# Patient Record
Sex: MALE | Race: WHITE | NOT HISPANIC OR LATINO
[De-identification: names, ages, dates, MRNs, and addresses within clinical notes are randomized per-mention and may not be internally consistent; named-entity substitution may affect disease eponyms.]

---

## 2017-01-03 ENCOUNTER — APPOINTMENT (OUTPATIENT)
Dept: CARDIOLOGY | Facility: CLINIC | Age: 82
End: 2017-01-03

## 2017-01-03 VITALS — DIASTOLIC BLOOD PRESSURE: 77 MMHG | SYSTOLIC BLOOD PRESSURE: 126 MMHG | HEART RATE: 69 BPM | OXYGEN SATURATION: 98 %

## 2017-01-03 LAB
INR PPP: 3.3 RATIO
POCT-PROTHROMBIN TIME: 39.3 SECS
QUALITY CONTROL: YES

## 2017-01-09 ENCOUNTER — APPOINTMENT (OUTPATIENT)
Dept: CARDIOLOGY | Facility: CLINIC | Age: 82
End: 2017-01-09

## 2017-01-16 ENCOUNTER — APPOINTMENT (OUTPATIENT)
Dept: CARDIOLOGY | Facility: CLINIC | Age: 82
End: 2017-01-16

## 2017-01-16 VITALS — HEART RATE: 75 BPM | OXYGEN SATURATION: 98 %

## 2017-01-16 LAB
INR PPP: 2 RATIO
POCT-PROTHROMBIN TIME: 23.6 SECS
QUALITY CONTROL: YES

## 2017-02-01 ENCOUNTER — APPOINTMENT (OUTPATIENT)
Dept: CARDIOLOGY | Facility: CLINIC | Age: 82
End: 2017-02-01

## 2017-02-01 VITALS — HEART RATE: 60 BPM | SYSTOLIC BLOOD PRESSURE: 144 MMHG | OXYGEN SATURATION: 96 % | DIASTOLIC BLOOD PRESSURE: 85 MMHG

## 2017-02-01 LAB
INR PPP: 2 RATIO
POCT-PROTHROMBIN TIME: 23.5 SECS
QUALITY CONTROL: YES

## 2017-02-15 ENCOUNTER — APPOINTMENT (OUTPATIENT)
Dept: CARDIOLOGY | Facility: CLINIC | Age: 82
End: 2017-02-15

## 2017-02-22 ENCOUNTER — APPOINTMENT (OUTPATIENT)
Dept: CARDIOLOGY | Facility: CLINIC | Age: 82
End: 2017-02-22

## 2017-02-22 VITALS — RESPIRATION RATE: 14 BRPM | HEART RATE: 65 BPM

## 2017-02-22 LAB
INR PPP: 3 RATIO
POCT-PROTHROMBIN TIME: 35.4 SECS
QUALITY CONTROL: YES

## 2017-03-08 ENCOUNTER — APPOINTMENT (OUTPATIENT)
Dept: SURGERY | Facility: CLINIC | Age: 82
End: 2017-03-08

## 2017-03-08 ENCOUNTER — EMERGENCY (EMERGENCY)
Facility: HOSPITAL | Age: 82
LOS: 1 days | Discharge: HOME | End: 2017-03-08
Admitting: EMERGENCY MEDICINE
Payer: MEDICARE

## 2017-03-08 DIAGNOSIS — Z79.01 LONG TERM (CURRENT) USE OF ANTICOAGULANTS: ICD-10-CM

## 2017-03-08 DIAGNOSIS — L02.01 CUTANEOUS ABSCESS OF FACE: ICD-10-CM

## 2017-03-08 PROCEDURE — 99284 EMERGENCY DEPT VISIT MOD MDM: CPT | Mod: 25

## 2017-03-08 PROCEDURE — 80048 BASIC METABOLIC PNL TOTAL CA: CPT

## 2017-03-08 PROCEDURE — 10021 FNA BX W/O IMG GDN 1ST LES: CPT

## 2017-03-08 PROCEDURE — 85610 PROTHROMBIN TIME: CPT

## 2017-03-08 PROCEDURE — 85027 COMPLETE CBC AUTOMATED: CPT

## 2017-03-08 PROCEDURE — 85730 THROMBOPLASTIN TIME PARTIAL: CPT

## 2017-03-08 PROCEDURE — 96374 THER/PROPH/DIAG INJ IV PUSH: CPT | Mod: XU

## 2017-03-13 ENCOUNTER — APPOINTMENT (OUTPATIENT)
Dept: CARDIOLOGY | Facility: CLINIC | Age: 82
End: 2017-03-13

## 2017-03-13 VITALS — OXYGEN SATURATION: 98 % | HEART RATE: 56 BPM

## 2017-03-13 LAB
INR PPP: 1.9 RATIO
POCT-PROTHROMBIN TIME: 22.2 SECS
QUALITY CONTROL: YES

## 2017-03-20 ENCOUNTER — APPOINTMENT (OUTPATIENT)
Dept: CARDIOLOGY | Facility: CLINIC | Age: 82
End: 2017-03-20

## 2017-03-20 VITALS — OXYGEN SATURATION: 97 % | RESPIRATION RATE: 14 BRPM

## 2017-03-20 LAB
INR PPP: 1.9 RATIO
POCT-PROTHROMBIN TIME: 22.3 SECS
QUALITY CONTROL: YES

## 2017-03-22 ENCOUNTER — LABORATORY RESULT (OUTPATIENT)
Age: 82
End: 2017-03-22

## 2017-03-27 ENCOUNTER — APPOINTMENT (OUTPATIENT)
Dept: CARDIOLOGY | Facility: CLINIC | Age: 82
End: 2017-03-27

## 2017-03-27 VITALS — OXYGEN SATURATION: 96 % | HEART RATE: 57 BPM

## 2017-03-28 LAB
INR PPP: 1.6 RATIO
POCT-PROTHROMBIN TIME: 18.7 SECS
QUALITY CONTROL: YES

## 2017-04-03 ENCOUNTER — APPOINTMENT (OUTPATIENT)
Dept: CARDIOLOGY | Facility: CLINIC | Age: 82
End: 2017-04-03

## 2017-04-03 VITALS — OXYGEN SATURATION: 95 % | HEART RATE: 72 BPM

## 2017-04-03 LAB
INR PPP: 1.6 RATIO
POCT-PROTHROMBIN TIME: 18.9 SECS
QUALITY CONTROL: YES

## 2017-04-10 ENCOUNTER — APPOINTMENT (OUTPATIENT)
Dept: CARDIOLOGY | Facility: CLINIC | Age: 82
End: 2017-04-10

## 2017-04-10 LAB
INR PPP: 1.6 RATIO
POCT-PROTHROMBIN TIME: 19.6 SECS
QUALITY CONTROL: YES

## 2017-04-19 ENCOUNTER — APPOINTMENT (OUTPATIENT)
Dept: CARDIOLOGY | Facility: CLINIC | Age: 82
End: 2017-04-19

## 2017-04-19 VITALS — HEART RATE: 62 BPM | OXYGEN SATURATION: 96 %

## 2017-04-19 LAB
INR PPP: 3.1 RATIO
POCT-PROTHROMBIN TIME: 37.7 SECS
QUALITY CONTROL: YES

## 2017-04-24 ENCOUNTER — NON-APPOINTMENT (OUTPATIENT)
Age: 82
End: 2017-04-24

## 2017-04-24 ENCOUNTER — APPOINTMENT (OUTPATIENT)
Dept: CARDIOLOGY | Facility: CLINIC | Age: 82
End: 2017-04-24

## 2017-04-24 VITALS
BODY MASS INDEX: 23.54 KG/M2 | DIASTOLIC BLOOD PRESSURE: 73 MMHG | HEART RATE: 66 BPM | HEIGHT: 67 IN | OXYGEN SATURATION: 97 % | WEIGHT: 150 LBS | RESPIRATION RATE: 16 BRPM | SYSTOLIC BLOOD PRESSURE: 163 MMHG

## 2017-04-24 VITALS — DIASTOLIC BLOOD PRESSURE: 70 MMHG | SYSTOLIC BLOOD PRESSURE: 142 MMHG

## 2017-05-14 ENCOUNTER — RX RENEWAL (OUTPATIENT)
Age: 82
End: 2017-05-14

## 2017-05-15 ENCOUNTER — APPOINTMENT (OUTPATIENT)
Dept: CARDIOLOGY | Facility: CLINIC | Age: 82
End: 2017-05-15

## 2017-05-15 ENCOUNTER — RX RENEWAL (OUTPATIENT)
Age: 82
End: 2017-05-15

## 2017-05-15 VITALS — HEART RATE: 74 BPM | OXYGEN SATURATION: 96 %

## 2017-05-15 LAB
INR PPP: 3.6 RATIO
POCT-PROTHROMBIN TIME: 42.9 SECS
QUALITY CONTROL: YES

## 2017-05-22 ENCOUNTER — APPOINTMENT (OUTPATIENT)
Dept: CARDIOLOGY | Facility: CLINIC | Age: 82
End: 2017-05-22

## 2017-05-22 VITALS — HEART RATE: 65 BPM | OXYGEN SATURATION: 97 %

## 2017-05-22 LAB
INR PPP: 2.5 RATIO
POCT-PROTHROMBIN TIME: 30.4 SECS
QUALITY CONTROL: YES

## 2017-06-12 ENCOUNTER — APPOINTMENT (OUTPATIENT)
Dept: CARDIOLOGY | Facility: CLINIC | Age: 82
End: 2017-06-12

## 2017-06-12 VITALS — RESPIRATION RATE: 14 BRPM | OXYGEN SATURATION: 97 %

## 2017-06-12 LAB
INR PPP: 3.1 RATIO
POCT-PROTHROMBIN TIME: 37 SECS
QUALITY CONTROL: YES

## 2017-07-03 ENCOUNTER — RX RENEWAL (OUTPATIENT)
Age: 82
End: 2017-07-03

## 2017-07-03 ENCOUNTER — APPOINTMENT (OUTPATIENT)
Dept: CARDIOLOGY | Facility: CLINIC | Age: 82
End: 2017-07-03

## 2017-07-05 LAB
INR PPP: 3.5 RATIO
POCT-PROTHROMBIN TIME: 41.7 SECS
QUALITY CONTROL: YES

## 2017-07-09 ENCOUNTER — RX RENEWAL (OUTPATIENT)
Age: 82
End: 2017-07-09

## 2017-07-24 ENCOUNTER — APPOINTMENT (OUTPATIENT)
Dept: CARDIOLOGY | Facility: CLINIC | Age: 82
End: 2017-07-24

## 2017-07-25 LAB
INR PPP: 2.9 RATIO
POCT-PROTHROMBIN TIME: 35 SECS
QUALITY CONTROL: YES

## 2017-08-14 ENCOUNTER — APPOINTMENT (OUTPATIENT)
Dept: CARDIOLOGY | Facility: CLINIC | Age: 82
End: 2017-08-14
Payer: MEDICARE

## 2017-08-14 ENCOUNTER — APPOINTMENT (OUTPATIENT)
Dept: CARDIOLOGY | Facility: CLINIC | Age: 82
End: 2017-08-14

## 2017-08-14 VITALS — RESPIRATION RATE: 14 BRPM | OXYGEN SATURATION: 97 %

## 2017-08-14 LAB
INR PPP: 3 RATIO
POCT-PROTHROMBIN TIME: 36.4 SECS
QUALITY CONTROL: YES

## 2017-08-14 PROCEDURE — 85610 PROTHROMBIN TIME: CPT | Mod: QW

## 2017-08-14 PROCEDURE — 99211 OFF/OP EST MAY X REQ PHY/QHP: CPT

## 2017-09-05 ENCOUNTER — RX RENEWAL (OUTPATIENT)
Age: 82
End: 2017-09-05

## 2017-09-10 ENCOUNTER — RESULT CHARGE (OUTPATIENT)
Age: 82
End: 2017-09-10

## 2017-09-11 ENCOUNTER — APPOINTMENT (OUTPATIENT)
Dept: CARDIOLOGY | Facility: CLINIC | Age: 82
End: 2017-09-11
Payer: MEDICARE

## 2017-09-11 ENCOUNTER — NON-APPOINTMENT (OUTPATIENT)
Age: 82
End: 2017-09-11

## 2017-09-11 VITALS
BODY MASS INDEX: 23.54 KG/M2 | HEART RATE: 58 BPM | SYSTOLIC BLOOD PRESSURE: 161 MMHG | HEIGHT: 67 IN | WEIGHT: 150 LBS | RESPIRATION RATE: 16 BRPM | OXYGEN SATURATION: 96 % | DIASTOLIC BLOOD PRESSURE: 72 MMHG

## 2017-09-11 VITALS — DIASTOLIC BLOOD PRESSURE: 72 MMHG | SYSTOLIC BLOOD PRESSURE: 124 MMHG

## 2017-09-11 LAB
INR PPP: 3.5 RATIO
POCT-PROTHROMBIN TIME: 41.6 SECS
QUALITY CONTROL: YES

## 2017-09-11 PROCEDURE — 99214 OFFICE O/P EST MOD 30 MIN: CPT

## 2017-09-11 PROCEDURE — 85610 PROTHROMBIN TIME: CPT | Mod: QW

## 2017-09-11 PROCEDURE — 93000 ELECTROCARDIOGRAM COMPLETE: CPT

## 2017-10-02 ENCOUNTER — APPOINTMENT (OUTPATIENT)
Dept: CARDIOLOGY | Facility: CLINIC | Age: 82
End: 2017-10-02

## 2017-10-02 ENCOUNTER — RX RENEWAL (OUTPATIENT)
Age: 82
End: 2017-10-02

## 2017-10-04 ENCOUNTER — APPOINTMENT (OUTPATIENT)
Age: 82
End: 2017-10-04
Payer: MEDICARE

## 2017-10-04 LAB
INR PPP: 3.4 RATIO
POCT-PROTHROMBIN TIME: 40.7 SECS
QUALITY CONTROL: YES

## 2017-10-04 PROCEDURE — 99211 OFF/OP EST MAY X REQ PHY/QHP: CPT

## 2017-10-04 PROCEDURE — 85610 PROTHROMBIN TIME: CPT | Mod: QW

## 2017-10-08 ENCOUNTER — RX RENEWAL (OUTPATIENT)
Age: 82
End: 2017-10-08

## 2017-10-23 ENCOUNTER — APPOINTMENT (OUTPATIENT)
Dept: CARDIOLOGY | Facility: CLINIC | Age: 82
End: 2017-10-23
Payer: MEDICARE

## 2017-10-23 LAB
INR PPP: 3.2 RATIO
POCT-PROTHROMBIN TIME: 38.5 SECS
QUALITY CONTROL: YES

## 2017-10-23 PROCEDURE — 85610 PROTHROMBIN TIME: CPT | Mod: QW

## 2017-10-23 PROCEDURE — 99211 OFF/OP EST MAY X REQ PHY/QHP: CPT

## 2017-11-01 ENCOUNTER — RX RENEWAL (OUTPATIENT)
Age: 82
End: 2017-11-01

## 2017-11-04 ENCOUNTER — RX RENEWAL (OUTPATIENT)
Age: 82
End: 2017-11-04

## 2017-11-13 ENCOUNTER — APPOINTMENT (OUTPATIENT)
Dept: CARDIOLOGY | Facility: CLINIC | Age: 82
End: 2017-11-13
Payer: MEDICARE

## 2017-11-13 VITALS — OXYGEN SATURATION: 96 % | HEART RATE: 64 BPM

## 2017-11-13 LAB
INR PPP: 3.1 RATIO
POCT-PROTHROMBIN TIME: 36.7 SECS
QUALITY CONTROL: YES

## 2017-11-13 PROCEDURE — 85610 PROTHROMBIN TIME: CPT | Mod: QW

## 2017-11-13 PROCEDURE — 99211 OFF/OP EST MAY X REQ PHY/QHP: CPT

## 2017-11-30 ENCOUNTER — RX RENEWAL (OUTPATIENT)
Age: 82
End: 2017-11-30

## 2017-12-11 ENCOUNTER — APPOINTMENT (OUTPATIENT)
Dept: CARDIOLOGY | Facility: CLINIC | Age: 82
End: 2017-12-11
Payer: MEDICARE

## 2017-12-11 VITALS — HEART RATE: 57 BPM | OXYGEN SATURATION: 97 %

## 2017-12-11 LAB
INR PPP: 4.2 RATIO
POCT-PROTHROMBIN TIME: 50.1 SECS
QUALITY CONTROL: YES

## 2017-12-11 PROCEDURE — 99211 OFF/OP EST MAY X REQ PHY/QHP: CPT

## 2017-12-11 PROCEDURE — 85610 PROTHROMBIN TIME: CPT | Mod: QW

## 2017-12-26 ENCOUNTER — APPOINTMENT (OUTPATIENT)
Dept: CARDIOLOGY | Facility: CLINIC | Age: 82
End: 2017-12-26
Payer: MEDICARE

## 2017-12-26 VITALS — OXYGEN SATURATION: 95 % | HEART RATE: 74 BPM

## 2017-12-26 LAB
INR PPP: 2.4 RATIO
POCT-PROTHROMBIN TIME: 29.2 SECS
QUALITY CONTROL: YES

## 2017-12-26 PROCEDURE — 99211 OFF/OP EST MAY X REQ PHY/QHP: CPT

## 2017-12-26 PROCEDURE — 85610 PROTHROMBIN TIME: CPT | Mod: QW

## 2018-01-02 ENCOUNTER — RX RENEWAL (OUTPATIENT)
Age: 83
End: 2018-01-02

## 2018-01-08 ENCOUNTER — APPOINTMENT (OUTPATIENT)
Dept: CARDIOLOGY | Facility: CLINIC | Age: 83
End: 2018-01-08

## 2018-01-14 ENCOUNTER — RX RENEWAL (OUTPATIENT)
Age: 83
End: 2018-01-14

## 2018-01-15 ENCOUNTER — APPOINTMENT (OUTPATIENT)
Dept: CARDIOLOGY | Facility: CLINIC | Age: 83
End: 2018-01-15
Payer: MEDICARE

## 2018-01-15 ENCOUNTER — NON-APPOINTMENT (OUTPATIENT)
Age: 83
End: 2018-01-15

## 2018-01-15 VITALS
HEART RATE: 61 BPM | HEIGHT: 67 IN | BODY MASS INDEX: 23.07 KG/M2 | SYSTOLIC BLOOD PRESSURE: 147 MMHG | OXYGEN SATURATION: 98 % | DIASTOLIC BLOOD PRESSURE: 76 MMHG | RESPIRATION RATE: 16 BRPM | WEIGHT: 147 LBS

## 2018-01-15 LAB
INR PPP: 2.3 RATIO
POCT-PROTHROMBIN TIME: 28.1 SECS
QUALITY CONTROL: YES

## 2018-01-15 PROCEDURE — 99214 OFFICE O/P EST MOD 30 MIN: CPT

## 2018-01-15 PROCEDURE — 85610 PROTHROMBIN TIME: CPT | Mod: QW

## 2018-01-15 PROCEDURE — 93000 ELECTROCARDIOGRAM COMPLETE: CPT

## 2018-01-15 PROCEDURE — 93306 TTE W/DOPPLER COMPLETE: CPT

## 2018-01-22 ENCOUNTER — APPOINTMENT (OUTPATIENT)
Dept: CARDIOLOGY | Facility: CLINIC | Age: 83
End: 2018-01-22
Payer: MEDICARE

## 2018-01-22 VITALS — RESPIRATION RATE: 14 BRPM | HEART RATE: 66 BPM

## 2018-01-22 LAB
INR PPP: 3 RATIO
POCT-PROTHROMBIN TIME: 35.9 SECS
QUALITY CONTROL: YES

## 2018-01-22 PROCEDURE — 99211 OFF/OP EST MAY X REQ PHY/QHP: CPT

## 2018-01-22 PROCEDURE — 85610 PROTHROMBIN TIME: CPT | Mod: QW

## 2018-01-27 ENCOUNTER — RX RENEWAL (OUTPATIENT)
Age: 83
End: 2018-01-27

## 2018-02-20 ENCOUNTER — APPOINTMENT (OUTPATIENT)
Dept: CARDIOLOGY | Facility: CLINIC | Age: 83
End: 2018-02-20
Payer: MEDICARE

## 2018-02-20 PROCEDURE — 99211 OFF/OP EST MAY X REQ PHY/QHP: CPT

## 2018-02-20 PROCEDURE — 85610 PROTHROMBIN TIME: CPT | Mod: QW

## 2018-02-22 LAB
INR PPP: 2.5 RATIO
POCT-PROTHROMBIN TIME: 29.7 SECS
QUALITY CONTROL: YES

## 2018-02-25 ENCOUNTER — RX RENEWAL (OUTPATIENT)
Age: 83
End: 2018-02-25

## 2018-03-12 ENCOUNTER — APPOINTMENT (OUTPATIENT)
Dept: CARDIOLOGY | Facility: CLINIC | Age: 83
End: 2018-03-12

## 2018-03-19 ENCOUNTER — APPOINTMENT (OUTPATIENT)
Dept: CARDIOLOGY | Facility: CLINIC | Age: 83
End: 2018-03-19
Payer: MEDICARE

## 2018-03-19 VITALS — OXYGEN SATURATION: 95 % | HEART RATE: 66 BPM

## 2018-03-19 LAB
INR PPP: 2.4 RATIO
POCT-PROTHROMBIN TIME: 29.4 SECS
QUALITY CONTROL: NO

## 2018-03-19 PROCEDURE — 85610 PROTHROMBIN TIME: CPT | Mod: QW

## 2018-03-19 PROCEDURE — 99211 OFF/OP EST MAY X REQ PHY/QHP: CPT

## 2018-03-25 ENCOUNTER — RX RENEWAL (OUTPATIENT)
Age: 83
End: 2018-03-25

## 2018-04-08 ENCOUNTER — RX RENEWAL (OUTPATIENT)
Age: 83
End: 2018-04-08

## 2018-04-09 ENCOUNTER — APPOINTMENT (OUTPATIENT)
Dept: CARDIOLOGY | Facility: CLINIC | Age: 83
End: 2018-04-09
Payer: MEDICARE

## 2018-04-09 VITALS — HEART RATE: 60 BPM | OXYGEN SATURATION: 94 %

## 2018-04-09 LAB
INR PPP: 1.8 RATIO
POCT-PROTHROMBIN TIME: 21.3 SECS
QUALITY CONTROL: YES

## 2018-04-09 PROCEDURE — 85610 PROTHROMBIN TIME: CPT | Mod: QW

## 2018-04-09 PROCEDURE — 99211 OFF/OP EST MAY X REQ PHY/QHP: CPT

## 2018-04-16 ENCOUNTER — APPOINTMENT (OUTPATIENT)
Dept: CARDIOLOGY | Facility: CLINIC | Age: 83
End: 2018-04-16
Payer: MEDICARE

## 2018-04-16 LAB
INR PPP: 4.8 RATIO
POCT-PROTHROMBIN TIME: 57.8 SECS
QUALITY CONTROL: YES

## 2018-04-16 PROCEDURE — 99211 OFF/OP EST MAY X REQ PHY/QHP: CPT

## 2018-04-16 PROCEDURE — 85610 PROTHROMBIN TIME: CPT | Mod: QW

## 2018-04-23 ENCOUNTER — APPOINTMENT (OUTPATIENT)
Dept: CARDIOLOGY | Facility: CLINIC | Age: 83
End: 2018-04-23
Payer: MEDICARE

## 2018-04-23 VITALS — HEART RATE: 76 BPM | OXYGEN SATURATION: 96 %

## 2018-04-23 LAB
INR PPP: 6.5 RATIO
POCT-PROTHROMBIN TIME: 77.5 SECS
QUALITY CONTROL: YES

## 2018-04-23 PROCEDURE — 85610 PROTHROMBIN TIME: CPT | Mod: QW

## 2018-04-23 PROCEDURE — 99211 OFF/OP EST MAY X REQ PHY/QHP: CPT

## 2018-04-26 ENCOUNTER — APPOINTMENT (OUTPATIENT)
Dept: CARDIOLOGY | Facility: CLINIC | Age: 83
End: 2018-04-26
Payer: MEDICARE

## 2018-04-26 LAB
INR PPP: 1.6 RATIO
POCT-PROTHROMBIN TIME: 19.1 SECS
QUALITY CONTROL: YES

## 2018-04-26 PROCEDURE — 85610 PROTHROMBIN TIME: CPT | Mod: QW

## 2018-04-26 PROCEDURE — 99211 OFF/OP EST MAY X REQ PHY/QHP: CPT

## 2018-04-30 ENCOUNTER — LABORATORY RESULT (OUTPATIENT)
Age: 83
End: 2018-04-30

## 2018-05-14 ENCOUNTER — APPOINTMENT (OUTPATIENT)
Dept: CARDIOLOGY | Facility: CLINIC | Age: 83
End: 2018-05-14
Payer: MEDICARE

## 2018-05-14 ENCOUNTER — NON-APPOINTMENT (OUTPATIENT)
Age: 83
End: 2018-05-14

## 2018-05-14 VITALS
SYSTOLIC BLOOD PRESSURE: 106 MMHG | BODY MASS INDEX: 22.44 KG/M2 | HEIGHT: 67 IN | HEART RATE: 61 BPM | DIASTOLIC BLOOD PRESSURE: 64 MMHG | OXYGEN SATURATION: 96 % | WEIGHT: 143 LBS | RESPIRATION RATE: 16 BRPM

## 2018-05-14 LAB
INR PPP: 2 RATIO
POCT-PROTHROMBIN TIME: 24 SECS
QUALITY CONTROL: YES

## 2018-05-14 PROCEDURE — 85610 PROTHROMBIN TIME: CPT | Mod: QW

## 2018-05-14 PROCEDURE — 93000 ELECTROCARDIOGRAM COMPLETE: CPT

## 2018-05-14 PROCEDURE — 99214 OFFICE O/P EST MOD 30 MIN: CPT

## 2018-05-20 ENCOUNTER — RX RENEWAL (OUTPATIENT)
Age: 83
End: 2018-05-20

## 2018-05-21 ENCOUNTER — MEDICATION RENEWAL (OUTPATIENT)
Age: 83
End: 2018-05-21

## 2018-05-22 ENCOUNTER — APPOINTMENT (OUTPATIENT)
Dept: CARDIOLOGY | Facility: CLINIC | Age: 83
End: 2018-05-22
Payer: MEDICARE

## 2018-05-22 LAB
INR PPP: 5.3 RATIO
POCT-PROTHROMBIN TIME: 63.8 SECS
QUALITY CONTROL: YES

## 2018-05-22 PROCEDURE — 85610 PROTHROMBIN TIME: CPT | Mod: QW

## 2018-05-22 PROCEDURE — 99211 OFF/OP EST MAY X REQ PHY/QHP: CPT

## 2018-05-25 ENCOUNTER — APPOINTMENT (OUTPATIENT)
Dept: CARDIOLOGY | Facility: CLINIC | Age: 83
End: 2018-05-25
Payer: MEDICARE

## 2018-05-25 LAB
INR PPP: 2.6 RATIO
POCT-PROTHROMBIN TIME: 30.7 SECS
QUALITY CONTROL: YES

## 2018-05-25 PROCEDURE — 99211 OFF/OP EST MAY X REQ PHY/QHP: CPT

## 2018-05-25 PROCEDURE — 85610 PROTHROMBIN TIME: CPT | Mod: QW

## 2018-05-29 ENCOUNTER — RX RENEWAL (OUTPATIENT)
Age: 83
End: 2018-05-29

## 2018-06-11 ENCOUNTER — APPOINTMENT (OUTPATIENT)
Dept: CARDIOLOGY | Facility: CLINIC | Age: 83
End: 2018-06-11
Payer: MEDICARE

## 2018-06-11 PROCEDURE — 85610 PROTHROMBIN TIME: CPT | Mod: QW

## 2018-06-11 PROCEDURE — 99211 OFF/OP EST MAY X REQ PHY/QHP: CPT

## 2018-06-14 ENCOUNTER — APPOINTMENT (OUTPATIENT)
Dept: CARDIOLOGY | Facility: CLINIC | Age: 83
End: 2018-06-14
Payer: MEDICARE

## 2018-06-14 LAB
INR PPP: 2.1 RATIO
POCT-PROTHROMBIN TIME: 24.6 SECS
QUALITY CONTROL: NO

## 2018-06-14 PROCEDURE — 85610 PROTHROMBIN TIME: CPT | Mod: QW

## 2018-06-14 PROCEDURE — 99211 OFF/OP EST MAY X REQ PHY/QHP: CPT

## 2018-06-19 LAB
INR PPP: 2.6 RATIO
POCT-PROTHROMBIN TIME: 67.2 SECS
QUALITY CONTROL: YES

## 2018-06-25 ENCOUNTER — APPOINTMENT (OUTPATIENT)
Dept: CARDIOLOGY | Facility: CLINIC | Age: 83
End: 2018-06-25
Payer: MEDICARE

## 2018-06-25 VITALS — HEART RATE: 74 BPM | RESPIRATION RATE: 16 BRPM

## 2018-06-25 LAB
INR PPP: 4.7 RATIO
POCT-PROTHROMBIN TIME: 56.2 SECS
QUALITY CONTROL: YES

## 2018-06-25 PROCEDURE — 93793 ANTICOAG MGMT PT WARFARIN: CPT

## 2018-06-25 PROCEDURE — 85610 PROTHROMBIN TIME: CPT | Mod: QW

## 2018-07-01 ENCOUNTER — RX RENEWAL (OUTPATIENT)
Age: 83
End: 2018-07-01

## 2018-07-02 ENCOUNTER — APPOINTMENT (OUTPATIENT)
Dept: CARDIOLOGY | Facility: CLINIC | Age: 83
End: 2018-07-02
Payer: MEDICARE

## 2018-07-02 VITALS — HEART RATE: 63 BPM | OXYGEN SATURATION: 96 %

## 2018-07-02 LAB
INR PPP: 2.2 RATIO
POCT-PROTHROMBIN TIME: 26.2 SECS
QUALITY CONTROL: YES

## 2018-07-02 PROCEDURE — 93793 ANTICOAG MGMT PT WARFARIN: CPT

## 2018-07-02 PROCEDURE — 85610 PROTHROMBIN TIME: CPT | Mod: QW

## 2018-07-16 ENCOUNTER — APPOINTMENT (OUTPATIENT)
Dept: CARDIOLOGY | Facility: CLINIC | Age: 83
End: 2018-07-16
Payer: MEDICARE

## 2018-07-16 VITALS — OXYGEN SATURATION: 96 % | RESPIRATION RATE: 16 BRPM

## 2018-07-16 LAB
INR PPP: 4 RATIO
POCT-PROTHROMBIN TIME: 48.5 SECS
QUALITY CONTROL: YES

## 2018-07-16 PROCEDURE — 93793 ANTICOAG MGMT PT WARFARIN: CPT

## 2018-07-16 PROCEDURE — 85610 PROTHROMBIN TIME: CPT | Mod: QW

## 2018-07-22 ENCOUNTER — RX RENEWAL (OUTPATIENT)
Age: 83
End: 2018-07-22

## 2018-07-30 ENCOUNTER — APPOINTMENT (OUTPATIENT)
Dept: CARDIOLOGY | Facility: CLINIC | Age: 83
End: 2018-07-30
Payer: MEDICARE

## 2018-07-30 VITALS — OXYGEN SATURATION: 97 % | DIASTOLIC BLOOD PRESSURE: 74 MMHG | SYSTOLIC BLOOD PRESSURE: 158 MMHG | HEART RATE: 78 BPM

## 2018-07-30 LAB
INR PPP: 3.2 RATIO
POCT-PROTHROMBIN TIME: 38.7 SECS
QUALITY CONTROL: YES

## 2018-07-30 PROCEDURE — 93793 ANTICOAG MGMT PT WARFARIN: CPT

## 2018-07-30 PROCEDURE — 85610 PROTHROMBIN TIME: CPT | Mod: QW

## 2018-08-20 ENCOUNTER — APPOINTMENT (OUTPATIENT)
Dept: CARDIOLOGY | Facility: CLINIC | Age: 83
End: 2018-08-20
Payer: MEDICARE

## 2018-08-20 VITALS — HEART RATE: 59 BPM | RESPIRATION RATE: 16 BRPM

## 2018-08-20 LAB
INR PPP: 3.9 RATIO
POCT-PROTHROMBIN TIME: 46.7 SECS
QUALITY CONTROL: YES

## 2018-08-20 PROCEDURE — 93793 ANTICOAG MGMT PT WARFARIN: CPT

## 2018-08-20 PROCEDURE — 85610 PROTHROMBIN TIME: CPT | Mod: QW

## 2018-09-04 ENCOUNTER — APPOINTMENT (OUTPATIENT)
Dept: CARDIOLOGY | Facility: CLINIC | Age: 83
End: 2018-09-04
Payer: MEDICARE

## 2018-09-04 VITALS — OXYGEN SATURATION: 97 % | HEART RATE: 71 BPM

## 2018-09-04 LAB
INR PPP: 1.9 RATIO
POCT-PROTHROMBIN TIME: 22.2 SECS
QUALITY CONTROL: YES

## 2018-09-04 PROCEDURE — 85610 PROTHROMBIN TIME: CPT | Mod: QW

## 2018-09-04 PROCEDURE — 93793 ANTICOAG MGMT PT WARFARIN: CPT

## 2018-09-16 ENCOUNTER — RX RENEWAL (OUTPATIENT)
Age: 83
End: 2018-09-16

## 2018-09-17 ENCOUNTER — APPOINTMENT (OUTPATIENT)
Dept: CARDIOLOGY | Facility: CLINIC | Age: 83
End: 2018-09-17
Payer: MEDICARE

## 2018-09-17 ENCOUNTER — NON-APPOINTMENT (OUTPATIENT)
Age: 83
End: 2018-09-17

## 2018-09-17 VITALS
RESPIRATION RATE: 15 BRPM | OXYGEN SATURATION: 97 % | WEIGHT: 144 LBS | HEIGHT: 67 IN | BODY MASS INDEX: 22.6 KG/M2 | SYSTOLIC BLOOD PRESSURE: 126 MMHG | DIASTOLIC BLOOD PRESSURE: 64 MMHG | HEART RATE: 62 BPM

## 2018-09-17 LAB
INR PPP: 3.7 RATIO
POCT-PROTHROMBIN TIME: 44.5 SECS
QUALITY CONTROL: YES

## 2018-09-17 PROCEDURE — 99214 OFFICE O/P EST MOD 30 MIN: CPT | Mod: 25

## 2018-09-17 PROCEDURE — 93000 ELECTROCARDIOGRAM COMPLETE: CPT

## 2018-09-17 PROCEDURE — 90662 IIV NO PRSV INCREASED AG IM: CPT

## 2018-09-17 PROCEDURE — 85610 PROTHROMBIN TIME: CPT | Mod: QW

## 2018-09-17 PROCEDURE — G0008: CPT

## 2018-09-18 ENCOUNTER — RX RENEWAL (OUTPATIENT)
Age: 83
End: 2018-09-18

## 2018-09-26 ENCOUNTER — APPOINTMENT (OUTPATIENT)
Dept: CARDIOLOGY | Facility: CLINIC | Age: 83
End: 2018-09-26
Payer: MEDICARE

## 2018-09-26 LAB
INR PPP: 3.4 RATIO
POCT-PROTHROMBIN TIME: 40.5 SECS
QUALITY CONTROL: YES

## 2018-09-26 PROCEDURE — 93793 ANTICOAG MGMT PT WARFARIN: CPT

## 2018-09-26 PROCEDURE — 85610 PROTHROMBIN TIME: CPT | Mod: QW

## 2018-10-15 ENCOUNTER — APPOINTMENT (OUTPATIENT)
Dept: CARDIOLOGY | Facility: CLINIC | Age: 83
End: 2018-10-15
Payer: MEDICARE

## 2018-10-15 ENCOUNTER — RX RENEWAL (OUTPATIENT)
Age: 83
End: 2018-10-15

## 2018-10-15 VITALS — HEART RATE: 95 BPM | RESPIRATION RATE: 16 BRPM

## 2018-10-15 LAB
INR PPP: 5.8 RATIO
POCT-PROTHROMBIN TIME: 69.9 SECS
QUALITY CONTROL: YES

## 2018-10-15 PROCEDURE — 93793 ANTICOAG MGMT PT WARFARIN: CPT

## 2018-10-15 PROCEDURE — 85610 PROTHROMBIN TIME: CPT | Mod: QW

## 2018-10-18 ENCOUNTER — APPOINTMENT (OUTPATIENT)
Dept: CARDIOLOGY | Facility: CLINIC | Age: 83
End: 2018-10-18
Payer: MEDICARE

## 2018-10-18 LAB
INR PPP: 1.6 RATIO
POCT-PROTHROMBIN TIME: 18.6 SECS
QUALITY CONTROL: YES

## 2018-10-18 PROCEDURE — 85610 PROTHROMBIN TIME: CPT | Mod: QW

## 2018-10-18 PROCEDURE — 93793 ANTICOAG MGMT PT WARFARIN: CPT

## 2018-10-25 ENCOUNTER — APPOINTMENT (OUTPATIENT)
Dept: CARDIOLOGY | Facility: CLINIC | Age: 83
End: 2018-10-25
Payer: MEDICARE

## 2018-10-25 LAB
INR PPP: 1.9 RATIO
POCT-PROTHROMBIN TIME: 22.7 SECS
QUALITY CONTROL: YES

## 2018-10-25 PROCEDURE — 93793 ANTICOAG MGMT PT WARFARIN: CPT

## 2018-10-25 PROCEDURE — 85610 PROTHROMBIN TIME: CPT | Mod: QW

## 2018-11-01 ENCOUNTER — APPOINTMENT (OUTPATIENT)
Dept: CARDIOLOGY | Facility: CLINIC | Age: 83
End: 2018-11-01
Payer: MEDICARE

## 2018-11-01 LAB
INR PPP: 2.7 RATIO
POCT-PROTHROMBIN TIME: 31.9 SECS
QUALITY CONTROL: YES

## 2018-11-01 PROCEDURE — 85610 PROTHROMBIN TIME: CPT | Mod: QW

## 2018-11-01 PROCEDURE — 93793 ANTICOAG MGMT PT WARFARIN: CPT

## 2018-11-19 ENCOUNTER — APPOINTMENT (OUTPATIENT)
Dept: CARDIOLOGY | Facility: CLINIC | Age: 83
End: 2018-11-19
Payer: MEDICARE

## 2018-11-19 VITALS — OXYGEN SATURATION: 98 % | HEART RATE: 65 BPM

## 2018-11-19 LAB
INR PPP: 3.6 RATIO
POCT-PROTHROMBIN TIME: 43.6 SECS
QUALITY CONTROL: NO

## 2018-11-19 PROCEDURE — 85610 PROTHROMBIN TIME: CPT | Mod: QW

## 2018-11-19 PROCEDURE — 93793 ANTICOAG MGMT PT WARFARIN: CPT

## 2018-12-05 ENCOUNTER — RX RENEWAL (OUTPATIENT)
Age: 83
End: 2018-12-05

## 2018-12-10 ENCOUNTER — APPOINTMENT (OUTPATIENT)
Dept: CARDIOLOGY | Facility: CLINIC | Age: 83
End: 2018-12-10

## 2018-12-10 ENCOUNTER — APPOINTMENT (OUTPATIENT)
Dept: CARDIOLOGY | Facility: CLINIC | Age: 83
End: 2018-12-10
Payer: MEDICARE

## 2018-12-10 VITALS — SYSTOLIC BLOOD PRESSURE: 138 MMHG | OXYGEN SATURATION: 95 % | DIASTOLIC BLOOD PRESSURE: 82 MMHG | HEART RATE: 60 BPM

## 2018-12-10 LAB
INR PPP: 4.1 RATIO
POCT-PROTHROMBIN TIME: 49.1 SECS
QUALITY CONTROL: YES

## 2018-12-10 PROCEDURE — 85610 PROTHROMBIN TIME: CPT | Mod: QW

## 2018-12-10 PROCEDURE — G0250: CPT

## 2018-12-17 ENCOUNTER — APPOINTMENT (OUTPATIENT)
Dept: CARDIOLOGY | Facility: CLINIC | Age: 83
End: 2018-12-17
Payer: MEDICARE

## 2018-12-17 VITALS — OXYGEN SATURATION: 100 % | HEART RATE: 64 BPM

## 2018-12-17 LAB
INR PPP: 3.3 RATIO
POCT-PROTHROMBIN TIME: 39.5 SECS
QUALITY CONTROL: YES

## 2018-12-17 PROCEDURE — 93793 ANTICOAG MGMT PT WARFARIN: CPT

## 2018-12-17 PROCEDURE — 85610 PROTHROMBIN TIME: CPT | Mod: QW

## 2018-12-31 ENCOUNTER — APPOINTMENT (OUTPATIENT)
Dept: CARDIOLOGY | Facility: CLINIC | Age: 83
End: 2018-12-31
Payer: MEDICARE

## 2018-12-31 VITALS — OXYGEN SATURATION: 97 % | DIASTOLIC BLOOD PRESSURE: 82 MMHG | SYSTOLIC BLOOD PRESSURE: 129 MMHG | HEART RATE: 74 BPM

## 2018-12-31 LAB
INR PPP: 4.1 RATIO
POCT-PROTHROMBIN TIME: 49.5 SECS
QUALITY CONTROL: YES

## 2018-12-31 PROCEDURE — 93793 ANTICOAG MGMT PT WARFARIN: CPT

## 2018-12-31 PROCEDURE — 85610 PROTHROMBIN TIME: CPT | Mod: QW

## 2019-01-02 ENCOUNTER — RX RENEWAL (OUTPATIENT)
Age: 84
End: 2019-01-02

## 2019-01-07 ENCOUNTER — APPOINTMENT (OUTPATIENT)
Dept: CARDIOLOGY | Facility: CLINIC | Age: 84
End: 2019-01-07
Payer: MEDICARE

## 2019-01-07 PROCEDURE — 93793 ANTICOAG MGMT PT WARFARIN: CPT

## 2019-01-07 PROCEDURE — 85610 PROTHROMBIN TIME: CPT | Mod: QW

## 2019-01-08 LAB
INR PPP: 3.8 RATIO
QUALITY CONTROL: YES

## 2019-01-14 ENCOUNTER — APPOINTMENT (OUTPATIENT)
Dept: CARDIOLOGY | Facility: CLINIC | Age: 84
End: 2019-01-14
Payer: MEDICARE

## 2019-01-14 PROCEDURE — 93306 TTE W/DOPPLER COMPLETE: CPT

## 2019-01-14 PROCEDURE — 93793 ANTICOAG MGMT PT WARFARIN: CPT

## 2019-01-14 PROCEDURE — 85610 PROTHROMBIN TIME: CPT | Mod: QW

## 2019-01-15 LAB
INR PPP: 2.3 RATIO
POCT-PROTHROMBIN TIME: 27.3 SECS
QUALITY CONTROL: YES

## 2019-01-16 ENCOUNTER — RX RENEWAL (OUTPATIENT)
Age: 84
End: 2019-01-16

## 2019-01-17 ENCOUNTER — APPOINTMENT (OUTPATIENT)
Dept: CARDIOLOGY | Facility: CLINIC | Age: 84
End: 2019-01-17

## 2019-01-28 ENCOUNTER — APPOINTMENT (OUTPATIENT)
Dept: CARDIOLOGY | Facility: CLINIC | Age: 84
End: 2019-01-28
Payer: MEDICARE

## 2019-01-28 ENCOUNTER — NON-APPOINTMENT (OUTPATIENT)
Age: 84
End: 2019-01-28

## 2019-01-28 VITALS — SYSTOLIC BLOOD PRESSURE: 156 MMHG | DIASTOLIC BLOOD PRESSURE: 69 MMHG

## 2019-01-28 VITALS
HEART RATE: 64 BPM | OXYGEN SATURATION: 97 % | RESPIRATION RATE: 16 BRPM | HEIGHT: 67 IN | SYSTOLIC BLOOD PRESSURE: 156 MMHG | BODY MASS INDEX: 22.6 KG/M2 | DIASTOLIC BLOOD PRESSURE: 88 MMHG | WEIGHT: 144 LBS

## 2019-01-28 LAB
INR PPP: 2.2 RATIO
POCT-PROTHROMBIN TIME: 25.9 SECS
QUALITY CONTROL: YES

## 2019-01-28 PROCEDURE — 85610 PROTHROMBIN TIME: CPT | Mod: QW

## 2019-01-28 PROCEDURE — 99214 OFFICE O/P EST MOD 30 MIN: CPT

## 2019-01-28 PROCEDURE — 93000 ELECTROCARDIOGRAM COMPLETE: CPT

## 2019-01-28 NOTE — REVIEW OF SYSTEMS
[Recent Weight Loss (___ Lbs)] : no recent weight loss [Eyeglasses] : currently wearing eyeglasses [Loss Of Hearing] : hearing loss [see HPI] : see HPI [Negative] : Heme/Lymph [FreeTextEntry2] : loss of vision in one eye

## 2019-01-28 NOTE — DISCUSSION/SUMMARY
[FreeTextEntry1] : Discussed with patient. Compliance issues Coumadin instructions given. Monitor blood pressure may need additional antihypertensive therapy. SBE prophylaxis. Lab work ordered prior to next visit in 4 months. Blood pressure may be monitored during his anticoagulation clinic visits as well

## 2019-01-28 NOTE — REASON FOR VISIT
[Hypertension] : hypertension [Medication Management] : Medication management [Prosthetic Valve] : a prosthetic valve [FreeTextEntry1] : \par 86-year-old man with history of mechanical aVR and thoracic aortic aneurysm repair, CLBBB seen for follow.\par \par  Denied any lightheadedness or dizziness, falls, chest pain or dyspnea.\par \par

## 2019-01-28 NOTE — PHYSICAL EXAM
[General Appearance - Well Developed] : well developed [Normal Appearance] : normal appearance [General Appearance - Well Nourished] : well nourished [No Deformities] : no deformities [General Appearance - In No Acute Distress] : no acute distress [Normal Conjunctiva] : the conjunctiva exhibited no abnormalities [Eyelids - No Xanthelasma] : the eyelids demonstrated no xanthelasmas [Normal Oral Mucosa] : normal oral mucosa [No Oral Pallor] : no oral pallor [No Oral Cyanosis] : no oral cyanosis [Respiration, Rhythm And Depth] : normal respiratory rhythm and effort [Exaggerated Use Of Accessory Muscles For Inspiration] : no accessory muscle use [Auscultation Breath Sounds / Voice Sounds] : lungs were clear to auscultation bilaterally [Edema] : no peripheral edema present [FreeTextEntry1] : Prosthetic second sound, good quality, soft systolic murmur, 3/6 [Abdomen Soft] : soft [Abdomen Tenderness] : non-tender [Abdomen Mass (___ Cm)] : no abdominal mass palpated [Abnormal Walk] : normal gait [Gait - Sufficient For Exercise Testing] : the gait was sufficient for exercise testing [Nail Clubbing] : no clubbing of the fingernails [Cyanosis, Localized] : no localized cyanosis [Petechial Hemorrhages (___cm)] : no petechial hemorrhages [] : no ischemic changes [Skin Color & Pigmentation] : normal skin color and pigmentation [No Venous Stasis] : no venous stasis [Affect] : the affect was normal [Mood] : the mood was normal

## 2019-01-28 NOTE — ASSESSMENT
[FreeTextEntry1] : 86-year-old man with history of mechanical aortic valve, history of A. fib LBBB hypertension history of thoracic aneurysm repair. Elevated blood pressure asymptomatic. Patient on oral anticoagulation chronically

## 2019-02-04 ENCOUNTER — APPOINTMENT (OUTPATIENT)
Dept: CARDIOLOGY | Facility: CLINIC | Age: 84
End: 2019-02-04
Payer: MEDICARE

## 2019-02-04 VITALS — RESPIRATION RATE: 16 BRPM | OXYGEN SATURATION: 96 %

## 2019-02-04 LAB
INR PPP: 1.5 RATIO
POCT-PROTHROMBIN TIME: 17.5 SECS
QUALITY CONTROL: YES

## 2019-02-04 PROCEDURE — 85610 PROTHROMBIN TIME: CPT | Mod: QW

## 2019-02-04 PROCEDURE — 99211 OFF/OP EST MAY X REQ PHY/QHP: CPT | Mod: 25

## 2019-02-10 ENCOUNTER — RX RENEWAL (OUTPATIENT)
Age: 84
End: 2019-02-10

## 2019-02-11 ENCOUNTER — APPOINTMENT (OUTPATIENT)
Dept: CARDIOLOGY | Facility: CLINIC | Age: 84
End: 2019-02-11
Payer: MEDICARE

## 2019-02-11 VITALS — OXYGEN SATURATION: 97 % | HEART RATE: 64 BPM

## 2019-02-11 LAB
INR PPP: 3.3 RATIO
POCT-PROTHROMBIN TIME: 40 SECS
QUALITY CONTROL: YES

## 2019-02-11 PROCEDURE — 85610 PROTHROMBIN TIME: CPT | Mod: QW

## 2019-02-11 PROCEDURE — 99211 OFF/OP EST MAY X REQ PHY/QHP: CPT | Mod: 25

## 2019-02-25 ENCOUNTER — EMERGENCY (EMERGENCY)
Facility: HOSPITAL | Age: 84
LOS: 1 days | Discharge: HOME | End: 2019-02-25
Attending: INTERNAL MEDICINE | Admitting: INTERNAL MEDICINE
Payer: MEDICARE

## 2019-02-25 ENCOUNTER — APPOINTMENT (OUTPATIENT)
Dept: CARDIOLOGY | Facility: CLINIC | Age: 84
End: 2019-02-25
Payer: MEDICARE

## 2019-02-25 VITALS
OXYGEN SATURATION: 99 % | HEIGHT: 67 IN | DIASTOLIC BLOOD PRESSURE: 100 MMHG | HEART RATE: 75 BPM | SYSTOLIC BLOOD PRESSURE: 219 MMHG | WEIGHT: 136.03 LBS | RESPIRATION RATE: 17 BRPM | TEMPERATURE: 97 F

## 2019-02-25 VITALS
HEART RATE: 67 BPM | SYSTOLIC BLOOD PRESSURE: 149 MMHG | RESPIRATION RATE: 16 BRPM | OXYGEN SATURATION: 98 % | DIASTOLIC BLOOD PRESSURE: 79 MMHG

## 2019-02-25 LAB
ALBUMIN SERPL ELPH-MCNC: 3.7 G/DL — SIGNIFICANT CHANGE UP (ref 3.3–5)
ALP SERPL-CCNC: 74 U/L — SIGNIFICANT CHANGE UP (ref 40–120)
ALT FLD-CCNC: 22 U/L DA — SIGNIFICANT CHANGE UP (ref 10–45)
ANION GAP SERPL CALC-SCNC: 7 MMOL/L — SIGNIFICANT CHANGE UP (ref 5–17)
APTT BLD: 69.1 SEC — HIGH (ref 27.5–36.3)
AST SERPL-CCNC: 21 U/L — SIGNIFICANT CHANGE UP (ref 10–40)
BASOPHILS # BLD AUTO: 0.1 K/UL — SIGNIFICANT CHANGE UP (ref 0–0.2)
BASOPHILS NFR BLD AUTO: 0.7 % — SIGNIFICANT CHANGE UP (ref 0–2)
BILIRUB SERPL-MCNC: 0.6 MG/DL — SIGNIFICANT CHANGE UP (ref 0.2–1.2)
BUN SERPL-MCNC: 28 MG/DL — HIGH (ref 7–23)
CALCIUM SERPL-MCNC: 9.2 MG/DL — SIGNIFICANT CHANGE UP (ref 8.4–10.5)
CHLORIDE SERPL-SCNC: 104 MMOL/L — SIGNIFICANT CHANGE UP (ref 96–108)
CO2 SERPL-SCNC: 29 MMOL/L — SIGNIFICANT CHANGE UP (ref 22–31)
CREAT SERPL-MCNC: 1.23 MG/DL — SIGNIFICANT CHANGE UP (ref 0.5–1.3)
EOSINOPHIL # BLD AUTO: 0.1 K/UL — SIGNIFICANT CHANGE UP (ref 0–0.5)
EOSINOPHIL NFR BLD AUTO: 2 % — SIGNIFICANT CHANGE UP (ref 0–6)
GLUCOSE SERPL-MCNC: 138 MG/DL — HIGH (ref 70–99)
HCT VFR BLD CALC: 46.2 % — SIGNIFICANT CHANGE UP (ref 39–50)
HGB BLD-MCNC: 15.9 G/DL — SIGNIFICANT CHANGE UP (ref 13–17)
INR BLD: 9.18 RATIO — CRITICAL HIGH (ref 0.88–1.16)
INR PPP: 8 RATIO
LYMPHOCYTES # BLD AUTO: 1.6 K/UL — SIGNIFICANT CHANGE UP (ref 1–3.3)
LYMPHOCYTES # BLD AUTO: 20.7 % — SIGNIFICANT CHANGE UP (ref 13–44)
MCHC RBC-ENTMCNC: 31.1 PG — SIGNIFICANT CHANGE UP (ref 27–34)
MCHC RBC-ENTMCNC: 34.3 GM/DL — SIGNIFICANT CHANGE UP (ref 32–36)
MCV RBC AUTO: 90.7 FL — SIGNIFICANT CHANGE UP (ref 80–100)
MONOCYTES # BLD AUTO: 0.8 K/UL — SIGNIFICANT CHANGE UP (ref 0–0.9)
MONOCYTES NFR BLD AUTO: 10.8 % — SIGNIFICANT CHANGE UP (ref 2–14)
NEUTROPHILS # BLD AUTO: 5 K/UL — SIGNIFICANT CHANGE UP (ref 1.8–7.4)
NEUTROPHILS NFR BLD AUTO: 65.8 % — SIGNIFICANT CHANGE UP (ref 43–77)
OB PNL STL: NEGATIVE — SIGNIFICANT CHANGE UP
PLATELET # BLD AUTO: 142 K/UL — LOW (ref 150–400)
POCT-PROTHROMBIN TIME: 96 SECS
POTASSIUM SERPL-MCNC: 3.9 MMOL/L — SIGNIFICANT CHANGE UP (ref 3.5–5.3)
POTASSIUM SERPL-SCNC: 3.9 MMOL/L — SIGNIFICANT CHANGE UP (ref 3.5–5.3)
PROT SERPL-MCNC: 7.5 G/DL — SIGNIFICANT CHANGE UP (ref 6–8.3)
PROTHROM AB SERPL-ACNC: 110.1 SEC — SIGNIFICANT CHANGE UP (ref 10–12.9)
QUALITY CONTROL: YES
RBC # BLD: 5.1 M/UL — SIGNIFICANT CHANGE UP (ref 4.2–5.8)
RBC # FLD: 13.2 % — SIGNIFICANT CHANGE UP (ref 10.3–14.5)
SODIUM SERPL-SCNC: 140 MMOL/L — SIGNIFICANT CHANGE UP (ref 135–145)
WBC # BLD: 7.6 K/UL — SIGNIFICANT CHANGE UP (ref 3.8–10.5)
WBC # FLD AUTO: 7.6 K/UL — SIGNIFICANT CHANGE UP (ref 3.8–10.5)

## 2019-02-25 PROCEDURE — 85730 THROMBOPLASTIN TIME PARTIAL: CPT

## 2019-02-25 PROCEDURE — 85027 COMPLETE CBC AUTOMATED: CPT

## 2019-02-25 PROCEDURE — 85610 PROTHROMBIN TIME: CPT | Mod: QW

## 2019-02-25 PROCEDURE — 99284 EMERGENCY DEPT VISIT MOD MDM: CPT

## 2019-02-25 PROCEDURE — 99283 EMERGENCY DEPT VISIT LOW MDM: CPT

## 2019-02-25 PROCEDURE — 80053 COMPREHEN METABOLIC PANEL: CPT

## 2019-02-25 PROCEDURE — 99211 OFF/OP EST MAY X REQ PHY/QHP: CPT | Mod: 25

## 2019-02-25 PROCEDURE — 82272 OCCULT BLD FECES 1-3 TESTS: CPT

## 2019-02-25 PROCEDURE — 85610 PROTHROMBIN TIME: CPT

## 2019-02-25 RX ORDER — PHYTONADIONE (VIT K1) 5 MG
10 TABLET ORAL ONCE
Qty: 0 | Refills: 0 | Status: DISCONTINUED | OUTPATIENT
Start: 2019-02-25 | End: 2019-02-25

## 2019-02-25 RX ORDER — PHYTONADIONE (VIT K1) 5 MG
5 TABLET ORAL ONCE
Qty: 0 | Refills: 0 | Status: COMPLETED | OUTPATIENT
Start: 2019-02-25 | End: 2019-02-25

## 2019-02-25 RX ORDER — LISINOPRIL 2.5 MG/1
20 TABLET ORAL DAILY
Qty: 0 | Refills: 0 | Status: DISCONTINUED | OUTPATIENT
Start: 2019-02-25 | End: 2019-03-01

## 2019-02-25 RX ADMIN — LISINOPRIL 20 MILLIGRAM(S): 2.5 TABLET ORAL at 14:51

## 2019-02-25 RX ADMIN — Medication 5 MILLIGRAM(S): at 16:30

## 2019-02-25 NOTE — ED PROVIDER NOTE - OBJECTIVE STATEMENT
86 year old male, PMHx of HTN, HLD, BPH, valve replacement on coumadin; presents to the ED for high INR. Patient states he went to Dr. Bergman's office this morning and was told to come into the ED for a pill because his INR was too high. Patient does not know the value. He denies chest pain, headache, abdominal pain, bleeding or other complaints. Pt feels well and is asymptomatic at present. Pt is hypertensive on presentation, states he is nervous. Takes all of his medications at night, not due for any meds. Last taken last night.

## 2019-02-25 NOTE — ED PROVIDER NOTE - CHPI ED SYMPTOMS NEG
no nausea/no numbness/no vomiting/no tingling/no chills/no weakness/no decreased eating/drinking/no dizziness/no fever/no pain

## 2019-02-25 NOTE — ED PROVIDER NOTE - ATTENDING CONTRIBUTION TO CARE
· HPI Objective Statement: 86 year old male, PMHx of HTN, HLD, BPH, valve replacement on coumadin; presents to the ED for high INR. Patient states he went to Dr. Bergman's office this morning and was told to come into the ED for a pill because his INR was too high. Patient does not know the value. He denies chest pain, headache, abdominal pain, bleeding or other complaints. Pt feels well and is asymptomatic at present. Pt is hypertensive on presentation, states he is nervous. Takes all of his medications at night, not due for any meds. Last taken last night.  Dr. Bernstein:  I have reviewed and discussed with the PA/ resident the case specifics, including the history, physical assessment, evaluation, conclusion, laboratory results, and medical plan. I agree with the contents, and conclusions. I have personally examined, and interviewed the patient.

## 2019-02-25 NOTE — ED PROVIDER NOTE - NSFOLLOWUPINSTRUCTIONS_ED_ALL_ED_FT
Do not take your coumadin x 2 days, then follow up with Dr. Bergman for repeat INR  Return to the ED for bleeding, chest pain or other concerning symptoms

## 2019-02-25 NOTE — ED ADULT NURSE NOTE - OBJECTIVE STATEMENT
86 yr old male to ED after being sent by PMD.  Pt reports that Dr Linares sent him in secondary to "his coumadin being high".  Pt denies any chest pain, dizziness, SOB, n/v/d.  Denies any blood in urine or stool. No acute resp distress noted. Resp even and unlabored. Abd soft, ND, NT. PEREA. Skin warm, dry, and intact. Pt arrives to ED with elevated pressure, reports he is nervous and he did not take his pressure meds today. Will continue to monitor.

## 2019-02-25 NOTE — ED PROVIDER NOTE - PROGRESS NOTE DETAILS
[facility] : Lipitor 10mg QD, Ramipril 10mg QD, Coumadin 4mg QD, Nadalol 40mg QD, Rapaflow 8mg QD, HCTZ 12.5mg QD PA Baseil: Pt given Vitamin K 10mg, counselled to hold coumadin x 2 days, return to Dr. Bergman for repeat INR in 48 hours. Return to ED for worsening

## 2019-02-28 ENCOUNTER — APPOINTMENT (OUTPATIENT)
Dept: CARDIOLOGY | Facility: CLINIC | Age: 84
End: 2019-02-28
Payer: MEDICARE

## 2019-02-28 PROBLEM — I10 ESSENTIAL (PRIMARY) HYPERTENSION: Chronic | Status: ACTIVE | Noted: 2019-02-25

## 2019-02-28 PROBLEM — E78.5 HYPERLIPIDEMIA, UNSPECIFIED: Chronic | Status: ACTIVE | Noted: 2019-02-25

## 2019-02-28 LAB
INR PPP: 1.3 RATIO
POCT-PROTHROMBIN TIME: 15.1 SECS
QUALITY CONTROL: YES

## 2019-02-28 PROCEDURE — 85610 PROTHROMBIN TIME: CPT | Mod: QW

## 2019-02-28 PROCEDURE — 99211 OFF/OP EST MAY X REQ PHY/QHP: CPT | Mod: 25

## 2019-03-04 ENCOUNTER — APPOINTMENT (OUTPATIENT)
Dept: CARDIOLOGY | Facility: CLINIC | Age: 84
End: 2019-03-04
Payer: MEDICARE

## 2019-03-04 LAB
INR PPP: 2 RATIO
POCT-PROTHROMBIN TIME: 24.3 SECS
QUALITY CONTROL: NO

## 2019-03-04 PROCEDURE — 99211 OFF/OP EST MAY X REQ PHY/QHP: CPT | Mod: 25

## 2019-03-04 PROCEDURE — 85610 PROTHROMBIN TIME: CPT | Mod: QW

## 2019-03-11 ENCOUNTER — APPOINTMENT (OUTPATIENT)
Dept: CARDIOLOGY | Facility: CLINIC | Age: 84
End: 2019-03-11
Payer: MEDICARE

## 2019-03-11 VITALS — RESPIRATION RATE: 16 BRPM | OXYGEN SATURATION: 98 % | HEART RATE: 60 BPM

## 2019-03-11 LAB
INR PPP: 2.7 RATIO
POCT-PROTHROMBIN TIME: 32.7 SECS
QUALITY CONTROL: YES

## 2019-03-11 PROCEDURE — 99211 OFF/OP EST MAY X REQ PHY/QHP: CPT | Mod: 25

## 2019-03-11 PROCEDURE — 85610 PROTHROMBIN TIME: CPT | Mod: QW

## 2019-03-17 ENCOUNTER — RX RENEWAL (OUTPATIENT)
Age: 84
End: 2019-03-17

## 2019-04-01 ENCOUNTER — APPOINTMENT (OUTPATIENT)
Dept: CARDIOLOGY | Facility: CLINIC | Age: 84
End: 2019-04-01
Payer: MEDICARE

## 2019-04-01 VITALS — RESPIRATION RATE: 16 BRPM | OXYGEN SATURATION: 96 %

## 2019-04-01 LAB
INR PPP: 2.3 RATIO
POCT-PROTHROMBIN TIME: 27.1 SECS
QUALITY CONTROL: YES

## 2019-04-01 PROCEDURE — 85610 PROTHROMBIN TIME: CPT | Mod: QW

## 2019-04-01 PROCEDURE — 99211 OFF/OP EST MAY X REQ PHY/QHP: CPT | Mod: 25

## 2019-04-15 ENCOUNTER — APPOINTMENT (OUTPATIENT)
Dept: CARDIOLOGY | Facility: CLINIC | Age: 84
End: 2019-04-15
Payer: MEDICARE

## 2019-04-15 VITALS — HEART RATE: 59 BPM | OXYGEN SATURATION: 94 %

## 2019-04-15 LAB
INR PPP: 2.4 RATIO
POCT-PROTHROMBIN TIME: 29 SECS
QUALITY CONTROL: NO

## 2019-04-15 PROCEDURE — 99211 OFF/OP EST MAY X REQ PHY/QHP: CPT | Mod: 25

## 2019-04-15 PROCEDURE — 85610 PROTHROMBIN TIME: CPT | Mod: QW

## 2019-04-29 ENCOUNTER — APPOINTMENT (OUTPATIENT)
Dept: CARDIOLOGY | Facility: CLINIC | Age: 84
End: 2019-04-29
Payer: MEDICARE

## 2019-04-29 VITALS — HEART RATE: 56 BPM | OXYGEN SATURATION: 97 %

## 2019-04-29 LAB
INR PPP: 3.8 RATIO
POCT-PROTHROMBIN TIME: 45.6 SECS
QUALITY CONTROL: YES

## 2019-04-29 PROCEDURE — 99211 OFF/OP EST MAY X REQ PHY/QHP: CPT | Mod: 25

## 2019-04-29 PROCEDURE — 85610 PROTHROMBIN TIME: CPT | Mod: QW

## 2019-05-13 ENCOUNTER — APPOINTMENT (OUTPATIENT)
Dept: CARDIOLOGY | Facility: CLINIC | Age: 84
End: 2019-05-13
Payer: MEDICARE

## 2019-05-13 VITALS — HEART RATE: 56 BPM | RESPIRATION RATE: 16 BRPM

## 2019-05-13 LAB
INR PPP: 5.6 RATIO
POCT-PROTHROMBIN TIME: 67.4 SECS
QUALITY CONTROL: YES

## 2019-05-13 PROCEDURE — 99211 OFF/OP EST MAY X REQ PHY/QHP: CPT | Mod: 25

## 2019-05-13 PROCEDURE — 85610 PROTHROMBIN TIME: CPT | Mod: QW

## 2019-05-20 ENCOUNTER — LABORATORY RESULT (OUTPATIENT)
Age: 84
End: 2019-05-20

## 2019-05-23 ENCOUNTER — APPOINTMENT (OUTPATIENT)
Dept: CARDIOLOGY | Facility: CLINIC | Age: 84
End: 2019-05-23
Payer: MEDICARE

## 2019-05-23 LAB
INR PPP: 2.4 RATIO
POCT-PROTHROMBIN TIME: 28.7 SECS
QUALITY CONTROL: YES

## 2019-05-23 PROCEDURE — 85610 PROTHROMBIN TIME: CPT | Mod: QW

## 2019-05-23 PROCEDURE — 99211 OFF/OP EST MAY X REQ PHY/QHP: CPT | Mod: 25

## 2019-05-30 ENCOUNTER — APPOINTMENT (OUTPATIENT)
Dept: CARDIOLOGY | Facility: CLINIC | Age: 84
End: 2019-05-30
Payer: MEDICARE

## 2019-05-30 LAB
INR PPP: 2.7 RATIO
POCT-PROTHROMBIN TIME: 32.8 SECS
QUALITY CONTROL: YES

## 2019-05-30 PROCEDURE — 85610 PROTHROMBIN TIME: CPT | Mod: QW

## 2019-05-30 PROCEDURE — 99211 OFF/OP EST MAY X REQ PHY/QHP: CPT | Mod: 25

## 2019-06-02 ENCOUNTER — RX RENEWAL (OUTPATIENT)
Age: 84
End: 2019-06-02

## 2019-06-03 ENCOUNTER — APPOINTMENT (OUTPATIENT)
Dept: CARDIOLOGY | Facility: CLINIC | Age: 84
End: 2019-06-03

## 2019-06-03 ENCOUNTER — NON-APPOINTMENT (OUTPATIENT)
Age: 84
End: 2019-06-03

## 2019-06-03 ENCOUNTER — APPOINTMENT (OUTPATIENT)
Dept: CARDIOLOGY | Facility: CLINIC | Age: 84
End: 2019-06-03
Payer: MEDICARE

## 2019-06-03 VITALS
HEART RATE: 66 BPM | OXYGEN SATURATION: 96 % | DIASTOLIC BLOOD PRESSURE: 69 MMHG | BODY MASS INDEX: 21.97 KG/M2 | RESPIRATION RATE: 16 BRPM | SYSTOLIC BLOOD PRESSURE: 110 MMHG | HEIGHT: 67 IN | WEIGHT: 140 LBS

## 2019-06-03 VITALS
DIASTOLIC BLOOD PRESSURE: 69 MMHG | HEART RATE: 66 BPM | HEIGHT: 67 IN | BODY MASS INDEX: 21.97 KG/M2 | RESPIRATION RATE: 16 BRPM | OXYGEN SATURATION: 96 % | WEIGHT: 140 LBS | SYSTOLIC BLOOD PRESSURE: 110 MMHG

## 2019-06-03 PROCEDURE — 93000 ELECTROCARDIOGRAM COMPLETE: CPT

## 2019-06-03 PROCEDURE — 99214 OFFICE O/P EST MOD 30 MIN: CPT

## 2019-06-03 NOTE — DISCUSSION/SUMMARY
[Patient] : the patient [Risks] : risks [Benefits] : benefits [Alternatives] : alternatives [___ Month(s)] : [unfilled] month(s) [FreeTextEntry1] : Continue close INR monitoring. Fall precautions. SB prophylaxis. Reviewed with patient.

## 2019-06-03 NOTE — PHYSICAL EXAM
[General Appearance - Well Developed] : well developed [Normal Appearance] : normal appearance [General Appearance - Well Nourished] : well nourished [General Appearance - In No Acute Distress] : no acute distress [No Deformities] : no deformities [Normal Conjunctiva] : the conjunctiva exhibited no abnormalities [Normal Oral Mucosa] : normal oral mucosa [Eyelids - No Xanthelasma] : the eyelids demonstrated no xanthelasmas [No Oral Pallor] : no oral pallor [No Oral Cyanosis] : no oral cyanosis [Respiration, Rhythm And Depth] : normal respiratory rhythm and effort [Exaggerated Use Of Accessory Muscles For Inspiration] : no accessory muscle use [Auscultation Breath Sounds / Voice Sounds] : lungs were clear to auscultation bilaterally [Abdomen Soft] : soft [Edema] : no peripheral edema present [FreeTextEntry1] : Prosthetic second sound, good quality, soft systolic murmur, 3/6 [Abdomen Tenderness] : non-tender [Abdomen Mass (___ Cm)] : no abdominal mass palpated [Abnormal Walk] : normal gait [Gait - Sufficient For Exercise Testing] : the gait was sufficient for exercise testing [Petechial Hemorrhages (___cm)] : no petechial hemorrhages [Cyanosis, Localized] : no localized cyanosis [Nail Clubbing] : no clubbing of the fingernails [] : no ischemic changes [No Venous Stasis] : no venous stasis [Skin Color & Pigmentation] : normal skin color and pigmentation [Affect] : the affect was normal [Mood] : the mood was normal

## 2019-06-03 NOTE — ASSESSMENT
[FreeTextEntry1] : History of hypertension PAF, LBBB.\par \par Remote mechanical aVR. Remote thoracic aneurysm resection. Chronic anticoagulation.\par \par Asymptomatic and doing well at this time.

## 2019-06-03 NOTE — REASON FOR VISIT
[Hypertension] : hypertension [Prosthetic Valve] : a prosthetic valve [Medication Management] : Medication management [FreeTextEntry1] : \par 86-year-old man with history of mechanical aVR and thoracic aortic aneurysm repair, CLBBB seen for follow.\par \par  Denied any lightheadedness or dizziness, falls, chest pain or dyspnea.\par \par

## 2019-06-17 ENCOUNTER — APPOINTMENT (OUTPATIENT)
Dept: CARDIOLOGY | Facility: CLINIC | Age: 84
End: 2019-06-17
Payer: MEDICARE

## 2019-06-17 LAB
INR PPP: 5.7 RATIO
POCT-PROTHROMBIN TIME: 67.7 SECS
QUALITY CONTROL: YES

## 2019-06-17 PROCEDURE — 85610 PROTHROMBIN TIME: CPT | Mod: QW

## 2019-06-17 PROCEDURE — 99211 OFF/OP EST MAY X REQ PHY/QHP: CPT | Mod: 25

## 2019-06-20 ENCOUNTER — APPOINTMENT (OUTPATIENT)
Dept: CARDIOLOGY | Facility: CLINIC | Age: 84
End: 2019-06-20
Payer: MEDICARE

## 2019-06-20 VITALS — OXYGEN SATURATION: 96 % | RESPIRATION RATE: 18 BRPM

## 2019-06-20 LAB
INR PPP: 1.8 RATIO
POCT-PROTHROMBIN TIME: 21.5 SECS
QUALITY CONTROL: YES

## 2019-06-20 PROCEDURE — 85610 PROTHROMBIN TIME: CPT | Mod: QW

## 2019-06-20 PROCEDURE — 99211 OFF/OP EST MAY X REQ PHY/QHP: CPT | Mod: 25

## 2019-06-27 ENCOUNTER — APPOINTMENT (OUTPATIENT)
Dept: CARDIOLOGY | Facility: CLINIC | Age: 84
End: 2019-06-27
Payer: MEDICARE

## 2019-06-27 LAB
INR PPP: 3.2 RATIO
POCT-PROTHROMBIN TIME: 39 SECS
QUALITY CONTROL: YES

## 2019-06-27 PROCEDURE — 85610 PROTHROMBIN TIME: CPT | Mod: QW

## 2019-06-27 PROCEDURE — 99211 OFF/OP EST MAY X REQ PHY/QHP: CPT | Mod: 25

## 2019-07-15 ENCOUNTER — APPOINTMENT (OUTPATIENT)
Dept: CARDIOLOGY | Facility: CLINIC | Age: 84
End: 2019-07-15
Payer: MEDICARE

## 2019-07-15 LAB
INR PPP: 5.8 RATIO
POCT-PROTHROMBIN TIME: 70.1 SECS
QUALITY CONTROL: YES

## 2019-07-15 PROCEDURE — 85610 PROTHROMBIN TIME: CPT | Mod: QW

## 2019-07-15 PROCEDURE — 99211 OFF/OP EST MAY X REQ PHY/QHP: CPT | Mod: 25

## 2019-07-18 ENCOUNTER — APPOINTMENT (OUTPATIENT)
Dept: CARDIOLOGY | Facility: CLINIC | Age: 84
End: 2019-07-18
Payer: MEDICARE

## 2019-07-18 LAB
INR PPP: 1.4 RATIO
QUALITY CONTROL: YES

## 2019-07-18 PROCEDURE — 99211 OFF/OP EST MAY X REQ PHY/QHP: CPT | Mod: 25

## 2019-07-18 PROCEDURE — 85610 PROTHROMBIN TIME: CPT | Mod: QW

## 2019-07-25 ENCOUNTER — APPOINTMENT (OUTPATIENT)
Dept: CARDIOLOGY | Facility: CLINIC | Age: 84
End: 2019-07-25
Payer: MEDICARE

## 2019-07-25 PROCEDURE — 85610 PROTHROMBIN TIME: CPT | Mod: QW

## 2019-07-25 PROCEDURE — 99211 OFF/OP EST MAY X REQ PHY/QHP: CPT | Mod: 25

## 2019-07-26 LAB
INR PPP: 2.3 RATIO
POCT-PROTHROMBIN TIME: 28 SECS
QUALITY CONTROL: YES

## 2019-08-01 ENCOUNTER — APPOINTMENT (OUTPATIENT)
Dept: CARDIOLOGY | Facility: CLINIC | Age: 84
End: 2019-08-01
Payer: MEDICARE

## 2019-08-01 LAB
INR PPP: 3.8 RATIO
POCT-PROTHROMBIN TIME: 45.7 SECS
QUALITY CONTROL: YES

## 2019-08-01 PROCEDURE — 85610 PROTHROMBIN TIME: CPT | Mod: QW

## 2019-08-01 PROCEDURE — 99211 OFF/OP EST MAY X REQ PHY/QHP: CPT | Mod: 25

## 2019-08-08 ENCOUNTER — APPOINTMENT (OUTPATIENT)
Dept: CARDIOLOGY | Facility: CLINIC | Age: 84
End: 2019-08-08
Payer: MEDICARE

## 2019-08-08 PROCEDURE — 99211 OFF/OP EST MAY X REQ PHY/QHP: CPT | Mod: 25

## 2019-08-08 PROCEDURE — 85610 PROTHROMBIN TIME: CPT | Mod: QW

## 2019-08-09 LAB
INR PPP: 5 RATIO
POCT-PROTHROMBIN TIME: 59.9 SECS
QUALITY CONTROL: YES

## 2019-08-15 ENCOUNTER — APPOINTMENT (OUTPATIENT)
Dept: CARDIOLOGY | Facility: CLINIC | Age: 84
End: 2019-08-15
Payer: MEDICARE

## 2019-08-15 LAB
INR PPP: 1.9 RATIO
POCT-PROTHROMBIN TIME: 22.3 SECS
QUALITY CONTROL: YES

## 2019-08-15 PROCEDURE — 85610 PROTHROMBIN TIME: CPT | Mod: QW

## 2019-08-15 PROCEDURE — 99211 OFF/OP EST MAY X REQ PHY/QHP: CPT | Mod: 25

## 2019-08-26 ENCOUNTER — APPOINTMENT (OUTPATIENT)
Dept: CARDIOLOGY | Facility: CLINIC | Age: 84
End: 2019-08-26
Payer: MEDICARE

## 2019-08-26 LAB
INR PPP: 3.6 RATIO
POCT-PROTHROMBIN TIME: 42.8 SECS
QUALITY CONTROL: YES

## 2019-08-26 PROCEDURE — 85610 PROTHROMBIN TIME: CPT | Mod: QW

## 2019-08-26 PROCEDURE — 99211 OFF/OP EST MAY X REQ PHY/QHP: CPT | Mod: 25

## 2019-09-09 ENCOUNTER — APPOINTMENT (OUTPATIENT)
Dept: CARDIOLOGY | Facility: CLINIC | Age: 84
End: 2019-09-09
Payer: MEDICARE

## 2019-09-09 PROCEDURE — 99211 OFF/OP EST MAY X REQ PHY/QHP: CPT | Mod: 25

## 2019-09-09 PROCEDURE — 85610 PROTHROMBIN TIME: CPT | Mod: QW

## 2019-09-11 LAB
INR PPP: 2.4 RATIO
POCT-PROTHROMBIN TIME: 29.2 SECS
QUALITY CONTROL: YES

## 2019-09-16 ENCOUNTER — RX RENEWAL (OUTPATIENT)
Age: 84
End: 2019-09-16

## 2019-09-23 ENCOUNTER — APPOINTMENT (OUTPATIENT)
Dept: CARDIOLOGY | Facility: CLINIC | Age: 84
End: 2019-09-23
Payer: MEDICARE

## 2019-09-23 VITALS — HEART RATE: 66 BPM | OXYGEN SATURATION: 96 %

## 2019-09-23 LAB
INR PPP: 5.4 RATIO
POCT-PROTHROMBIN TIME: 65.3 SECS
QUALITY CONTROL: YES

## 2019-09-23 PROCEDURE — 85610 PROTHROMBIN TIME: CPT | Mod: QW

## 2019-09-23 PROCEDURE — 99211 OFF/OP EST MAY X REQ PHY/QHP: CPT | Mod: 25

## 2019-10-02 ENCOUNTER — APPOINTMENT (OUTPATIENT)
Dept: CARDIOLOGY | Facility: CLINIC | Age: 84
End: 2019-10-02
Payer: MEDICARE

## 2019-10-02 LAB
INR PPP: 3 RATIO
POCT-PROTHROMBIN TIME: 36.5 SECS
QUALITY CONTROL: YES

## 2019-10-02 PROCEDURE — 99211 OFF/OP EST MAY X REQ PHY/QHP: CPT | Mod: 25

## 2019-10-02 PROCEDURE — 85610 PROTHROMBIN TIME: CPT | Mod: QW

## 2019-10-17 ENCOUNTER — APPOINTMENT (OUTPATIENT)
Dept: CARDIOLOGY | Facility: CLINIC | Age: 84
End: 2019-10-17
Payer: MEDICARE

## 2019-10-17 LAB
INR PPP: 1.8 RATIO
POCT-PROTHROMBIN TIME: 21.1 SECS
QUALITY CONTROL: YES

## 2019-10-17 PROCEDURE — 85610 PROTHROMBIN TIME: CPT | Mod: QW

## 2019-10-17 PROCEDURE — 99211 OFF/OP EST MAY X REQ PHY/QHP: CPT | Mod: 25

## 2019-10-28 ENCOUNTER — APPOINTMENT (OUTPATIENT)
Dept: CARDIOLOGY | Facility: CLINIC | Age: 84
End: 2019-10-28
Payer: MEDICARE

## 2019-10-28 PROCEDURE — 99211 OFF/OP EST MAY X REQ PHY/QHP: CPT | Mod: 25

## 2019-10-28 PROCEDURE — 85610 PROTHROMBIN TIME: CPT | Mod: QW

## 2019-10-29 LAB
INR PPP: 2.3 RATIO
POCT-PROTHROMBIN TIME: 27.7 SECS
QUALITY CONTROL: YES

## 2019-11-11 ENCOUNTER — APPOINTMENT (OUTPATIENT)
Dept: CARDIOLOGY | Facility: CLINIC | Age: 84
End: 2019-11-11
Payer: MEDICARE

## 2019-11-11 LAB
INR PPP: 5.4 RATIO
POCT-PROTHROMBIN TIME: 65 SECS
QUALITY CONTROL: YES

## 2019-11-11 PROCEDURE — 85610 PROTHROMBIN TIME: CPT | Mod: QW

## 2019-11-11 PROCEDURE — 99211 OFF/OP EST MAY X REQ PHY/QHP: CPT | Mod: 25

## 2019-11-14 ENCOUNTER — APPOINTMENT (OUTPATIENT)
Dept: CARDIOLOGY | Facility: CLINIC | Age: 84
End: 2019-11-14
Payer: MEDICARE

## 2019-11-14 PROCEDURE — 99211 OFF/OP EST MAY X REQ PHY/QHP: CPT | Mod: 25

## 2019-11-14 PROCEDURE — 85610 PROTHROMBIN TIME: CPT | Mod: QW

## 2019-11-15 LAB
INR PPP: 2.3 RATIO
POCT-PROTHROMBIN TIME: 27.5 SECS
QUALITY CONTROL: YES

## 2019-11-25 ENCOUNTER — APPOINTMENT (OUTPATIENT)
Dept: CARDIOLOGY | Facility: CLINIC | Age: 84
End: 2019-11-25
Payer: MEDICARE

## 2019-11-25 LAB
INR PPP: 2.2 RATIO
POCT-PROTHROMBIN TIME: 26.6 SECS
QUALITY CONTROL: YES

## 2019-11-25 PROCEDURE — 85610 PROTHROMBIN TIME: CPT | Mod: QW

## 2019-11-25 PROCEDURE — 99211 OFF/OP EST MAY X REQ PHY/QHP: CPT | Mod: 25

## 2019-11-30 ENCOUNTER — LABORATORY RESULT (OUTPATIENT)
Age: 84
End: 2019-11-30

## 2019-12-09 ENCOUNTER — APPOINTMENT (OUTPATIENT)
Dept: CARDIOLOGY | Facility: CLINIC | Age: 84
End: 2019-12-09
Payer: MEDICARE

## 2019-12-09 ENCOUNTER — NON-APPOINTMENT (OUTPATIENT)
Age: 84
End: 2019-12-09

## 2019-12-09 VITALS
HEIGHT: 67 IN | OXYGEN SATURATION: 98 % | RESPIRATION RATE: 16 BRPM | DIASTOLIC BLOOD PRESSURE: 73 MMHG | BODY MASS INDEX: 21.19 KG/M2 | WEIGHT: 135 LBS | SYSTOLIC BLOOD PRESSURE: 122 MMHG | HEART RATE: 61 BPM

## 2019-12-09 LAB
INR PPP: 3.2 RATIO
POCT-PROTHROMBIN TIME: 37.9 SECS
QUALITY CONTROL: YES

## 2019-12-09 PROCEDURE — 93000 ELECTROCARDIOGRAM COMPLETE: CPT

## 2019-12-09 PROCEDURE — 99214 OFFICE O/P EST MOD 30 MIN: CPT | Mod: 25

## 2019-12-09 PROCEDURE — 90662 IIV NO PRSV INCREASED AG IM: CPT

## 2019-12-09 PROCEDURE — 85610 PROTHROMBIN TIME: CPT | Mod: QW

## 2019-12-09 PROCEDURE — G0008: CPT

## 2019-12-09 NOTE — PHYSICAL EXAM
[Normal Appearance] : normal appearance [General Appearance - Well Developed] : well developed [No Deformities] : no deformities [General Appearance - In No Acute Distress] : no acute distress [General Appearance - Well Nourished] : well nourished [Normal Oral Mucosa] : normal oral mucosa [Eyelids - No Xanthelasma] : the eyelids demonstrated no xanthelasmas [Normal Conjunctiva] : the conjunctiva exhibited no abnormalities [No Oral Pallor] : no oral pallor [No Oral Cyanosis] : no oral cyanosis [Exaggerated Use Of Accessory Muscles For Inspiration] : no accessory muscle use [Respiration, Rhythm And Depth] : normal respiratory rhythm and effort [Edema] : no peripheral edema present [Auscultation Breath Sounds / Voice Sounds] : lungs were clear to auscultation bilaterally [Abdomen Tenderness] : non-tender [Abdomen Soft] : soft [Abnormal Walk] : normal gait [Gait - Sufficient For Exercise Testing] : the gait was sufficient for exercise testing [Abdomen Mass (___ Cm)] : no abdominal mass palpated [Cyanosis, Localized] : no localized cyanosis [] : no ischemic changes [Nail Clubbing] : no clubbing of the fingernails [Petechial Hemorrhages (___cm)] : no petechial hemorrhages [No Venous Stasis] : no venous stasis [Affect] : the affect was normal [Skin Color & Pigmentation] : normal skin color and pigmentation [Mood] : the mood was normal [FreeTextEntry1] : Prosthetic second sound, good quality, soft systolic murmur, 3/6

## 2019-12-09 NOTE — REASON FOR VISIT
[Hypertension] : hypertension [Medication Management] : Medication management [Prosthetic Valve] : a prosthetic valve [FreeTextEntry1] : \par \par

## 2019-12-09 NOTE — DISCUSSION/SUMMARY
[Benefits] : benefits [Patient] : the patient [Risks] : risks [Alternatives] : alternatives [___ Month(s)] : [unfilled] month(s)

## 2019-12-09 NOTE — HISTORY OF PRESENT ILLNESS
[FreeTextEntry1] : \par 87-year-old man with history of mechanical AVR and thoracic aortic aneurysm repair, CLBBB seen for follow.\par \par  Denied any lightheadedness or dizziness, falls, chest pain or dyspnea.

## 2019-12-09 NOTE — ASSESSMENT
[FreeTextEntry1] : Doing well remote mechanical aVR remote thoracic aortic repair. Hypertension. Atrial fibrillation. LBBB. No recurrent falls. Patient on chronic anticoagulant

## 2019-12-30 ENCOUNTER — APPOINTMENT (OUTPATIENT)
Dept: CARDIOLOGY | Facility: CLINIC | Age: 84
End: 2019-12-30
Payer: MEDICARE

## 2019-12-30 PROCEDURE — 99211 OFF/OP EST MAY X REQ PHY/QHP: CPT | Mod: 25

## 2019-12-30 PROCEDURE — 85610 PROTHROMBIN TIME: CPT | Mod: QW

## 2020-01-13 ENCOUNTER — APPOINTMENT (OUTPATIENT)
Dept: CARDIOLOGY | Facility: CLINIC | Age: 85
End: 2020-01-13
Payer: MEDICARE

## 2020-01-13 LAB
INR PPP: 2.5 RATIO
POCT-PROTHROMBIN TIME: 29.7 SECS
QUALITY CONTROL: YES

## 2020-01-13 PROCEDURE — 99211 OFF/OP EST MAY X REQ PHY/QHP: CPT | Mod: 25

## 2020-01-13 PROCEDURE — 85610 PROTHROMBIN TIME: CPT | Mod: QW

## 2020-02-03 ENCOUNTER — APPOINTMENT (OUTPATIENT)
Dept: CARDIOLOGY | Facility: CLINIC | Age: 85
End: 2020-02-03
Payer: MEDICARE

## 2020-02-03 LAB
INR PPP: 1.2 RATIO
POCT-PROTHROMBIN TIME: 14.6 SECS
QUALITY CONTROL: YES

## 2020-02-03 PROCEDURE — 99211 OFF/OP EST MAY X REQ PHY/QHP: CPT | Mod: 25

## 2020-02-03 PROCEDURE — 85610 PROTHROMBIN TIME: CPT | Mod: QW

## 2020-02-09 ENCOUNTER — RX RENEWAL (OUTPATIENT)
Age: 85
End: 2020-02-09

## 2020-02-10 ENCOUNTER — APPOINTMENT (OUTPATIENT)
Dept: CARDIOLOGY | Facility: CLINIC | Age: 85
End: 2020-02-10
Payer: MEDICARE

## 2020-02-10 LAB
INR PPP: 1.4 RATIO
POCT-PROTHROMBIN TIME: 16.5 SECS
QUALITY CONTROL: YES

## 2020-02-10 PROCEDURE — 85610 PROTHROMBIN TIME: CPT | Mod: QW

## 2020-02-10 PROCEDURE — 99211 OFF/OP EST MAY X REQ PHY/QHP: CPT | Mod: 25

## 2020-02-18 ENCOUNTER — APPOINTMENT (OUTPATIENT)
Dept: CARDIOLOGY | Facility: CLINIC | Age: 85
End: 2020-02-18
Payer: MEDICARE

## 2020-02-18 LAB
INR PPP: 2.1 RATIO
POCT-PROTHROMBIN TIME: 25.1 SECS
QUALITY CONTROL: YES

## 2020-02-18 PROCEDURE — 99211 OFF/OP EST MAY X REQ PHY/QHP: CPT | Mod: 25

## 2020-02-18 PROCEDURE — 85610 PROTHROMBIN TIME: CPT | Mod: QW

## 2020-03-03 ENCOUNTER — APPOINTMENT (OUTPATIENT)
Dept: CARDIOLOGY | Facility: CLINIC | Age: 85
End: 2020-03-03
Payer: MEDICARE

## 2020-03-03 LAB
INR PPP: 3.9 RATIO
POCT-PROTHROMBIN TIME: 46.8 SECS
QUALITY CONTROL: YES

## 2020-03-03 PROCEDURE — 85610 PROTHROMBIN TIME: CPT | Mod: QW

## 2020-03-03 PROCEDURE — 99211 OFF/OP EST MAY X REQ PHY/QHP: CPT | Mod: 25

## 2020-03-16 ENCOUNTER — APPOINTMENT (OUTPATIENT)
Dept: CARDIOLOGY | Facility: CLINIC | Age: 85
End: 2020-03-16
Payer: MEDICARE

## 2020-03-16 LAB
INR PPP: 3 RATIO
POCT-PROTHROMBIN TIME: 36 SECS
QUALITY CONTROL: YES

## 2020-03-16 PROCEDURE — 85610 PROTHROMBIN TIME: CPT | Mod: QW

## 2020-03-16 PROCEDURE — 99211 OFF/OP EST MAY X REQ PHY/QHP: CPT | Mod: 25

## 2020-04-02 ENCOUNTER — RX RENEWAL (OUTPATIENT)
Age: 85
End: 2020-04-02

## 2020-04-06 ENCOUNTER — APPOINTMENT (OUTPATIENT)
Dept: CARDIOLOGY | Facility: CLINIC | Age: 85
End: 2020-04-06
Payer: MEDICARE

## 2020-04-06 LAB
INR PPP: 2 RATIO
POCT-PROTHROMBIN TIME: 24.5 SECS
QUALITY CONTROL: YES

## 2020-04-06 PROCEDURE — 99211 OFF/OP EST MAY X REQ PHY/QHP: CPT | Mod: 25

## 2020-04-06 PROCEDURE — 85610 PROTHROMBIN TIME: CPT | Mod: QW

## 2020-04-13 ENCOUNTER — NON-APPOINTMENT (OUTPATIENT)
Age: 85
End: 2020-04-13

## 2020-04-13 ENCOUNTER — APPOINTMENT (OUTPATIENT)
Dept: CARDIOLOGY | Facility: CLINIC | Age: 85
End: 2020-04-13
Payer: MEDICARE

## 2020-04-13 VITALS
RESPIRATION RATE: 16 BRPM | BODY MASS INDEX: 21.03 KG/M2 | HEIGHT: 67 IN | SYSTOLIC BLOOD PRESSURE: 116 MMHG | OXYGEN SATURATION: 97 % | WEIGHT: 134 LBS | HEART RATE: 62 BPM | DIASTOLIC BLOOD PRESSURE: 72 MMHG

## 2020-04-13 DIAGNOSIS — Z91.81 HISTORY OF FALLING: ICD-10-CM

## 2020-04-13 LAB
INR PPP: 2.5 RATIO
POCT-PROTHROMBIN TIME: 29.1 SECS
QUALITY CONTROL: YES

## 2020-04-13 PROCEDURE — 99214 OFFICE O/P EST MOD 30 MIN: CPT | Mod: 25

## 2020-04-13 PROCEDURE — 85610 PROTHROMBIN TIME: CPT | Mod: QW

## 2020-04-13 PROCEDURE — 93000 ELECTROCARDIOGRAM COMPLETE: CPT

## 2020-04-13 NOTE — HISTORY OF PRESENT ILLNESS
[FreeTextEntry1] : \par 87-year-old man with history of mechanical AVR and thoracic aortic aneurysm repair, HBP,  CLBBB seen for follow.\par \par On questioning, he had a fall unexpectedly about 3 months ago. He had no warning fell down without trip or slip got back up. He felt he needed to hold on for something transiently. He had no palpitations and no injury. He denied chest pain or shortness of breath.\par \par Since then he says he has been doing well. 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 He continues to walk regularly.\par \par He denied dyspnea edema palpitations. Overall he says he feels well.

## 2020-04-13 NOTE — DISCUSSION/SUMMARY
[Patient] : the patient [Risks] : risks [Benefits] : benefits [Alternatives] : alternatives [___ Month(s)] : [unfilled] month(s) [FreeTextEntry1] : I discussed with him that he may require a pacemaker given his conduction system disease and episodes of falling which could be due to transient bradycardia or heart block. He is on beta blocker related to both hypertension thoracic aneurysm and history of atrial fib.\par \par We'll obtain Holter. We'll reduce beta blocker. Lab testing ordered. Echo to be obtained to assess valvular function. Continue INR monitoring. Followup in 3-4 weeks to discuss above and consider for repeat evaluation. Questions addressed with patient.

## 2020-04-13 NOTE — REVIEW OF SYSTEMS
[Eyeglasses] : currently wearing eyeglasses [Loss Of Hearing] : hearing loss [see HPI] : see HPI [Negative] : Heme/Lymph [Recent Weight Loss (___ Lbs)] : no recent weight loss [FreeTextEntry2] : loss of vision in one eye

## 2020-04-13 NOTE — PHYSICAL EXAM
[General Appearance - Well Developed] : well developed [Normal Appearance] : normal appearance [General Appearance - Well Nourished] : well nourished [No Deformities] : no deformities [General Appearance - In No Acute Distress] : no acute distress [Normal Conjunctiva] : the conjunctiva exhibited no abnormalities [Eyelids - No Xanthelasma] : the eyelids demonstrated no xanthelasmas [Normal Oral Mucosa] : normal oral mucosa [No Oral Pallor] : no oral pallor [No Oral Cyanosis] : no oral cyanosis [Respiration, Rhythm And Depth] : normal respiratory rhythm and effort [Exaggerated Use Of Accessory Muscles For Inspiration] : no accessory muscle use [Auscultation Breath Sounds / Voice Sounds] : lungs were clear to auscultation bilaterally [Edema] : no peripheral edema present [Abdomen Soft] : soft [Abdomen Tenderness] : non-tender [Abdomen Mass (___ Cm)] : no abdominal mass palpated [Abnormal Walk] : normal gait [Gait - Sufficient For Exercise Testing] : the gait was sufficient for exercise testing [Nail Clubbing] : no clubbing of the fingernails [Cyanosis, Localized] : no localized cyanosis [Petechial Hemorrhages (___cm)] : no petechial hemorrhages [] : no ischemic changes [Skin Color & Pigmentation] : normal skin color and pigmentation [No Venous Stasis] : no venous stasis [Affect] : the affect was normal [Mood] : the mood was normal [FreeTextEntry1] : Prosthetic second sound, good quality, soft systolic murmur, 3/6

## 2020-04-20 ENCOUNTER — NON-APPOINTMENT (OUTPATIENT)
Age: 85
End: 2020-04-20

## 2020-04-20 ENCOUNTER — APPOINTMENT (OUTPATIENT)
Dept: CARDIOLOGY | Facility: CLINIC | Age: 85
End: 2020-04-20
Payer: MEDICARE

## 2020-04-20 LAB
INR PPP: 2.4 RATIO
POCT-PROTHROMBIN TIME: 28.9 SECS
QUALITY CONTROL: YES

## 2020-04-20 PROCEDURE — 85610 PROTHROMBIN TIME: CPT | Mod: QW

## 2020-04-20 PROCEDURE — 99211 OFF/OP EST MAY X REQ PHY/QHP: CPT | Mod: 25

## 2020-04-20 PROCEDURE — 93224 XTRNL ECG REC UP TO 48 HRS: CPT

## 2020-05-04 ENCOUNTER — APPOINTMENT (OUTPATIENT)
Dept: CARDIOLOGY | Facility: CLINIC | Age: 85
End: 2020-05-04
Payer: MEDICARE

## 2020-05-04 LAB
INR PPP: 2.3 RATIO
POCT-PROTHROMBIN TIME: 28.1 SECS
QUALITY CONTROL: YES

## 2020-05-04 PROCEDURE — 99211 OFF/OP EST MAY X REQ PHY/QHP: CPT | Mod: 25

## 2020-05-04 PROCEDURE — 85610 PROTHROMBIN TIME: CPT | Mod: QW

## 2020-05-18 ENCOUNTER — APPOINTMENT (OUTPATIENT)
Dept: CARDIOLOGY | Facility: CLINIC | Age: 85
End: 2020-05-18
Payer: MEDICARE

## 2020-05-18 LAB
INR PPP: 4.8 RATIO
POCT-PROTHROMBIN TIME: 57.9 SECS
QUALITY CONTROL: YES

## 2020-05-18 PROCEDURE — 85610 PROTHROMBIN TIME: CPT | Mod: QW

## 2020-05-18 PROCEDURE — 99211 OFF/OP EST MAY X REQ PHY/QHP: CPT | Mod: 25

## 2020-05-19 LAB
ALBUMIN SERPL ELPH-MCNC: 4.3 G/DL
ALP BLD-CCNC: 87 U/L
ALT SERPL-CCNC: 11 U/L
ANION GAP SERPL CALC-SCNC: 13 MMOL/L
AST SERPL-CCNC: 13 U/L
BASOPHILS # BLD AUTO: 0.06 K/UL
BASOPHILS NFR BLD AUTO: 0.5 %
BILIRUB SERPL-MCNC: 1.2 MG/DL
BUN SERPL-MCNC: 35 MG/DL
CALCIUM SERPL-MCNC: 9.4 MG/DL
CHLORIDE SERPL-SCNC: 100 MMOL/L
CHOLEST SERPL-MCNC: 132 MG/DL
CHOLEST/HDLC SERPL: 4.4 RATIO
CO2 SERPL-SCNC: 28 MMOL/L
CREAT SERPL-MCNC: 1.46 MG/DL
EOSINOPHIL # BLD AUTO: 0.35 K/UL
EOSINOPHIL NFR BLD AUTO: 2.9 %
GLUCOSE SERPL-MCNC: 183 MG/DL
HCT VFR BLD CALC: 44.4 %
HDLC SERPL-MCNC: 30 MG/DL
HGB BLD-MCNC: 14.8 G/DL
IMM GRANULOCYTES NFR BLD AUTO: 0.9 %
LDLC SERPL CALC-MCNC: 65 MG/DL
LYMPHOCYTES # BLD AUTO: 1.78 K/UL
LYMPHOCYTES NFR BLD AUTO: 14.6 %
MAN DIFF?: NORMAL
MCHC RBC-ENTMCNC: 30.3 PG
MCHC RBC-ENTMCNC: 33.3 GM/DL
MCV RBC AUTO: 90.8 FL
MONOCYTES # BLD AUTO: 1.29 K/UL
MONOCYTES NFR BLD AUTO: 10.5 %
NEUTROPHILS # BLD AUTO: 8.64 K/UL
NEUTROPHILS NFR BLD AUTO: 70.6 %
PLATELET # BLD AUTO: 169 K/UL
POTASSIUM SERPL-SCNC: 4.5 MMOL/L
PROT SERPL-MCNC: 7.2 G/DL
RBC # BLD: 4.89 M/UL
RBC # FLD: 13.6 %
SODIUM SERPL-SCNC: 141 MMOL/L
TRIGL SERPL-MCNC: 188 MG/DL
TSH SERPL-ACNC: 2.75 UIU/ML
WBC # FLD AUTO: 12.23 K/UL

## 2020-05-22 ENCOUNTER — APPOINTMENT (OUTPATIENT)
Dept: CARDIOLOGY | Facility: CLINIC | Age: 85
End: 2020-05-22
Payer: MEDICARE

## 2020-05-22 LAB
INR PPP: 1.8 RATIO
POCT-PROTHROMBIN TIME: 21.6 SECS
QUALITY CONTROL: YES

## 2020-05-22 PROCEDURE — 85610 PROTHROMBIN TIME: CPT | Mod: QW

## 2020-05-22 PROCEDURE — 99211 OFF/OP EST MAY X REQ PHY/QHP: CPT | Mod: 25

## 2020-05-28 ENCOUNTER — APPOINTMENT (OUTPATIENT)
Dept: CARDIOLOGY | Facility: CLINIC | Age: 85
End: 2020-05-28

## 2020-05-29 ENCOUNTER — APPOINTMENT (OUTPATIENT)
Dept: CARDIOLOGY | Facility: CLINIC | Age: 85
End: 2020-05-29
Payer: MEDICARE

## 2020-05-29 VITALS
OXYGEN SATURATION: 95 % | DIASTOLIC BLOOD PRESSURE: 78 MMHG | RESPIRATION RATE: 17 BRPM | HEIGHT: 67 IN | SYSTOLIC BLOOD PRESSURE: 118 MMHG | HEART RATE: 80 BPM | BODY MASS INDEX: 21.03 KG/M2 | WEIGHT: 134 LBS

## 2020-05-29 VITALS — DIASTOLIC BLOOD PRESSURE: 78 MMHG | SYSTOLIC BLOOD PRESSURE: 118 MMHG

## 2020-05-29 LAB — INR PPP: 2.5 RATIO

## 2020-05-29 PROCEDURE — 99211 OFF/OP EST MAY X REQ PHY/QHP: CPT | Mod: 25

## 2020-05-29 PROCEDURE — 85610 PROTHROMBIN TIME: CPT | Mod: QW

## 2020-06-03 ENCOUNTER — APPOINTMENT (OUTPATIENT)
Dept: CARDIOLOGY | Facility: CLINIC | Age: 85
End: 2020-06-03
Payer: MEDICARE

## 2020-06-03 PROCEDURE — 93306 TTE W/DOPPLER COMPLETE: CPT

## 2020-06-08 ENCOUNTER — APPOINTMENT (OUTPATIENT)
Dept: CARDIOLOGY | Facility: CLINIC | Age: 85
End: 2020-06-08
Payer: MEDICARE

## 2020-06-08 ENCOUNTER — NON-APPOINTMENT (OUTPATIENT)
Age: 85
End: 2020-06-08

## 2020-06-08 VITALS
BODY MASS INDEX: 20.09 KG/M2 | SYSTOLIC BLOOD PRESSURE: 108 MMHG | OXYGEN SATURATION: 94 % | RESPIRATION RATE: 16 BRPM | WEIGHT: 128 LBS | HEIGHT: 67 IN | DIASTOLIC BLOOD PRESSURE: 65 MMHG | HEART RATE: 65 BPM

## 2020-06-08 LAB
INR PPP: 2.5 RATIO
POCT-PROTHROMBIN TIME: 31.6 SECS
QUALITY CONTROL: YES

## 2020-06-08 PROCEDURE — 93000 ELECTROCARDIOGRAM COMPLETE: CPT

## 2020-06-08 PROCEDURE — 99214 OFFICE O/P EST MOD 30 MIN: CPT | Mod: 25

## 2020-06-08 PROCEDURE — 85610 PROTHROMBIN TIME: CPT | Mod: QW

## 2020-06-08 NOTE — HISTORY OF PRESENT ILLNESS
[FreeTextEntry1] : \par 87-year-old man with history of mechanical AVR and thoracic aortic aneurysm repair, HBP,  CLBBB seen for follow.\par \par States he feels well and has had no recurrent falls. He was aware of weight loss since his eating less. No chest pain palpitations shortness of breath cough fever chills. We'll have the INR testing today. Had recent blood tests reviewed and discussed with him\par \par

## 2020-06-08 NOTE — DISCUSSION/SUMMARY
[Patient] : the patient [Risks] : risks [Benefits] : benefits [Alternatives] : alternatives [___ Month(s)] : [unfilled] month(s) [FreeTextEntry1] : He is reluctant for pacer, but now asymptomatic, no recurrent falls.\par Holter was benign.\par Will obtain cxr and repeat labs\par INR monitoring.

## 2020-06-08 NOTE — ASSESSMENT
[FreeTextEntry1] : s/p mechanical aVR remote thoracic aortic repair. Hypertension. Atrial fibrillation. 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

## 2020-06-08 NOTE — REVIEW OF SYSTEMS
[Recent Weight Loss (___ Lbs)] : recent [unfilled] ~Ulb weight loss [Eyeglasses] : currently wearing eyeglasses [Loss Of Hearing] : hearing loss [see HPI] : see HPI [Negative] : Heme/Lymph [FreeTextEntry2] : loss of vision in one eye

## 2020-06-15 ENCOUNTER — APPOINTMENT (OUTPATIENT)
Dept: CARDIOLOGY | Facility: CLINIC | Age: 85
End: 2020-06-15
Payer: MEDICARE

## 2020-06-15 LAB
INR PPP: 1.8 RATIO
POCT-PROTHROMBIN TIME: 21.3 SECS
QUALITY CONTROL: YES

## 2020-06-15 PROCEDURE — 85610 PROTHROMBIN TIME: CPT | Mod: QW

## 2020-06-15 PROCEDURE — 99211 OFF/OP EST MAY X REQ PHY/QHP: CPT | Mod: 25

## 2020-06-22 ENCOUNTER — APPOINTMENT (OUTPATIENT)
Dept: CARDIOLOGY | Facility: CLINIC | Age: 85
End: 2020-06-22
Payer: MEDICARE

## 2020-06-22 LAB
INR PPP: 1.6 RATIO
POCT-PROTHROMBIN TIME: 19.8 SECS
QUALITY CONTROL: YES

## 2020-06-22 PROCEDURE — 99211 OFF/OP EST MAY X REQ PHY/QHP: CPT | Mod: 25

## 2020-06-22 PROCEDURE — 85610 PROTHROMBIN TIME: CPT | Mod: QW

## 2020-06-29 ENCOUNTER — APPOINTMENT (OUTPATIENT)
Dept: CARDIOLOGY | Facility: CLINIC | Age: 85
End: 2020-06-29
Payer: MEDICARE

## 2020-06-29 LAB
INR PPP: 2.2 RATIO
POCT-PROTHROMBIN TIME: 25.8 SECS
QUALITY CONTROL: YES

## 2020-06-29 PROCEDURE — 85610 PROTHROMBIN TIME: CPT | Mod: QW

## 2020-06-29 PROCEDURE — 99211 OFF/OP EST MAY X REQ PHY/QHP: CPT | Mod: 25

## 2020-07-14 ENCOUNTER — APPOINTMENT (OUTPATIENT)
Dept: CARDIOLOGY | Facility: CLINIC | Age: 85
End: 2020-07-14
Payer: MEDICARE

## 2020-07-14 LAB
INR PPP: 3 RATIO
POCT-PROTHROMBIN TIME: 36 SECS
QUALITY CONTROL: YES

## 2020-07-14 PROCEDURE — 99211 OFF/OP EST MAY X REQ PHY/QHP: CPT | Mod: 25

## 2020-07-14 PROCEDURE — 85610 PROTHROMBIN TIME: CPT | Mod: QW

## 2020-07-28 ENCOUNTER — APPOINTMENT (OUTPATIENT)
Dept: CARDIOLOGY | Facility: CLINIC | Age: 85
End: 2020-07-28
Payer: MEDICARE

## 2020-07-28 LAB
INR PPP: 4.6 RATIO
POCT-PROTHROMBIN TIME: 54.8 SECS
QUALITY CONTROL: YES

## 2020-07-28 PROCEDURE — 85610 PROTHROMBIN TIME: CPT | Mod: QW

## 2020-07-28 PROCEDURE — 99211 OFF/OP EST MAY X REQ PHY/QHP: CPT | Mod: 25

## 2020-08-04 ENCOUNTER — APPOINTMENT (OUTPATIENT)
Dept: CARDIOLOGY | Facility: CLINIC | Age: 85
End: 2020-08-04
Payer: MEDICARE

## 2020-08-04 LAB
INR PPP: 72.1 RATIO
POCT-PROTHROMBIN TIME: 6.9 SECS
QUALITY CONTROL: YES

## 2020-08-04 PROCEDURE — 85610 PROTHROMBIN TIME: CPT | Mod: QW

## 2020-08-04 PROCEDURE — 99211 OFF/OP EST MAY X REQ PHY/QHP: CPT | Mod: 25

## 2020-08-07 ENCOUNTER — APPOINTMENT (OUTPATIENT)
Dept: CARDIOLOGY | Facility: CLINIC | Age: 85
End: 2020-08-07
Payer: MEDICARE

## 2020-08-07 LAB
INR PPP: 7.3 RATIO
POCT-PROTHROMBIN TIME: 87.3 SECS
QUALITY CONTROL: YES

## 2020-08-07 PROCEDURE — 85610 PROTHROMBIN TIME: CPT | Mod: QW

## 2020-08-07 PROCEDURE — 99211 OFF/OP EST MAY X REQ PHY/QHP: CPT | Mod: 25

## 2020-08-11 ENCOUNTER — APPOINTMENT (OUTPATIENT)
Dept: CARDIOLOGY | Facility: CLINIC | Age: 85
End: 2020-08-11
Payer: MEDICARE

## 2020-08-11 LAB
INR PPP: 2 RATIO
POCT-PROTHROMBIN TIME: 24.4 SECS
QUALITY CONTROL: YES

## 2020-08-11 PROCEDURE — 85610 PROTHROMBIN TIME: CPT | Mod: QW

## 2020-08-11 PROCEDURE — 99211 OFF/OP EST MAY X REQ PHY/QHP: CPT | Mod: 25

## 2020-08-17 ENCOUNTER — APPOINTMENT (OUTPATIENT)
Dept: CARDIOLOGY | Facility: CLINIC | Age: 85
End: 2020-08-17
Payer: MEDICARE

## 2020-08-17 LAB
INR PPP: 6 RATIO
POCT-PROTHROMBIN TIME: 72.2 SECS
QUALITY CONTROL: YES

## 2020-08-17 PROCEDURE — 85610 PROTHROMBIN TIME: CPT | Mod: QW

## 2020-08-17 PROCEDURE — 99211 OFF/OP EST MAY X REQ PHY/QHP: CPT | Mod: 25

## 2020-08-20 ENCOUNTER — APPOINTMENT (OUTPATIENT)
Dept: CARDIOLOGY | Facility: CLINIC | Age: 85
End: 2020-08-20
Payer: MEDICARE

## 2020-08-20 LAB
INR PPP: 2.3 RATIO
POCT-PROTHROMBIN TIME: 28.1 SECS
QUALITY CONTROL: YES

## 2020-08-20 PROCEDURE — 85610 PROTHROMBIN TIME: CPT | Mod: QW

## 2020-08-20 PROCEDURE — 99211 OFF/OP EST MAY X REQ PHY/QHP: CPT | Mod: 25

## 2020-08-25 ENCOUNTER — APPOINTMENT (OUTPATIENT)
Dept: CARDIOLOGY | Facility: CLINIC | Age: 85
End: 2020-08-25
Payer: MEDICARE

## 2020-08-25 LAB
INR PPP: 2.3 RATIO
POCT-PROTHROMBIN TIME: 28 SECS
QUALITY CONTROL: YES

## 2020-08-25 PROCEDURE — 85610 PROTHROMBIN TIME: CPT | Mod: QW

## 2020-08-25 PROCEDURE — 99211 OFF/OP EST MAY X REQ PHY/QHP: CPT | Mod: 25

## 2020-09-01 ENCOUNTER — APPOINTMENT (OUTPATIENT)
Dept: CARDIOLOGY | Facility: CLINIC | Age: 85
End: 2020-09-01
Payer: MEDICARE

## 2020-09-01 LAB
INR PPP: 3.1 RATIO
POCT-PROTHROMBIN TIME: 37.2 SECS
QUALITY CONTROL: YES

## 2020-09-01 PROCEDURE — 85610 PROTHROMBIN TIME: CPT | Mod: QW

## 2020-09-01 PROCEDURE — 99211 OFF/OP EST MAY X REQ PHY/QHP: CPT | Mod: 25

## 2020-09-03 ENCOUNTER — RX RENEWAL (OUTPATIENT)
Age: 85
End: 2020-09-03

## 2020-09-16 ENCOUNTER — APPOINTMENT (OUTPATIENT)
Dept: CARDIOLOGY | Facility: CLINIC | Age: 85
End: 2020-09-16
Payer: MEDICARE

## 2020-09-16 ENCOUNTER — NON-APPOINTMENT (OUTPATIENT)
Age: 85
End: 2020-09-16

## 2020-09-16 VITALS
HEIGHT: 67 IN | RESPIRATION RATE: 16 BRPM | WEIGHT: 119 LBS | BODY MASS INDEX: 18.68 KG/M2 | SYSTOLIC BLOOD PRESSURE: 110 MMHG | DIASTOLIC BLOOD PRESSURE: 76 MMHG | OXYGEN SATURATION: 95 % | HEART RATE: 98 BPM

## 2020-09-16 PROCEDURE — 93000 ELECTROCARDIOGRAM COMPLETE: CPT

## 2020-09-16 PROCEDURE — 99214 OFFICE O/P EST MOD 30 MIN: CPT

## 2020-09-16 NOTE — PHYSICAL EXAM
[General Appearance - Well Developed] : well developed [Normal Appearance] : normal appearance [General Appearance - Well Nourished] : well nourished [No Deformities] : no deformities [General Appearance - In No Acute Distress] : no acute distress [Normal Oral Mucosa] : normal oral mucosa [No Oral Pallor] : no oral pallor [FreeTextEntry1] : JVP normal. No carotid bruits. [No Oral Cyanosis] : no oral cyanosis [Respiration, Rhythm And Depth] : normal respiratory rhythm and effort [Exaggerated Use Of Accessory Muscles For Inspiration] : no accessory muscle use [5th Left ICS - MCL] : palpated at the 5th LICS in the midclavicular line [Normal] : normal [Auscultation Breath Sounds / Voice Sounds] : lungs were clear to auscultation bilaterally [Normal Rate] : normal [Irregularly Irregular] : irregularly irregular [Abdomen Soft] : soft [Abdomen Tenderness] : non-tender [Abdomen Mass (___ Cm)] : no abdominal mass palpated [Nail Clubbing] : no clubbing of the fingernails [Cyanosis, Localized] : no localized cyanosis [Petechial Hemorrhages (___cm)] : no petechial hemorrhages [Skin Color & Pigmentation] : normal skin color and pigmentation [] : no ischemic changes [Affect] : the affect was normal [No Venous Stasis] : no venous stasis [Mood] : the mood was normal

## 2020-09-16 NOTE — REASON FOR VISIT
[Consultation] : a consultation regarding [FreeTextEntry1] : 87-year-old man with history of aortic valve replacement with a [employer] prosthesis in 1991 history of paroxysmal atrial fibrillation, atrial tachycardia, PVCs. Patient is normally followed by Dr. Linares. This is my first encounter with him. He is scheduled for cataract surgery. He has no complaints of chest discomfort shortness of breath palpitations dizziness or syncope. He has had no recent cardiac hospitalizations. Echocardiography done in June of this year revealed a normally functioning aortic prosthesis, mild mitral stenosis, and normal left ventricular systolic function.

## 2020-09-29 ENCOUNTER — APPOINTMENT (OUTPATIENT)
Dept: CARDIOLOGY | Facility: CLINIC | Age: 85
End: 2020-09-29
Payer: MEDICARE

## 2020-09-29 LAB
INR PPP: 5.3 RATIO
POCT-PROTHROMBIN TIME: 63.4 SECS
QUALITY CONTROL: YES

## 2020-09-29 PROCEDURE — 99211 OFF/OP EST MAY X REQ PHY/QHP: CPT | Mod: 25

## 2020-09-29 PROCEDURE — 85610 PROTHROMBIN TIME: CPT | Mod: QW

## 2020-10-06 ENCOUNTER — APPOINTMENT (OUTPATIENT)
Dept: CARDIOLOGY | Facility: CLINIC | Age: 85
End: 2020-10-06
Payer: MEDICARE

## 2020-10-06 LAB
INR PPP: 4.1 RATIO
POCT-PROTHROMBIN TIME: 48.8 SECS
QUALITY CONTROL: YES

## 2020-10-06 PROCEDURE — 85610 PROTHROMBIN TIME: CPT | Mod: QW

## 2020-10-06 PROCEDURE — 99211 OFF/OP EST MAY X REQ PHY/QHP: CPT | Mod: 25

## 2020-10-12 ENCOUNTER — APPOINTMENT (OUTPATIENT)
Dept: CARDIOLOGY | Facility: CLINIC | Age: 85
End: 2020-10-12
Payer: MEDICARE

## 2020-10-12 ENCOUNTER — NON-APPOINTMENT (OUTPATIENT)
Age: 85
End: 2020-10-12

## 2020-10-12 VITALS
SYSTOLIC BLOOD PRESSURE: 84 MMHG | WEIGHT: 121 LBS | HEIGHT: 67 IN | RESPIRATION RATE: 16 BRPM | HEART RATE: 72 BPM | BODY MASS INDEX: 18.99 KG/M2 | OXYGEN SATURATION: 95 % | DIASTOLIC BLOOD PRESSURE: 42 MMHG

## 2020-10-12 LAB
INR PPP: 3.3 RATIO
POCT-PROTHROMBIN TIME: 38.1 SECS
QUALITY CONTROL: YES

## 2020-10-12 PROCEDURE — 99214 OFFICE O/P EST MOD 30 MIN: CPT | Mod: 25

## 2020-10-12 PROCEDURE — 93000 ELECTROCARDIOGRAM COMPLETE: CPT

## 2020-10-12 PROCEDURE — 85610 PROTHROMBIN TIME: CPT | Mod: QW

## 2020-10-12 NOTE — PHYSICAL EXAM
[General Appearance - Well Developed] : well developed [Normal Appearance] : normal appearance [General Appearance - Well Nourished] : well nourished [No Deformities] : no deformities [General Appearance - In No Acute Distress] : no acute distress [Normal Conjunctiva] : the conjunctiva exhibited no abnormalities [Eyelids - No Xanthelasma] : the eyelids demonstrated no xanthelasmas [Respiration, Rhythm And Depth] : normal respiratory rhythm and effort [Exaggerated Use Of Accessory Muscles For Inspiration] : no accessory muscle use [Auscultation Breath Sounds / Voice Sounds] : lungs were clear to auscultation bilaterally [Edema] : no peripheral edema present [FreeTextEntry1] : Prosthetic second sound, good quality, soft systolic murmur, 3/6 [Abdomen Soft] : soft [Abdomen Tenderness] : non-tender [Abdomen Mass (___ Cm)] : no abdominal mass palpated [Abnormal Walk] : normal gait [Gait - Sufficient For Exercise Testing] : the gait was sufficient for exercise testing [Nail Clubbing] : no clubbing of the fingernails [Cyanosis, Localized] : no localized cyanosis [Petechial Hemorrhages (___cm)] : no petechial hemorrhages [] : no ischemic changes [Skin Color & Pigmentation] : normal skin color and pigmentation [No Venous Stasis] : no venous stasis [Affect] : the affect was normal [Mood] : the mood was normal

## 2020-10-12 NOTE — HISTORY OF PRESENT ILLNESS
[FreeTextEntry1] : \par 88-year-old man with history of mechanical AVR and thoracic aortic aneurysm repair, HBP,  CLBBB seen for follow.\par \par States he feels well and has had no recurrent falls. He was aware of weight loss since his eating less. No chest pain palpitations shortness of breath cough fever chills. We'll have the INR testing today.\par \par

## 2020-10-12 NOTE — ASSESSMENT
[FreeTextEntry1] : s/p mechanical aVR remote thoracic aortic repair. Hypertension, relative hypotension today. Atrial fibrillation. 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

## 2020-10-12 NOTE — DISCUSSION/SUMMARY
[Patient] : the patient [Risks] : risks [Benefits] : benefits [Alternatives] : alternatives [___ Month(s)] : [unfilled] month(s) [FreeTextEntry1] : To stop diuretic, reassess bp in 2 weeks.\par INR testing.\par Arrange flu vaccine soon.

## 2020-10-26 ENCOUNTER — APPOINTMENT (OUTPATIENT)
Dept: CARDIOLOGY | Facility: CLINIC | Age: 85
End: 2020-10-26
Payer: MEDICARE

## 2020-10-26 VITALS
SYSTOLIC BLOOD PRESSURE: 100 MMHG | WEIGHT: 121 LBS | HEIGHT: 67 IN | BODY MASS INDEX: 18.99 KG/M2 | HEART RATE: 73 BPM | RESPIRATION RATE: 16 BRPM | TEMPERATURE: 97.6 F | OXYGEN SATURATION: 96 % | DIASTOLIC BLOOD PRESSURE: 59 MMHG

## 2020-10-26 DIAGNOSIS — Z23 ENCOUNTER FOR IMMUNIZATION: ICD-10-CM

## 2020-10-26 DIAGNOSIS — Z00.00 ENCOUNTER FOR GENERAL ADULT MEDICAL EXAMINATION W/OUT ABNORMAL FINDINGS: ICD-10-CM

## 2020-10-26 LAB
INR PPP: 3 RATIO
POCT-PROTHROMBIN TIME: 35.6 SECS
QUALITY CONTROL: YES

## 2020-10-26 PROCEDURE — 85610 PROTHROMBIN TIME: CPT | Mod: QW

## 2020-10-26 PROCEDURE — 99213 OFFICE O/P EST LOW 20 MIN: CPT | Mod: 25

## 2020-10-26 PROCEDURE — G0008: CPT

## 2020-10-26 PROCEDURE — 90662 IIV NO PRSV INCREASED AG IM: CPT

## 2020-10-26 NOTE — PHYSICAL EXAM
[General Appearance - Well Developed] : well developed [Normal Appearance] : normal appearance [General Appearance - Well Nourished] : well nourished [No Deformities] : no deformities [General Appearance - In No Acute Distress] : no acute distress [Normal Conjunctiva] : the conjunctiva exhibited no abnormalities [Eyelids - No Xanthelasma] : the eyelids demonstrated no xanthelasmas [Exaggerated Use Of Accessory Muscles For Inspiration] : no accessory muscle use [Auscultation Breath Sounds / Voice Sounds] : lungs were clear to auscultation bilaterally [Edema] : no peripheral edema present [Abdomen Soft] : soft [Abdomen Tenderness] : non-tender [] : no hepato-splenomegaly [Abdomen Mass (___ Cm)] : no abdominal mass palpated [Nail Clubbing] : no clubbing of the fingernails [Cyanosis, Localized] : no localized cyanosis [Skin Color & Pigmentation] : normal skin color and pigmentation [Skin Turgor] : normal skin turgor [Affect] : the affect was normal [Mood] : the mood was normal [FreeTextEntry1] : Prosthetic second sound, good quality, soft systolic murmur, 3/6

## 2020-10-26 NOTE — DISCUSSION/SUMMARY
[Patient] : the patient [Risks] : risks [Benefits] : benefits [Alternatives] : alternatives [___ Month(s)] : [unfilled] month(s) [FreeTextEntry1] : Remain off hctz. Fall precautions. Dietary supplement...Ensure discussed.\par INR testing.\par Flu vaccine today.

## 2020-10-26 NOTE — HISTORY OF PRESENT ILLNESS
[FreeTextEntry1] : \par 88-year-old man with history of mechanical AVR and thoracic aortic aneurysm repair, HBP,  CLBBB seen for follow.\par \par States he feels well and has had no recurrent falls. He was aware of weight loss since his eating less. No chest pain palpitations shortness of breath cough fever chills. We'll have the INR testing today.\par \par Stopped HCTZ last visit.  Will have flu shot today.\par \par

## 2020-11-09 ENCOUNTER — APPOINTMENT (OUTPATIENT)
Dept: CARDIOLOGY | Facility: CLINIC | Age: 85
End: 2020-11-09
Payer: MEDICARE

## 2020-11-09 LAB
INR PPP: 1.1 RATIO
POCT-PROTHROMBIN TIME: 12.8 SECS
QUALITY CONTROL: YES

## 2020-11-09 PROCEDURE — 99211 OFF/OP EST MAY X REQ PHY/QHP: CPT | Mod: 25

## 2020-11-09 PROCEDURE — 85610 PROTHROMBIN TIME: CPT | Mod: QW

## 2020-11-15 ENCOUNTER — RX RENEWAL (OUTPATIENT)
Age: 85
End: 2020-11-15

## 2020-11-16 ENCOUNTER — APPOINTMENT (OUTPATIENT)
Dept: CARDIOLOGY | Facility: CLINIC | Age: 85
End: 2020-11-16
Payer: MEDICARE

## 2020-11-16 LAB
INR PPP: 1.2 RATIO
POCT-PROTHROMBIN TIME: 14.9 SECS
QUALITY CONTROL: YES

## 2020-11-16 PROCEDURE — 85610 PROTHROMBIN TIME: CPT | Mod: QW

## 2020-11-16 PROCEDURE — 99211 OFF/OP EST MAY X REQ PHY/QHP: CPT | Mod: 25

## 2020-11-30 ENCOUNTER — APPOINTMENT (OUTPATIENT)
Dept: CARDIOLOGY | Facility: CLINIC | Age: 85
End: 2020-11-30
Payer: MEDICARE

## 2020-11-30 LAB
INR PPP: 2.3 RATIO
POCT-PROTHROMBIN TIME: 28.2 SECS
QUALITY CONTROL: YES

## 2020-11-30 PROCEDURE — 99211 OFF/OP EST MAY X REQ PHY/QHP: CPT | Mod: 25

## 2020-11-30 PROCEDURE — 85610 PROTHROMBIN TIME: CPT | Mod: QW

## 2020-12-14 ENCOUNTER — APPOINTMENT (OUTPATIENT)
Dept: CARDIOLOGY | Facility: CLINIC | Age: 85
End: 2020-12-14
Payer: MEDICARE

## 2020-12-14 LAB
INR PPP: 1.9 RATIO
POCT-PROTHROMBIN TIME: 22.3 SECS
QUALITY CONTROL: YES

## 2020-12-14 PROCEDURE — 99211 OFF/OP EST MAY X REQ PHY/QHP: CPT | Mod: 25

## 2020-12-14 PROCEDURE — 85610 PROTHROMBIN TIME: CPT | Mod: QW

## 2020-12-28 ENCOUNTER — APPOINTMENT (OUTPATIENT)
Dept: CARDIOLOGY | Facility: CLINIC | Age: 85
End: 2020-12-28
Payer: MEDICARE

## 2020-12-28 LAB
INR PPP: 4.8 RATIO
POCT-PROTHROMBIN TIME: 57.9 SECS
QUALITY CONTROL: YES

## 2020-12-28 PROCEDURE — 85610 PROTHROMBIN TIME: CPT | Mod: QW

## 2020-12-28 PROCEDURE — 99211 OFF/OP EST MAY X REQ PHY/QHP: CPT | Mod: 25

## 2021-01-04 ENCOUNTER — APPOINTMENT (OUTPATIENT)
Dept: CARDIOLOGY | Facility: CLINIC | Age: 86
End: 2021-01-04
Payer: MEDICARE

## 2021-01-04 PROCEDURE — 99211 OFF/OP EST MAY X REQ PHY/QHP: CPT | Mod: 25

## 2021-01-04 PROCEDURE — 85610 PROTHROMBIN TIME: CPT | Mod: QW

## 2021-01-05 LAB
INR PPP: 2.5 RATIO
POCT-PROTHROMBIN TIME: 30.5 SECS
QUALITY CONTROL: YES

## 2021-01-11 ENCOUNTER — APPOINTMENT (OUTPATIENT)
Dept: CARDIOLOGY | Facility: CLINIC | Age: 86
End: 2021-01-11

## 2021-01-25 ENCOUNTER — APPOINTMENT (OUTPATIENT)
Dept: CARDIOLOGY | Facility: CLINIC | Age: 86
End: 2021-01-25
Payer: MEDICARE

## 2021-01-25 ENCOUNTER — NON-APPOINTMENT (OUTPATIENT)
Age: 86
End: 2021-01-25

## 2021-01-25 VITALS
RESPIRATION RATE: 16 BRPM | DIASTOLIC BLOOD PRESSURE: 77 MMHG | WEIGHT: 130 LBS | HEIGHT: 67 IN | BODY MASS INDEX: 20.4 KG/M2 | SYSTOLIC BLOOD PRESSURE: 145 MMHG | HEART RATE: 59 BPM | OXYGEN SATURATION: 96 %

## 2021-01-25 DIAGNOSIS — I95.9 HYPOTENSION, UNSPECIFIED: ICD-10-CM

## 2021-01-25 LAB
INR PPP: 2 RATIO
POCT-PROTHROMBIN TIME: 24.5 SECS
QUALITY CONTROL: YES

## 2021-01-25 PROCEDURE — 93000 ELECTROCARDIOGRAM COMPLETE: CPT

## 2021-01-25 PROCEDURE — 85610 PROTHROMBIN TIME: CPT | Mod: QW

## 2021-01-25 PROCEDURE — 99214 OFFICE O/P EST MOD 30 MIN: CPT

## 2021-01-25 PROCEDURE — 99211 OFF/OP EST MAY X REQ PHY/QHP: CPT | Mod: 25

## 2021-01-25 NOTE — PHYSICAL EXAM
[General Appearance - Well Developed] : well developed [Normal Appearance] : normal appearance [General Appearance - Well Nourished] : well nourished [No Deformities] : no deformities [General Appearance - In No Acute Distress] : no acute distress [Normal Conjunctiva] : the conjunctiva exhibited no abnormalities [Eyelids - No Xanthelasma] : the eyelids demonstrated no xanthelasmas [Exaggerated Use Of Accessory Muscles For Inspiration] : no accessory muscle use [Auscultation Breath Sounds / Voice Sounds] : lungs were clear to auscultation bilaterally [Edema] : no peripheral edema present [FreeTextEntry1] : Prosthetic second sound, good quality, soft systolic murmur, 3/6 [Abdomen Soft] : soft [Abdomen Tenderness] : non-tender [] : no hepato-splenomegaly [Abdomen Mass (___ Cm)] : no abdominal mass palpated [Nail Clubbing] : no clubbing of the fingernails [Cyanosis, Localized] : no localized cyanosis [Skin Color & Pigmentation] : normal skin color and pigmentation [Skin Turgor] : normal skin turgor [Affect] : the affect was normal [Mood] : the mood was normal

## 2021-01-25 NOTE — REVIEW OF SYSTEMS
[Recent Weight Gain (___ Lbs)] : recent [unfilled] ~Ulb weight gain [Recent Weight Loss (___ Lbs)] : no recent weight loss [Eyeglasses] : currently wearing eyeglasses [Loss Of Hearing] : hearing loss [see HPI] : see HPI [Negative] : Heme/Lymph [FreeTextEntry2] : loss of vision in one eye

## 2021-01-25 NOTE — ASSESSMENT
[FreeTextEntry1] : s/p mechanical aVR remote thoracic aortic repair. Hypertension, relative hypotension today. Atrial fibrillation. 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

## 2021-01-25 NOTE — DISCUSSION/SUMMARY
[Patient] : the patient [Risks] : risks [Benefits] : benefits [Alternatives] : alternatives [___ Month(s)] : [unfilled] month(s) [FreeTextEntry1] : Remain off hctz. Fall precautions. Dietary supplement...Ensure discussed.\par INR testing.\par

## 2021-01-25 NOTE — HISTORY OF PRESENT ILLNESS
[FreeTextEntry1] : \par 88-year-old man with history of mechanical AVR and thoracic aortic aneurysm repair, HBP,  CLBBB seen for follow.\par \par States he feels well and has had no recurrent falls No chest pain palpitations shortness of breath cough fever chills. We'll have the INR testing today.\par \par Eating better, gainged back some weight.\par \par \par \par

## 2021-02-08 ENCOUNTER — APPOINTMENT (OUTPATIENT)
Dept: CARDIOLOGY | Facility: CLINIC | Age: 86
End: 2021-02-08
Payer: MEDICARE

## 2021-02-08 LAB
INR PPP: 7.1 RATIO
POCT-PROTHROMBIN TIME: 85.5 SECS
QUALITY CONTROL: YES

## 2021-02-08 PROCEDURE — 99211 OFF/OP EST MAY X REQ PHY/QHP: CPT | Mod: 25

## 2021-02-08 PROCEDURE — 85610 PROTHROMBIN TIME: CPT | Mod: QW

## 2021-02-11 ENCOUNTER — APPOINTMENT (OUTPATIENT)
Dept: CARDIOLOGY | Facility: CLINIC | Age: 86
End: 2021-02-11
Payer: MEDICARE

## 2021-02-11 LAB
INR PPP: 2.2 RATIO
POCT-PROTHROMBIN TIME: 27 SECS
QUALITY CONTROL: YES

## 2021-02-11 PROCEDURE — 85610 PROTHROMBIN TIME: CPT | Mod: QW

## 2021-02-11 PROCEDURE — 99211 OFF/OP EST MAY X REQ PHY/QHP: CPT | Mod: 25

## 2021-03-01 ENCOUNTER — APPOINTMENT (OUTPATIENT)
Dept: CARDIOLOGY | Facility: CLINIC | Age: 86
End: 2021-03-01
Payer: MEDICARE

## 2021-03-01 LAB
INR PPP: 1.7 RATIO
POCT-PROTHROMBIN TIME: 20.5 SECS
QUALITY CONTROL: YES

## 2021-03-01 PROCEDURE — 85610 PROTHROMBIN TIME: CPT | Mod: QW

## 2021-03-01 PROCEDURE — 99211 OFF/OP EST MAY X REQ PHY/QHP: CPT | Mod: 25

## 2021-03-15 ENCOUNTER — APPOINTMENT (OUTPATIENT)
Dept: CARDIOLOGY | Facility: CLINIC | Age: 86
End: 2021-03-15
Payer: MEDICARE

## 2021-03-15 LAB
INR PPP: 1.5 RATIO
POCT-PROTHROMBIN TIME: 17.7 SECS
QUALITY CONTROL: YES

## 2021-03-15 PROCEDURE — 99211 OFF/OP EST MAY X REQ PHY/QHP: CPT | Mod: 25

## 2021-03-15 PROCEDURE — 85610 PROTHROMBIN TIME: CPT | Mod: QW

## 2021-04-06 ENCOUNTER — APPOINTMENT (OUTPATIENT)
Dept: CARDIOLOGY | Facility: CLINIC | Age: 86
End: 2021-04-06
Payer: MEDICARE

## 2021-04-06 LAB
INR PPP: 1.4 RATIO
POCT-PROTHROMBIN TIME: 16.2 SECS
QUALITY CONTROL: YES

## 2021-04-06 PROCEDURE — 85610 PROTHROMBIN TIME: CPT | Mod: QW

## 2021-04-06 PROCEDURE — 99211 OFF/OP EST MAY X REQ PHY/QHP: CPT | Mod: 25

## 2021-04-19 ENCOUNTER — APPOINTMENT (OUTPATIENT)
Dept: CARDIOLOGY | Facility: CLINIC | Age: 86
End: 2021-04-19
Payer: MEDICARE

## 2021-04-19 LAB
INR PPP: 1.5 RATIO
POCT-PROTHROMBIN TIME: 18 SECS
QUALITY CONTROL: YES

## 2021-04-19 PROCEDURE — 85610 PROTHROMBIN TIME: CPT | Mod: QW

## 2021-04-19 PROCEDURE — 99211 OFF/OP EST MAY X REQ PHY/QHP: CPT | Mod: 25

## 2021-05-03 ENCOUNTER — APPOINTMENT (OUTPATIENT)
Dept: CARDIOLOGY | Facility: CLINIC | Age: 86
End: 2021-05-03
Payer: MEDICARE

## 2021-05-03 LAB
INR PPP: 1.5 RATIO
POCT-PROTHROMBIN TIME: 17.8 SECS
QUALITY CONTROL: YES

## 2021-05-03 PROCEDURE — 85610 PROTHROMBIN TIME: CPT | Mod: QW

## 2021-05-03 PROCEDURE — 99211 OFF/OP EST MAY X REQ PHY/QHP: CPT | Mod: 25

## 2021-05-10 ENCOUNTER — APPOINTMENT (OUTPATIENT)
Dept: CARDIOLOGY | Facility: CLINIC | Age: 86
End: 2021-05-10
Payer: MEDICARE

## 2021-05-10 LAB
INR PPP: 2.7 RATIO
POCT-PROTHROMBIN TIME: 32.7 SECS
QUALITY CONTROL: YES

## 2021-05-10 PROCEDURE — 99211 OFF/OP EST MAY X REQ PHY/QHP: CPT | Mod: 25

## 2021-05-10 PROCEDURE — 85610 PROTHROMBIN TIME: CPT | Mod: QW

## 2021-05-11 NOTE — ED PROVIDER NOTE - CLINICAL SUMMARY MEDICAL DECISION MAKING FREE TEXT BOX
87 yo M pw supratherapeutic INR- will draw pt/inr, give additional dose of ramipril as pt is htn. Adam Miller, cardiology

## 2021-05-12 LAB
ANION GAP SERPL CALC-SCNC: 10 MMOL/L
BASOPHILS # BLD AUTO: 0.07 K/UL
BASOPHILS NFR BLD AUTO: 0.7 %
BUN SERPL-MCNC: 25 MG/DL
CALCIUM SERPL-MCNC: 9.7 MG/DL
CHLORIDE SERPL-SCNC: 105 MMOL/L
CHOLEST SERPL-MCNC: 132 MG/DL
CO2 SERPL-SCNC: 27 MMOL/L
CREAT SERPL-MCNC: 1.29 MG/DL
EOSINOPHIL # BLD AUTO: 0.44 K/UL
EOSINOPHIL NFR BLD AUTO: 4.6 %
ESTIMATED AVERAGE GLUCOSE: 123 MG/DL
GLUCOSE SERPL-MCNC: 143 MG/DL
HBA1C MFR BLD HPLC: 5.9 %
HCT VFR BLD CALC: 44.2 %
HDLC SERPL-MCNC: 28 MG/DL
HGB BLD-MCNC: 14.6 G/DL
IMM GRANULOCYTES NFR BLD AUTO: 1 %
LDLC SERPL CALC-MCNC: 71 MG/DL
LYMPHOCYTES # BLD AUTO: 1.99 K/UL
LYMPHOCYTES NFR BLD AUTO: 20.8 %
MAN DIFF?: NORMAL
MCHC RBC-ENTMCNC: 29.9 PG
MCHC RBC-ENTMCNC: 33 GM/DL
MCV RBC AUTO: 90.4 FL
MONOCYTES # BLD AUTO: 0.78 K/UL
MONOCYTES NFR BLD AUTO: 8.1 %
NEUTROPHILS # BLD AUTO: 6.21 K/UL
NEUTROPHILS NFR BLD AUTO: 64.8 %
NONHDLC SERPL-MCNC: 104 MG/DL
PLATELET # BLD AUTO: 183 K/UL
POTASSIUM SERPL-SCNC: 4.4 MMOL/L
RBC # BLD: 4.89 M/UL
RBC # FLD: 14 %
SODIUM SERPL-SCNC: 142 MMOL/L
TRIGL SERPL-MCNC: 164 MG/DL
WBC # FLD AUTO: 9.59 K/UL

## 2021-05-24 ENCOUNTER — NON-APPOINTMENT (OUTPATIENT)
Age: 86
End: 2021-05-24

## 2021-05-24 ENCOUNTER — APPOINTMENT (OUTPATIENT)
Dept: CARDIOLOGY | Facility: CLINIC | Age: 86
End: 2021-05-24
Payer: MEDICARE

## 2021-05-24 VITALS
HEART RATE: 70 BPM | RESPIRATION RATE: 16 BRPM | SYSTOLIC BLOOD PRESSURE: 160 MMHG | DIASTOLIC BLOOD PRESSURE: 70 MMHG | WEIGHT: 135 LBS | OXYGEN SATURATION: 98 % | BODY MASS INDEX: 21.19 KG/M2 | HEIGHT: 67 IN | TEMPERATURE: 97.4 F

## 2021-05-24 VITALS — SYSTOLIC BLOOD PRESSURE: 138 MMHG | DIASTOLIC BLOOD PRESSURE: 76 MMHG

## 2021-05-24 PROCEDURE — 99214 OFFICE O/P EST MOD 30 MIN: CPT

## 2021-05-24 PROCEDURE — 93000 ELECTROCARDIOGRAM COMPLETE: CPT

## 2021-05-24 NOTE — ASSESSMENT
[FreeTextEntry1] : Elderly man with remote mechanical AVR and thoracic aneurysm repair doing well chronic atrial fibrillation.  LBBB.  Blood pressure controlled.  Paroxysmal A. fib on oral anticoagulation

## 2021-05-24 NOTE — CARDIOLOGY SUMMARY
[de-identified] : May 24, 2021 sinus rhythm first-degree block LBBB [de-identified] : June 2020 mechanical AVR normal LV function [de-identified] : Mechanical AVR and thoracic aneurysm repair

## 2021-05-24 NOTE — DISCUSSION/SUMMARY
[Patient] : the patient [Risks] : risks [Benefits] : benefits [Alternatives] : alternatives [___ Month(s)] : in [unfilled] month(s) [FreeTextEntry1] : Blood work reviewed and discussed with patient.  Continue current cardiac medication and regime.  SBE prophylaxis for bacteremic procedures.  INR monitoring every few weeks.  Medications reviewed and questions addressed.  Fall precautions discussed.

## 2021-05-24 NOTE — REASON FOR VISIT
[Arrhythmia/ECG Abnorrmalities] : arrhythmia/ECG abnormalities [Hypertension] : hypertension [Other: ____] : [unfilled]

## 2021-05-24 NOTE — HISTORY OF PRESENT ILLNESS
[FreeTextEntry1] : \par 88-year-old man with history of mechanical AVR and thoracic aortic aneurysm repair, HBP,  CLBBB seen for follow.\par \par States he feels well and has had no recurrent falls No chest pain palpitations shortness of breath cough fever chills. \par \par \par \par \par \par

## 2021-05-24 NOTE — PHYSICAL EXAM
[Well Developed] : well developed [Well Nourished] : well nourished [No Acute Distress] : no acute distress [Normal Conjunctiva] : normal conjunctiva [Normal Venous Pressure] : normal venous pressure [No Carotid Bruit] : no carotid bruit [No Rub] : no rub [No Gallop] : no gallop [Clear Lung Fields] : clear lung fields [Good Air Entry] : good air entry [No Respiratory Distress] : no respiratory distress  [Soft] : abdomen soft [Non Tender] : non-tender [No Masses/organomegaly] : no masses/organomegaly [Normal Bowel Sounds] : normal bowel sounds [Normal Gait] : normal gait [No Edema] : no edema [No Cyanosis] : no cyanosis [No Clubbing] : no clubbing [No Varicosities] : no varicosities [No Rash] : no rash [Moves all extremities] : moves all extremities [No Focal Deficits] : no focal deficits [Normal Speech] : normal speech [Alert and Oriented] : alert and oriented [Normal memory] : normal memory

## 2021-05-30 ENCOUNTER — TRANSCRIPTION ENCOUNTER (OUTPATIENT)
Age: 86
End: 2021-05-30

## 2021-06-03 ENCOUNTER — APPOINTMENT (OUTPATIENT)
Dept: CARDIOLOGY | Facility: CLINIC | Age: 86
End: 2021-06-03
Payer: MEDICARE

## 2021-06-03 LAB
INR PPP: 2.3 RATIO
POCT-PROTHROMBIN TIME: 27.2 SECS
QUALITY CONTROL: YES

## 2021-06-03 PROCEDURE — 99211 OFF/OP EST MAY X REQ PHY/QHP: CPT | Mod: 25

## 2021-06-03 PROCEDURE — 85610 PROTHROMBIN TIME: CPT | Mod: QW

## 2021-06-17 ENCOUNTER — APPOINTMENT (OUTPATIENT)
Dept: CARDIOLOGY | Facility: CLINIC | Age: 86
End: 2021-06-17
Payer: MEDICARE

## 2021-06-17 LAB
INR PPP: 2.3 RATIO
POCT-PROTHROMBIN TIME: 28.1 SECS
QUALITY CONTROL: YES

## 2021-06-17 PROCEDURE — 99211 OFF/OP EST MAY X REQ PHY/QHP: CPT | Mod: 25

## 2021-06-17 PROCEDURE — 85610 PROTHROMBIN TIME: CPT | Mod: QW

## 2021-07-01 ENCOUNTER — APPOINTMENT (OUTPATIENT)
Dept: CARDIOLOGY | Facility: CLINIC | Age: 86
End: 2021-07-01
Payer: MEDICARE

## 2021-07-01 PROCEDURE — 85610 PROTHROMBIN TIME: CPT | Mod: QW

## 2021-07-01 PROCEDURE — 99211 OFF/OP EST MAY X REQ PHY/QHP: CPT | Mod: 25

## 2021-07-02 LAB
INR PPP: 2.9 RATIO
POCT-PROTHROMBIN TIME: 34.9 SECS
QUALITY CONTROL: YES

## 2021-07-20 ENCOUNTER — APPOINTMENT (OUTPATIENT)
Dept: CARDIOLOGY | Facility: CLINIC | Age: 86
End: 2021-07-20
Payer: MEDICARE

## 2021-07-20 LAB
INR PPP: 2.9 RATIO
POCT-PROTHROMBIN TIME: 34.2 SECS
QUALITY CONTROL: YES

## 2021-07-20 PROCEDURE — 99211 OFF/OP EST MAY X REQ PHY/QHP: CPT | Mod: 25

## 2021-07-20 PROCEDURE — 85610 PROTHROMBIN TIME: CPT | Mod: QW

## 2021-08-17 ENCOUNTER — APPOINTMENT (OUTPATIENT)
Dept: CARDIOLOGY | Facility: CLINIC | Age: 86
End: 2021-08-17
Payer: MEDICARE

## 2021-08-17 LAB
INR PPP: 2.6 RATIO
POCT-PROTHROMBIN TIME: 31.6 SECS
QUALITY CONTROL: YES

## 2021-08-17 PROCEDURE — 99211 OFF/OP EST MAY X REQ PHY/QHP: CPT | Mod: 25

## 2021-08-17 PROCEDURE — 85610 PROTHROMBIN TIME: CPT | Mod: QW

## 2021-08-24 ENCOUNTER — APPOINTMENT (OUTPATIENT)
Dept: CARDIOLOGY | Facility: CLINIC | Age: 86
End: 2021-08-24

## 2021-09-07 ENCOUNTER — INPATIENT (INPATIENT)
Facility: HOSPITAL | Age: 86
LOS: 0 days | Discharge: HOME | DRG: 948 | End: 2021-09-08
Attending: HOSPITALIST | Admitting: HOSPITALIST
Payer: COMMERCIAL

## 2021-09-07 ENCOUNTER — APPOINTMENT (OUTPATIENT)
Dept: CARDIOLOGY | Facility: CLINIC | Age: 86
End: 2021-09-07
Payer: MEDICARE

## 2021-09-07 VITALS
SYSTOLIC BLOOD PRESSURE: 172 MMHG | DIASTOLIC BLOOD PRESSURE: 74 MMHG | WEIGHT: 138.01 LBS | HEART RATE: 74 BPM | HEIGHT: 67 IN | RESPIRATION RATE: 16 BRPM | OXYGEN SATURATION: 100 % | TEMPERATURE: 98 F

## 2021-09-07 VITALS
RESPIRATION RATE: 17 BRPM | WEIGHT: 135 LBS | OXYGEN SATURATION: 97 % | HEIGHT: 67 IN | TEMPERATURE: 97.5 F | SYSTOLIC BLOOD PRESSURE: 150 MMHG | BODY MASS INDEX: 21.19 KG/M2 | HEART RATE: 69 BPM | DIASTOLIC BLOOD PRESSURE: 80 MMHG

## 2021-09-07 DIAGNOSIS — E78.5 HYPERLIPIDEMIA, UNSPECIFIED: ICD-10-CM

## 2021-09-07 DIAGNOSIS — Z98.890 OTHER SPECIFIED POSTPROCEDURAL STATES: Chronic | ICD-10-CM

## 2021-09-07 DIAGNOSIS — I10 ESSENTIAL (PRIMARY) HYPERTENSION: ICD-10-CM

## 2021-09-07 DIAGNOSIS — Z87.438 PERSONAL HISTORY OF OTHER DISEASES OF MALE GENITAL ORGANS: ICD-10-CM

## 2021-09-07 DIAGNOSIS — I48.0 PAROXYSMAL ATRIAL FIBRILLATION: ICD-10-CM

## 2021-09-07 DIAGNOSIS — R79.1 ABNORMAL COAGULATION PROFILE: ICD-10-CM

## 2021-09-07 DIAGNOSIS — D64.9 ANEMIA, UNSPECIFIED: ICD-10-CM

## 2021-09-07 LAB
ANION GAP SERPL CALC-SCNC: 8 MMOL/L — SIGNIFICANT CHANGE UP (ref 5–17)
BLD GP AB SCN SERPL QL: SIGNIFICANT CHANGE UP
BUN SERPL-MCNC: 25 MG/DL — HIGH (ref 7–23)
CALCIUM SERPL-MCNC: 9 MG/DL — SIGNIFICANT CHANGE UP (ref 8.4–10.5)
CHLORIDE SERPL-SCNC: 105 MMOL/L — SIGNIFICANT CHANGE UP (ref 96–108)
CO2 SERPL-SCNC: 29 MMOL/L — SIGNIFICANT CHANGE UP (ref 22–31)
CREAT SERPL-MCNC: 1.2 MG/DL — SIGNIFICANT CHANGE UP (ref 0.5–1.3)
GLUCOSE SERPL-MCNC: 145 MG/DL — HIGH (ref 70–99)
HCT VFR BLD CALC: 36.5 % — LOW (ref 39–50)
HCT VFR BLD CALC: 37.8 % — LOW (ref 39–50)
HGB BLD-MCNC: 12.8 G/DL — LOW (ref 13–17)
HGB BLD-MCNC: 13.3 G/DL — SIGNIFICANT CHANGE UP (ref 13–17)
INR BLD: 16.22 RATIO — SIGNIFICANT CHANGE UP (ref 0.88–1.16)
INR PPP: 8 RATIO
MCHC RBC-ENTMCNC: 31.1 PG — SIGNIFICANT CHANGE UP (ref 27–34)
MCHC RBC-ENTMCNC: 31.3 PG — SIGNIFICANT CHANGE UP (ref 27–34)
MCHC RBC-ENTMCNC: 35.1 GM/DL — SIGNIFICANT CHANGE UP (ref 32–36)
MCHC RBC-ENTMCNC: 35.2 GM/DL — SIGNIFICANT CHANGE UP (ref 32–36)
MCV RBC AUTO: 88.8 FL — SIGNIFICANT CHANGE UP (ref 80–100)
MCV RBC AUTO: 88.9 FL — SIGNIFICANT CHANGE UP (ref 80–100)
NRBC # BLD: 0 /100 WBCS — SIGNIFICANT CHANGE UP (ref 0–0)
NRBC # BLD: 0 /100 WBCS — SIGNIFICANT CHANGE UP (ref 0–0)
PLATELET # BLD AUTO: 124 K/UL — LOW (ref 150–400)
PLATELET # BLD AUTO: 127 K/UL — LOW (ref 150–400)
POTASSIUM SERPL-MCNC: 4.6 MMOL/L — SIGNIFICANT CHANGE UP (ref 3.5–5.3)
POTASSIUM SERPL-SCNC: 4.6 MMOL/L — SIGNIFICANT CHANGE UP (ref 3.5–5.3)
PROTHROM AB SERPL-ACNC: 171.9 SEC — SIGNIFICANT CHANGE UP (ref 10.6–13.6)
RBC # BLD: 4.11 M/UL — LOW (ref 4.2–5.8)
RBC # BLD: 4.25 M/UL — SIGNIFICANT CHANGE UP (ref 4.2–5.8)
RBC # FLD: 13.6 % — SIGNIFICANT CHANGE UP (ref 10.3–14.5)
RBC # FLD: 13.7 % — SIGNIFICANT CHANGE UP (ref 10.3–14.5)
SARS-COV-2 RNA SPEC QL NAA+PROBE: SIGNIFICANT CHANGE UP
SODIUM SERPL-SCNC: 142 MMOL/L — SIGNIFICANT CHANGE UP (ref 135–145)
WBC # BLD: 7.24 K/UL — SIGNIFICANT CHANGE UP (ref 3.8–10.5)
WBC # BLD: 8.6 K/UL — SIGNIFICANT CHANGE UP (ref 3.8–10.5)
WBC # FLD AUTO: 7.24 K/UL — SIGNIFICANT CHANGE UP (ref 3.8–10.5)
WBC # FLD AUTO: 8.6 K/UL — SIGNIFICANT CHANGE UP (ref 3.8–10.5)

## 2021-09-07 PROCEDURE — 85610 PROTHROMBIN TIME: CPT | Mod: QW

## 2021-09-07 PROCEDURE — 99223 1ST HOSP IP/OBS HIGH 75: CPT

## 2021-09-07 PROCEDURE — 99285 EMERGENCY DEPT VISIT HI MDM: CPT

## 2021-09-07 PROCEDURE — 71045 X-RAY EXAM CHEST 1 VIEW: CPT | Mod: 26

## 2021-09-07 PROCEDURE — 93010 ELECTROCARDIOGRAM REPORT: CPT

## 2021-09-07 PROCEDURE — 99211 OFF/OP EST MAY X REQ PHY/QHP: CPT | Mod: 25

## 2021-09-07 RX ORDER — RAMIPRIL 5 MG
0 CAPSULE ORAL
Qty: 0 | Refills: 0 | DISCHARGE

## 2021-09-07 RX ORDER — DOXAZOSIN MESYLATE 4 MG
4 TABLET ORAL AT BEDTIME
Refills: 0 | Status: DISCONTINUED | OUTPATIENT
Start: 2021-09-07 | End: 2021-09-08

## 2021-09-07 RX ORDER — NADOLOL 80 MG/1
20 TABLET ORAL DAILY
Refills: 0 | Status: DISCONTINUED | OUTPATIENT
Start: 2021-09-07 | End: 2021-09-08

## 2021-09-07 RX ORDER — LISINOPRIL 2.5 MG/1
40 TABLET ORAL DAILY
Refills: 0 | Status: DISCONTINUED | OUTPATIENT
Start: 2021-09-07 | End: 2021-09-08

## 2021-09-07 RX ORDER — ATORVASTATIN CALCIUM 80 MG/1
0 TABLET, FILM COATED ORAL
Qty: 0 | Refills: 0 | DISCHARGE

## 2021-09-07 RX ORDER — WARFARIN SODIUM 2.5 MG/1
0 TABLET ORAL
Qty: 0 | Refills: 0 | DISCHARGE

## 2021-09-07 RX ORDER — ATORVASTATIN CALCIUM 80 MG/1
10 TABLET, FILM COATED ORAL AT BEDTIME
Refills: 0 | Status: DISCONTINUED | OUTPATIENT
Start: 2021-09-07 | End: 2021-09-08

## 2021-09-07 RX ORDER — NADOLOL 80 MG/1
0 TABLET ORAL
Qty: 0 | Refills: 0 | DISCHARGE

## 2021-09-07 RX ORDER — PHYTONADIONE (VIT K1) 5 MG
5 TABLET ORAL ONCE
Refills: 0 | Status: COMPLETED | OUTPATIENT
Start: 2021-09-07 | End: 2021-09-07

## 2021-09-07 RX ADMIN — ATORVASTATIN CALCIUM 10 MILLIGRAM(S): 80 TABLET, FILM COATED ORAL at 21:43

## 2021-09-07 RX ADMIN — Medication 4 MILLIGRAM(S): at 21:43

## 2021-09-07 RX ADMIN — Medication 5 MILLIGRAM(S): at 16:21

## 2021-09-07 NOTE — H&P ADULT - NSHPREVIEWOFSYSTEMS_GEN_ALL_CORE
REVIEW OF SYSTEMS:  CONSTITUTIONAL: No fever, weight loss, or fatigue  EYES: No eye pain, visual disturbances, or discharge  ENMT:  No difficulty hearing, tinnitus, vertigo; No sinus or throat pain  NECK: No neck pain or neck stiffness  BREASTS: No pain, masses, or nipple discharge  RESPIRATORY: No cough, wheezing, chills or hemoptysis; No shortness of breath  CARDIOVASCULAR: No chest pain, palpitations, dizziness, or leg swelling  GASTROINTESTINAL: No abdominal pain, No nausea, vomiting, or hematemesis; No diarrhea or constipation  GENITOURINARY: No dysuria, frequency, hematuria, or incontinence  NEUROLOGICAL: No headaches, memory loss, loss of strength, numbness, or tremors  SKIN: No itching, burning, rashes, or lesions   LYMPH NODES: No enlarged glands  ENDOCRINE: No heat or cold intolerance; No hair loss  MUSCULOSKELETAL: No joint pain or swelling; No muscle, back, or extremity pain  PSYCHIATRIC: No depression, anxiety, mood swings, or difficulty sleeping  HEME/LYMPH: No easy bruising or bleeding  ALLERGY AND IMMUNOLOGIC: No hives or eczema    All other ROS reviewed and negative except as otherwise stated

## 2021-09-07 NOTE — ED PROVIDER NOTE - CROS ED HEME ALL NEG
negative...
Detail Level: Zone
Plan: I recommended patient schedule regular extractions with . I also recommended regular use of a retinoid. I explained that aggressive exfoliation will likely only result in trauma to the skin and won’t prevent milia
Render In Strict Bullet Format?: No
Continue Regimen: Tretinoin as prescribed by outside physician: apply 2-3 times weekly working up to nightly as tolerated

## 2021-09-07 NOTE — H&P ADULT - NSHPPHYSICALEXAM_GEN_ALL_CORE
Vital Signs Last 24 Hrs  T(F): 98.3 (07 Sep 2021 13:49), Max: 98.3 (07 Sep 2021 13:49)  HR: 74 (07 Sep 2021 13:49) (74 - 74)  BP: 172/74 (07 Sep 2021 13:49) (172/74 - 172/74)  RR: 16 (07 Sep 2021 13:49) (16 - 16)  SpO2: 100% (07 Sep 2021 13:49) (100% - 100%)    PHYSICAL EXAM:  GENERAL: NAD, elderly, thin   HEAD:  Atraumatic, Normocephalic  EYES: EOMI, conjunctiva and sclera clear  ENMT: Moist mucous membranes, Good dentition, no thrush. Extremely [place]  NECK: Supple, No JVD  CHEST/LUNG: Clear to auscultation bilaterally, good air entry, non-labored breathing  HEART: RRR; S1/S2, No murmur  ABDOMEN: Soft, Nontender, Nondistended; Bowel sounds present  VASCULAR: Normal pulses, Normal capillary refill  EXTREMITIES: No calf tenderness, No cyanosis, No edema  SKIN: Warm, Intact  NERVOUS SYSTEM:  A/O x3, No focal deficits

## 2021-09-07 NOTE — ED ADULT NURSE NOTE - OBJECTIVE STATEMENT
Patient presents to ED sent by PCP for evaluation of high INR (8.0). Patient offers no complaints at this time. No signs of active bleeding.

## 2021-09-07 NOTE — ED PROVIDER NOTE - CHPI ED SYMPTOMS NEG
no dizziness/no fever/no nausea/no numbness/no pain/no tingling/no chills/no decreased eating/drinking

## 2021-09-07 NOTE — ED PROVIDER NOTE - CLINICAL SUMMARY MEDICAL DECISION MAKING FREE TEXT BOX
Patient with elevated INR 16 . Hgb dropped from previous exams. Will admit Patient with elevated INR 16 . Hgb dropped from previous exams. Will admit for vit k , repeat hgb and inr

## 2021-09-07 NOTE — PATIENT PROFILE ADULT - ABILITY TO HEAR (WITH HEARING AID OR HEARING APPLIANCE IF NORMALLY USED):
Speaking loudly Speaking loudly/Mildly to Moderately Impaired: difficulty hearing in some environments or speaker may need to increase volume or speak distinctly

## 2021-09-07 NOTE — H&P ADULT - ATTENDING COMMENTS
Patient with elevated inr. Po vitamin K given. Patient with no complaints. EKG shows 1stdeg AVB, LBBB with qtc 460. Holding coumadin. repeating labs. anticipate D/C in AM. As per friend at bedside, estella Pelayo is HCP and person of contact for medical condiiton

## 2021-09-07 NOTE — H&P ADULT - ASSESSMENT
89 yo male with PMH HTN, pAF, remote TB, s/p AVR, s/p thoracic aortic aneurysm repair, sent to ED by Dr Sandoval for elevated INR noted on routine outpatient blood work. In ED, patient with elevated INR 16. Hgb dropped from previous exams (baseline 14). Patient currently asymptomatic. He denies any new diet changes, medication changes.     #Supratherapeutic INR  #Anemia  - INR 16, Hgb 12.8 (baseline Hbg ~14)  - Will check FOBT  - Repeat CBC at 2100 and in AM  - Repeat INR in AM  - Patient was given vitamin K PO  - Holding coumadin   - Fall precautions, bleeding precautions    #HTN  #HLD  #pAF  - Continue home medications: atorvastatin, lisinopril, nadolol   - Holding coumadin     #BPH  - On solidosin 8mg daily  - Not in formulary, will give doxazosin 4mg while inpatient    #DVT ppx  - Holding pharmacologic for elevated INR  - Holding mechanical for INR 16, concern for bruising/bleeding    Called and left voicemail with callback number for patient's son Pierce at [phone]. Patient does have a friend at bedside, Tiarra ([phone]). Tiarra will try to contact patient's son and daughter. Tiarra also states when patient gets discharged he can give him a ride home       IMPROVE VTE Individual Risk Assessment          RISK                                                          Points  [  ] Previous VTE                                                3  [  ] Thrombophilia                                             2  [  ] Lower limb paralysis                                   2        (unable to hold up >15 seconds)    [  ] Current Cancer                                             2         (within 6 months)  [  ] Immobilization > 24 hrs                              1  [  ] ICU/CCU stay > 24 hours                             1  [x  ] Age > 60                                                         1    IMPROVE VTE Score: 1     87 yo male with PMH HTN, pAF, remote TB, s/p AVR, s/p thoracic aortic aneurysm repair, sent to ED by Dr Sandoval for elevated INR noted on routine outpatient blood work. In ED, patient with elevated INR 16. Hgb dropped from previous exams (baseline 14). Patient currently asymptomatic.    #Supratherapeutic INR  #Anemia  - INR 16, Hgb 12.8 (baseline Hbg ~14)  - Will check FOBT  - Repeat CBC at 2100 and in AM  - Repeat INR in AM  - Patient was given vitamin K PO  - Holding coumadin   - Fall precautions, bleeding precautions    #HTN  #HLD  #pAF  - Continue home medications: atorvastatin, lisinopril, nadolol   - Holding coumadin     #BPH  - On solidosin 8mg daily  - Not in formulary, will give doxazosin 4mg while inpatient    #DVT ppx  - Holding pharmacologic for elevated INR  - Holding mechanical for INR 16, concern for bruising/bleeding    Called and left voicemail with callback number for patient's son Pierce at [phone]. Patient does have a friend at bedside, Tiarra ([phone]). Tiarra will try to contact patient's son and daughter. Tiarra also states when patient gets discharged he can give him a ride home       IMPROVE VTE Individual Risk Assessment          RISK                                                          Points  [  ] Previous VTE                                                3  [  ] Thrombophilia                                             2  [  ] Lower limb paralysis                                   2        (unable to hold up >15 seconds)    [  ] Current Cancer                                             2         (within 6 months)  [  ] Immobilization > 24 hrs                              1  [  ] ICU/CCU stay > 24 hours                             1  [x  ] Age > 60                                                         1    IMPROVE VTE Score: 1

## 2021-09-07 NOTE — H&P ADULT - NSHPSOCIALHISTORY_GEN_ALL_CORE
Patient lives alone, doesn't drive but ambulates independently, walks around town. Former grocery . Denies tobacco, ETOH and drugs.    Patient cannot recall family history

## 2021-09-07 NOTE — H&P ADULT - HISTORY OF PRESENT ILLNESS
89 yo male with PMH HTN, pAF, remote TB, s/p AVR, s/p thoracic aortic aneurysm repair, sent to ED by Dr Sandoval for elevated INR noted on routine outpatient blood work. In ED, patient with elevated INR 16. Hgb dropped from previous exams (baseline 14). Patient currently asymptomatic. He denies any new diet changes, medication changes. Given PO vitamin K

## 2021-09-07 NOTE — ED ADULT NURSE NOTE - NSIMPLEMENTINTERV_GEN_ALL_ED
Implemented All Fall with Harm Risk Interventions:  [place] to call system. Call bell, personal items and telephone within reach. Instruct patient to call for assistance. Room bathroom lighting operational. Non-slip footwear when patient is off stretcher. Physically safe environment: no spills, clutter or unnecessary equipment. Stretcher in lowest position, wheels locked, appropriate side rails in place. Provide visual cue, wrist band, yellow gown, etc. Monitor gait and stability. Monitor for mental status changes and reorient to person, place, and time. Review medications for side effects contributing to fall risk. Reinforce activity limits and safety measures with patient and family. Provide visual clues: red socks.

## 2021-09-07 NOTE — H&P ADULT - NSHPLABSRESULTS_GEN_ALL_CORE
12.8   7.24  )-----------( 127      ( 07 Sep 2021 15:00 )             36.5       09-07    142  |  105  |  25<H>  ----------------------------<  145<H>  4.6   |  29  |  1.20    Ca    9.0      07 Sep 2021 15:40                    PT/INR - ( 07 Sep 2021 14:35 )   PT: 171.9 sec;   INR: 16.22 ratio             Lactate Trend            CAPILLARY BLOOD GLUCOSE 12.8   7.24  )-----------( 127      ( 07 Sep 2021 15:00 )             36.5       09-07    142  |  105  |  25<H>  ----------------------------<  145<H>  4.6   |  29  |  1.20    Ca    9.0      07 Sep 2021 15:40      PT/INR - ( 07 Sep 2021 14:35 )   PT: 171.9 sec;   INR: 16.22 ratio        Lactate Trend            CAPILLARY BLOOD GLUCOSE

## 2021-09-08 ENCOUNTER — TRANSCRIPTION ENCOUNTER (OUTPATIENT)
Age: 86
End: 2021-09-08

## 2021-09-08 VITALS — WEIGHT: 123.46 LBS

## 2021-09-08 LAB
ALBUMIN SERPL ELPH-MCNC: 3.4 G/DL — SIGNIFICANT CHANGE UP (ref 3.3–5)
ALP SERPL-CCNC: 84 U/L — SIGNIFICANT CHANGE UP (ref 40–120)
ALT FLD-CCNC: 23 U/L — SIGNIFICANT CHANGE UP (ref 10–45)
ANION GAP SERPL CALC-SCNC: 5 MMOL/L — SIGNIFICANT CHANGE UP (ref 5–17)
AST SERPL-CCNC: 23 U/L — SIGNIFICANT CHANGE UP (ref 10–40)
BILIRUB DIRECT SERPL-MCNC: 0.1 MG/DL — SIGNIFICANT CHANGE UP (ref 0–0.2)
BILIRUB INDIRECT FLD-MCNC: 0.6 MG/DL — SIGNIFICANT CHANGE UP (ref 0.2–1)
BILIRUB SERPL-MCNC: 0.7 MG/DL — SIGNIFICANT CHANGE UP (ref 0.2–1.2)
BUN SERPL-MCNC: 26 MG/DL — HIGH (ref 7–23)
CALCIUM SERPL-MCNC: 8.9 MG/DL — SIGNIFICANT CHANGE UP (ref 8.4–10.5)
CHLORIDE SERPL-SCNC: 107 MMOL/L — SIGNIFICANT CHANGE UP (ref 96–108)
CO2 SERPL-SCNC: 31 MMOL/L — SIGNIFICANT CHANGE UP (ref 22–31)
COVID-19 SPIKE DOMAIN AB INTERP: POSITIVE
COVID-19 SPIKE DOMAIN ANTIBODY RESULT: >250 U/ML — HIGH
CREAT SERPL-MCNC: 1.33 MG/DL — HIGH (ref 0.5–1.3)
GLUCOSE SERPL-MCNC: 132 MG/DL — HIGH (ref 70–99)
HCT VFR BLD CALC: 37.5 % — LOW (ref 39–50)
HGB BLD-MCNC: 13 G/DL — SIGNIFICANT CHANGE UP (ref 13–17)
INR BLD: 2.9 RATIO — HIGH (ref 0.88–1.16)
MCHC RBC-ENTMCNC: 30.3 PG — SIGNIFICANT CHANGE UP (ref 27–34)
MCHC RBC-ENTMCNC: 34.7 GM/DL — SIGNIFICANT CHANGE UP (ref 32–36)
MCV RBC AUTO: 87.4 FL — SIGNIFICANT CHANGE UP (ref 80–100)
NRBC # BLD: 0 /100 WBCS — SIGNIFICANT CHANGE UP (ref 0–0)
PLATELET # BLD AUTO: 115 K/UL — LOW (ref 150–400)
POTASSIUM SERPL-MCNC: 4.1 MMOL/L — SIGNIFICANT CHANGE UP (ref 3.5–5.3)
POTASSIUM SERPL-SCNC: 4.1 MMOL/L — SIGNIFICANT CHANGE UP (ref 3.5–5.3)
PROT SERPL-MCNC: 6.8 G/DL — SIGNIFICANT CHANGE UP (ref 6–8.3)
PROTHROM AB SERPL-ACNC: 33.4 SEC — HIGH (ref 10.6–13.6)
RBC # BLD: 4.29 M/UL — SIGNIFICANT CHANGE UP (ref 4.2–5.8)
RBC # FLD: 13.5 % — SIGNIFICANT CHANGE UP (ref 10.3–14.5)
SARS-COV-2 IGG+IGM SERPL QL IA: >250 U/ML — HIGH
SARS-COV-2 IGG+IGM SERPL QL IA: POSITIVE
SODIUM SERPL-SCNC: 143 MMOL/L — SIGNIFICANT CHANGE UP (ref 135–145)
WBC # BLD: 6.86 K/UL — SIGNIFICANT CHANGE UP (ref 3.8–10.5)
WBC # FLD AUTO: 6.86 K/UL — SIGNIFICANT CHANGE UP (ref 3.8–10.5)

## 2021-09-08 PROCEDURE — 85027 COMPLETE CBC AUTOMATED: CPT

## 2021-09-08 PROCEDURE — 93005 ELECTROCARDIOGRAM TRACING: CPT

## 2021-09-08 PROCEDURE — 99239 HOSP IP/OBS DSCHRG MGMT >30: CPT

## 2021-09-08 PROCEDURE — 99285 EMERGENCY DEPT VISIT HI MDM: CPT

## 2021-09-08 PROCEDURE — 80048 BASIC METABOLIC PNL TOTAL CA: CPT

## 2021-09-08 PROCEDURE — 80076 HEPATIC FUNCTION PANEL: CPT

## 2021-09-08 PROCEDURE — 86901 BLOOD TYPING SEROLOGIC RH(D): CPT

## 2021-09-08 PROCEDURE — 36415 COLL VENOUS BLD VENIPUNCTURE: CPT

## 2021-09-08 PROCEDURE — 86769 SARS-COV-2 COVID-19 ANTIBODY: CPT

## 2021-09-08 PROCEDURE — 87635 SARS-COV-2 COVID-19 AMP PRB: CPT

## 2021-09-08 PROCEDURE — 71045 X-RAY EXAM CHEST 1 VIEW: CPT

## 2021-09-08 PROCEDURE — 85610 PROTHROMBIN TIME: CPT

## 2021-09-08 PROCEDURE — 86900 BLOOD TYPING SEROLOGIC ABO: CPT

## 2021-09-08 PROCEDURE — 86850 RBC ANTIBODY SCREEN: CPT

## 2021-09-08 RX ORDER — WARFARIN SODIUM 2.5 MG/1
1 TABLET ORAL
Qty: 30 | Refills: 0
Start: 2021-09-08 | End: 2021-10-07

## 2021-09-08 RX ORDER — INFLUENZA VIRUS VACCINE 15; 15; 15; 15 UG/.5ML; UG/.5ML; UG/.5ML; UG/.5ML
0.5 SUSPENSION INTRAMUSCULAR ONCE
Refills: 0 | Status: DISCONTINUED | OUTPATIENT
Start: 2021-09-08 | End: 2021-09-08

## 2021-09-08 RX ORDER — WARFARIN SODIUM 2.5 MG/1
1.5 TABLET ORAL
Qty: 0 | Refills: 0 | DISCHARGE

## 2021-09-08 RX ADMIN — NADOLOL 20 MILLIGRAM(S): 80 TABLET ORAL at 05:16

## 2021-09-08 RX ADMIN — LISINOPRIL 40 MILLIGRAM(S): 2.5 TABLET ORAL at 05:16

## 2021-09-08 NOTE — DISCHARGE NOTE NURSING/CASE MANAGEMENT/SOCIAL WORK - NSTRANSFERBELONGINGSRESP_GEN_A_NUR
Check out staff: Gladys  NEUROLOGY PATIENT IS ON A WAIT LIST HOWEVER WE MUST TRY TO GET THIS CEA SCHEDULED BEFORE END OF February  REMINDER: Under Reason For Call, comments MUST be formatted as:   (Surgeon's Initials) / (Procedure)    PHYSICIAN / HOSPITAL: Doctor, Williams Sicard (NPI: [id number]) / Lane (Tax: [id number] / NPI: [id number])    PROCEDURE: (CEA) Carotid Endarterectomy / Patch Angioplasty     LEVEL: 3    REP: No    ASSISTANT SURGEON: No    ALLERGIES: Butalbital-apap-caffeine    INSTRUCTIONS GIVEN: NO BOWEL PREP    BLOOD THINNERS / MED HOLD: Do not hold Aspirin (ASA)   HYDRATION REQUIRED: Patient does not require hydration  DIALYSIS: Patient is not on dialysis  *Please ROUTE this encounter to [facility] Surgery Coordinator   AND   Send COMPLETE CONSENT to V[facility] Surgery Schedulers e-mail group*    I certify that patient has signed, printed, timed, and dated their surgery consent  I certify that BOTH sides of the completed surgery consent have been scanned into the patient's Epic chart by myself on 1/21/2021  *Please ROUTE this encounter to [facility] Clearance Pool   AND   Send CLEARANCE FORM(S) to Vascular Nursing e-mail group*    (2) TWO CLEARANCES NEEDED - Patient requires Cardiology  clearance  Spoke withLEIDY, at [address]  Patient's appointment with DR Francisca Lozano  has been scheduled for 1/6/2021 at N/A  Office contact information P: [phone] - F: [phone]    (AND)    Patient requires NEUROLOGY/LMD DOES NOT NEED CLEARANCE JUST THAT THEY SEE HIM PRIOR TO SCHEDULING THE CEA clearance  Spoke withKristina, at [facility]  Patient's appointment with DR Petr Garcia  has been scheduled for 3/19/21  at N/A    Office contact information P: [phone] - F: [phone] yes

## 2021-09-08 NOTE — DIETITIAN INITIAL EVALUATION ADULT. - OTHER INFO
admitted w/ high INR. eating well; tolerates regular diet. appetite good. denies any n/v/ diarrhea. no bm as of yet. denies any dysphagia. weight stable. lives alone and eats well at home w/ a variety of foods. understands the need for limiting green vegetables w/ coumadin. no questions at this time. no preferences. pt. to be discharged today. no edema noted. skin intact

## 2021-09-08 NOTE — PROGRESS NOTE ADULT - ASSESSMENT
89 yo male with PMH HTN, pAF, remote TB, s/p AVR, s/p thoracic aortic aneurysm repair, sent to ED by Dr Sandoval for elevated INR noted on routine outpatient blood work. In ED, patient with elevated INR 16. Hgb dropped from previous exams (baseline 14). Patient currently asymptomatic.    #Supratherapeutic INR  #Anemia  - INR 16,on admission--Given Vitamin K-INR today 2.90   Hgb 13 (baseline Hbg ~14)  -Discussed with Dr Linares-plan to d.c home today with coumadin 2.5 mg and repeat INR tomorrow  \    #HTN  #HLD  #pAF  - Continue home medications: atorvastatin, lisinopril, nadolol   - Restart coumadin 2.5 mg     *Mechanical AVR:  Restart coumadin 2.5 mg today  Goal INR 2.5-3.5  INR 2.90 today    #BPH  - On solidosin 8mg daily  - Not in formulary, will give doxazosin 4mg while inpatient    #Dispo: home today with outpatient follow up . Has INR check tomorrow.    9/8: Called and left voicemail with callback number for patient's son Pierce at [phone].    Case d/w Dr Linares.

## 2021-09-08 NOTE — DISCHARGE NOTE NURSING/CASE MANAGEMENT/SOCIAL WORK - NSDCPEFALRISK_GEN_ALL_CORE
For information on Fall & injury Prevention, visit [email or web address]

## 2021-09-08 NOTE — DISCHARGE NOTE PROVIDER - NSDCMRMEDTOKEN_GEN_ALL_CORE_FT
atorvastatin 10 mg oral tablet: 1 tab(s) orally once a day  nadolol 20 mg oral tablet: 1 tab(s) orally once a day  ramipril 10 mg oral capsule: 1 cap(s) orally once a day  Rapaflo 8 mg oral capsule: 1 cap(s) orally once a day  warfarin 2.5 mg oral tablet: 1 tab(s) orally once a day

## 2021-09-08 NOTE — DISCHARGE NOTE NURSING/CASE MANAGEMENT/SOCIAL WORK - PATIENT PORTAL LINK FT
You can access the FollowMyHealth Patient Portal offered by [facility] by registering at the following website: [email or web address]. By joining [facility]s FollowMyHealth portal, you will also be able to view your health information using other applications (apps) compatible with our system.

## 2021-09-08 NOTE — DISCHARGE NOTE PROVIDER - HOSPITAL COURSE
89 yo male with PMH HTN, pAF, remote TB, s/p mechcanical AVR, s/p thoracic aortic aneurysm repair, sent to ED by Dr Sandoval for elevated INR noted on routine outpatient blood work. In ED, patient with elevated INR 16. Given one dose of Vitamin K and admitted to hospitalist for further workup. LFTs normal.  Repeat INR 2.90. Nutrition consulted and went over appropriate foods for patient diet.  Case d/w patient cardiologist, Dr Linares who rec discharging patient on 2.5 mg coumadin and follow up with cardiology tomorrow for repeat INR check.  Medically stable for d/c home    case d/w Dr Linares

## 2021-09-08 NOTE — DISCHARGE NOTE PROVIDER - NSDCFUSCHEDAPPT_GEN_ALL_CORE_FT
POPPY MERCADO ; 09/09/2021 ; [facility]  POPPY MERCADO ; 09/20/2021 ; [facility]

## 2021-09-08 NOTE — PROGRESS NOTE ADULT - ATTENDING COMMENTS
Supratherapeutic INR  - back to therapeutic range  - dose decreased to 2.5 mg per outpatient cardiologist  - patient to follow up with Dr. Latham as outpatient tomorrow for INR check

## 2021-09-08 NOTE — DISCHARGE NOTE PROVIDER - CARE PROVIDER_API CALL
Cleveland Linares  CARDIOLOGY  [address], [address]  [place] [id number]  Phone: [phone]  Fax: [phone]  Follow Up Time:

## 2021-09-08 NOTE — DISCHARGE NOTE PROVIDER - NSDCCPCAREPLAN_GEN_ALL_CORE_FT
PRINCIPAL DISCHARGE DIAGNOSIS  Diagnosis: Elevated INR  Assessment and Plan of Treatment: You were found to have elevated INR -INR was 16  You were given Vitamin K and INR improved to 2.9  Take coumadin 2.5 mg tonight and repeat INR tomorrow with Dr Linares's office

## 2021-09-08 NOTE — PROGRESS NOTE ADULT - SUBJECTIVE AND OBJECTIVE BOX
Patient is a 88y old  Male who presents with a chief complaint of elevated INR (07 Sep 2021 16:37)  No acute issues overnight  Eager to go home   Patient seen and examined at bedside.    ALLERGIES:  No Known Allergies    MEDICATIONS  (STANDING):  atorvastatin 10 milliGRAM(s) Oral at bedtime  doxazosin 4 milliGRAM(s) Oral at bedtime  influenza   Vaccine 0.5 milliLiter(s) IntraMuscular once  lisinopril 40 milliGRAM(s) Oral daily  nadolol 20 milliGRAM(s) Oral daily    MEDICATIONS  (PRN):    Vital Signs Last 24 Hrs  T(F): 97.4 (08 Sep 2021 05:00), Max: 98.3 (07 Sep 2021 13:49)  HR: 65 (08 Sep 2021 05:00) (65 - 78)  BP: 133/76 (08 Sep 2021 05:00) (114/77 - 174/85)  RR: 16 (08 Sep 2021 05:00) (16 - 18)  SpO2: 97% (08 Sep 2021 05:00) (97% - 100%)  I&O's Summary    07 Sep 2021 07:01  -  08 Sep 2021 07:00  --------------------------------------------------------  IN: 0 mL / OUT: 300 mL / NET: -300 mL      BMI (kg/m2): 19.4 (09-07-21 @ 20:15)  PHYSICAL EXAM:  General: NAD, A/O x 3, elderly, hard of hearing   ENT: MMM, poor dentition   Neck: Supple, No JVD  Lungs: Non labored breathing,  Clear to auscultation bilaterally,   Cardio: RRR, S1/S2, + systolic murmur  no pitting edema bilaterally  Abdomen: Soft, Nontender, Nondistended; Bowel sounds present  Extremities: No calf tenderness, moves all extremities    LABS:                        13.0   6.86  )-----------( 115      ( 08 Sep 2021 05:00 )             37.5       09-08    143  |  107  |  26  ----------------------------<  132  4.1   |  31  |  1.33    Ca    8.9      08 Sep 2021 05:00       eGFR if Non African American: 47 mL/min/1.73M2 (09-08-21 @ 05:00)  eGFR if African American: 55 mL/min/1.73M2 (09-08-21 @ 05:00)    PT/INR - ( 08 Sep 2021 05:00 )   PT: 33.4 sec;   INR: 2.90 ratio                                        COVID-19 PCR: NotDetec (09-07-21 @ 16:25)      RADIOLOGY & ADDITIONAL TESTS:    Care Discussed with Consultants/Other Providers:

## 2021-09-09 ENCOUNTER — APPOINTMENT (OUTPATIENT)
Dept: CARDIOLOGY | Facility: CLINIC | Age: 86
End: 2021-09-09
Payer: MEDICARE

## 2021-09-09 PROBLEM — I48.0 PAROXYSMAL ATRIAL FIBRILLATION: Chronic | Status: ACTIVE | Noted: 2021-09-07

## 2021-09-09 LAB
INR PPP: 1.8 RATIO
QUALITY CONTROL: YES

## 2021-09-09 PROCEDURE — 85610 PROTHROMBIN TIME: CPT | Mod: QW

## 2021-09-09 PROCEDURE — 99211 OFF/OP EST MAY X REQ PHY/QHP: CPT | Mod: 25

## 2021-09-14 ENCOUNTER — APPOINTMENT (OUTPATIENT)
Dept: CARDIOLOGY | Facility: CLINIC | Age: 86
End: 2021-09-14
Payer: MEDICARE

## 2021-09-14 LAB
INR PPP: 4.4 RATIO
POCT-PROTHROMBIN TIME: 53.1 SECS
QUALITY CONTROL: YES

## 2021-09-14 PROCEDURE — 85610 PROTHROMBIN TIME: CPT | Mod: QW

## 2021-09-14 PROCEDURE — 99211 OFF/OP EST MAY X REQ PHY/QHP: CPT | Mod: 25

## 2021-09-20 ENCOUNTER — APPOINTMENT (OUTPATIENT)
Dept: CARDIOLOGY | Facility: CLINIC | Age: 86
End: 2021-09-20
Payer: MEDICARE

## 2021-09-20 ENCOUNTER — NON-APPOINTMENT (OUTPATIENT)
Age: 86
End: 2021-09-20

## 2021-09-20 VITALS
BODY MASS INDEX: 21.03 KG/M2 | SYSTOLIC BLOOD PRESSURE: 151 MMHG | WEIGHT: 134 LBS | HEART RATE: 80 BPM | DIASTOLIC BLOOD PRESSURE: 76 MMHG | RESPIRATION RATE: 16 BRPM | OXYGEN SATURATION: 96 % | TEMPERATURE: 97.6 F | HEIGHT: 67 IN

## 2021-09-20 LAB
INR PPP: 2.1 RATIO
POCT-PROTHROMBIN TIME: 25.7 SECS
QUALITY CONTROL: YES

## 2021-09-20 PROCEDURE — 93000 ELECTROCARDIOGRAM COMPLETE: CPT

## 2021-09-20 PROCEDURE — 85610 PROTHROMBIN TIME: CPT | Mod: QW

## 2021-09-20 PROCEDURE — G0008: CPT

## 2021-09-20 PROCEDURE — 99214 OFFICE O/P EST MOD 30 MIN: CPT | Mod: 25

## 2021-09-20 PROCEDURE — 90662 IIV NO PRSV INCREASED AG IM: CPT

## 2021-09-20 NOTE — ASSESSMENT
[FreeTextEntry1] : Remote mechanical AVR and thoracic aneurysm repair.  Paroxysmal A. fib.  Hypertension.  Status post hospital stay for elevated INR.  Patient on chronic anticoagulation.

## 2021-09-20 NOTE — HISTORY OF PRESENT ILLNESS
[FreeTextEntry1] : Patient seen post recent hospital stay with INR 16 for which he received vitamin K and return to therapeutic level.  He had no bleeding.  Discharge summary reviewed.  Patient understands appropriate diet and is eating greens 3 times a week.  Has had no bleeding problems.  No chest pain shortness of breath palpitations or recurrent falls.\par \par Is an 88-year-old man with history of mechanical AVR and also thoracic aortic aneurysm dissection paroxysmal atrial fibrillation hypertension.

## 2021-09-20 NOTE — DISCUSSION/SUMMARY
[FreeTextEntry1] : Discussed with patient.  Counseled on diet.  INR obtained today.  Influenza vaccine recommended.  Discharge summary and labs noted from hospital.\par \par Reviewed with patient.  Instructed on Coumadin dosing with repeat INR in 1 week.  Follow-up with me with attention to blood pressure.  Amlodipine prescribed.  Continue other cardiac medicines.

## 2021-09-20 NOTE — PHYSICAL EXAM
[No Rub] : no rub [No Gallop] : no gallop [Normal] : soft, non-tender, no masses/organomegaly, normal bowel sounds [de-identified] : Good quality metallic aortic valve closing sound

## 2021-09-20 NOTE — REASON FOR VISIT
[Arrhythmia/ECG Abnorrmalities] : arrhythmia/ECG abnormalities [Structural Heart and Valve Disease] : structural heart and valve disease [Hypertension] : hypertension [Other: ____] : [unfilled]

## 2021-09-20 NOTE — CARDIOLOGY SUMMARY
[de-identified] : June 2020 mechanical AVR normal LV function [de-identified] : December 20, 2021 sinus rhythm LBBB APC [de-identified] : Mechanical AVR and thoracic aneurysm repair

## 2021-09-27 ENCOUNTER — APPOINTMENT (OUTPATIENT)
Dept: CARDIOLOGY | Facility: CLINIC | Age: 86
End: 2021-09-27
Payer: MEDICARE

## 2021-09-27 LAB
INR PPP: 1.4 RATIO
POCT-PROTHROMBIN TIME: 16.4 SECS
QUALITY CONTROL: YES

## 2021-09-27 PROCEDURE — 85610 PROTHROMBIN TIME: CPT | Mod: QW

## 2021-09-27 PROCEDURE — 99211 OFF/OP EST MAY X REQ PHY/QHP: CPT | Mod: 25

## 2021-10-04 ENCOUNTER — APPOINTMENT (OUTPATIENT)
Dept: CARDIOLOGY | Facility: CLINIC | Age: 86
End: 2021-10-04
Payer: MEDICARE

## 2021-10-04 LAB
INR PPP: 2.4 RATIO
POCT-PROTHROMBIN TIME: 29.2 SECS
QUALITY CONTROL: YES

## 2021-10-04 PROCEDURE — 99211 OFF/OP EST MAY X REQ PHY/QHP: CPT | Mod: 25

## 2021-10-04 PROCEDURE — 85610 PROTHROMBIN TIME: CPT | Mod: QW

## 2021-10-18 ENCOUNTER — APPOINTMENT (OUTPATIENT)
Dept: CARDIOLOGY | Facility: CLINIC | Age: 86
End: 2021-10-18
Payer: MEDICARE

## 2021-10-18 LAB
INR PPP: 2.4 RATIO
POCT-PROTHROMBIN TIME: 29.1 SECS
QUALITY CONTROL: YES

## 2021-10-18 PROCEDURE — 85610 PROTHROMBIN TIME: CPT | Mod: QW

## 2021-10-18 PROCEDURE — 99211 OFF/OP EST MAY X REQ PHY/QHP: CPT | Mod: 25

## 2021-11-01 ENCOUNTER — APPOINTMENT (OUTPATIENT)
Dept: CARDIOLOGY | Facility: CLINIC | Age: 86
End: 2021-11-01
Payer: MEDICARE

## 2021-11-01 LAB
INR PPP: 1.5 RATIO
POCT-PROTHROMBIN TIME: 18.1 SECS
QUALITY CONTROL: YES

## 2021-11-01 PROCEDURE — 85610 PROTHROMBIN TIME: CPT | Mod: QW

## 2021-11-01 PROCEDURE — 99211 OFF/OP EST MAY X REQ PHY/QHP: CPT | Mod: 25

## 2021-11-07 NOTE — REASON FOR VISIT
temporal [Hypertension] : hypertension [Medication Management] : Medication management [Prosthetic Valve] : a prosthetic valve [FreeTextEntry1] : \par \par

## 2021-11-08 ENCOUNTER — APPOINTMENT (OUTPATIENT)
Dept: CARDIOLOGY | Facility: CLINIC | Age: 86
End: 2021-11-08
Payer: MEDICARE

## 2021-11-08 PROCEDURE — 99211 OFF/OP EST MAY X REQ PHY/QHP: CPT | Mod: 25

## 2021-11-08 PROCEDURE — 85610 PROTHROMBIN TIME: CPT | Mod: QW

## 2021-11-09 LAB
INR PPP: 2.1 RATIO
POCT-PROTHROMBIN TIME: 21.9 SECS
QUALITY CONTROL: YES

## 2021-11-22 ENCOUNTER — APPOINTMENT (OUTPATIENT)
Dept: CARDIOLOGY | Facility: CLINIC | Age: 86
End: 2021-11-22
Payer: MEDICARE

## 2021-11-22 LAB
INR PPP: 6.7 RATIO
POCT-PROTHROMBIN TIME: 80.6 SECS
QUALITY CONTROL: YES

## 2021-11-22 PROCEDURE — 99211 OFF/OP EST MAY X REQ PHY/QHP: CPT | Mod: 25

## 2021-11-22 PROCEDURE — 85610 PROTHROMBIN TIME: CPT | Mod: QW

## 2021-11-24 ENCOUNTER — APPOINTMENT (OUTPATIENT)
Dept: CARDIOLOGY | Facility: CLINIC | Age: 86
End: 2021-11-24
Payer: MEDICARE

## 2021-11-24 LAB
INR PPP: 3.6 RATIO
POCT-PROTHROMBIN TIME: 43.1 SECS
QUALITY CONTROL: YES

## 2021-11-24 PROCEDURE — 85610 PROTHROMBIN TIME: CPT | Mod: QW

## 2021-11-24 PROCEDURE — 99211 OFF/OP EST MAY X REQ PHY/QHP: CPT | Mod: 25

## 2021-12-06 ENCOUNTER — APPOINTMENT (OUTPATIENT)
Dept: CARDIOLOGY | Facility: CLINIC | Age: 86
End: 2021-12-06
Payer: MEDICARE

## 2021-12-06 LAB
INR PPP: 1.9 RATIO
POCT-PROTHROMBIN TIME: 23.3 SECS
QUALITY CONTROL: YES

## 2021-12-06 PROCEDURE — 85610 PROTHROMBIN TIME: CPT | Mod: QW

## 2021-12-06 PROCEDURE — 99211 OFF/OP EST MAY X REQ PHY/QHP: CPT | Mod: 25

## 2021-12-20 ENCOUNTER — NON-APPOINTMENT (OUTPATIENT)
Age: 86
End: 2021-12-20

## 2021-12-20 ENCOUNTER — APPOINTMENT (OUTPATIENT)
Dept: CARDIOLOGY | Facility: CLINIC | Age: 86
End: 2021-12-20
Payer: MEDICARE

## 2021-12-20 VITALS
HEART RATE: 81 BPM | HEIGHT: 67 IN | DIASTOLIC BLOOD PRESSURE: 84 MMHG | TEMPERATURE: 97.6 F | OXYGEN SATURATION: 98 % | BODY MASS INDEX: 21.5 KG/M2 | WEIGHT: 137 LBS | SYSTOLIC BLOOD PRESSURE: 160 MMHG | RESPIRATION RATE: 16 BRPM

## 2021-12-20 VITALS — SYSTOLIC BLOOD PRESSURE: 166 MMHG | DIASTOLIC BLOOD PRESSURE: 79 MMHG

## 2021-12-20 VITALS — DIASTOLIC BLOOD PRESSURE: 64 MMHG | SYSTOLIC BLOOD PRESSURE: 156 MMHG

## 2021-12-20 LAB
INR PPP: 4.1 RATIO
POCT-PROTHROMBIN TIME: 49.8 SECS
QUALITY CONTROL: YES

## 2021-12-20 PROCEDURE — 85610 PROTHROMBIN TIME: CPT | Mod: QW

## 2021-12-20 PROCEDURE — 93000 ELECTROCARDIOGRAM COMPLETE: CPT

## 2021-12-20 PROCEDURE — 99214 OFFICE O/P EST MOD 30 MIN: CPT | Mod: 25

## 2021-12-20 PROCEDURE — 93306 TTE W/DOPPLER COMPLETE: CPT

## 2021-12-20 RX ORDER — AMLODIPINE BESYLATE 2.5 MG/1
2.5 TABLET ORAL DAILY
Qty: 30 | Refills: 4 | Status: DISCONTINUED | COMMUNITY
Start: 2021-09-20 | End: 2021-12-20

## 2021-12-20 NOTE — HISTORY OF PRESENT ILLNESS
[FreeTextEntry1] : \par 89-year-old man with history of mechanical AVR and also thoracic aortic aneurysm paroxysmal atrial fibrillation hypertension.\par \par He feels well.  He denies lightheadedness dizziness falls chest pain shortness of breath or bleeding.  He is on chronic anticoagulation

## 2021-12-20 NOTE — DISCUSSION/SUMMARY
[Patient] : the patient [Risks] : risks [Benefits] : benefits [Alternatives] : alternatives [___ Month(s)] : in [unfilled] month(s) [FreeTextEntry1] : \par \par Reviewed with patient.  Increase amlodipine.  INR Coumadin clinic following up including blood pressure.  SBE prophylaxis.  Lab testing ordered.  Questions addressed with patient.  Echo today findings reviewed and discussed with him.

## 2021-12-20 NOTE — CARDIOLOGY SUMMARY
[de-identified] : December 20, 2021 sinus rhythm LBBB APC [de-identified] : June 2020 mechanical AVR normal LV function [de-identified] : Mechanical AVR and thoracic aneurysm repair

## 2021-12-20 NOTE — PHYSICAL EXAM
[No Rub] : no rub [No Gallop] : no gallop [Normal] : soft, non-tender, no masses/organomegaly, normal bowel sounds [de-identified] : Good quality metallic aortic valve closing sound

## 2022-01-03 ENCOUNTER — APPOINTMENT (OUTPATIENT)
Dept: CARDIOLOGY | Facility: CLINIC | Age: 87
End: 2022-01-03
Payer: MEDICARE

## 2022-01-03 LAB
INR PPP: 5.6 RATIO
POCT-PROTHROMBIN TIME: 67.7 SECS
QUALITY CONTROL: YES

## 2022-01-03 PROCEDURE — 99211 OFF/OP EST MAY X REQ PHY/QHP: CPT | Mod: 25

## 2022-01-03 PROCEDURE — 85610 PROTHROMBIN TIME: CPT | Mod: QW

## 2022-01-06 ENCOUNTER — APPOINTMENT (OUTPATIENT)
Dept: CARDIOLOGY | Facility: CLINIC | Age: 87
End: 2022-01-06
Payer: MEDICARE

## 2022-01-06 LAB
INR PPP: 2.1 RATIO
POCT-PROTHROMBIN TIME: 25 SECS
QUALITY CONTROL: YES

## 2022-01-06 PROCEDURE — 99211 OFF/OP EST MAY X REQ PHY/QHP: CPT | Mod: 25

## 2022-01-06 PROCEDURE — 85610 PROTHROMBIN TIME: CPT | Mod: QW

## 2022-01-13 ENCOUNTER — APPOINTMENT (OUTPATIENT)
Dept: CARDIOLOGY | Facility: CLINIC | Age: 87
End: 2022-01-13
Payer: MEDICARE

## 2022-01-13 LAB
INR PPP: 4.6 RATIO
POCT-PROTHROMBIN TIME: 55.7 SECS
QUALITY CONTROL: YES

## 2022-01-13 PROCEDURE — 85610 PROTHROMBIN TIME: CPT | Mod: QW

## 2022-01-13 PROCEDURE — 99211 OFF/OP EST MAY X REQ PHY/QHP: CPT | Mod: 25

## 2022-01-20 ENCOUNTER — APPOINTMENT (OUTPATIENT)
Dept: CARDIOLOGY | Facility: CLINIC | Age: 87
End: 2022-01-20
Payer: MEDICARE

## 2022-01-20 LAB
INR PPP: 2.2 RATIO
POCT-PROTHROMBIN TIME: 26.3 SECS
QUALITY CONTROL: YES

## 2022-01-20 PROCEDURE — 99211 OFF/OP EST MAY X REQ PHY/QHP: CPT | Mod: 25

## 2022-01-20 PROCEDURE — 85610 PROTHROMBIN TIME: CPT | Mod: QW

## 2022-02-03 ENCOUNTER — APPOINTMENT (OUTPATIENT)
Dept: CARDIOLOGY | Facility: CLINIC | Age: 87
End: 2022-02-03
Payer: MEDICARE

## 2022-02-03 LAB
INR PPP: 1.1 RATIO
POCT-PROTHROMBIN TIME: 18.9 SECS
QUALITY CONTROL: YES

## 2022-02-03 PROCEDURE — 99211 OFF/OP EST MAY X REQ PHY/QHP: CPT | Mod: 25

## 2022-02-03 PROCEDURE — 85610 PROTHROMBIN TIME: CPT | Mod: QW

## 2022-02-10 ENCOUNTER — APPOINTMENT (OUTPATIENT)
Dept: CARDIOLOGY | Facility: CLINIC | Age: 87
End: 2022-02-10
Payer: MEDICARE

## 2022-02-10 LAB
INR PPP: 1.8 RATIO
POCT-PROTHROMBIN TIME: 22.1 SECS
QUALITY CONTROL: YES

## 2022-02-10 PROCEDURE — 85610 PROTHROMBIN TIME: CPT | Mod: QW

## 2022-02-10 PROCEDURE — 99211 OFF/OP EST MAY X REQ PHY/QHP: CPT | Mod: 25

## 2022-02-17 ENCOUNTER — APPOINTMENT (OUTPATIENT)
Dept: CARDIOLOGY | Facility: CLINIC | Age: 87
End: 2022-02-17
Payer: MEDICARE

## 2022-02-17 LAB
INR PPP: 2.7 RATIO
POCT-PROTHROMBIN TIME: 32.8 SECS
QUALITY CONTROL: YES

## 2022-02-17 PROCEDURE — 85610 PROTHROMBIN TIME: CPT | Mod: QW

## 2022-02-17 PROCEDURE — 99211 OFF/OP EST MAY X REQ PHY/QHP: CPT | Mod: 25

## 2022-03-03 ENCOUNTER — APPOINTMENT (OUTPATIENT)
Dept: CARDIOLOGY | Facility: CLINIC | Age: 87
End: 2022-03-03
Payer: MEDICARE

## 2022-03-03 LAB
INR PPP: 3.1 RATIO
POCT-PROTHROMBIN TIME: 36.7 SECS
QUALITY CONTROL: YES

## 2022-03-03 PROCEDURE — 99211 OFF/OP EST MAY X REQ PHY/QHP: CPT | Mod: 25

## 2022-03-03 PROCEDURE — 85610 PROTHROMBIN TIME: CPT | Mod: QW

## 2022-03-11 ENCOUNTER — RX RENEWAL (OUTPATIENT)
Age: 87
End: 2022-03-11

## 2022-03-21 ENCOUNTER — APPOINTMENT (OUTPATIENT)
Dept: CARDIOLOGY | Facility: CLINIC | Age: 87
End: 2022-03-21

## 2022-03-22 ENCOUNTER — APPOINTMENT (OUTPATIENT)
Dept: CARDIOLOGY | Facility: CLINIC | Age: 87
End: 2022-03-22
Payer: MEDICARE

## 2022-03-22 LAB
INR PPP: 6.9 RATIO
POCT-PROTHROMBIN TIME: 82.3 SECS
QUALITY CONTROL: YES

## 2022-03-22 PROCEDURE — 85610 PROTHROMBIN TIME: CPT | Mod: QW

## 2022-03-22 PROCEDURE — 99211 OFF/OP EST MAY X REQ PHY/QHP: CPT | Mod: 25

## 2022-03-25 ENCOUNTER — APPOINTMENT (OUTPATIENT)
Dept: CARDIOLOGY | Facility: CLINIC | Age: 87
End: 2022-03-25
Payer: MEDICARE

## 2022-03-25 LAB
INR PPP: 1.8 RATIO
POCT-PROTHROMBIN TIME: 22 SECS
QUALITY CONTROL: YES

## 2022-03-25 PROCEDURE — 85610 PROTHROMBIN TIME: CPT | Mod: QW

## 2022-03-25 PROCEDURE — 99211 OFF/OP EST MAY X REQ PHY/QHP: CPT | Mod: 25

## 2022-03-28 NOTE — DISCHARGE NOTE NURSING/CASE MANAGEMENT/SOCIAL WORK - CAREGIVER ADDRESS
[address]  [facility] [id number]
You can access the FollowMyHealth Patient Portal offered by [facility] by registering at the following website: [email or web address]. By joining [facility]’s FollowMyHealth portal, you will also be able to view your health information using other applications (apps) compatible with our system.

## 2022-04-07 ENCOUNTER — APPOINTMENT (OUTPATIENT)
Dept: CARDIOLOGY | Facility: CLINIC | Age: 87
End: 2022-04-07
Payer: MEDICARE

## 2022-04-07 ENCOUNTER — NON-APPOINTMENT (OUTPATIENT)
Age: 87
End: 2022-04-07

## 2022-04-07 VITALS
WEIGHT: 140 LBS | OXYGEN SATURATION: 96 % | SYSTOLIC BLOOD PRESSURE: 148 MMHG | BODY MASS INDEX: 21.97 KG/M2 | DIASTOLIC BLOOD PRESSURE: 70 MMHG | HEART RATE: 68 BPM | HEIGHT: 67 IN | RESPIRATION RATE: 18 BRPM | TEMPERATURE: 97 F

## 2022-04-07 LAB
INR PPP: 2.1 RATIO
POCT-PROTHROMBIN TIME: 25.2 SECS
QUALITY CONTROL: YES

## 2022-04-07 PROCEDURE — 85610 PROTHROMBIN TIME: CPT | Mod: QW

## 2022-04-07 PROCEDURE — 93000 ELECTROCARDIOGRAM COMPLETE: CPT

## 2022-04-07 PROCEDURE — 99214 OFFICE O/P EST MOD 30 MIN: CPT

## 2022-04-07 NOTE — DISCUSSION/SUMMARY
[Patient] : the patient [Risks] : risks [Benefits] : benefits [Alternatives] : alternatives [___ Month(s)] : in [unfilled] month(s) [FreeTextEntry1] : \par \par Reviewed with patient.   INR Coumadin clinic  SBE prophylaxis.  Lab testing ordered.  Questions addressed with patient. \par continue amlodipine, atorvastatin, Nadolol Rampiril\par warfarin.

## 2022-04-07 NOTE — PHYSICAL EXAM
[No Rub] : no rub [No Gallop] : no gallop [Normal] : soft, non-tender, no masses/organomegaly, normal bowel sounds [de-identified] : Good quality metallic aortic valve closing sound

## 2022-04-07 NOTE — ASSESSMENT
[FreeTextEntry1] : Remote mechanical AVR and thoracic aneurysm repair.  Paroxysmal A. fib.  Hypertension.    Patient on chronic anticoagulation.

## 2022-04-07 NOTE — CARDIOLOGY SUMMARY
[de-identified] : 4/7/22 sinus rhythm LBBB  [de-identified] : June 2020 mechanical AVR normal LV function [de-identified] : Mechanical AVR and thoracic aneurysm repair

## 2022-04-26 ENCOUNTER — APPOINTMENT (OUTPATIENT)
Dept: CARDIOLOGY | Facility: CLINIC | Age: 87
End: 2022-04-26
Payer: MEDICARE

## 2022-04-26 LAB
INR PPP: 3.2 RATIO
POCT-PROTHROMBIN TIME: 38.2 SECS
QUALITY CONTROL: YES

## 2022-04-26 PROCEDURE — 85610 PROTHROMBIN TIME: CPT | Mod: QW

## 2022-04-26 PROCEDURE — 99211 OFF/OP EST MAY X REQ PHY/QHP: CPT | Mod: 25

## 2022-04-28 LAB
ALBUMIN SERPL ELPH-MCNC: 4.3 G/DL
ALP BLD-CCNC: 82 U/L
ALT SERPL-CCNC: 14 U/L
ANION GAP SERPL CALC-SCNC: 10 MMOL/L
AST SERPL-CCNC: 15 U/L
BASOPHILS # BLD AUTO: 0.03 K/UL
BASOPHILS NFR BLD AUTO: 0.4 %
BILIRUB SERPL-MCNC: 0.6 MG/DL
BUN SERPL-MCNC: 29 MG/DL
CALCIUM SERPL-MCNC: 9.5 MG/DL
CHLORIDE SERPL-SCNC: 104 MMOL/L
CHOLEST SERPL-MCNC: 132 MG/DL
CO2 SERPL-SCNC: 28 MMOL/L
CREAT SERPL-MCNC: 1.31 MG/DL
EGFR: 52 ML/MIN/1.73M2
EOSINOPHIL # BLD AUTO: 0.13 K/UL
EOSINOPHIL NFR BLD AUTO: 1.5 %
GLUCOSE SERPL-MCNC: 147 MG/DL
HCT VFR BLD CALC: 43.6 %
HDLC SERPL-MCNC: 31 MG/DL
HGB BLD-MCNC: 14.8 G/DL
IMM GRANULOCYTES NFR BLD AUTO: 1.1 %
LDLC SERPL CALC-MCNC: 79 MG/DL
LYMPHOCYTES # BLD AUTO: 1.68 K/UL
LYMPHOCYTES NFR BLD AUTO: 19.9 %
MAN DIFF?: NORMAL
MCHC RBC-ENTMCNC: 30.4 PG
MCHC RBC-ENTMCNC: 33.9 GM/DL
MCV RBC AUTO: 89.5 FL
MONOCYTES # BLD AUTO: 0.92 K/UL
MONOCYTES NFR BLD AUTO: 10.9 %
NEUTROPHILS # BLD AUTO: 5.58 K/UL
NEUTROPHILS NFR BLD AUTO: 66.2 %
NONHDLC SERPL-MCNC: 101 MG/DL
PLATELET # BLD AUTO: 150 K/UL
POTASSIUM SERPL-SCNC: 4.3 MMOL/L
PROT SERPL-MCNC: 7.2 G/DL
RBC # BLD: 4.87 M/UL
RBC # FLD: 13.5 %
SODIUM SERPL-SCNC: 142 MMOL/L
T3FREE SERPL-MCNC: 2.91 PG/ML
T4 FREE SERPL-MCNC: 1.3 NG/DL
TRIGL SERPL-MCNC: 112 MG/DL
TSH SERPL-ACNC: 3.06 UIU/ML
WBC # FLD AUTO: 8.43 K/UL

## 2022-05-17 ENCOUNTER — APPOINTMENT (OUTPATIENT)
Dept: CARDIOLOGY | Facility: CLINIC | Age: 87
End: 2022-05-17
Payer: MEDICARE

## 2022-05-17 LAB
INR PPP: 6.1 RATIO
POCT-PROTHROMBIN TIME: 59.8 SECS
QUALITY CONTROL: YES

## 2022-05-17 PROCEDURE — 85610 PROTHROMBIN TIME: CPT | Mod: QW

## 2022-05-17 PROCEDURE — 99211 OFF/OP EST MAY X REQ PHY/QHP: CPT | Mod: 25

## 2022-05-20 ENCOUNTER — APPOINTMENT (OUTPATIENT)
Dept: CARDIOLOGY | Facility: CLINIC | Age: 87
End: 2022-05-20
Payer: MEDICARE

## 2022-05-20 LAB
INR PPP: 3.4 RATIO
POCT-PROTHROMBIN TIME: 40.1 SECS
QUALITY CONTROL: YES

## 2022-05-20 PROCEDURE — 85610 PROTHROMBIN TIME: CPT | Mod: QW

## 2022-05-20 PROCEDURE — 99211 OFF/OP EST MAY X REQ PHY/QHP: CPT | Mod: 25

## 2022-05-26 ENCOUNTER — APPOINTMENT (OUTPATIENT)
Dept: CARDIOLOGY | Facility: CLINIC | Age: 87
End: 2022-05-26
Payer: MEDICARE

## 2022-05-26 LAB
INR PPP: 1.5 RATIO
POCT-PROTHROMBIN TIME: 18.5 SECS
QUALITY CONTROL: YES

## 2022-05-26 PROCEDURE — 99211 OFF/OP EST MAY X REQ PHY/QHP: CPT | Mod: 25

## 2022-05-26 PROCEDURE — 85610 PROTHROMBIN TIME: CPT | Mod: QW

## 2022-06-02 ENCOUNTER — APPOINTMENT (OUTPATIENT)
Dept: CARDIOLOGY | Facility: CLINIC | Age: 87
End: 2022-06-02
Payer: MEDICARE

## 2022-06-02 LAB
INR PPP: 2.8 RATIO
POCT-PROTHROMBIN TIME: 33.4 SECS
QUALITY CONTROL: YES

## 2022-06-02 PROCEDURE — 85610 PROTHROMBIN TIME: CPT | Mod: QW

## 2022-06-02 PROCEDURE — 99211 OFF/OP EST MAY X REQ PHY/QHP: CPT | Mod: 25

## 2022-06-20 ENCOUNTER — APPOINTMENT (OUTPATIENT)
Dept: CARDIOLOGY | Facility: CLINIC | Age: 87
End: 2022-06-20

## 2022-06-22 ENCOUNTER — APPOINTMENT (OUTPATIENT)
Dept: CARDIOLOGY | Facility: CLINIC | Age: 87
End: 2022-06-22
Payer: MEDICARE

## 2022-06-22 LAB
INR PPP: 3.2 RATIO
POCT-PROTHROMBIN TIME: 38.3 SECS
QUALITY CONTROL: YES

## 2022-06-22 PROCEDURE — 85610 PROTHROMBIN TIME: CPT | Mod: QW

## 2022-06-22 PROCEDURE — 99211 OFF/OP EST MAY X REQ PHY/QHP: CPT | Mod: 25

## 2022-07-13 ENCOUNTER — APPOINTMENT (OUTPATIENT)
Dept: CARDIOLOGY | Facility: CLINIC | Age: 87
End: 2022-07-13

## 2022-07-13 LAB
INR PPP: 2.3 RATIO
POCT-PROTHROMBIN TIME: 27.9 SECS
QUALITY CONTROL: YES

## 2022-07-13 PROCEDURE — 85610 PROTHROMBIN TIME: CPT | Mod: QW

## 2022-07-13 PROCEDURE — 99211 OFF/OP EST MAY X REQ PHY/QHP: CPT | Mod: 25

## 2022-08-08 ENCOUNTER — APPOINTMENT (OUTPATIENT)
Dept: CARDIOLOGY | Facility: CLINIC | Age: 87
End: 2022-08-08
Payer: MEDICARE

## 2022-08-08 ENCOUNTER — NON-APPOINTMENT (OUTPATIENT)
Age: 87
End: 2022-08-08

## 2022-08-08 VITALS
SYSTOLIC BLOOD PRESSURE: 126 MMHG | HEART RATE: 77 BPM | HEIGHT: 67 IN | RESPIRATION RATE: 19 BRPM | OXYGEN SATURATION: 98 % | WEIGHT: 138 LBS | BODY MASS INDEX: 21.66 KG/M2 | DIASTOLIC BLOOD PRESSURE: 70 MMHG | TEMPERATURE: 97.5 F

## 2022-08-08 LAB
INR PPP: 2.6 RATIO
POCT-PROTHROMBIN TIME: 31.4 SECS
QUALITY CONTROL: YES

## 2022-08-08 PROCEDURE — 93000 ELECTROCARDIOGRAM COMPLETE: CPT

## 2022-08-08 PROCEDURE — 99214 OFFICE O/P EST MOD 30 MIN: CPT

## 2022-08-08 PROCEDURE — 85610 PROTHROMBIN TIME: CPT | Mod: QW

## 2022-08-08 NOTE — CARDIOLOGY SUMMARY
[de-identified] : 8/8/22 sinus rhythm LBBB  [de-identified] : 2021 mechanical AVR normal LV function [de-identified] : Mechanical AVR and thoracic aneurysm repair

## 2022-08-08 NOTE — PHYSICAL EXAM
[No Rub] : no rub [No Gallop] : no gallop [Normal] : soft, non-tender, no masses/organomegaly, normal bowel sounds [de-identified] : Good quality metallic aortic valve closing sound

## 2022-08-08 NOTE — DISCUSSION/SUMMARY
[Patient] : the patient [Risks] : risks [Benefits] : benefits [Alternatives] : alternatives [___ Month(s)] : in [unfilled] month(s) [FreeTextEntry1] : \par \par Reviewed with patient.   INR Coumadin clinic  SBE prophylaxis.  Lab testing ordered.  Questions addressed with patient. \par continue amlodipine, atorvastatin, Nadolol Rampiril\par warfarin. [EKG obtained to assist in diagnosis and management of assessed problem(s)] : EKG obtained to assist in diagnosis and management of assessed problem(s)

## 2022-09-06 ENCOUNTER — APPOINTMENT (OUTPATIENT)
Dept: CARDIOLOGY | Facility: CLINIC | Age: 87
End: 2022-09-06

## 2022-09-06 LAB
INR PPP: 3 RATIO
POCT-PROTHROMBIN TIME: 38.2 SECS
QUALITY CONTROL: YES

## 2022-09-06 PROCEDURE — 99211 OFF/OP EST MAY X REQ PHY/QHP: CPT | Mod: 25

## 2022-09-06 PROCEDURE — 85610 PROTHROMBIN TIME: CPT | Mod: QW

## 2022-10-04 ENCOUNTER — APPOINTMENT (OUTPATIENT)
Dept: CARDIOLOGY | Facility: CLINIC | Age: 87
End: 2022-10-04

## 2022-10-04 LAB
INR PPP: 3.7 RATIO
POCT-PROTHROMBIN TIME: 44.1 SECS
QUALITY CONTROL: YES

## 2022-10-04 PROCEDURE — 85610 PROTHROMBIN TIME: CPT | Mod: QW

## 2022-10-04 PROCEDURE — 99211 OFF/OP EST MAY X REQ PHY/QHP: CPT | Mod: 25

## 2022-10-25 ENCOUNTER — APPOINTMENT (OUTPATIENT)
Dept: CARDIOLOGY | Facility: CLINIC | Age: 87
End: 2022-10-25

## 2022-10-25 LAB
INR PPP: 4.2 RATIO
POCT-PROTHROMBIN TIME: 50.3 SECS
QUALITY CONTROL: YES

## 2022-10-25 PROCEDURE — 99211 OFF/OP EST MAY X REQ PHY/QHP: CPT | Mod: 25

## 2022-10-25 PROCEDURE — 85610 PROTHROMBIN TIME: CPT | Mod: QW

## 2022-11-08 ENCOUNTER — APPOINTMENT (OUTPATIENT)
Dept: CARDIOLOGY | Facility: CLINIC | Age: 87
End: 2022-11-08

## 2022-11-08 PROCEDURE — 85610 PROTHROMBIN TIME: CPT | Mod: QW

## 2022-11-08 PROCEDURE — 99211 OFF/OP EST MAY X REQ PHY/QHP: CPT | Mod: 25

## 2022-11-09 LAB
INR PPP: 2.3 RATIO
POCT-PROTHROMBIN TIME: 27.9 SECS
QUALITY CONTROL: YES

## 2022-11-10 NOTE — ASSESSMENT
[FreeTextEntry1] : s/p mechanical aVR remote thoracic aortic repair. Hypertension. Atrial fibrillation. LBBB. Now with a recurrent fall. 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
Statement Selected

## 2022-11-22 LAB
ALBUMIN SERPL ELPH-MCNC: 4.2 G/DL
ALP BLD-CCNC: 87 U/L
ALT SERPL-CCNC: 11 U/L
ANION GAP SERPL CALC-SCNC: 12 MMOL/L
AST SERPL-CCNC: 15 U/L
BASOPHILS # BLD AUTO: 0.04 K/UL
BASOPHILS NFR BLD AUTO: 0.5 %
BILIRUB SERPL-MCNC: 0.8 MG/DL
BUN SERPL-MCNC: 25 MG/DL
CALCIUM SERPL-MCNC: 9.5 MG/DL
CHLORIDE SERPL-SCNC: 103 MMOL/L
CHOLEST SERPL-MCNC: 132 MG/DL
CO2 SERPL-SCNC: 25 MMOL/L
CREAT SERPL-MCNC: 1.27 MG/DL
EGFR: 54 ML/MIN/1.73M2
EOSINOPHIL # BLD AUTO: 0.16 K/UL
EOSINOPHIL NFR BLD AUTO: 1.8 %
GLUCOSE SERPL-MCNC: 170 MG/DL
HCT VFR BLD CALC: 42.8 %
HDLC SERPL-MCNC: 28 MG/DL
HGB BLD-MCNC: 14.8 G/DL
IMM GRANULOCYTES NFR BLD AUTO: 0.9 %
LDLC SERPL CALC-MCNC: 69 MG/DL
LYMPHOCYTES # BLD AUTO: 1.7 K/UL
LYMPHOCYTES NFR BLD AUTO: 19.3 %
MAN DIFF?: NORMAL
MCHC RBC-ENTMCNC: 31.1 PG
MCHC RBC-ENTMCNC: 34.6 GM/DL
MCV RBC AUTO: 89.9 FL
MONOCYTES # BLD AUTO: 0.84 K/UL
MONOCYTES NFR BLD AUTO: 9.6 %
NEUTROPHILS # BLD AUTO: 5.97 K/UL
NEUTROPHILS NFR BLD AUTO: 67.9 %
NONHDLC SERPL-MCNC: 104 MG/DL
PLATELET # BLD AUTO: 140 K/UL
POTASSIUM SERPL-SCNC: 4.3 MMOL/L
PROT SERPL-MCNC: 7.2 G/DL
RBC # BLD: 4.76 M/UL
RBC # FLD: 14 %
SODIUM SERPL-SCNC: 141 MMOL/L
TRIGL SERPL-MCNC: 175 MG/DL
WBC # FLD AUTO: 8.79 K/UL

## 2022-11-23 ENCOUNTER — NON-APPOINTMENT (OUTPATIENT)
Age: 87
End: 2022-11-23

## 2022-11-24 ENCOUNTER — NON-APPOINTMENT (OUTPATIENT)
Age: 87
End: 2022-11-24

## 2022-11-26 ENCOUNTER — NON-APPOINTMENT (OUTPATIENT)
Age: 87
End: 2022-11-26

## 2022-11-28 ENCOUNTER — APPOINTMENT (OUTPATIENT)
Dept: CARDIOLOGY | Facility: CLINIC | Age: 87
End: 2022-11-28

## 2022-11-28 LAB
INR PPP: 3.6 RATIO
POCT-PROTHROMBIN TIME: 43.6 SECS
QUALITY CONTROL: YES
T3FREE SERPL-MCNC: 2.36 PG/ML
T4 FREE SERPL-MCNC: 1.2 NG/DL
TSH SERPL-ACNC: 3.49 UIU/ML

## 2022-11-28 PROCEDURE — 99211 OFF/OP EST MAY X REQ PHY/QHP: CPT | Mod: 25

## 2022-11-28 PROCEDURE — 85610 PROTHROMBIN TIME: CPT | Mod: QW

## 2022-11-30 ENCOUNTER — RX RENEWAL (OUTPATIENT)
Age: 87
End: 2022-11-30

## 2022-12-05 ENCOUNTER — APPOINTMENT (OUTPATIENT)
Dept: CARDIOLOGY | Facility: CLINIC | Age: 87
End: 2022-12-05

## 2022-12-05 ENCOUNTER — NON-APPOINTMENT (OUTPATIENT)
Age: 87
End: 2022-12-05

## 2022-12-05 ENCOUNTER — APPOINTMENT (OUTPATIENT)
Dept: CARDIOLOGY | Facility: CLINIC | Age: 87
End: 2022-12-05
Payer: MEDICARE

## 2022-12-05 VITALS
HEIGHT: 67 IN | RESPIRATION RATE: 16 BRPM | TEMPERATURE: 97.1 F | OXYGEN SATURATION: 97 % | HEART RATE: 68 BPM | WEIGHT: 138 LBS | DIASTOLIC BLOOD PRESSURE: 56 MMHG | SYSTOLIC BLOOD PRESSURE: 150 MMHG | BODY MASS INDEX: 21.66 KG/M2

## 2022-12-05 VITALS — SYSTOLIC BLOOD PRESSURE: 132 MMHG | DIASTOLIC BLOOD PRESSURE: 72 MMHG

## 2022-12-05 DIAGNOSIS — Z79.01 LONG TERM (CURRENT) USE OF ANTICOAGULANTS: ICD-10-CM

## 2022-12-05 DIAGNOSIS — R73.01 IMPAIRED FASTING GLUCOSE: ICD-10-CM

## 2022-12-05 PROCEDURE — 90662 IIV NO PRSV INCREASED AG IM: CPT

## 2022-12-05 PROCEDURE — 99214 OFFICE O/P EST MOD 30 MIN: CPT

## 2022-12-05 PROCEDURE — G0008: CPT

## 2022-12-05 PROCEDURE — 93000 ELECTROCARDIOGRAM COMPLETE: CPT

## 2022-12-05 NOTE — HISTORY OF PRESENT ILLNESS
[FreeTextEntry1] : \par 89-year-old man with history of mechanical AVR and also thoracic aortic aneurysm paroxysmal atrial fibrillation hypertension.\par \par He feels well.  He denies lightheadedness dizziness falls chest pain shortness of breath or bleeding.  He is on chronic anticoagulation

## 2022-12-05 NOTE — ASSESSMENT
[FreeTextEntry1] : Remote mechanical AVR and thoracic aneurysm repair.  Paroxysmal A. fib.  Hypertension.    Patient on chronic anticoagulation.  Elevated glucose probable diabetes

## 2022-12-05 NOTE — DISCUSSION/SUMMARY
[Patient] : the patient [Risks] : risks [Benefits] : benefits [Alternatives] : alternatives [___ Month(s)] : in [unfilled] month(s) [FreeTextEntry1] : \par \par Reviewed with patient.   INR Coumadin clinic  SBE prophylaxis.  Lab testing reviewed.  Blood test including A1c requested.  Questions addressed with patient. \par continue amlodipine, atorvastatin, Nadolol Rampiril\par warfarin.\par Incidental influenza vaccine recommended [EKG obtained to assist in diagnosis and management of assessed problem(s)] : EKG obtained to assist in diagnosis and management of assessed problem(s)

## 2022-12-05 NOTE — PHYSICAL EXAM
[No Rub] : no rub [No Gallop] : no gallop [Normal] : soft, non-tender, no masses/organomegaly, normal bowel sounds [de-identified] : Good quality metallic aortic valve closing sound

## 2022-12-05 NOTE — CARDIOLOGY SUMMARY
[de-identified] : December 5, 2022 sinus rhythm first-degree block LBBB [de-identified] : 2021 mechanical AVR normal LV function [de-identified] : Mechanical AVR and thoracic aneurysm repair

## 2022-12-19 ENCOUNTER — APPOINTMENT (OUTPATIENT)
Dept: CARDIOLOGY | Facility: CLINIC | Age: 87
End: 2022-12-19

## 2022-12-19 PROCEDURE — 99211 OFF/OP EST MAY X REQ PHY/QHP: CPT | Mod: 25

## 2022-12-19 PROCEDURE — 85610 PROTHROMBIN TIME: CPT | Mod: QW

## 2022-12-20 LAB
INR PPP: 3.2 RATIO
POCT-PROTHROMBIN TIME: 41.6 SECS
QUALITY CONTROL: YES

## 2022-12-22 ENCOUNTER — RX RENEWAL (OUTPATIENT)
Age: 87
End: 2022-12-22

## 2023-01-01 ENCOUNTER — APPOINTMENT (OUTPATIENT)
Dept: CARDIOLOGY | Facility: CLINIC | Age: 88
End: 2023-01-01

## 2023-01-01 ENCOUNTER — APPOINTMENT (OUTPATIENT)
Dept: CARDIOLOGY | Facility: CLINIC | Age: 88
End: 2023-01-01
Payer: MEDICARE

## 2023-01-01 ENCOUNTER — RX RENEWAL (OUTPATIENT)
Age: 88
End: 2023-01-01

## 2023-01-01 ENCOUNTER — NON-APPOINTMENT (OUTPATIENT)
Age: 88
End: 2023-01-01

## 2023-01-01 ENCOUNTER — INPATIENT (INPATIENT)
Facility: HOSPITAL | Age: 88
LOS: 3 days | Discharge: ANOTHER HOSPITAL | DRG: 602 | End: 2024-01-04
Attending: FAMILY MEDICINE | Admitting: INTERNAL MEDICINE
Payer: MEDICARE

## 2023-01-01 VITALS
WEIGHT: 134.92 LBS | OXYGEN SATURATION: 96 % | RESPIRATION RATE: 18 BRPM | HEIGHT: 63 IN | TEMPERATURE: 97 F | HEART RATE: 81 BPM | DIASTOLIC BLOOD PRESSURE: 47 MMHG | SYSTOLIC BLOOD PRESSURE: 83 MMHG

## 2023-01-01 VITALS
SYSTOLIC BLOOD PRESSURE: 110 MMHG | WEIGHT: 135 LBS | HEART RATE: 69 BPM | DIASTOLIC BLOOD PRESSURE: 71 MMHG | BODY MASS INDEX: 21.19 KG/M2 | OXYGEN SATURATION: 97 % | HEIGHT: 67 IN

## 2023-01-01 VITALS
RESPIRATION RATE: 17 BRPM | OXYGEN SATURATION: 96 % | WEIGHT: 140 LBS | SYSTOLIC BLOOD PRESSURE: 147 MMHG | HEART RATE: 72 BPM | TEMPERATURE: 97.2 F | BODY MASS INDEX: 21.97 KG/M2 | DIASTOLIC BLOOD PRESSURE: 84 MMHG | HEIGHT: 67 IN

## 2023-01-01 VITALS
OXYGEN SATURATION: 95 % | DIASTOLIC BLOOD PRESSURE: 62 MMHG | BODY MASS INDEX: 19.93 KG/M2 | SYSTOLIC BLOOD PRESSURE: 110 MMHG | HEIGHT: 67 IN | WEIGHT: 127 LBS | HEART RATE: 79 BPM

## 2023-01-01 DIAGNOSIS — L03.116 CELLULITIS OF LEFT LOWER LIMB: ICD-10-CM

## 2023-01-01 DIAGNOSIS — E11.9 TYPE 2 DIABETES MELLITUS WITHOUT COMPLICATIONS: ICD-10-CM

## 2023-01-01 DIAGNOSIS — Z98.890 OTHER SPECIFIED POSTPROCEDURAL STATES: Chronic | ICD-10-CM

## 2023-01-01 DIAGNOSIS — I44.7 LEFT BUNDLE-BRANCH BLOCK, UNSPECIFIED: ICD-10-CM

## 2023-01-01 DIAGNOSIS — Z78.9 OTHER SPECIFIED HEALTH STATUS: ICD-10-CM

## 2023-01-01 DIAGNOSIS — E78.5 HYPERLIPIDEMIA, UNSPECIFIED: ICD-10-CM

## 2023-01-01 DIAGNOSIS — I10 ESSENTIAL (PRIMARY) HYPERTENSION: ICD-10-CM

## 2023-01-01 DIAGNOSIS — I48.91 UNSPECIFIED ATRIAL FIBRILLATION: ICD-10-CM

## 2023-01-01 DIAGNOSIS — L03.90 CELLULITIS, UNSPECIFIED: ICD-10-CM

## 2023-01-01 DIAGNOSIS — I48.0 PAROXYSMAL ATRIAL FIBRILLATION: ICD-10-CM

## 2023-01-01 DIAGNOSIS — Z95.2 PRESENCE OF PROSTHETIC HEART VALVE: ICD-10-CM

## 2023-01-01 LAB
ALBUMIN SERPL ELPH-MCNC: 2.8 G/DL — LOW (ref 3.3–5)
ALBUMIN SERPL ELPH-MCNC: 2.8 G/DL — LOW (ref 3.3–5)
ALBUMIN SERPL ELPH-MCNC: 4.5 G/DL
ALP BLD-CCNC: 97 U/L
ALP SERPL-CCNC: 78 U/L — SIGNIFICANT CHANGE UP (ref 40–120)
ALP SERPL-CCNC: 78 U/L — SIGNIFICANT CHANGE UP (ref 40–120)
ALT FLD-CCNC: 18 U/L — SIGNIFICANT CHANGE UP (ref 10–45)
ALT FLD-CCNC: 18 U/L — SIGNIFICANT CHANGE UP (ref 10–45)
ALT SERPL-CCNC: 13 U/L
ANION GAP SERPL CALC-SCNC: 13 MMOL/L
ANION GAP SERPL CALC-SCNC: 6 MMOL/L — SIGNIFICANT CHANGE UP (ref 5–17)
ANION GAP SERPL CALC-SCNC: 6 MMOL/L — SIGNIFICANT CHANGE UP (ref 5–17)
APPEARANCE UR: CLEAR — SIGNIFICANT CHANGE UP
APPEARANCE UR: CLEAR — SIGNIFICANT CHANGE UP
APTT BLD: 45.7 SEC — HIGH (ref 24.5–35.6)
APTT BLD: 45.7 SEC — HIGH (ref 24.5–35.6)
AST SERPL-CCNC: 16 U/L
AST SERPL-CCNC: 17 U/L — SIGNIFICANT CHANGE UP (ref 10–40)
AST SERPL-CCNC: 17 U/L — SIGNIFICANT CHANGE UP (ref 10–40)
BACTERIA # UR AUTO: NEGATIVE /HPF — SIGNIFICANT CHANGE UP
BACTERIA # UR AUTO: NEGATIVE /HPF — SIGNIFICANT CHANGE UP
BASOPHILS # BLD AUTO: 0.03 K/UL — SIGNIFICANT CHANGE UP (ref 0–0.2)
BASOPHILS # BLD AUTO: 0.03 K/UL — SIGNIFICANT CHANGE UP (ref 0–0.2)
BASOPHILS NFR BLD AUTO: 0.3 % — SIGNIFICANT CHANGE UP (ref 0–2)
BASOPHILS NFR BLD AUTO: 0.3 % — SIGNIFICANT CHANGE UP (ref 0–2)
BILIRUB SERPL-MCNC: 0.6 MG/DL — SIGNIFICANT CHANGE UP (ref 0.2–1.2)
BILIRUB SERPL-MCNC: 0.6 MG/DL — SIGNIFICANT CHANGE UP (ref 0.2–1.2)
BILIRUB SERPL-MCNC: 0.8 MG/DL
BILIRUB UR-MCNC: NEGATIVE — SIGNIFICANT CHANGE UP
BILIRUB UR-MCNC: NEGATIVE — SIGNIFICANT CHANGE UP
BUN SERPL-MCNC: 24 MG/DL
BUN SERPL-MCNC: 46 MG/DL — HIGH (ref 7–23)
BUN SERPL-MCNC: 46 MG/DL — HIGH (ref 7–23)
CALCIUM SERPL-MCNC: 10 MG/DL — SIGNIFICANT CHANGE UP (ref 8.4–10.5)
CALCIUM SERPL-MCNC: 10 MG/DL — SIGNIFICANT CHANGE UP (ref 8.4–10.5)
CALCIUM SERPL-MCNC: 9.5 MG/DL
CHLORIDE SERPL-SCNC: 102 MMOL/L
CHLORIDE SERPL-SCNC: 104 MMOL/L — SIGNIFICANT CHANGE UP (ref 96–108)
CHLORIDE SERPL-SCNC: 104 MMOL/L — SIGNIFICANT CHANGE UP (ref 96–108)
CHOLEST SERPL-MCNC: 129 MG/DL
CO2 SERPL-SCNC: 26 MMOL/L
CO2 SERPL-SCNC: 30 MMOL/L — SIGNIFICANT CHANGE UP (ref 22–31)
CO2 SERPL-SCNC: 30 MMOL/L — SIGNIFICANT CHANGE UP (ref 22–31)
COLOR SPEC: YELLOW — SIGNIFICANT CHANGE UP
COLOR SPEC: YELLOW — SIGNIFICANT CHANGE UP
CREAT SERPL-MCNC: 1.16 MG/DL
CREAT SERPL-MCNC: 1.6 MG/DL — HIGH (ref 0.5–1.3)
CREAT SERPL-MCNC: 1.6 MG/DL — HIGH (ref 0.5–1.3)
DIFF PNL FLD: NEGATIVE — SIGNIFICANT CHANGE UP
DIFF PNL FLD: NEGATIVE — SIGNIFICANT CHANGE UP
EGFR: 40 ML/MIN/1.73M2 — LOW
EGFR: 40 ML/MIN/1.73M2 — LOW
EGFR: 60 ML/MIN/1.73M2
EOSINOPHIL # BLD AUTO: 0.14 K/UL — SIGNIFICANT CHANGE UP (ref 0–0.5)
EOSINOPHIL # BLD AUTO: 0.14 K/UL — SIGNIFICANT CHANGE UP (ref 0–0.5)
EOSINOPHIL NFR BLD AUTO: 1.2 % — SIGNIFICANT CHANGE UP (ref 0–6)
EOSINOPHIL NFR BLD AUTO: 1.2 % — SIGNIFICANT CHANGE UP (ref 0–6)
EPI CELLS # UR: 2 — SIGNIFICANT CHANGE UP
EPI CELLS # UR: 2 — SIGNIFICANT CHANGE UP
ESTIMATED AVERAGE GLUCOSE: 146 MG/DL
GLUCOSE BLDC GLUCOMTR-MCNC: 182 MG/DL — HIGH (ref 70–99)
GLUCOSE BLDC GLUCOMTR-MCNC: 182 MG/DL — HIGH (ref 70–99)
GLUCOSE BS SERPL-MCNC: 159 MG/DL
GLUCOSE SERPL-MCNC: 150 MG/DL
GLUCOSE SERPL-MCNC: 171 MG/DL — HIGH (ref 70–99)
GLUCOSE SERPL-MCNC: 171 MG/DL — HIGH (ref 70–99)
GLUCOSE UR QL: NEGATIVE MG/DL — SIGNIFICANT CHANGE UP
GLUCOSE UR QL: NEGATIVE MG/DL — SIGNIFICANT CHANGE UP
HBA1C MFR BLD HPLC: 6.7 %
HCT VFR BLD CALC: 35.8 % — LOW (ref 39–50)
HCT VFR BLD CALC: 35.8 % — LOW (ref 39–50)
HDLC SERPL-MCNC: 31 MG/DL
HGB BLD-MCNC: 12.4 G/DL — LOW (ref 13–17)
HGB BLD-MCNC: 12.4 G/DL — LOW (ref 13–17)
IMM GRANULOCYTES NFR BLD AUTO: 1 % — HIGH (ref 0–0.9)
IMM GRANULOCYTES NFR BLD AUTO: 1 % — HIGH (ref 0–0.9)
INR BLD: 6.59 RATIO — CRITICAL HIGH (ref 0.85–1.18)
INR BLD: 6.59 RATIO — CRITICAL HIGH (ref 0.85–1.18)
INR PPP: 1.3 RATIO
INR PPP: 1.4 RATIO
INR PPP: 1.5 RATIO
INR PPP: 1.6 RATIO
INR PPP: 2.2 RATIO
INR PPP: 2.3 RATIO
INR PPP: 2.3 RATIO
INR PPP: 2.5 RATIO
INR PPP: 2.8 RATIO
INR PPP: 3 RATIO
INR PPP: 3.3 RATIO
INR PPP: 3.8 RATIO
INR PPP: 3.9 RATIO
INR PPP: 4.7 RATIO
INR PPP: 4.7 RATIO
INR PPP: 4.8 RATIO
INR PPP: 4.8 RATIO
INR PPP: 5.1 RATIO
INR PPP: 5.5 RATIO
INR PPP: 6.7 RATIO
INR PPP: 6.9 RATIO
INR PPP: 8 RATIO
KETONES UR-MCNC: NEGATIVE MG/DL — SIGNIFICANT CHANGE UP
KETONES UR-MCNC: NEGATIVE MG/DL — SIGNIFICANT CHANGE UP
LACTATE SERPL-SCNC: 1.1 MMOL/L — SIGNIFICANT CHANGE UP (ref 0.7–2)
LACTATE SERPL-SCNC: 1.1 MMOL/L — SIGNIFICANT CHANGE UP (ref 0.7–2)
LDLC SERPL CALC-MCNC: 71 MG/DL
LEUKOCYTE ESTERASE UR-ACNC: ABNORMAL
LEUKOCYTE ESTERASE UR-ACNC: ABNORMAL
LYMPHOCYTES # BLD AUTO: 1.37 K/UL — SIGNIFICANT CHANGE UP (ref 1–3.3)
LYMPHOCYTES # BLD AUTO: 1.37 K/UL — SIGNIFICANT CHANGE UP (ref 1–3.3)
LYMPHOCYTES # BLD AUTO: 11.7 % — LOW (ref 13–44)
LYMPHOCYTES # BLD AUTO: 11.7 % — LOW (ref 13–44)
MCHC RBC-ENTMCNC: 30.5 PG — SIGNIFICANT CHANGE UP (ref 27–34)
MCHC RBC-ENTMCNC: 30.5 PG — SIGNIFICANT CHANGE UP (ref 27–34)
MCHC RBC-ENTMCNC: 34.6 GM/DL — SIGNIFICANT CHANGE UP (ref 32–36)
MCHC RBC-ENTMCNC: 34.6 GM/DL — SIGNIFICANT CHANGE UP (ref 32–36)
MCV RBC AUTO: 88 FL — SIGNIFICANT CHANGE UP (ref 80–100)
MCV RBC AUTO: 88 FL — SIGNIFICANT CHANGE UP (ref 80–100)
MONOCYTES # BLD AUTO: 1.07 K/UL — HIGH (ref 0–0.9)
MONOCYTES # BLD AUTO: 1.07 K/UL — HIGH (ref 0–0.9)
MONOCYTES NFR BLD AUTO: 9.1 % — SIGNIFICANT CHANGE UP (ref 2–14)
MONOCYTES NFR BLD AUTO: 9.1 % — SIGNIFICANT CHANGE UP (ref 2–14)
NEUTROPHILS # BLD AUTO: 8.98 K/UL — HIGH (ref 1.8–7.4)
NEUTROPHILS # BLD AUTO: 8.98 K/UL — HIGH (ref 1.8–7.4)
NEUTROPHILS NFR BLD AUTO: 76.7 % — SIGNIFICANT CHANGE UP (ref 43–77)
NEUTROPHILS NFR BLD AUTO: 76.7 % — SIGNIFICANT CHANGE UP (ref 43–77)
NITRITE UR-MCNC: NEGATIVE — SIGNIFICANT CHANGE UP
NITRITE UR-MCNC: NEGATIVE — SIGNIFICANT CHANGE UP
NONHDLC SERPL-MCNC: 98 MG/DL
NRBC # BLD: 0 /100 WBCS — SIGNIFICANT CHANGE UP (ref 0–0)
NRBC # BLD: 0 /100 WBCS — SIGNIFICANT CHANGE UP (ref 0–0)
PH UR: 5 — SIGNIFICANT CHANGE UP (ref 5–8)
PH UR: 5 — SIGNIFICANT CHANGE UP (ref 5–8)
PLATELET # BLD AUTO: 233 K/UL — SIGNIFICANT CHANGE UP (ref 150–400)
PLATELET # BLD AUTO: 233 K/UL — SIGNIFICANT CHANGE UP (ref 150–400)
POCT-PROTHROMBIN TIME: 15.9 SECS
POCT-PROTHROMBIN TIME: 16.6 SECS
POCT-PROTHROMBIN TIME: 17.2 SECS
POCT-PROTHROMBIN TIME: 18.5 SECS
POCT-PROTHROMBIN TIME: 18.6 SECS
POCT-PROTHROMBIN TIME: 19 SECS
POCT-PROTHROMBIN TIME: 22.1 SECS
POCT-PROTHROMBIN TIME: 23.1 SECS
POCT-PROTHROMBIN TIME: 26.3 SECS
POCT-PROTHROMBIN TIME: 27.5 SECS
POCT-PROTHROMBIN TIME: 27.9 SECS
POCT-PROTHROMBIN TIME: 29.6 SECS
POCT-PROTHROMBIN TIME: 35.9 SECS
POCT-PROTHROMBIN TIME: 39.9 SECS
POCT-PROTHROMBIN TIME: 45.9 SECS
POCT-PROTHROMBIN TIME: 46.2 SECS
POCT-PROTHROMBIN TIME: 53.9 SECS
POCT-PROTHROMBIN TIME: 56 SECS
POCT-PROTHROMBIN TIME: 57.3 SECS
POCT-PROTHROMBIN TIME: 57.9 SECS
POCT-PROTHROMBIN TIME: 60.8 SECS
POCT-PROTHROMBIN TIME: 65.7 SECS
POCT-PROTHROMBIN TIME: 72 SECS
POCT-PROTHROMBIN TIME: 72.1 SECS
POCT-PROTHROMBIN TIME: 80 SECS
POTASSIUM SERPL-MCNC: 4.4 MMOL/L — SIGNIFICANT CHANGE UP (ref 3.5–5.3)
POTASSIUM SERPL-MCNC: 4.4 MMOL/L — SIGNIFICANT CHANGE UP (ref 3.5–5.3)
POTASSIUM SERPL-SCNC: 4.3 MMOL/L
POTASSIUM SERPL-SCNC: 4.4 MMOL/L — SIGNIFICANT CHANGE UP (ref 3.5–5.3)
POTASSIUM SERPL-SCNC: 4.4 MMOL/L — SIGNIFICANT CHANGE UP (ref 3.5–5.3)
PROT SERPL-MCNC: 7.3 G/DL
PROT SERPL-MCNC: 7.3 G/DL — SIGNIFICANT CHANGE UP (ref 6–8.3)
PROT SERPL-MCNC: 7.3 G/DL — SIGNIFICANT CHANGE UP (ref 6–8.3)
PROT UR-MCNC: 30 MG/DL
PROT UR-MCNC: 30 MG/DL
PROTHROM AB SERPL-ACNC: 73 SEC — HIGH (ref 9.5–13)
PROTHROM AB SERPL-ACNC: 73 SEC — HIGH (ref 9.5–13)
QUALITY CONTROL: YES
RAPID RVP RESULT: SIGNIFICANT CHANGE UP
RAPID RVP RESULT: SIGNIFICANT CHANGE UP
RBC # BLD: 4.07 M/UL — LOW (ref 4.2–5.8)
RBC # BLD: 4.07 M/UL — LOW (ref 4.2–5.8)
RBC # FLD: 13.1 % — SIGNIFICANT CHANGE UP (ref 10.3–14.5)
RBC # FLD: 13.1 % — SIGNIFICANT CHANGE UP (ref 10.3–14.5)
RBC CASTS # UR COMP ASSIST: 2 /HPF — SIGNIFICANT CHANGE UP (ref 0–4)
RBC CASTS # UR COMP ASSIST: 2 /HPF — SIGNIFICANT CHANGE UP (ref 0–4)
SARS-COV-2 RNA SPEC QL NAA+PROBE: SIGNIFICANT CHANGE UP
SARS-COV-2 RNA SPEC QL NAA+PROBE: SIGNIFICANT CHANGE UP
SODIUM SERPL-SCNC: 140 MMOL/L — SIGNIFICANT CHANGE UP (ref 135–145)
SODIUM SERPL-SCNC: 140 MMOL/L — SIGNIFICANT CHANGE UP (ref 135–145)
SODIUM SERPL-SCNC: 141 MMOL/L
SP GR SPEC: 1.02 — SIGNIFICANT CHANGE UP (ref 1–1.03)
SP GR SPEC: 1.02 — SIGNIFICANT CHANGE UP (ref 1–1.03)
TRIGL SERPL-MCNC: 148 MG/DL
TSH SERPL-ACNC: 3.96 UIU/ML
UROBILINOGEN FLD QL: 1 MG/DL — SIGNIFICANT CHANGE UP (ref 0.2–1)
UROBILINOGEN FLD QL: 1 MG/DL — SIGNIFICANT CHANGE UP (ref 0.2–1)
WBC # BLD: 11.71 K/UL — HIGH (ref 3.8–10.5)
WBC # BLD: 11.71 K/UL — HIGH (ref 3.8–10.5)
WBC # FLD AUTO: 11.71 K/UL — HIGH (ref 3.8–10.5)
WBC # FLD AUTO: 11.71 K/UL — HIGH (ref 3.8–10.5)
WBC UR QL: 5 /HPF — SIGNIFICANT CHANGE UP (ref 0–5)
WBC UR QL: 5 /HPF — SIGNIFICANT CHANGE UP (ref 0–5)

## 2023-01-01 PROCEDURE — 85610 PROTHROMBIN TIME: CPT | Mod: QW

## 2023-01-01 PROCEDURE — 99211 OFF/OP EST MAY X REQ PHY/QHP: CPT | Mod: 25

## 2023-01-01 PROCEDURE — 93793 ANTICOAG MGMT PT WARFARIN: CPT

## 2023-01-01 PROCEDURE — 73630 X-RAY EXAM OF FOOT: CPT | Mod: 26,LT

## 2023-01-01 PROCEDURE — 99285 EMERGENCY DEPT VISIT HI MDM: CPT

## 2023-01-01 PROCEDURE — 93000 ELECTROCARDIOGRAM COMPLETE: CPT

## 2023-01-01 PROCEDURE — 99214 OFFICE O/P EST MOD 30 MIN: CPT

## 2023-01-01 PROCEDURE — 99223 1ST HOSP IP/OBS HIGH 75: CPT | Mod: GC,AI

## 2023-01-01 PROCEDURE — 93010 ELECTROCARDIOGRAM REPORT: CPT

## 2023-01-01 PROCEDURE — 71045 X-RAY EXAM CHEST 1 VIEW: CPT | Mod: 26

## 2023-01-01 PROCEDURE — 99211 OFF/OP EST MAY X REQ PHY/QHP: CPT

## 2023-01-01 RX ORDER — ONDANSETRON 8 MG/1
4 TABLET, FILM COATED ORAL EVERY 8 HOURS
Refills: 0 | Status: DISCONTINUED | OUTPATIENT
Start: 2023-01-01 | End: 2024-01-01

## 2023-01-01 RX ORDER — SODIUM CHLORIDE 9 MG/ML
1000 INJECTION, SOLUTION INTRAVENOUS
Refills: 0 | Status: DISCONTINUED | OUTPATIENT
Start: 2023-01-01 | End: 2024-01-01

## 2023-01-01 RX ORDER — INSULIN GLARGINE 100 [IU]/ML
10 INJECTION, SOLUTION SUBCUTANEOUS AT BEDTIME
Refills: 0 | Status: DISCONTINUED | OUTPATIENT
Start: 2023-01-01 | End: 2024-01-01

## 2023-01-01 RX ORDER — PIPERACILLIN AND TAZOBACTAM 4; .5 G/20ML; G/20ML
3.38 INJECTION, POWDER, LYOPHILIZED, FOR SOLUTION INTRAVENOUS ONCE
Refills: 0 | Status: COMPLETED | OUTPATIENT
Start: 2023-01-01 | End: 2023-01-01

## 2023-01-01 RX ORDER — SODIUM CHLORIDE 9 MG/ML
1900 INJECTION INTRAMUSCULAR; INTRAVENOUS; SUBCUTANEOUS ONCE
Refills: 0 | Status: COMPLETED | OUTPATIENT
Start: 2023-01-01 | End: 2023-01-01

## 2023-01-01 RX ORDER — DEXTROSE 50 % IN WATER 50 %
12.5 SYRINGE (ML) INTRAVENOUS ONCE
Refills: 0 | Status: DISCONTINUED | OUTPATIENT
Start: 2023-01-01 | End: 2024-01-01

## 2023-01-01 RX ORDER — WARFARIN 5 MG/1
5 TABLET ORAL
Qty: 45 | Refills: 2 | Status: DISCONTINUED | COMMUNITY
Start: 2023-01-01 | End: 2023-01-01

## 2023-01-01 RX ORDER — VANCOMYCIN HCL 1 G
1000 VIAL (EA) INTRAVENOUS ONCE
Refills: 0 | Status: COMPLETED | OUTPATIENT
Start: 2023-01-01 | End: 2023-01-01

## 2023-01-01 RX ORDER — NADOLOL 80 MG/1
20 TABLET ORAL DAILY
Refills: 0 | Status: DISCONTINUED | OUTPATIENT
Start: 2024-01-01 | End: 2024-01-01

## 2023-01-01 RX ORDER — DEXTROSE 50 % IN WATER 50 %
25 SYRINGE (ML) INTRAVENOUS ONCE
Refills: 0 | Status: DISCONTINUED | OUTPATIENT
Start: 2023-01-01 | End: 2024-01-01

## 2023-01-01 RX ORDER — LANOLIN ALCOHOL/MO/W.PET/CERES
3 CREAM (GRAM) TOPICAL AT BEDTIME
Refills: 0 | Status: DISCONTINUED | OUTPATIENT
Start: 2023-01-01 | End: 2024-01-01

## 2023-01-01 RX ORDER — DEXTROSE 50 % IN WATER 50 %
15 SYRINGE (ML) INTRAVENOUS ONCE
Refills: 0 | Status: DISCONTINUED | OUTPATIENT
Start: 2023-01-01 | End: 2024-01-01

## 2023-01-01 RX ORDER — GLUCAGON INJECTION, SOLUTION 0.5 MG/.1ML
1 INJECTION, SOLUTION SUBCUTANEOUS ONCE
Refills: 0 | Status: DISCONTINUED | OUTPATIENT
Start: 2023-01-01 | End: 2024-01-01

## 2023-01-01 RX ORDER — PIPERACILLIN AND TAZOBACTAM 4; .5 G/20ML; G/20ML
3.38 INJECTION, POWDER, LYOPHILIZED, FOR SOLUTION INTRAVENOUS EVERY 8 HOURS
Refills: 0 | Status: DISCONTINUED | OUTPATIENT
Start: 2023-01-01 | End: 2024-01-01

## 2023-01-01 RX ORDER — ACETAMINOPHEN 500 MG
650 TABLET ORAL EVERY 6 HOURS
Refills: 0 | Status: DISCONTINUED | OUTPATIENT
Start: 2023-01-01 | End: 2024-01-01

## 2023-01-01 RX ORDER — INFLUENZA VIRUS VACCINE 15; 15; 15; 15 UG/.5ML; UG/.5ML; UG/.5ML; UG/.5ML
0.7 SUSPENSION INTRAMUSCULAR ONCE
Refills: 0 | Status: DISCONTINUED | OUTPATIENT
Start: 2023-01-01 | End: 2024-01-01

## 2023-01-01 RX ORDER — LISINOPRIL 2.5 MG/1
40 TABLET ORAL DAILY
Refills: 0 | Status: DISCONTINUED | OUTPATIENT
Start: 2023-01-01 | End: 2024-01-01

## 2023-01-01 RX ORDER — VANCOMYCIN HCL 1 G
VIAL (EA) INTRAVENOUS
Refills: 0 | Status: DISCONTINUED | OUTPATIENT
Start: 2023-01-01 | End: 2024-01-01

## 2023-01-01 RX ORDER — VANCOMYCIN HCL 1 G
750 VIAL (EA) INTRAVENOUS EVERY 24 HOURS
Refills: 0 | Status: DISCONTINUED | OUTPATIENT
Start: 2024-01-01 | End: 2024-01-01

## 2023-01-01 RX ORDER — INSULIN LISPRO 100/ML
4 VIAL (ML) SUBCUTANEOUS
Refills: 0 | Status: DISCONTINUED | OUTPATIENT
Start: 2023-01-01 | End: 2024-01-01

## 2023-01-01 RX ORDER — ATORVASTATIN CALCIUM 80 MG/1
10 TABLET, FILM COATED ORAL AT BEDTIME
Refills: 0 | Status: DISCONTINUED | OUTPATIENT
Start: 2023-01-01 | End: 2024-01-01

## 2023-01-01 RX ADMIN — Medication 250 MILLIGRAM(S): at 13:37

## 2023-01-01 RX ADMIN — SODIUM CHLORIDE 1900 MILLILITER(S): 9 INJECTION INTRAMUSCULAR; INTRAVENOUS; SUBCUTANEOUS at 14:11

## 2023-01-01 RX ADMIN — SODIUM CHLORIDE 1900 MILLILITER(S): 9 INJECTION INTRAMUSCULAR; INTRAVENOUS; SUBCUTANEOUS at 13:11

## 2023-01-01 RX ADMIN — SODIUM CHLORIDE 75 MILLILITER(S): 9 INJECTION, SOLUTION INTRAVENOUS at 17:09

## 2023-01-01 RX ADMIN — ATORVASTATIN CALCIUM 10 MILLIGRAM(S): 80 TABLET, FILM COATED ORAL at 22:00

## 2023-01-01 RX ADMIN — INSULIN GLARGINE 10 UNIT(S): 100 INJECTION, SOLUTION SUBCUTANEOUS at 22:18

## 2023-01-01 RX ADMIN — PIPERACILLIN AND TAZOBACTAM 3.38 GRAM(S): 4; .5 INJECTION, POWDER, LYOPHILIZED, FOR SOLUTION INTRAVENOUS at 13:41

## 2023-01-01 RX ADMIN — Medication 1000 MILLIGRAM(S): at 14:37

## 2023-01-01 RX ADMIN — PIPERACILLIN AND TAZOBACTAM 200 GRAM(S): 4; .5 INJECTION, POWDER, LYOPHILIZED, FOR SOLUTION INTRAVENOUS at 13:11

## 2023-01-01 RX ADMIN — PIPERACILLIN AND TAZOBACTAM 25 GRAM(S): 4; .5 INJECTION, POWDER, LYOPHILIZED, FOR SOLUTION INTRAVENOUS at 22:00

## 2023-01-17 ENCOUNTER — APPOINTMENT (OUTPATIENT)
Dept: CARDIOLOGY | Facility: CLINIC | Age: 88
End: 2023-01-17
Payer: MEDICARE

## 2023-01-17 LAB
INR PPP: 3 RATIO
POCT-PROTHROMBIN TIME: 36.4 SECS
QUALITY CONTROL: YES

## 2023-01-17 PROCEDURE — 85610 PROTHROMBIN TIME: CPT | Mod: QW

## 2023-01-17 PROCEDURE — 99211 OFF/OP EST MAY X REQ PHY/QHP: CPT | Mod: 25

## 2023-01-31 ENCOUNTER — RX RENEWAL (OUTPATIENT)
Age: 88
End: 2023-01-31

## 2023-02-14 ENCOUNTER — APPOINTMENT (OUTPATIENT)
Dept: CARDIOLOGY | Facility: CLINIC | Age: 88
End: 2023-02-14
Payer: MEDICARE

## 2023-02-14 LAB
INR PPP: 5.9 RATIO
POCT-PROTHROMBIN TIME: 53.1 SECS
QUALITY CONTROL: YES

## 2023-02-14 PROCEDURE — 85610 PROTHROMBIN TIME: CPT | Mod: QW

## 2023-02-14 PROCEDURE — 99211 OFF/OP EST MAY X REQ PHY/QHP: CPT | Mod: 25

## 2023-02-17 ENCOUNTER — APPOINTMENT (OUTPATIENT)
Dept: CARDIOLOGY | Facility: CLINIC | Age: 88
End: 2023-02-17
Payer: MEDICARE

## 2023-02-17 LAB
INR PPP: 6 RATIO
POCT-PROTHROMBIN TIME: 59.8 SECS
QUALITY CONTROL: YES

## 2023-02-17 PROCEDURE — 99211 OFF/OP EST MAY X REQ PHY/QHP: CPT | Mod: 25

## 2023-02-17 PROCEDURE — 85610 PROTHROMBIN TIME: CPT | Mod: QW

## 2023-02-23 ENCOUNTER — APPOINTMENT (OUTPATIENT)
Dept: CARDIOLOGY | Facility: CLINIC | Age: 88
End: 2023-02-23
Payer: MEDICARE

## 2023-02-23 LAB
INR PPP: 5.6 RATIO
POCT-PROTHROMBIN TIME: 49.1 SECS
QUALITY CONTROL: YES

## 2023-02-23 PROCEDURE — 85610 PROTHROMBIN TIME: CPT | Mod: QW

## 2023-02-23 PROCEDURE — 99211 OFF/OP EST MAY X REQ PHY/QHP: CPT | Mod: 25

## 2023-02-27 ENCOUNTER — APPOINTMENT (OUTPATIENT)
Dept: CARDIOLOGY | Facility: CLINIC | Age: 88
End: 2023-02-27
Payer: MEDICARE

## 2023-02-27 LAB
INR PPP: 3.7 RATIO
POCT-PROTHROMBIN TIME: 39.9 SECS
QUALITY CONTROL: YES

## 2023-02-27 PROCEDURE — 85610 PROTHROMBIN TIME: CPT | Mod: QW

## 2023-02-27 PROCEDURE — 99211 OFF/OP EST MAY X REQ PHY/QHP: CPT | Mod: 25

## 2023-03-06 ENCOUNTER — APPOINTMENT (OUTPATIENT)
Dept: CARDIOLOGY | Facility: CLINIC | Age: 88
End: 2023-03-06
Payer: MEDICARE

## 2023-03-06 LAB
INR PPP: 6 RATIO
POCT-PROTHROMBIN TIME: 59.8 SECS
QUALITY CONTROL: YES

## 2023-03-06 PROCEDURE — 85610 PROTHROMBIN TIME: CPT | Mod: QW

## 2023-03-06 PROCEDURE — 99211 OFF/OP EST MAY X REQ PHY/QHP: CPT | Mod: 25

## 2023-03-09 ENCOUNTER — APPOINTMENT (OUTPATIENT)
Dept: CARDIOLOGY | Facility: CLINIC | Age: 88
End: 2023-03-09
Payer: MEDICARE

## 2023-03-09 LAB
INR PPP: 5.3 RATIO
POCT-PROTHROMBIN TIME: 63.1 SECS
QUALITY CONTROL: YES

## 2023-03-09 PROCEDURE — 85610 PROTHROMBIN TIME: CPT | Mod: QW

## 2023-03-09 PROCEDURE — 99211 OFF/OP EST MAY X REQ PHY/QHP: CPT | Mod: 25

## 2023-03-13 ENCOUNTER — APPOINTMENT (OUTPATIENT)
Dept: CARDIOLOGY | Facility: CLINIC | Age: 88
End: 2023-03-13
Payer: MEDICARE

## 2023-03-13 LAB
INR PPP: 2.9 RATIO
POCT-PROTHROMBIN TIME: 34.8 SECS
QUALITY CONTROL: YES

## 2023-03-13 PROCEDURE — 99211 OFF/OP EST MAY X REQ PHY/QHP: CPT | Mod: 25

## 2023-03-13 PROCEDURE — 85610 PROTHROMBIN TIME: CPT | Mod: QW

## 2023-04-13 NOTE — CARDIOLOGY SUMMARY
[de-identified] : 4/13/23 sinus rhythm first-degree block LBBB  apc [de-identified] : 2021 mechanical AVR normal LV function [de-identified] : Mechanical AVR and thoracic aneurysm repair

## 2023-04-13 NOTE — ASSESSMENT
[FreeTextEntry1] : Remote mechanical AVR and thoracic aneurysm repair.  Paroxysmal A. fib.  Hypertension.    Patient on chronic anticoagulation.  BP acceptable. History of falls, none since last visit.

## 2023-04-13 NOTE — PHYSICAL EXAM
[No Rub] : no rub [No Gallop] : no gallop [Normal] : no edema, no cyanosis, no clubbing, no varicosities [de-identified] : Good quality metallic aortic valve closing sound

## 2023-04-13 NOTE — DISCUSSION/SUMMARY
[Patient] : the patient [Risks] : risks [Benefits] : benefits [Alternatives] : alternatives [___ Month(s)] : in [unfilled] month(s) [FreeTextEntry1] : \par \par Reviewed with patient.   INR Coumadin clinic  SBE prophylaxis.  Lab testing reviewed.  Blood test including A1c requested.  Questions addressed with patient. \par continue amlodipine, atorvastatin, Nadolol Rampiril\par warfarin.\par Echo next visit regarding prosthetic AVR

## 2023-08-14 NOTE — ASSESSMENT
[FreeTextEntry1] : Remote mechanical AVR and thoracic aneurysm repair.  Paroxysmal A. fib.  Hypertension.    Patient on chronic anticoagulation.  Elevated glucose probable diabetes  hard of hearing

## 2023-08-14 NOTE — DISCUSSION/SUMMARY
[Patient] : the patient [Risks] : risks [Benefits] : benefits [Alternatives] : alternatives [___ Month(s)] : in [unfilled] month(s) [FreeTextEntry1] :  Reviewed with patient.   INR Coumadin clinic  SBE prophylaxis.  Lab testing reviewed.  Blood test including A1c requested.  Questions addressed with patient.  continue amlodipine, atorvastatin, Nadolol Rampiril warfarin.  Audiology referral encouraged. Patient's questions were addressed to their satisfaction.

## 2023-08-14 NOTE — CARDIOLOGY SUMMARY
[de-identified] : December 5, 2022 sinus rhythm first-degree block LBBB 8/14/23 sinus lbbb 1 degree av block [de-identified] : 2021 mechanical AVR normal LV function [de-identified] : Mechanical AVR and thoracic aneurysm repair

## 2023-08-14 NOTE — PHYSICAL EXAM
[No Rub] : no rub [No Gallop] : no gallop [Normal] : soft, non-tender, no masses/organomegaly, normal bowel sounds [de-identified] : Good quality metallic aortic valve closing sound

## 2023-12-14 NOTE — CARDIOLOGY SUMMARY
[de-identified] : December 5, 2022 sinus rhythm first-degree block LBBB 8/14/23 sinus lbbb 1 degree av block 12/14/23 sinus clbbb [de-identified] : 2021 mechanical AVR normal LV function [de-identified] : Mechanical AVR and thoracic aneurysm repair

## 2023-12-14 NOTE — DISCUSSION/SUMMARY
[Patient] : the patient [Risks] : risks [Benefits] : benefits [Alternatives] : alternatives [___ Month(s)] : in [unfilled] month(s) [FreeTextEntry1] : Reviewed with patient.   INR Coumadin clinic  SBE prophylaxis.  Lab testing reviewed.  Blood test including A1c requested.  Questions addressed with patient.  continue amlodipine, atorvastatin, Nadolol Rampiril warfarin. Echo next visit follow-up of AVR repeat labs including A1c  Patient's questions were addressed to their satisfaction.  [EKG obtained to assist in diagnosis and management of assessed problem(s)] : EKG obtained to assist in diagnosis and management of assessed problem(s)

## 2023-12-14 NOTE — PHYSICAL EXAM
[No Rub] : no rub [No Gallop] : no gallop [Normal] : soft, non-tender, no masses/organomegaly, normal bowel sounds [de-identified] : Good quality metallic aortic valve closing sound

## 2023-12-14 NOTE — HISTORY OF PRESENT ILLNESS
[FreeTextEntry1] : 89-year-old man with history of mechanical AVR and also thoracic aortic aneurysm paroxysmal atrial fibrillation hypertension.  He feels well.  He denies lightheadedness dizziness falls chest pain shortness of breath or bleeding.  He is on chronic anticoagulation

## 2023-12-31 NOTE — H&P ADULT - CONVERSATION DETAILS
Spoke to only son Mr Bogdan Jefferson over the phone regarding goals of care and advanced directives. Mr Jefferson states there are no written or verbal directives in place, and no designated HCP. States he will visit patient tomorrow 1/1/24 and discuss thi matter with his father Mr Bogdan Jefferson

## 2023-12-31 NOTE — H&P ADULT - PROBLEM SELECTOR PLAN 7
-Goals od care discussion initiated with son Mr Scottie Jeffesron  -States he will visit patient 1/1/24 and initiate conversation  -Currently no advance directives and non-defined code status -Goals od care discussion initiated with son Mr Scottie Jefferson  -States he will visit patient 1/1/24 and initiate conversation  -Currently no advance directives and non-defined code status

## 2023-12-31 NOTE — PATIENT PROFILE ADULT - ABILITY TO HEAR (WITH HEARING AID OR HEARING APPLIANCE IF NORMALLY USED):
Speaking loudly/Mildly to Moderately Impaired: difficulty hearing in some environments or speaker may need to increase volume or speak distinctly

## 2023-12-31 NOTE — ED PROVIDER NOTE - NS ED MD DISPO SPECIAL CONSIDERATION1
28 y/o male with PMHx of ?gastritis (pt says he used to take omeprazole) presents with epigastric abdominal pain since yesterday.  As per patient, symptoms associated with vomiting x2.  Pt denies diarrhea.  Pt unknown if symptoms related to bad food exposure. No fever, no chest pain, no rash.  Discomfort described as burning pain.
None

## 2023-12-31 NOTE — H&P ADULT - PROBLEM SELECTOR PLAN 1
-Undatable left dorsum necrotic wound with no active drainage  -No sepsis criteria met on arrival/admission  -WBC 11.71, follow trend  -10/2023 HbA1c 6.7% under no medical management  -Left foot xray: moderate hallux valgus with some degeneration and mild first IP generation. No bone destruction noted or fracture evident  -F/up blood cultures  -S/P zosyn  -ID consult requested  -Podiatry consult requested  -F/up am labs: CBC, CMP, ESR, CRP  -Continue zosyn and renally dosed vancomycin for MRSA coverage given DMT2 status  -Obtain vanc through after 3rd dose -Undatable left dorsum necrotic wound with no active drainage  -No sepsis criteria met on arrival/admission  -WBC 11.71, follow trend  -10/2023 HbA1c 6.7% under no medical management  -Left foot xray: moderate hallux valgus with some degeneration and mild first IP generation. No bone destruction noted or fracture evident  -F/up blood cultures  -S/P zosyn  -ID consult requested, message of non-urgent consult left on answering service  -Podiatry consult requested, text send to Dr Randal Ross  -F/up am labs: CBC, CMP, ESR, CRP  -Continue zosyn and renally dosed vancomycin for MRSA coverage given DMT2 status  -Obtain vanc through after 3rd dose -Undatable left dorsum necrotic wound with no active drainage  -No sepsis criteria met on arrival/admission  -WBC 11.71, follow trend  -10/2023 HbA1c 6.7% under no medical management  -Left foot xray: moderate hallux valgus with some degeneration and mild first IP generation. No bone destruction noted or fracture evident  -F/up blood cultures  -S/P zosyn  -ID consult requested. No amion ID provider on call information, unable to leave message of non-urgent consult on answering service. Will f/up am 1.1.24  -Podiatry consult requested, text send to Dr Randal Ross  -F/up am labs: CBC, CMP, ESR, CRP  -Continue zosyn and renally dosed vancomycin for MRSA coverage given DMT2 status  -Obtain vanc through after 3rd dose -Undatable left dorsum necrotic wound with no active drainage  -No sepsis criteria met on arrival/admission  -WBC 11.71, follow trend  -10/2023 HbA1c 6.7% under no medical management  -Left foot xray: moderate hallux valgus with some degeneration and mild first IP generation. No bone destruction noted or fracture evident  -F/up blood cultures  -S/P zosyn  -ID consult requested. Answering service message placed  -Podiatry consult requested, text send to Dr Randal Ross  -F/up am labs: CBC, CMP, ESR, CRP  -Continue zosyn and renally dosed vancomycin for MRSA coverage given DMT2 status  -Obtain vanc through after 3rd dose  -MRI order for 1/1/24

## 2023-12-31 NOTE — ED ADULT TRIAGE NOTE - CHIEF COMPLAINT QUOTE
Pt BIB friend for left foot wound x unknown amount of time. Pt was seen at an urgent care this morning and sent to the ED for blood work and IV abx. Pt denies pain or discomfort. No fever/chills. Pt afebrile in triage, BP = 83/47.

## 2023-12-31 NOTE — H&P ADULT - ASSESSMENT
89 yo M with history of presents for left foot wound. Admitted for left foot cellulitis 89 yo M with history of presents for left foot wound. Admitted for left foot cellulitis 89yo M with history of  HTN, HLD, LBBB, paroxismal Atrial Fibrillation, thoracic Ao aneurysm and s/p mechanical AVR and diabetes mellitus type 2 presents for left foot wound. Admitted for medical management of cellulitis 89yo M with history of  HTN, HLD, LBBB, paroxismal Atrial Fibrillation, thoracic Ao aneurysm and s/p mechanical AVR and diabetes mellitus type 2 presents for left foot wound. Admitted for medical management of cellulitis

## 2023-12-31 NOTE — H&P ADULT - NSHPREVIEWOFSYSTEMS_GEN_ALL_CORE
REVIEW OF SYSTEMS:  CONSTITUTIONAL: (-) weakness, (-) fevers, (-) chills, (-) coughing (-)sneezing  EYES/ENT: (-) visual changes,  (-) vertigo,  (-) throat pain   NECK:  (-) pain, (-) stiffness  RESPIRATORY:  (-) shortness of breath, (-) cough,  (-) wheezing,  (-) hemoptysis   CARDIOVASCULAR:  (-) chest pain, (-) palpitations  GASTROINTESTINAL:  (-) abdominal or epigastric pain, (-) nausea, (-) vomiting, (-) diarrhea, (-) constipation, (-) melena,  (-) hematemesis,  (-) hematochezia  GENITOURINARY: (-) dysuria, (-) frequency, (-) hematuria  NEUROLOGICAL: (-) numbness, (-) weakness  SKIN: (-) itching, (-) rashes, (+) lesions

## 2023-12-31 NOTE — ED ADULT NURSE NOTE - NSFALLUNIVINTERV_ED_ALL_ED
Bed/Stretcher in lowest position, wheels locked, appropriate side rails in place/Call bell, personal items and telephone in reach/Instruct patient to call for assistance before getting out of bed/chair/stretcher/Non-slip footwear applied when patient is off stretcher/[place] to call system/Physically safe environment - no spills, clutter or unnecessary equipment/Purposeful proactive rounding/Room/bathroom lighting operational, light cord in reach Bed/Stretcher in lowest position, wheels locked, appropriate side rails in place/Call bell, personal items and telephone in reach/Instruct patient to call for assistance before getting out of bed/chair/stretcher/Non-slip footwear applied when patient is off stretcher/[place] to call system/Physically safe environment - no spills, clutter or unnecessary equipment/Purposeful proactive rounding/Room/bathroom lighting operational, light cord in reach

## 2023-12-31 NOTE — PATIENT PROFILE ADULT - FALL HARM RISK - HARM RISK INTERVENTIONS
Assistance with ambulation/Assistance OOB with selected safe patient handling equipment/Communicate Risk of Fall with Harm to all staff/Reinforce activity limits and safety measures with patient and family/Tailored Fall Risk Interventions/Visual Cue: Yellow wristband and red socks/Bed in lowest position, wheels locked, appropriate side rails in place/Call bell, personal items and telephone in reach/Instruct patient to call for assistance before getting out of bed or chair/Non-slip footwear when patient is out of bed/[place] to call system/Physically safe environment - no spills, clutter or unnecessary equipment/Purposeful Proactive Rounding/Room/bathroom lighting operational, light cord in reach Assistance with ambulation/Assistance OOB with selected safe patient handling equipment/Communicate Risk of Fall with Harm to all staff/Reinforce activity limits and safety measures with patient and family/Tailored Fall Risk Interventions/Visual Cue: Yellow wristband and red socks/Bed in lowest position, wheels locked, appropriate side rails in place/Call bell, personal items and telephone in reach/Instruct patient to call for assistance before getting out of bed or chair/Non-slip footwear when patient is out of bed/[place] to call system/Physically safe environment - no spills, clutter or unnecessary equipment/Purposeful Proactive Rounding/Room/bathroom lighting operational, light cord in reach

## 2023-12-31 NOTE — H&P ADULT - PROBLEM SELECTOR PLAN 5
-Supratherapeutic INR 6.59  -Oupatient trend reviewed, last POCT INR 2.3 (12.24.23)> 1.4> 5.1> 3.9> 1.6> 2.2>2.2> 1.5>1.3>3.0 (9.28.23)  -Hold warfarin dose  -Currently not actively bleeding  -Recheck INR am  -Remote telemetry in place  -If INR increases or active bleeding, tachycardia, anemia, hypotension, start vitamin K and PCC. -currently rate controlled  -Supratherapeutic INR 6.59  -Oupatient trend reviewed, last POCT INR 2.3 (12.24.23)> 1.4> 5.1> 3.9> 1.6> 2.2>2.2> 1.5>1.3>3.0 (9.28.23)  -Hold warfarin dose  -Currently not actively bleeding  -Recheck INR am  -Remote telemetry in place  -If INR increases or active bleeding, tachycardia, anemia, hypotension, start vitamin K and PCC.

## 2023-12-31 NOTE — H&P ADULT - ATTENDING COMMENTS
89yo M with history of  HTN, HLD, LBBB, paroxismal Atrial Fibrillation, thoracic Ao aneurysm and s/p mechanical AVR and diabetes mellitus type 2 presents for left foot wound. Admitted for medical management of cellulitis Plan: cont iv antibxs, f/u blood cultures, monitor clinical course,

## 2023-12-31 NOTE — H&P ADULT - PROBLEM SELECTOR PLAN 4
-HbA1c from outpatient chart review noted 6.7% under no medical management  -For age, at goal.  -Insulin orders in place as needed  -Continue carbohydrate diet if pass bedside speech and swallow test  -Follow aspiration precautions

## 2023-12-31 NOTE — ED ADULT NURSE NOTE - NSFALLRISKASMT_ED_ALL_ED_DT
COVID-19 Isolation discontinuation Pt Na came back as 121 Pt /43 HR 51. Pt asymtomatic. Pt family also requesting to speak with provider Pt requesting to take am meds with breakfast 31-Dec-2023 12:35

## 2023-12-31 NOTE — ED PROVIDER NOTE - PHYSICAL EXAMINATION
General:    unkempt, no apparent distress. hard of hearing. rectal temp afebrile.  Eyes: PERRL, white sclera  Head:     NC/AT, EOMI  Pharynx: pharynx wnl, oral mucosa very dry  Neck:     trachea midline  Lungs:     CTA b/l  CVS:     RRR  Abd:     +BS, s/nt/nd  Left lower extremity: Dorsum of foot with full erythema with 6x8 central scab or eschar. Distal cap refill is normal. Tender. No active drainage.   No calf tenderness or edema.

## 2023-12-31 NOTE — H&P ADULT - NSHPPHYSICALEXAM_GEN_ALL_CORE
PHYSICAL EXAM:    GENERAL: NAD, lying in bed comfortably  HEAD:  Atraumatic, Normocephalic  EYES: EOMI, PERRLA, conjunctiva and sclera clear  ENT: Moist mucous membranes  NECK: Supple, No JVD  CHEST/LUNG: Clear to auscultation bilaterally, good air entry bilaterally; No wheezing, rales, or rhonchi. Unlabored respirations  HEART: Regular rate and rhythm. S1 and S2. No murmurs, rubs, or gallops  ABDOMEN: Soft, Nontender, Nondistended. Bowel sounds present x4 quadrants; No hepatomegaly. No splenomegaly.  EXTREMITIES:  2+ Peripheral Pulses. Capillary refill <2 seconds. No clubbing, cyanosis, or edema  NERVOUS SYSTEM:  Alert & Oriented X3, speech clear. No deficits   MSK: FROM all 4 extremities, full and equal strength  SKIN: left foot dorsum erythema and tenderness, 6x8cm central scar with no active drainage PHYSICAL EXAM:    GENERAL: NAD, lying in bed comfortably  HEAD:  Atraumatic, Normocephalic  EYES: EOMI, PERRLA, conjunctiva and sclera clear  ENT: dry mucous membranes  NECK: Supple, No JVD  CHEST/LUNG: Clear to auscultation bilaterally, good air entry bilaterally; No wheezing, rales, or rhonchi. Unlabored respirations  HEART: Regular rate and rhythm. S1 and S2. No murmurs, rubs, or gallops  ABDOMEN: Soft, Nontender, Nondistended. Bowel sounds present x4 quadrants; No hepatomegaly. No splenomegaly.  EXTREMITIES:  2+ Peripheral Pulses. Capillary refill <2 seconds. No clubbing, cyanosis, or edema  NERVOUS SYSTEM:  Alert & Oriented X3, speech clear. No deficits   MSK: FROM all 4 extremities, full and equal strength  SKIN: left foot dorsum erythema and tenderness, 6x8cm central scar with no active drainage

## 2023-12-31 NOTE — H&P ADULT - PROBLEM SELECTOR PLAN 6
-Outpatient cardiologist Dr Cleveland Bergman  -ECG 12/5/22 sinus rhythm first degree block LBBB, 8/14/23 sinus LBBB, 1st degree block, 12/14/23 sinus LBBB  -ECHO: 2021 mechanical AVR normal LV  function  -S/P mechanical AVR and thoracic aneursym repair

## 2023-12-31 NOTE — ED PROVIDER NOTE - OBJECTIVE STATEMENT
89-year-old male presents to the emergency department reporting left foot wound.  Unclear when it started.  He states Monday took his sock off and noticed it.  His friend states he started 2 days ago and second to urgent care.  They sent him to the emergency department.  Patient reluctant to coming in.  No fever reported.  No antibiotics on board.  Positive pain and redness to the site.  Patient is extremely hard of hearing.  Patient is on Coumadin, unclear why, ramipril, simvastatin, nadolol.

## 2023-12-31 NOTE — ED PROVIDER NOTE - CLINICAL SUMMARY MEDICAL DECISION MAKING FREE TEXT BOX
89-year-old male presents to the emergency department reporting left foot wound.  Unclear when it started.  He states Monday took his sock off and noticed it.  His friend states he started 2 days ago and second to urgent care.  They sent him to the emergency department.  Patient reluctant to coming in.  No fever reported.  No antibiotics on board.  Positive pain and redness to the site.  Patient is extremely hard of hearing.  Patient is on Coumadin, unclear why, ramipril, simvastatin, nadolol.  Exam as stated. Plan for labs with cultures. IV abx. Xray. Admit.

## 2023-12-31 NOTE — PATIENT PROFILE ADULT - NSPROMEDSBROUGHTTOHOSP_GEN_A_NUR
Changed to ventolin
Proventil denied. Must use ventolin.
Proventil submitted to [employer] via cover my meds. Awaiting response.
no

## 2023-12-31 NOTE — ED ADULT NURSE NOTE - OBJECTIVE STATEMENT
Pt presents to the ER complaining of a left foot wound. Pt states that he noticed it when he took off his sock on Monday. Pt was seen at an urgent care this morning and sent to the ED for blood work and IV antibiotics. A&OX4. Pt denies SOB, chest pain, nausea, vomiting, pain, fever, chills, dizziness or headache.

## 2023-12-31 NOTE — H&P ADULT - HISTORY OF PRESENT ILLNESS
89yo M with history of.. 89yo M with history of  HTN, HLD, LBBB, paroxismal Atrial Fibrillation, thoracic Ao aneurysm and s/p mechanical AVR presented to urgent care for left foot pain and send to ED for evaluation. On arrival 89yo M with history of  HTN, HLD, LBBB, paroxismal Atrial Fibrillation, thoracic Ao aneurysm and s/p mechanical AVR presented to urgent care for left foot wound. Per roomate, and further confirmed with only son Mr Bogdan Jefferson, earlier today patient complained of left foot pain, roommate took sock of and realized of left dorsum black scar, unable to date when lesion started. Patient taken to urgent care and referred for ED evaluation. Patient evaluated at bedside denies bleeding episodes, headaches, chest pain, abdominal pain. Son contacted and confirmed what was stated above, unable to provide more information for present illness.  Per outpatient chart review, patient recently seen and followed by cardiology for cardiovascular comorbidities, noticed stable INR for the most part. On recent blood work from 10/2023 noticed HbA1c 6.7% but no primary care provider following chronic care.

## 2024-01-01 ENCOUNTER — TRANSCRIPTION ENCOUNTER (OUTPATIENT)
Age: 89
End: 2024-01-01

## 2024-01-01 ENCOUNTER — INPATIENT (INPATIENT)
Facility: HOSPITAL | Age: 89
LOS: 7 days | Discharge: HOME | DRG: 602 | End: 2024-01-13
Attending: INTERNAL MEDICINE | Admitting: INTERNAL MEDICINE
Payer: MEDICARE

## 2024-01-01 ENCOUNTER — APPOINTMENT (OUTPATIENT)
Dept: UROLOGY | Facility: CLINIC | Age: 89
End: 2024-01-01
Payer: MEDICARE

## 2024-01-01 ENCOUNTER — EMERGENCY (EMERGENCY)
Facility: HOSPITAL | Age: 89
LOS: 1 days | Discharge: HOME | End: 2024-01-01
Attending: EMERGENCY MEDICINE | Admitting: EMERGENCY MEDICINE
Payer: MEDICARE

## 2024-01-01 ENCOUNTER — INPATIENT (INPATIENT)
Facility: HOSPITAL | Age: 89
LOS: 0 days | DRG: 871 | End: 2024-03-26
Attending: HOSPITALIST | Admitting: HOSPITALIST
Payer: MEDICARE

## 2024-01-01 VITALS
RESPIRATION RATE: 19 BRPM | HEART RATE: 89 BPM | SYSTOLIC BLOOD PRESSURE: 111 MMHG | OXYGEN SATURATION: 92 % | WEIGHT: 134.92 LBS | TEMPERATURE: 99 F | DIASTOLIC BLOOD PRESSURE: 72 MMHG

## 2024-01-01 VITALS
WEIGHT: 130.07 LBS | RESPIRATION RATE: 18 BRPM | HEART RATE: 109 BPM | OXYGEN SATURATION: 96 % | TEMPERATURE: 98 F | DIASTOLIC BLOOD PRESSURE: 63 MMHG | SYSTOLIC BLOOD PRESSURE: 114 MMHG | HEIGHT: 67 IN

## 2024-01-01 VITALS
TEMPERATURE: 97.8 F | HEART RATE: 75 BPM | SYSTOLIC BLOOD PRESSURE: 110 MMHG | DIASTOLIC BLOOD PRESSURE: 68 MMHG | WEIGHT: 127 LBS | HEIGHT: 67 IN | OXYGEN SATURATION: 96 % | BODY MASS INDEX: 19.93 KG/M2

## 2024-01-01 VITALS — WEIGHT: 160.06 LBS

## 2024-01-01 VITALS
DIASTOLIC BLOOD PRESSURE: 65 MMHG | BODY MASS INDEX: 19.93 KG/M2 | OXYGEN SATURATION: 96 % | HEART RATE: 79 BPM | TEMPERATURE: 97.5 F | WEIGHT: 127 LBS | SYSTOLIC BLOOD PRESSURE: 115 MMHG | HEIGHT: 67 IN

## 2024-01-01 VITALS — DIASTOLIC BLOOD PRESSURE: 58 MMHG | SYSTOLIC BLOOD PRESSURE: 104 MMHG

## 2024-01-01 VITALS
TEMPERATURE: 98 F | DIASTOLIC BLOOD PRESSURE: 72 MMHG | RESPIRATION RATE: 16 BRPM | SYSTOLIC BLOOD PRESSURE: 126 MMHG | HEART RATE: 95 BPM | OXYGEN SATURATION: 96 %

## 2024-01-01 VITALS
TEMPERATURE: 99 F | HEART RATE: 78 BPM | RESPIRATION RATE: 18 BRPM | SYSTOLIC BLOOD PRESSURE: 113 MMHG | DIASTOLIC BLOOD PRESSURE: 87 MMHG | OXYGEN SATURATION: 95 %

## 2024-01-01 VITALS
SYSTOLIC BLOOD PRESSURE: 80 MMHG | OXYGEN SATURATION: 85 % | HEIGHT: 67 IN | WEIGHT: 160.06 LBS | RESPIRATION RATE: 18 BRPM | DIASTOLIC BLOOD PRESSURE: 48 MMHG | TEMPERATURE: 97 F | HEART RATE: 95 BPM

## 2024-01-01 DIAGNOSIS — N17.9 ACUTE KIDNEY FAILURE, UNSPECIFIED: ICD-10-CM

## 2024-01-01 DIAGNOSIS — E11.621 TYPE 2 DIABETES MELLITUS WITH FOOT ULCER: ICD-10-CM

## 2024-01-01 DIAGNOSIS — I10 ESSENTIAL (PRIMARY) HYPERTENSION: ICD-10-CM

## 2024-01-01 DIAGNOSIS — N40.0 BENIGN PROSTATIC HYPERPLASIA WITHOUT LOWER URINARY TRACT SYMPTOMS: ICD-10-CM

## 2024-01-01 DIAGNOSIS — Z29.9 ENCOUNTER FOR PROPHYLACTIC MEASURES, UNSPECIFIED: ICD-10-CM

## 2024-01-01 DIAGNOSIS — Z98.890 OTHER SPECIFIED POSTPROCEDURAL STATES: Chronic | ICD-10-CM

## 2024-01-01 DIAGNOSIS — K59.00 CONSTIPATION, UNSPECIFIED: ICD-10-CM

## 2024-01-01 DIAGNOSIS — R33.9 RETENTION OF URINE, UNSPECIFIED: ICD-10-CM

## 2024-01-01 DIAGNOSIS — K52.9 NONINFECTIVE GASTROENTERITIS AND COLITIS, UNSPECIFIED: ICD-10-CM

## 2024-01-01 DIAGNOSIS — L03.90 CELLULITIS, UNSPECIFIED: ICD-10-CM

## 2024-01-01 DIAGNOSIS — E78.5 HYPERLIPIDEMIA, UNSPECIFIED: ICD-10-CM

## 2024-01-01 DIAGNOSIS — I48.91 UNSPECIFIED ATRIAL FIBRILLATION: ICD-10-CM

## 2024-01-01 DIAGNOSIS — E11.9 TYPE 2 DIABETES MELLITUS WITHOUT COMPLICATIONS: ICD-10-CM

## 2024-01-01 DIAGNOSIS — S91.302A UNSPECIFIED OPEN WOUND, LEFT FOOT, INITIAL ENCOUNTER: ICD-10-CM

## 2024-01-01 DIAGNOSIS — A41.9 SEPSIS, UNSPECIFIED ORGANISM: ICD-10-CM

## 2024-01-01 DIAGNOSIS — N30.00 ACUTE CYSTITIS W/OUT HEMATURIA: ICD-10-CM

## 2024-01-01 DIAGNOSIS — L03.116 CELLULITIS OF LEFT LOWER LIMB: ICD-10-CM

## 2024-01-01 DIAGNOSIS — R53.83 OTHER FATIGUE: ICD-10-CM

## 2024-01-01 LAB
A1C WITH ESTIMATED AVERAGE GLUCOSE RESULT: 6.3 % — HIGH (ref 4–5.6)
A1C WITH ESTIMATED AVERAGE GLUCOSE RESULT: 7.1 % — HIGH (ref 4–5.6)
A1C WITH ESTIMATED AVERAGE GLUCOSE RESULT: 7.1 % — HIGH (ref 4–5.6)
ALBUMIN SERPL ELPH-MCNC: 1.6 G/DL — LOW (ref 3.3–5)
ALBUMIN SERPL ELPH-MCNC: 1.8 G/DL — LOW (ref 3.3–5)
ALBUMIN SERPL ELPH-MCNC: 1.9 G/DL — LOW (ref 3.3–5)
ALBUMIN SERPL ELPH-MCNC: 1.9 G/DL — LOW (ref 3.3–5)
ALBUMIN SERPL ELPH-MCNC: 2 G/DL — LOW (ref 3.3–5)
ALBUMIN SERPL ELPH-MCNC: 2.1 G/DL — LOW (ref 3.3–5)
ALBUMIN SERPL ELPH-MCNC: 2.2 G/DL — LOW (ref 3.3–5)
ALBUMIN SERPL ELPH-MCNC: 2.2 G/DL — LOW (ref 3.3–5)
ALBUMIN SERPL ELPH-MCNC: 2.3 G/DL — LOW (ref 3.3–5)
ALBUMIN SERPL ELPH-MCNC: 2.3 G/DL — LOW (ref 3.3–5)
ALBUMIN SERPL ELPH-MCNC: 2.4 G/DL — LOW (ref 3.3–5)
ALBUMIN SERPL ELPH-MCNC: 2.4 G/DL — LOW (ref 3.3–5)
ALBUMIN SERPL ELPH-MCNC: 2.5 G/DL — LOW (ref 3.3–5)
ALBUMIN SERPL ELPH-MCNC: 2.5 G/DL — LOW (ref 3.3–5)
ALBUMIN SERPL ELPH-MCNC: 2.6 G/DL — LOW (ref 3.3–5)
ALBUMIN SERPL ELPH-MCNC: 2.6 G/DL — LOW (ref 3.3–5)
ALBUMIN SERPL ELPH-MCNC: 2.8 G/DL — LOW (ref 3.3–5)
ALBUMIN SERPL ELPH-MCNC: 2.8 G/DL — LOW (ref 3.3–5)
ALP SERPL-CCNC: 52 U/L — SIGNIFICANT CHANGE UP (ref 40–120)
ALP SERPL-CCNC: 52 U/L — SIGNIFICANT CHANGE UP (ref 40–120)
ALP SERPL-CCNC: 54 U/L — SIGNIFICANT CHANGE UP (ref 40–120)
ALP SERPL-CCNC: 59 U/L — SIGNIFICANT CHANGE UP (ref 40–120)
ALP SERPL-CCNC: 59 U/L — SIGNIFICANT CHANGE UP (ref 40–120)
ALP SERPL-CCNC: 66 U/L — SIGNIFICANT CHANGE UP (ref 40–120)
ALP SERPL-CCNC: 66 U/L — SIGNIFICANT CHANGE UP (ref 40–120)
ALP SERPL-CCNC: 68 U/L — SIGNIFICANT CHANGE UP (ref 40–120)
ALP SERPL-CCNC: 69 U/L — SIGNIFICANT CHANGE UP (ref 40–120)
ALP SERPL-CCNC: 69 U/L — SIGNIFICANT CHANGE UP (ref 40–120)
ALP SERPL-CCNC: 73 U/L — SIGNIFICANT CHANGE UP (ref 40–120)
ALP SERPL-CCNC: 73 U/L — SIGNIFICANT CHANGE UP (ref 40–120)
ALP SERPL-CCNC: 80 U/L — SIGNIFICANT CHANGE UP (ref 40–120)
ALP SERPL-CCNC: 80 U/L — SIGNIFICANT CHANGE UP (ref 40–120)
ALP SERPL-CCNC: 81 U/L — SIGNIFICANT CHANGE UP (ref 40–120)
ALP SERPL-CCNC: 86 U/L — SIGNIFICANT CHANGE UP (ref 40–120)
ALP SERPL-CCNC: 90 U/L — SIGNIFICANT CHANGE UP (ref 40–120)
ALP SERPL-CCNC: 90 U/L — SIGNIFICANT CHANGE UP (ref 40–120)
ALT FLD-CCNC: 18 U/L — SIGNIFICANT CHANGE UP (ref 10–45)
ALT FLD-CCNC: 18 U/L — SIGNIFICANT CHANGE UP (ref 10–45)
ALT FLD-CCNC: 19 U/L — SIGNIFICANT CHANGE UP (ref 10–45)
ALT FLD-CCNC: 19 U/L — SIGNIFICANT CHANGE UP (ref 10–45)
ALT FLD-CCNC: 19 U/L — SIGNIFICANT CHANGE UP (ref 12–78)
ALT FLD-CCNC: 20 U/L — SIGNIFICANT CHANGE UP (ref 10–45)
ALT FLD-CCNC: 20 U/L — SIGNIFICANT CHANGE UP (ref 10–45)
ALT FLD-CCNC: 21 U/L — SIGNIFICANT CHANGE UP (ref 12–78)
ALT FLD-CCNC: 21 U/L — SIGNIFICANT CHANGE UP (ref 12–78)
ALT FLD-CCNC: 23 U/L — SIGNIFICANT CHANGE UP (ref 12–78)
ALT FLD-CCNC: 23 U/L — SIGNIFICANT CHANGE UP (ref 12–78)
ALT FLD-CCNC: 24 U/L — SIGNIFICANT CHANGE UP (ref 12–78)
ALT FLD-CCNC: 24 U/L — SIGNIFICANT CHANGE UP (ref 12–78)
ALT FLD-CCNC: 25 U/L — SIGNIFICANT CHANGE UP (ref 10–45)
ALT FLD-CCNC: 26 U/L — SIGNIFICANT CHANGE UP (ref 10–45)
ALT FLD-CCNC: 26 U/L — SIGNIFICANT CHANGE UP (ref 12–78)
ALT FLD-CCNC: 26 U/L — SIGNIFICANT CHANGE UP (ref 12–78)
ALT FLD-CCNC: 29 U/L — SIGNIFICANT CHANGE UP (ref 12–78)
ALT FLD-CCNC: 29 U/L — SIGNIFICANT CHANGE UP (ref 12–78)
ANION GAP SERPL CALC-SCNC: 10 MMOL/L — SIGNIFICANT CHANGE UP (ref 5–17)
ANION GAP SERPL CALC-SCNC: 10 MMOL/L — SIGNIFICANT CHANGE UP (ref 5–17)
ANION GAP SERPL CALC-SCNC: 11 MMOL/L — SIGNIFICANT CHANGE UP (ref 5–17)
ANION GAP SERPL CALC-SCNC: 12 MMOL/L — SIGNIFICANT CHANGE UP (ref 5–17)
ANION GAP SERPL CALC-SCNC: 2 MMOL/L — LOW (ref 5–17)
ANION GAP SERPL CALC-SCNC: 2 MMOL/L — LOW (ref 5–17)
ANION GAP SERPL CALC-SCNC: 3 MMOL/L — LOW (ref 5–17)
ANION GAP SERPL CALC-SCNC: 3 MMOL/L — LOW (ref 5–17)
ANION GAP SERPL CALC-SCNC: 4 MMOL/L — LOW (ref 5–17)
ANION GAP SERPL CALC-SCNC: 5 MMOL/L — SIGNIFICANT CHANGE UP (ref 5–17)
ANION GAP SERPL CALC-SCNC: 6 MMOL/L — SIGNIFICANT CHANGE UP (ref 5–17)
ANION GAP SERPL CALC-SCNC: 7 MMOL/L — SIGNIFICANT CHANGE UP (ref 5–17)
ANION GAP SERPL CALC-SCNC: 7 MMOL/L — SIGNIFICANT CHANGE UP (ref 5–17)
ANION GAP SERPL CALC-SCNC: 8 MMOL/L — SIGNIFICANT CHANGE UP (ref 5–17)
APPEARANCE UR: ABNORMAL
APTT BLD: 36 SEC — HIGH (ref 24.5–35.6)
APTT BLD: 36 SEC — HIGH (ref 24.5–35.6)
APTT BLD: 37.2 SEC — HIGH (ref 24.5–35.6)
APTT BLD: 37.2 SEC — HIGH (ref 24.5–35.6)
APTT BLD: 37.3 SEC — HIGH (ref 24.5–35.6)
APTT BLD: 37.3 SEC — HIGH (ref 24.5–35.6)
APTT BLD: 37.4 SEC — HIGH (ref 24.5–35.6)
APTT BLD: 37.4 SEC — HIGH (ref 24.5–35.6)
APTT BLD: 39.1 SEC — HIGH (ref 24.5–35.6)
APTT BLD: 39.1 SEC — HIGH (ref 24.5–35.6)
APTT BLD: 42.7 SEC — HIGH (ref 24.5–35.6)
APTT BLD: 42.7 SEC — HIGH (ref 24.5–35.6)
APTT BLD: 52.1 SEC — HIGH (ref 24.5–35.6)
APTT BLD: 54.1 SEC — HIGH (ref 24.5–35.6)
APTT BLD: 54.1 SEC — HIGH (ref 24.5–35.6)
APTT BLD: 55.5 SEC — HIGH (ref 24.5–35.6)
APTT BLD: 55.5 SEC — HIGH (ref 24.5–35.6)
APTT BLD: 68.8 SEC — HIGH (ref 24.5–35.6)
APTT BLD: 68.8 SEC — HIGH (ref 24.5–35.6)
APTT BLD: 70.7 SEC — HIGH (ref 24.5–35.6)
APTT BLD: 70.7 SEC — HIGH (ref 24.5–35.6)
APTT BLD: 74.1 SEC — HIGH (ref 24.5–35.6)
APTT BLD: 74.1 SEC — HIGH (ref 24.5–35.6)
APTT BLD: 74.6 SEC — HIGH (ref 24.5–35.6)
APTT BLD: 74.6 SEC — HIGH (ref 24.5–35.6)
APTT BLD: 75 SEC — HIGH (ref 24.5–35.6)
APTT BLD: 75 SEC — HIGH (ref 24.5–35.6)
APTT BLD: 80.1 SEC — HIGH (ref 24.5–35.6)
APTT BLD: 80.1 SEC — HIGH (ref 24.5–35.6)
APTT BLD: >200 SEC — SIGNIFICANT CHANGE UP (ref 24.5–35.6)
APTT BLD: >200 SEC — SIGNIFICANT CHANGE UP (ref 24.5–35.6)
AST SERPL-CCNC: 18 U/L — SIGNIFICANT CHANGE UP (ref 15–37)
AST SERPL-CCNC: 18 U/L — SIGNIFICANT CHANGE UP (ref 15–37)
AST SERPL-CCNC: 20 U/L — SIGNIFICANT CHANGE UP (ref 10–40)
AST SERPL-CCNC: 20 U/L — SIGNIFICANT CHANGE UP (ref 10–40)
AST SERPL-CCNC: 21 U/L — SIGNIFICANT CHANGE UP (ref 15–37)
AST SERPL-CCNC: 22 U/L — SIGNIFICANT CHANGE UP (ref 15–37)
AST SERPL-CCNC: 22 U/L — SIGNIFICANT CHANGE UP (ref 15–37)
AST SERPL-CCNC: 24 U/L — SIGNIFICANT CHANGE UP (ref 10–40)
AST SERPL-CCNC: 24 U/L — SIGNIFICANT CHANGE UP (ref 10–40)
AST SERPL-CCNC: 24 U/L — SIGNIFICANT CHANGE UP (ref 15–37)
AST SERPL-CCNC: 24 U/L — SIGNIFICANT CHANGE UP (ref 15–37)
AST SERPL-CCNC: 25 U/L — SIGNIFICANT CHANGE UP (ref 10–40)
AST SERPL-CCNC: 27 U/L — SIGNIFICANT CHANGE UP (ref 15–37)
AST SERPL-CCNC: 27 U/L — SIGNIFICANT CHANGE UP (ref 15–37)
AST SERPL-CCNC: 28 U/L — SIGNIFICANT CHANGE UP (ref 10–40)
AST SERPL-CCNC: 28 U/L — SIGNIFICANT CHANGE UP (ref 15–37)
AST SERPL-CCNC: 28 U/L — SIGNIFICANT CHANGE UP (ref 15–37)
AST SERPL-CCNC: 29 U/L — SIGNIFICANT CHANGE UP (ref 10–40)
BACTERIA # UR AUTO: ABNORMAL /HPF
BASOPHILS # BLD AUTO: 0 K/UL — SIGNIFICANT CHANGE UP (ref 0–0.2)
BASOPHILS # BLD AUTO: 0 K/UL — SIGNIFICANT CHANGE UP (ref 0–0.2)
BASOPHILS # BLD AUTO: 0.02 K/UL — SIGNIFICANT CHANGE UP (ref 0–0.2)
BASOPHILS # BLD AUTO: 0.02 K/UL — SIGNIFICANT CHANGE UP (ref 0–0.2)
BASOPHILS # BLD AUTO: 0.04 K/UL — SIGNIFICANT CHANGE UP (ref 0–0.2)
BASOPHILS # BLD AUTO: 0.05 K/UL — SIGNIFICANT CHANGE UP (ref 0–0.2)
BASOPHILS # BLD AUTO: 0.06 K/UL — SIGNIFICANT CHANGE UP (ref 0–0.2)
BASOPHILS NFR BLD AUTO: 0 % — SIGNIFICANT CHANGE UP (ref 0–2)
BASOPHILS NFR BLD AUTO: 0 % — SIGNIFICANT CHANGE UP (ref 0–2)
BASOPHILS NFR BLD AUTO: 0.1 % — SIGNIFICANT CHANGE UP (ref 0–2)
BASOPHILS NFR BLD AUTO: 0.2 % — SIGNIFICANT CHANGE UP (ref 0–2)
BASOPHILS NFR BLD AUTO: 0.3 % — SIGNIFICANT CHANGE UP (ref 0–2)
BASOPHILS NFR BLD AUTO: 0.4 % — SIGNIFICANT CHANGE UP (ref 0–2)
BILIRUB SERPL-MCNC: 0.4 MG/DL — SIGNIFICANT CHANGE UP (ref 0.2–1.2)
BILIRUB SERPL-MCNC: 0.5 MG/DL — SIGNIFICANT CHANGE UP (ref 0.2–1.2)
BILIRUB SERPL-MCNC: 0.6 MG/DL — SIGNIFICANT CHANGE UP (ref 0.2–1.2)
BILIRUB SERPL-MCNC: 0.7 MG/DL — SIGNIFICANT CHANGE UP (ref 0.2–1.2)
BILIRUB SERPL-MCNC: 0.8 MG/DL — SIGNIFICANT CHANGE UP (ref 0.2–1.2)
BILIRUB UR-MCNC: NEGATIVE — SIGNIFICANT CHANGE UP
BUN SERPL-MCNC: 30 MG/DL — HIGH (ref 7–23)
BUN SERPL-MCNC: 30 MG/DL — HIGH (ref 7–23)
BUN SERPL-MCNC: 31 MG/DL — HIGH (ref 7–23)
BUN SERPL-MCNC: 31 MG/DL — HIGH (ref 7–23)
BUN SERPL-MCNC: 32 MG/DL — HIGH (ref 7–23)
BUN SERPL-MCNC: 32 MG/DL — HIGH (ref 7–23)
BUN SERPL-MCNC: 34 MG/DL — HIGH (ref 7–23)
BUN SERPL-MCNC: 34 MG/DL — HIGH (ref 7–23)
BUN SERPL-MCNC: 37 MG/DL — HIGH (ref 7–23)
BUN SERPL-MCNC: 39 MG/DL — HIGH (ref 7–23)
BUN SERPL-MCNC: 42 MG/DL — HIGH (ref 7–23)
BUN SERPL-MCNC: 44 MG/DL — HIGH (ref 7–23)
BUN SERPL-MCNC: 44 MG/DL — HIGH (ref 7–23)
BUN SERPL-MCNC: 51 MG/DL — HIGH (ref 7–23)
BUN SERPL-MCNC: 56 MG/DL — HIGH (ref 7–23)
BUN SERPL-MCNC: 67 MG/DL — HIGH (ref 7–23)
BUN SERPL-MCNC: 67 MG/DL — HIGH (ref 7–23)
CALCIUM SERPL-MCNC: 7.8 MG/DL — LOW (ref 8.5–10.1)
CALCIUM SERPL-MCNC: 7.8 MG/DL — LOW (ref 8.5–10.1)
CALCIUM SERPL-MCNC: 8.1 MG/DL — LOW (ref 8.4–10.5)
CALCIUM SERPL-MCNC: 8.1 MG/DL — LOW (ref 8.5–10.1)
CALCIUM SERPL-MCNC: 8.1 MG/DL — LOW (ref 8.5–10.1)
CALCIUM SERPL-MCNC: 8.4 MG/DL — LOW (ref 8.5–10.1)
CALCIUM SERPL-MCNC: 8.5 MG/DL — SIGNIFICANT CHANGE UP (ref 8.4–10.5)
CALCIUM SERPL-MCNC: 8.7 MG/DL — SIGNIFICANT CHANGE UP (ref 8.5–10.1)
CALCIUM SERPL-MCNC: 9.1 MG/DL — SIGNIFICANT CHANGE UP (ref 8.4–10.5)
CALCIUM SERPL-MCNC: 9.2 MG/DL — SIGNIFICANT CHANGE UP (ref 8.4–10.5)
CALCIUM SERPL-MCNC: 9.2 MG/DL — SIGNIFICANT CHANGE UP (ref 8.5–10.1)
CALCIUM SERPL-MCNC: 9.2 MG/DL — SIGNIFICANT CHANGE UP (ref 8.5–10.1)
CHLORIDE SERPL-SCNC: 101 MMOL/L — SIGNIFICANT CHANGE UP (ref 96–108)
CHLORIDE SERPL-SCNC: 101 MMOL/L — SIGNIFICANT CHANGE UP (ref 96–108)
CHLORIDE SERPL-SCNC: 103 MMOL/L — SIGNIFICANT CHANGE UP (ref 96–108)
CHLORIDE SERPL-SCNC: 104 MMOL/L — SIGNIFICANT CHANGE UP (ref 96–108)
CHLORIDE SERPL-SCNC: 105 MMOL/L — SIGNIFICANT CHANGE UP (ref 96–108)
CHLORIDE SERPL-SCNC: 105 MMOL/L — SIGNIFICANT CHANGE UP (ref 96–108)
CHLORIDE SERPL-SCNC: 106 MMOL/L — SIGNIFICANT CHANGE UP (ref 96–108)
CHLORIDE SERPL-SCNC: 107 MMOL/L — SIGNIFICANT CHANGE UP (ref 96–108)
CHLORIDE SERPL-SCNC: 107 MMOL/L — SIGNIFICANT CHANGE UP (ref 96–108)
CHLORIDE SERPL-SCNC: 112 MMOL/L — HIGH (ref 96–108)
CHLORIDE SERPL-SCNC: 112 MMOL/L — HIGH (ref 96–108)
CHLORIDE SERPL-SCNC: 99 MMOL/L — SIGNIFICANT CHANGE UP (ref 96–108)
CHOLEST SERPL-MCNC: 129 MG/DL — SIGNIFICANT CHANGE UP
CHOLEST SERPL-MCNC: 129 MG/DL — SIGNIFICANT CHANGE UP
CO2 SERPL-SCNC: 19 MMOL/L — LOW (ref 22–31)
CO2 SERPL-SCNC: 21 MMOL/L — LOW (ref 22–31)
CO2 SERPL-SCNC: 21 MMOL/L — LOW (ref 22–31)
CO2 SERPL-SCNC: 23 MMOL/L — SIGNIFICANT CHANGE UP (ref 22–31)
CO2 SERPL-SCNC: 24 MMOL/L — SIGNIFICANT CHANGE UP (ref 22–31)
CO2 SERPL-SCNC: 25 MMOL/L — SIGNIFICANT CHANGE UP (ref 22–31)
CO2 SERPL-SCNC: 26 MMOL/L — SIGNIFICANT CHANGE UP (ref 22–31)
CO2 SERPL-SCNC: 27 MMOL/L — SIGNIFICANT CHANGE UP (ref 22–31)
CO2 SERPL-SCNC: 28 MMOL/L — SIGNIFICANT CHANGE UP (ref 22–31)
CO2 SERPL-SCNC: 28 MMOL/L — SIGNIFICANT CHANGE UP (ref 22–31)
CO2 SERPL-SCNC: 29 MMOL/L — SIGNIFICANT CHANGE UP (ref 22–31)
CO2 SERPL-SCNC: 29 MMOL/L — SIGNIFICANT CHANGE UP (ref 22–31)
COLOR SPEC: ABNORMAL
COLOR SPEC: ABNORMAL
COLOR SPEC: SIGNIFICANT CHANGE UP
CREAT SERPL-MCNC: 1.34 MG/DL — HIGH (ref 0.5–1.3)
CREAT SERPL-MCNC: 1.34 MG/DL — HIGH (ref 0.5–1.3)
CREAT SERPL-MCNC: 1.4 MG/DL — HIGH (ref 0.5–1.3)
CREAT SERPL-MCNC: 1.4 MG/DL — HIGH (ref 0.5–1.3)
CREAT SERPL-MCNC: 1.5 MG/DL — HIGH (ref 0.5–1.3)
CREAT SERPL-MCNC: 1.57 MG/DL — HIGH (ref 0.5–1.3)
CREAT SERPL-MCNC: 1.57 MG/DL — HIGH (ref 0.5–1.3)
CREAT SERPL-MCNC: 1.6 MG/DL — HIGH (ref 0.5–1.3)
CREAT SERPL-MCNC: 1.7 MG/DL — HIGH (ref 0.5–1.3)
CREAT SERPL-MCNC: 1.76 MG/DL — HIGH (ref 0.5–1.3)
CREAT SERPL-MCNC: 1.8 MG/DL — HIGH (ref 0.5–1.3)
CREAT SERPL-MCNC: 1.8 MG/DL — HIGH (ref 0.5–1.3)
CREAT SERPL-MCNC: 1.86 MG/DL — HIGH (ref 0.5–1.3)
CREAT SERPL-MCNC: 1.96 MG/DL — HIGH (ref 0.5–1.3)
CREAT SERPL-MCNC: 1.96 MG/DL — HIGH (ref 0.5–1.3)
CREAT SERPL-MCNC: 2.22 MG/DL — HIGH (ref 0.5–1.3)
CREAT SERPL-MCNC: 2.22 MG/DL — HIGH (ref 0.5–1.3)
CREAT SERPL-MCNC: 2.4 MG/DL — HIGH (ref 0.5–1.3)
CREAT SERPL-MCNC: 2.4 MG/DL — HIGH (ref 0.5–1.3)
CRP SERPL-MCNC: 53 MG/L — HIGH
CRP SERPL-MCNC: 53 MG/L — HIGH
CRP SERPL-MCNC: 57 MG/L — HIGH
CRP SERPL-MCNC: 57 MG/L — HIGH
CULTURE RESULTS: NO GROWTH — SIGNIFICANT CHANGE UP
CULTURE RESULTS: NO GROWTH — SIGNIFICANT CHANGE UP
CULTURE RESULTS: SIGNIFICANT CHANGE UP
DIFF PNL FLD: ABNORMAL
E FAECALIS DNA BLD POS QL NAA+NON-PROBE: SIGNIFICANT CHANGE UP
EGFR: 25 ML/MIN/1.73M2 — LOW
EGFR: 25 ML/MIN/1.73M2 — LOW
EGFR: 27 ML/MIN/1.73M2 — LOW
EGFR: 27 ML/MIN/1.73M2 — LOW
EGFR: 32 ML/MIN/1.73M2 — LOW
EGFR: 32 ML/MIN/1.73M2 — LOW
EGFR: 34 ML/MIN/1.73M2 — LOW
EGFR: 35 ML/MIN/1.73M2 — LOW
EGFR: 35 ML/MIN/1.73M2 — LOW
EGFR: 36 ML/MIN/1.73M2 — LOW
EGFR: 38 ML/MIN/1.73M2 — LOW
EGFR: 40 ML/MIN/1.73M2 — LOW
EGFR: 41 ML/MIN/1.73M2 — LOW
EGFR: 41 ML/MIN/1.73M2 — LOW
EGFR: 44 ML/MIN/1.73M2 — LOW
EGFR: 47 ML/MIN/1.73M2 — LOW
EGFR: 47 ML/MIN/1.73M2 — LOW
EGFR: 50 ML/MIN/1.73M2 — LOW
EGFR: 50 ML/MIN/1.73M2 — LOW
EOSINOPHIL # BLD AUTO: 0.01 K/UL — SIGNIFICANT CHANGE UP (ref 0–0.5)
EOSINOPHIL # BLD AUTO: 0.04 K/UL — SIGNIFICANT CHANGE UP (ref 0–0.5)
EOSINOPHIL # BLD AUTO: 0.1 K/UL — SIGNIFICANT CHANGE UP (ref 0–0.5)
EOSINOPHIL # BLD AUTO: 0.1 K/UL — SIGNIFICANT CHANGE UP (ref 0–0.5)
EOSINOPHIL # BLD AUTO: 0.15 K/UL — SIGNIFICANT CHANGE UP (ref 0–0.5)
EOSINOPHIL # BLD AUTO: 0.15 K/UL — SIGNIFICANT CHANGE UP (ref 0–0.5)
EOSINOPHIL # BLD AUTO: 0.18 K/UL — SIGNIFICANT CHANGE UP (ref 0–0.5)
EOSINOPHIL # BLD AUTO: 0.18 K/UL — SIGNIFICANT CHANGE UP (ref 0–0.5)
EOSINOPHIL # BLD AUTO: 0.19 K/UL — SIGNIFICANT CHANGE UP (ref 0–0.5)
EOSINOPHIL # BLD AUTO: 0.19 K/UL — SIGNIFICANT CHANGE UP (ref 0–0.5)
EOSINOPHIL # BLD AUTO: 0.2 K/UL — SIGNIFICANT CHANGE UP (ref 0–0.5)
EOSINOPHIL # BLD AUTO: 0.2 K/UL — SIGNIFICANT CHANGE UP (ref 0–0.5)
EOSINOPHIL # BLD AUTO: 0.24 K/UL — SIGNIFICANT CHANGE UP (ref 0–0.5)
EOSINOPHIL # BLD AUTO: 0.24 K/UL — SIGNIFICANT CHANGE UP (ref 0–0.5)
EOSINOPHIL # BLD AUTO: 0.25 K/UL — SIGNIFICANT CHANGE UP (ref 0–0.5)
EOSINOPHIL # BLD AUTO: 0.25 K/UL — SIGNIFICANT CHANGE UP (ref 0–0.5)
EOSINOPHIL # BLD AUTO: 0.27 K/UL — SIGNIFICANT CHANGE UP (ref 0–0.5)
EOSINOPHIL # BLD AUTO: 0.27 K/UL — SIGNIFICANT CHANGE UP (ref 0–0.5)
EOSINOPHIL # BLD AUTO: 0.3 K/UL — SIGNIFICANT CHANGE UP (ref 0–0.5)
EOSINOPHIL # BLD AUTO: 0.3 K/UL — SIGNIFICANT CHANGE UP (ref 0–0.5)
EOSINOPHIL # BLD AUTO: 0.4 K/UL — SIGNIFICANT CHANGE UP (ref 0–0.5)
EOSINOPHIL # BLD AUTO: 0.4 K/UL — SIGNIFICANT CHANGE UP (ref 0–0.5)
EOSINOPHIL # BLD AUTO: 0.42 K/UL — SIGNIFICANT CHANGE UP (ref 0–0.5)
EOSINOPHIL # BLD AUTO: 0.42 K/UL — SIGNIFICANT CHANGE UP (ref 0–0.5)
EOSINOPHIL NFR BLD AUTO: 0 % — SIGNIFICANT CHANGE UP (ref 0–6)
EOSINOPHIL NFR BLD AUTO: 0.3 % — SIGNIFICANT CHANGE UP (ref 0–6)
EOSINOPHIL NFR BLD AUTO: 0.4 % — SIGNIFICANT CHANGE UP (ref 0–6)
EOSINOPHIL NFR BLD AUTO: 0.4 % — SIGNIFICANT CHANGE UP (ref 0–6)
EOSINOPHIL NFR BLD AUTO: 1.1 % — SIGNIFICANT CHANGE UP (ref 0–6)
EOSINOPHIL NFR BLD AUTO: 1.1 % — SIGNIFICANT CHANGE UP (ref 0–6)
EOSINOPHIL NFR BLD AUTO: 1.3 % — SIGNIFICANT CHANGE UP (ref 0–6)
EOSINOPHIL NFR BLD AUTO: 2 % — SIGNIFICANT CHANGE UP (ref 0–6)
EOSINOPHIL NFR BLD AUTO: 2 % — SIGNIFICANT CHANGE UP (ref 0–6)
EOSINOPHIL NFR BLD AUTO: 2.1 % — SIGNIFICANT CHANGE UP (ref 0–6)
EOSINOPHIL NFR BLD AUTO: 2.1 % — SIGNIFICANT CHANGE UP (ref 0–6)
EOSINOPHIL NFR BLD AUTO: 2.2 % — SIGNIFICANT CHANGE UP (ref 0–6)
EOSINOPHIL NFR BLD AUTO: 2.2 % — SIGNIFICANT CHANGE UP (ref 0–6)
EOSINOPHIL NFR BLD AUTO: 2.5 % — SIGNIFICANT CHANGE UP (ref 0–6)
EOSINOPHIL NFR BLD AUTO: 2.5 % — SIGNIFICANT CHANGE UP (ref 0–6)
EOSINOPHIL NFR BLD AUTO: 2.6 % — SIGNIFICANT CHANGE UP (ref 0–6)
EOSINOPHIL NFR BLD AUTO: 2.6 % — SIGNIFICANT CHANGE UP (ref 0–6)
EOSINOPHIL NFR BLD AUTO: 3.3 % — SIGNIFICANT CHANGE UP (ref 0–6)
EOSINOPHIL NFR BLD AUTO: 3.3 % — SIGNIFICANT CHANGE UP (ref 0–6)
EOSINOPHIL NFR BLD AUTO: 3.6 % — SIGNIFICANT CHANGE UP (ref 0–6)
EOSINOPHIL NFR BLD AUTO: 3.6 % — SIGNIFICANT CHANGE UP (ref 0–6)
EPI CELLS # UR: 2 — SIGNIFICANT CHANGE UP
ERYTHROCYTE [SEDIMENTATION RATE] IN BLOOD: 76 MM/HR — HIGH (ref 0–20)
ERYTHROCYTE [SEDIMENTATION RATE] IN BLOOD: 76 MM/HR — HIGH (ref 0–20)
ERYTHROCYTE [SEDIMENTATION RATE] IN BLOOD: 77 MM/HR — HIGH (ref 0–20)
ERYTHROCYTE [SEDIMENTATION RATE] IN BLOOD: 77 MM/HR — HIGH (ref 0–20)
ESTIMATED AVERAGE GLUCOSE: 134 MG/DL — HIGH (ref 68–114)
ESTIMATED AVERAGE GLUCOSE: 157 MG/DL — HIGH (ref 68–114)
ESTIMATED AVERAGE GLUCOSE: 157 MG/DL — HIGH (ref 68–114)
FLUAV AG NPH QL: SIGNIFICANT CHANGE UP
FLUBV AG NPH QL: SIGNIFICANT CHANGE UP
GLUCOSE BLDC GLUCOMTR-MCNC: 119 MG/DL — HIGH (ref 70–99)
GLUCOSE BLDC GLUCOMTR-MCNC: 119 MG/DL — HIGH (ref 70–99)
GLUCOSE BLDC GLUCOMTR-MCNC: 136 MG/DL — HIGH (ref 70–99)
GLUCOSE BLDC GLUCOMTR-MCNC: 136 MG/DL — HIGH (ref 70–99)
GLUCOSE BLDC GLUCOMTR-MCNC: 140 MG/DL — HIGH (ref 70–99)
GLUCOSE BLDC GLUCOMTR-MCNC: 148 MG/DL — HIGH (ref 70–99)
GLUCOSE BLDC GLUCOMTR-MCNC: 153 MG/DL — HIGH (ref 70–99)
GLUCOSE BLDC GLUCOMTR-MCNC: 155 MG/DL — HIGH (ref 70–99)
GLUCOSE BLDC GLUCOMTR-MCNC: 155 MG/DL — HIGH (ref 70–99)
GLUCOSE BLDC GLUCOMTR-MCNC: 170 MG/DL — HIGH (ref 70–99)
GLUCOSE BLDC GLUCOMTR-MCNC: 179 MG/DL — HIGH (ref 70–99)
GLUCOSE BLDC GLUCOMTR-MCNC: 179 MG/DL — HIGH (ref 70–99)
GLUCOSE BLDC GLUCOMTR-MCNC: 197 MG/DL — HIGH (ref 70–99)
GLUCOSE BLDC GLUCOMTR-MCNC: 197 MG/DL — HIGH (ref 70–99)
GLUCOSE BLDC GLUCOMTR-MCNC: 198 MG/DL — HIGH (ref 70–99)
GLUCOSE BLDC GLUCOMTR-MCNC: 210 MG/DL — HIGH (ref 70–99)
GLUCOSE BLDC GLUCOMTR-MCNC: 217 MG/DL — HIGH (ref 70–99)
GLUCOSE BLDC GLUCOMTR-MCNC: 217 MG/DL — HIGH (ref 70–99)
GLUCOSE BLDC GLUCOMTR-MCNC: 220 MG/DL — HIGH (ref 70–99)
GLUCOSE BLDC GLUCOMTR-MCNC: 220 MG/DL — HIGH (ref 70–99)
GLUCOSE BLDC GLUCOMTR-MCNC: 223 MG/DL — HIGH (ref 70–99)
GLUCOSE BLDC GLUCOMTR-MCNC: 223 MG/DL — HIGH (ref 70–99)
GLUCOSE BLDC GLUCOMTR-MCNC: 226 MG/DL — HIGH (ref 70–99)
GLUCOSE BLDC GLUCOMTR-MCNC: 226 MG/DL — HIGH (ref 70–99)
GLUCOSE BLDC GLUCOMTR-MCNC: 230 MG/DL — HIGH (ref 70–99)
GLUCOSE BLDC GLUCOMTR-MCNC: 234 MG/DL — HIGH (ref 70–99)
GLUCOSE BLDC GLUCOMTR-MCNC: 234 MG/DL — HIGH (ref 70–99)
GLUCOSE BLDC GLUCOMTR-MCNC: 93 MG/DL — SIGNIFICANT CHANGE UP (ref 70–99)
GLUCOSE BLDC GLUCOMTR-MCNC: 93 MG/DL — SIGNIFICANT CHANGE UP (ref 70–99)
GLUCOSE SERPL-MCNC: 125 MG/DL — HIGH (ref 70–99)
GLUCOSE SERPL-MCNC: 125 MG/DL — HIGH (ref 70–99)
GLUCOSE SERPL-MCNC: 127 MG/DL — HIGH (ref 70–99)
GLUCOSE SERPL-MCNC: 127 MG/DL — HIGH (ref 70–99)
GLUCOSE SERPL-MCNC: 128 MG/DL — HIGH (ref 70–99)
GLUCOSE SERPL-MCNC: 128 MG/DL — HIGH (ref 70–99)
GLUCOSE SERPL-MCNC: 143 MG/DL — HIGH (ref 70–99)
GLUCOSE SERPL-MCNC: 143 MG/DL — HIGH (ref 70–99)
GLUCOSE SERPL-MCNC: 146 MG/DL — HIGH (ref 70–99)
GLUCOSE SERPL-MCNC: 146 MG/DL — HIGH (ref 70–99)
GLUCOSE SERPL-MCNC: 150 MG/DL — HIGH (ref 70–99)
GLUCOSE SERPL-MCNC: 151 MG/DL — HIGH (ref 70–99)
GLUCOSE SERPL-MCNC: 152 MG/DL — HIGH (ref 70–99)
GLUCOSE SERPL-MCNC: 152 MG/DL — HIGH (ref 70–99)
GLUCOSE SERPL-MCNC: 159 MG/DL — HIGH (ref 70–99)
GLUCOSE SERPL-MCNC: 159 MG/DL — HIGH (ref 70–99)
GLUCOSE SERPL-MCNC: 161 MG/DL — HIGH (ref 70–99)
GLUCOSE SERPL-MCNC: 161 MG/DL — HIGH (ref 70–99)
GLUCOSE SERPL-MCNC: 175 MG/DL — HIGH (ref 70–99)
GLUCOSE SERPL-MCNC: 175 MG/DL — HIGH (ref 70–99)
GLUCOSE SERPL-MCNC: 177 MG/DL — HIGH (ref 70–99)
GLUCOSE SERPL-MCNC: 177 MG/DL — HIGH (ref 70–99)
GLUCOSE SERPL-MCNC: 267 MG/DL — HIGH (ref 70–99)
GLUCOSE UR QL: NEGATIVE MG/DL — SIGNIFICANT CHANGE UP
GRAM STN FLD: ABNORMAL
HCT VFR BLD CALC: 27.7 % — LOW (ref 39–50)
HCT VFR BLD CALC: 27.7 % — LOW (ref 39–50)
HCT VFR BLD CALC: 29 % — LOW (ref 39–50)
HCT VFR BLD CALC: 29 % — LOW (ref 39–50)
HCT VFR BLD CALC: 29.3 % — LOW (ref 39–50)
HCT VFR BLD CALC: 29.6 % — LOW (ref 39–50)
HCT VFR BLD CALC: 29.6 % — LOW (ref 39–50)
HCT VFR BLD CALC: 29.7 % — LOW (ref 39–50)
HCT VFR BLD CALC: 29.7 % — LOW (ref 39–50)
HCT VFR BLD CALC: 29.8 % — LOW (ref 39–50)
HCT VFR BLD CALC: 29.8 % — LOW (ref 39–50)
HCT VFR BLD CALC: 30.2 % — LOW (ref 39–50)
HCT VFR BLD CALC: 30.2 % — LOW (ref 39–50)
HCT VFR BLD CALC: 30.3 % — LOW (ref 39–50)
HCT VFR BLD CALC: 30.4 % — LOW (ref 39–50)
HCT VFR BLD CALC: 30.4 % — LOW (ref 39–50)
HCT VFR BLD CALC: 30.5 % — LOW (ref 39–50)
HCT VFR BLD CALC: 30.5 % — LOW (ref 39–50)
HCT VFR BLD CALC: 30.7 % — LOW (ref 39–50)
HCT VFR BLD CALC: 31.3 % — LOW (ref 39–50)
HCT VFR BLD CALC: 31.3 % — LOW (ref 39–50)
HCT VFR BLD CALC: 31.8 % — LOW (ref 39–50)
HCT VFR BLD CALC: 31.8 % — LOW (ref 39–50)
HCT VFR BLD CALC: 32.6 % — LOW (ref 39–50)
HCT VFR BLD CALC: 32.6 % — LOW (ref 39–50)
HCT VFR BLD CALC: 33.4 % — LOW (ref 39–50)
HCT VFR BLD CALC: 35 % — LOW (ref 39–50)
HCT VFR BLD CALC: 35 % — LOW (ref 39–50)
HCT VFR BLD CALC: 35.8 % — LOW (ref 39–50)
HCT VFR BLD CALC: 35.8 % — LOW (ref 39–50)
HCT VFR BLD CALC: 37.1 % — LOW (ref 39–50)
HCT VFR BLD CALC: 37.1 % — LOW (ref 39–50)
HCT VFR BLD CALC: 39.9 % — SIGNIFICANT CHANGE UP (ref 39–50)
HCT VFR BLD CALC: 39.9 % — SIGNIFICANT CHANGE UP (ref 39–50)
HDLC SERPL-MCNC: 26 MG/DL — LOW
HDLC SERPL-MCNC: 26 MG/DL — LOW
HGB BLD-MCNC: 10.1 G/DL — LOW (ref 13–17)
HGB BLD-MCNC: 10.1 G/DL — LOW (ref 13–17)
HGB BLD-MCNC: 10.2 G/DL — LOW (ref 13–17)
HGB BLD-MCNC: 10.3 G/DL — LOW (ref 13–17)
HGB BLD-MCNC: 10.4 G/DL — LOW (ref 13–17)
HGB BLD-MCNC: 10.5 G/DL — LOW (ref 13–17)
HGB BLD-MCNC: 10.5 G/DL — LOW (ref 13–17)
HGB BLD-MCNC: 10.6 G/DL — LOW (ref 13–17)
HGB BLD-MCNC: 10.6 G/DL — LOW (ref 13–17)
HGB BLD-MCNC: 10.7 G/DL — LOW (ref 13–17)
HGB BLD-MCNC: 10.7 G/DL — LOW (ref 13–17)
HGB BLD-MCNC: 10.9 G/DL — LOW (ref 13–17)
HGB BLD-MCNC: 10.9 G/DL — LOW (ref 13–17)
HGB BLD-MCNC: 11 G/DL — LOW (ref 13–17)
HGB BLD-MCNC: 11 G/DL — LOW (ref 13–17)
HGB BLD-MCNC: 11.3 G/DL — LOW (ref 13–17)
HGB BLD-MCNC: 11.3 G/DL — LOW (ref 13–17)
HGB BLD-MCNC: 11.5 G/DL — LOW (ref 13–17)
HGB BLD-MCNC: 11.5 G/DL — LOW (ref 13–17)
HGB BLD-MCNC: 11.9 G/DL — LOW (ref 13–17)
HGB BLD-MCNC: 11.9 G/DL — LOW (ref 13–17)
HGB BLD-MCNC: 12.1 G/DL — LOW (ref 13–17)
HGB BLD-MCNC: 12.1 G/DL — LOW (ref 13–17)
HGB BLD-MCNC: 12.7 G/DL — LOW (ref 13–17)
HGB BLD-MCNC: 12.7 G/DL — LOW (ref 13–17)
HGB BLD-MCNC: 13.8 G/DL — SIGNIFICANT CHANGE UP (ref 13–17)
HGB BLD-MCNC: 13.8 G/DL — SIGNIFICANT CHANGE UP (ref 13–17)
HGB BLD-MCNC: 9.3 G/DL — LOW (ref 13–17)
HGB BLD-MCNC: 9.7 G/DL — LOW (ref 13–17)
HGB BLD-MCNC: 9.7 G/DL — LOW (ref 13–17)
HGB BLD-MCNC: 9.8 G/DL — LOW (ref 13–17)
IMM GRANULOCYTES NFR BLD AUTO: 0.8 % — SIGNIFICANT CHANGE UP (ref 0–0.9)
IMM GRANULOCYTES NFR BLD AUTO: 0.8 % — SIGNIFICANT CHANGE UP (ref 0–0.9)
IMM GRANULOCYTES NFR BLD AUTO: 0.9 % — SIGNIFICANT CHANGE UP (ref 0–0.9)
IMM GRANULOCYTES NFR BLD AUTO: 0.9 % — SIGNIFICANT CHANGE UP (ref 0–0.9)
IMM GRANULOCYTES NFR BLD AUTO: 1 % — HIGH (ref 0–0.9)
IMM GRANULOCYTES NFR BLD AUTO: 1.1 % — HIGH (ref 0–0.9)
IMM GRANULOCYTES NFR BLD AUTO: 1.5 % — HIGH (ref 0–0.9)
IMM GRANULOCYTES NFR BLD AUTO: 1.6 % — HIGH (ref 0–0.9)
IMM GRANULOCYTES NFR BLD AUTO: 1.7 % — HIGH (ref 0–0.9)
INR BLD: 1.59 RATIO — HIGH (ref 0.85–1.18)
INR BLD: 1.59 RATIO — HIGH (ref 0.85–1.18)
INR BLD: 1.99 RATIO — HIGH (ref 0.85–1.18)
INR BLD: 1.99 RATIO — HIGH (ref 0.85–1.18)
INR BLD: 2.01 RATIO — HIGH (ref 0.85–1.18)
INR BLD: 2.01 RATIO — HIGH (ref 0.85–1.18)
INR BLD: 2.1 RATIO — HIGH (ref 0.85–1.18)
INR BLD: 2.1 RATIO — HIGH (ref 0.85–1.18)
INR BLD: 2.3 RATIO — HIGH (ref 0.85–1.18)
INR BLD: 2.3 RATIO — HIGH (ref 0.85–1.18)
INR BLD: 2.41 RATIO — HIGH (ref 0.85–1.18)
INR BLD: 2.41 RATIO — HIGH (ref 0.85–1.18)
INR BLD: 2.85 RATIO — HIGH (ref 0.85–1.18)
INR BLD: 2.85 RATIO — HIGH (ref 0.85–1.18)
INR BLD: 2.9 RATIO — HIGH (ref 0.85–1.18)
INR BLD: 2.9 RATIO — HIGH (ref 0.85–1.18)
INR BLD: 3.53 RATIO — HIGH (ref 0.85–1.18)
INR BLD: 3.53 RATIO — HIGH (ref 0.85–1.18)
INR BLD: 3.66 RATIO — HIGH (ref 0.85–1.18)
INR BLD: 3.66 RATIO — HIGH (ref 0.85–1.18)
INR BLD: 3.75 RATIO — HIGH (ref 0.85–1.18)
INR BLD: 3.75 RATIO — HIGH (ref 0.85–1.18)
INR BLD: 4.85 RATIO — HIGH (ref 0.85–1.18)
INR BLD: 4.92 RATIO — HIGH (ref 0.85–1.18)
INR BLD: 4.92 RATIO — HIGH (ref 0.85–1.18)
INR BLD: 5.04 RATIO — CRITICAL HIGH (ref 0.85–1.18)
INR BLD: 7.68 RATIO — CRITICAL HIGH (ref 0.85–1.18)
INR BLD: 7.68 RATIO — CRITICAL HIGH (ref 0.85–1.18)
KETONES UR-MCNC: NEGATIVE MG/DL — SIGNIFICANT CHANGE UP
LACTATE SERPL-SCNC: 2 MMOL/L — SIGNIFICANT CHANGE UP (ref 0.7–2)
LACTATE SERPL-SCNC: 2.8 MMOL/L — HIGH (ref 0.7–2)
LEUKOCYTE ESTERASE UR-ACNC: ABNORMAL
LIPID PNL WITH DIRECT LDL SERPL: 80 MG/DL — SIGNIFICANT CHANGE UP
LIPID PNL WITH DIRECT LDL SERPL: 80 MG/DL — SIGNIFICANT CHANGE UP
LYMPHOCYTES # BLD AUTO: 0.62 K/UL — LOW (ref 1–3.3)
LYMPHOCYTES # BLD AUTO: 1.05 K/UL — SIGNIFICANT CHANGE UP (ref 1–3.3)
LYMPHOCYTES # BLD AUTO: 1.05 K/UL — SIGNIFICANT CHANGE UP (ref 1–3.3)
LYMPHOCYTES # BLD AUTO: 1.08 K/UL — SIGNIFICANT CHANGE UP (ref 1–3.3)
LYMPHOCYTES # BLD AUTO: 1.11 K/UL — SIGNIFICANT CHANGE UP (ref 1–3.3)
LYMPHOCYTES # BLD AUTO: 1.11 K/UL — SIGNIFICANT CHANGE UP (ref 1–3.3)
LYMPHOCYTES # BLD AUTO: 1.17 K/UL — SIGNIFICANT CHANGE UP (ref 1–3.3)
LYMPHOCYTES # BLD AUTO: 1.17 K/UL — SIGNIFICANT CHANGE UP (ref 1–3.3)
LYMPHOCYTES # BLD AUTO: 1.18 K/UL — SIGNIFICANT CHANGE UP (ref 1–3.3)
LYMPHOCYTES # BLD AUTO: 1.18 K/UL — SIGNIFICANT CHANGE UP (ref 1–3.3)
LYMPHOCYTES # BLD AUTO: 1.19 K/UL — SIGNIFICANT CHANGE UP (ref 1–3.3)
LYMPHOCYTES # BLD AUTO: 1.19 K/UL — SIGNIFICANT CHANGE UP (ref 1–3.3)
LYMPHOCYTES # BLD AUTO: 1.22 K/UL — SIGNIFICANT CHANGE UP (ref 1–3.3)
LYMPHOCYTES # BLD AUTO: 1.22 K/UL — SIGNIFICANT CHANGE UP (ref 1–3.3)
LYMPHOCYTES # BLD AUTO: 1.26 K/UL — SIGNIFICANT CHANGE UP (ref 1–3.3)
LYMPHOCYTES # BLD AUTO: 1.26 K/UL — SIGNIFICANT CHANGE UP (ref 1–3.3)
LYMPHOCYTES # BLD AUTO: 1.39 K/UL — SIGNIFICANT CHANGE UP (ref 1–3.3)
LYMPHOCYTES # BLD AUTO: 1.54 K/UL — SIGNIFICANT CHANGE UP (ref 1–3.3)
LYMPHOCYTES # BLD AUTO: 1.54 K/UL — SIGNIFICANT CHANGE UP (ref 1–3.3)
LYMPHOCYTES # BLD AUTO: 11.3 % — LOW (ref 13–44)
LYMPHOCYTES # BLD AUTO: 11.3 % — LOW (ref 13–44)
LYMPHOCYTES # BLD AUTO: 11.4 % — LOW (ref 13–44)
LYMPHOCYTES # BLD AUTO: 11.4 % — LOW (ref 13–44)
LYMPHOCYTES # BLD AUTO: 11.5 % — LOW (ref 13–44)
LYMPHOCYTES # BLD AUTO: 11.5 % — LOW (ref 13–44)
LYMPHOCYTES # BLD AUTO: 13.2 % — SIGNIFICANT CHANGE UP (ref 13–44)
LYMPHOCYTES # BLD AUTO: 13.2 % — SIGNIFICANT CHANGE UP (ref 13–44)
LYMPHOCYTES # BLD AUTO: 13.4 % — SIGNIFICANT CHANGE UP (ref 13–44)
LYMPHOCYTES # BLD AUTO: 13.4 % — SIGNIFICANT CHANGE UP (ref 13–44)
LYMPHOCYTES # BLD AUTO: 14 % — SIGNIFICANT CHANGE UP (ref 13–44)
LYMPHOCYTES # BLD AUTO: 14 % — SIGNIFICANT CHANGE UP (ref 13–44)
LYMPHOCYTES # BLD AUTO: 5 % — LOW (ref 13–44)
LYMPHOCYTES # BLD AUTO: 5.1 % — LOW (ref 13–44)
LYMPHOCYTES # BLD AUTO: 5.2 % — LOW (ref 13–44)
LYMPHOCYTES # BLD AUTO: 5.2 % — LOW (ref 13–44)
LYMPHOCYTES # BLD AUTO: 6.4 % — LOW (ref 13–44)
LYMPHOCYTES # BLD AUTO: 6.4 % — LOW (ref 13–44)
LYMPHOCYTES # BLD AUTO: 7.6 % — LOW (ref 13–44)
LYMPHOCYTES # BLD AUTO: 7.6 % — LOW (ref 13–44)
LYMPHOCYTES # BLD AUTO: 7.9 % — LOW (ref 13–44)
LYMPHOCYTES # BLD AUTO: 7.9 % — LOW (ref 13–44)
LYMPHOCYTES # BLD AUTO: 9.7 % — LOW (ref 13–44)
LYMPHOCYTES # BLD AUTO: 9.7 % — LOW (ref 13–44)
MAGNESIUM SERPL-MCNC: 1.5 MG/DL — LOW (ref 1.6–2.6)
MAGNESIUM SERPL-MCNC: 1.5 MG/DL — LOW (ref 1.6–2.6)
MAGNESIUM SERPL-MCNC: 1.7 MG/DL — SIGNIFICANT CHANGE UP (ref 1.6–2.6)
MAGNESIUM SERPL-MCNC: 1.7 MG/DL — SIGNIFICANT CHANGE UP (ref 1.6–2.6)
MAGNESIUM SERPL-MCNC: 1.8 MG/DL — SIGNIFICANT CHANGE UP (ref 1.6–2.6)
MAGNESIUM SERPL-MCNC: 1.8 MG/DL — SIGNIFICANT CHANGE UP (ref 1.6–2.6)
MCHC RBC-ENTMCNC: 27.2 PG — SIGNIFICANT CHANGE UP (ref 27–34)
MCHC RBC-ENTMCNC: 27.5 PG — SIGNIFICANT CHANGE UP (ref 27–34)
MCHC RBC-ENTMCNC: 29.8 PG — SIGNIFICANT CHANGE UP (ref 27–34)
MCHC RBC-ENTMCNC: 29.9 PG — SIGNIFICANT CHANGE UP (ref 27–34)
MCHC RBC-ENTMCNC: 30 PG — SIGNIFICANT CHANGE UP (ref 27–34)
MCHC RBC-ENTMCNC: 30.1 PG — SIGNIFICANT CHANGE UP (ref 27–34)
MCHC RBC-ENTMCNC: 30.1 PG — SIGNIFICANT CHANGE UP (ref 27–34)
MCHC RBC-ENTMCNC: 30.2 PG — SIGNIFICANT CHANGE UP (ref 27–34)
MCHC RBC-ENTMCNC: 30.3 PG — SIGNIFICANT CHANGE UP (ref 27–34)
MCHC RBC-ENTMCNC: 30.5 PG — SIGNIFICANT CHANGE UP (ref 27–34)
MCHC RBC-ENTMCNC: 30.7 PG — SIGNIFICANT CHANGE UP (ref 27–34)
MCHC RBC-ENTMCNC: 30.7 PG — SIGNIFICANT CHANGE UP (ref 27–34)
MCHC RBC-ENTMCNC: 31.7 GM/DL — LOW (ref 32–36)
MCHC RBC-ENTMCNC: 32.3 GM/DL — SIGNIFICANT CHANGE UP (ref 32–36)
MCHC RBC-ENTMCNC: 33.4 GM/DL — SIGNIFICANT CHANGE UP (ref 32–36)
MCHC RBC-ENTMCNC: 33.4 GM/DL — SIGNIFICANT CHANGE UP (ref 32–36)
MCHC RBC-ENTMCNC: 33.6 GM/DL — SIGNIFICANT CHANGE UP (ref 32–36)
MCHC RBC-ENTMCNC: 33.6 GM/DL — SIGNIFICANT CHANGE UP (ref 32–36)
MCHC RBC-ENTMCNC: 33.8 GM/DL — SIGNIFICANT CHANGE UP (ref 32–36)
MCHC RBC-ENTMCNC: 33.9 GM/DL — SIGNIFICANT CHANGE UP (ref 32–36)
MCHC RBC-ENTMCNC: 33.9 GM/DL — SIGNIFICANT CHANGE UP (ref 32–36)
MCHC RBC-ENTMCNC: 34 GM/DL — SIGNIFICANT CHANGE UP (ref 32–36)
MCHC RBC-ENTMCNC: 34 GM/DL — SIGNIFICANT CHANGE UP (ref 32–36)
MCHC RBC-ENTMCNC: 34.2 GM/DL — SIGNIFICANT CHANGE UP (ref 32–36)
MCHC RBC-ENTMCNC: 34.2 GM/DL — SIGNIFICANT CHANGE UP (ref 32–36)
MCHC RBC-ENTMCNC: 34.3 GM/DL — SIGNIFICANT CHANGE UP (ref 32–36)
MCHC RBC-ENTMCNC: 34.3 GM/DL — SIGNIFICANT CHANGE UP (ref 32–36)
MCHC RBC-ENTMCNC: 34.4 GM/DL — SIGNIFICANT CHANGE UP (ref 32–36)
MCHC RBC-ENTMCNC: 34.5 GM/DL — SIGNIFICANT CHANGE UP (ref 32–36)
MCHC RBC-ENTMCNC: 34.5 GM/DL — SIGNIFICANT CHANGE UP (ref 32–36)
MCHC RBC-ENTMCNC: 34.6 GM/DL — SIGNIFICANT CHANGE UP (ref 32–36)
MCHC RBC-ENTMCNC: 34.8 GM/DL — SIGNIFICANT CHANGE UP (ref 32–36)
MCHC RBC-ENTMCNC: 34.8 GM/DL — SIGNIFICANT CHANGE UP (ref 32–36)
MCHC RBC-ENTMCNC: 34.9 GM/DL — SIGNIFICANT CHANGE UP (ref 32–36)
MCHC RBC-ENTMCNC: 34.9 GM/DL — SIGNIFICANT CHANGE UP (ref 32–36)
MCHC RBC-ENTMCNC: 35 GM/DL — SIGNIFICANT CHANGE UP (ref 32–36)
MCHC RBC-ENTMCNC: 35 GM/DL — SIGNIFICANT CHANGE UP (ref 32–36)
MCV RBC AUTO: 85.1 FL — SIGNIFICANT CHANGE UP (ref 80–100)
MCV RBC AUTO: 85.7 FL — SIGNIFICANT CHANGE UP (ref 80–100)
MCV RBC AUTO: 86.9 FL — SIGNIFICANT CHANGE UP (ref 80–100)
MCV RBC AUTO: 87.1 FL — SIGNIFICANT CHANGE UP (ref 80–100)
MCV RBC AUTO: 87.3 FL — SIGNIFICANT CHANGE UP (ref 80–100)
MCV RBC AUTO: 87.3 FL — SIGNIFICANT CHANGE UP (ref 80–100)
MCV RBC AUTO: 87.6 FL — SIGNIFICANT CHANGE UP (ref 80–100)
MCV RBC AUTO: 87.6 FL — SIGNIFICANT CHANGE UP (ref 80–100)
MCV RBC AUTO: 87.7 FL — SIGNIFICANT CHANGE UP (ref 80–100)
MCV RBC AUTO: 87.7 FL — SIGNIFICANT CHANGE UP (ref 80–100)
MCV RBC AUTO: 87.8 FL — SIGNIFICANT CHANGE UP (ref 80–100)
MCV RBC AUTO: 87.9 FL — SIGNIFICANT CHANGE UP (ref 80–100)
MCV RBC AUTO: 87.9 FL — SIGNIFICANT CHANGE UP (ref 80–100)
MCV RBC AUTO: 88.1 FL — SIGNIFICANT CHANGE UP (ref 80–100)
MCV RBC AUTO: 88.4 FL — SIGNIFICANT CHANGE UP (ref 80–100)
MCV RBC AUTO: 88.4 FL — SIGNIFICANT CHANGE UP (ref 80–100)
MCV RBC AUTO: 88.5 FL — SIGNIFICANT CHANGE UP (ref 80–100)
MCV RBC AUTO: 88.5 FL — SIGNIFICANT CHANGE UP (ref 80–100)
MCV RBC AUTO: 88.6 FL — SIGNIFICANT CHANGE UP (ref 80–100)
MCV RBC AUTO: 88.6 FL — SIGNIFICANT CHANGE UP (ref 80–100)
MCV RBC AUTO: 89.1 FL — SIGNIFICANT CHANGE UP (ref 80–100)
MCV RBC AUTO: 89.1 FL — SIGNIFICANT CHANGE UP (ref 80–100)
MCV RBC AUTO: 89.5 FL — SIGNIFICANT CHANGE UP (ref 80–100)
MCV RBC AUTO: 89.5 FL — SIGNIFICANT CHANGE UP (ref 80–100)
METHOD TYPE: SIGNIFICANT CHANGE UP
MONOCYTES # BLD AUTO: 0.51 K/UL — SIGNIFICANT CHANGE UP (ref 0–0.9)
MONOCYTES # BLD AUTO: 0.76 K/UL — SIGNIFICANT CHANGE UP (ref 0–0.9)
MONOCYTES # BLD AUTO: 0.76 K/UL — SIGNIFICANT CHANGE UP (ref 0–0.9)
MONOCYTES # BLD AUTO: 0.78 K/UL — SIGNIFICANT CHANGE UP (ref 0–0.9)
MONOCYTES # BLD AUTO: 0.78 K/UL — SIGNIFICANT CHANGE UP (ref 0–0.9)
MONOCYTES # BLD AUTO: 0.8 K/UL — SIGNIFICANT CHANGE UP (ref 0–0.9)
MONOCYTES # BLD AUTO: 0.8 K/UL — SIGNIFICANT CHANGE UP (ref 0–0.9)
MONOCYTES # BLD AUTO: 0.85 K/UL — SIGNIFICANT CHANGE UP (ref 0–0.9)
MONOCYTES # BLD AUTO: 0.92 K/UL — HIGH (ref 0–0.9)
MONOCYTES # BLD AUTO: 0.92 K/UL — HIGH (ref 0–0.9)
MONOCYTES # BLD AUTO: 0.93 K/UL — HIGH (ref 0–0.9)
MONOCYTES # BLD AUTO: 0.94 K/UL — HIGH (ref 0–0.9)
MONOCYTES # BLD AUTO: 0.94 K/UL — HIGH (ref 0–0.9)
MONOCYTES # BLD AUTO: 0.98 K/UL — HIGH (ref 0–0.9)
MONOCYTES # BLD AUTO: 0.98 K/UL — HIGH (ref 0–0.9)
MONOCYTES # BLD AUTO: 0.99 K/UL — HIGH (ref 0–0.9)
MONOCYTES # BLD AUTO: 0.99 K/UL — HIGH (ref 0–0.9)
MONOCYTES # BLD AUTO: 1.18 K/UL — HIGH (ref 0–0.9)
MONOCYTES # BLD AUTO: 1.18 K/UL — HIGH (ref 0–0.9)
MONOCYTES NFR BLD AUTO: 11 % — SIGNIFICANT CHANGE UP (ref 2–14)
MONOCYTES NFR BLD AUTO: 11 % — SIGNIFICANT CHANGE UP (ref 2–14)
MONOCYTES NFR BLD AUTO: 4.2 % — SIGNIFICANT CHANGE UP (ref 2–14)
MONOCYTES NFR BLD AUTO: 4.4 % — SIGNIFICANT CHANGE UP (ref 2–14)
MONOCYTES NFR BLD AUTO: 5.2 % — SIGNIFICANT CHANGE UP (ref 2–14)
MONOCYTES NFR BLD AUTO: 5.2 % — SIGNIFICANT CHANGE UP (ref 2–14)
MONOCYTES NFR BLD AUTO: 5.3 % — SIGNIFICANT CHANGE UP (ref 2–14)
MONOCYTES NFR BLD AUTO: 5.3 % — SIGNIFICANT CHANGE UP (ref 2–14)
MONOCYTES NFR BLD AUTO: 5.6 % — SIGNIFICANT CHANGE UP (ref 2–14)
MONOCYTES NFR BLD AUTO: 5.6 % — SIGNIFICANT CHANGE UP (ref 2–14)
MONOCYTES NFR BLD AUTO: 5.8 % — SIGNIFICANT CHANGE UP (ref 2–14)
MONOCYTES NFR BLD AUTO: 7.6 % — SIGNIFICANT CHANGE UP (ref 2–14)
MONOCYTES NFR BLD AUTO: 7.6 % — SIGNIFICANT CHANGE UP (ref 2–14)
MONOCYTES NFR BLD AUTO: 8 % — SIGNIFICANT CHANGE UP (ref 2–14)
MONOCYTES NFR BLD AUTO: 8 % — SIGNIFICANT CHANGE UP (ref 2–14)
MONOCYTES NFR BLD AUTO: 8.2 % — SIGNIFICANT CHANGE UP (ref 2–14)
MONOCYTES NFR BLD AUTO: 8.2 % — SIGNIFICANT CHANGE UP (ref 2–14)
MONOCYTES NFR BLD AUTO: 8.5 % — SIGNIFICANT CHANGE UP (ref 2–14)
MONOCYTES NFR BLD AUTO: 8.5 % — SIGNIFICANT CHANGE UP (ref 2–14)
MONOCYTES NFR BLD AUTO: 9.7 % — SIGNIFICANT CHANGE UP (ref 2–14)
MONOCYTES NFR BLD AUTO: 9.7 % — SIGNIFICANT CHANGE UP (ref 2–14)
MRSA PCR RESULT.: SIGNIFICANT CHANGE UP
MRSA PCR RESULT.: SIGNIFICANT CHANGE UP
NEUTROPHILS # BLD AUTO: 10.91 K/UL — HIGH (ref 1.8–7.4)
NEUTROPHILS # BLD AUTO: 11.06 K/UL — HIGH (ref 1.8–7.4)
NEUTROPHILS # BLD AUTO: 11.06 K/UL — HIGH (ref 1.8–7.4)
NEUTROPHILS # BLD AUTO: 11.7 K/UL — HIGH (ref 1.8–7.4)
NEUTROPHILS # BLD AUTO: 11.7 K/UL — HIGH (ref 1.8–7.4)
NEUTROPHILS # BLD AUTO: 13.3 K/UL — HIGH (ref 1.8–7.4)
NEUTROPHILS # BLD AUTO: 13.3 K/UL — HIGH (ref 1.8–7.4)
NEUTROPHILS # BLD AUTO: 16.41 K/UL — HIGH (ref 1.8–7.4)
NEUTROPHILS # BLD AUTO: 16.41 K/UL — HIGH (ref 1.8–7.4)
NEUTROPHILS # BLD AUTO: 18.83 K/UL — HIGH (ref 1.8–7.4)
NEUTROPHILS # BLD AUTO: 19.64 K/UL — HIGH (ref 1.8–7.4)
NEUTROPHILS # BLD AUTO: 19.64 K/UL — HIGH (ref 1.8–7.4)
NEUTROPHILS # BLD AUTO: 5.64 K/UL — SIGNIFICANT CHANGE UP (ref 1.8–7.4)
NEUTROPHILS # BLD AUTO: 5.64 K/UL — SIGNIFICANT CHANGE UP (ref 1.8–7.4)
NEUTROPHILS # BLD AUTO: 7.06 K/UL — SIGNIFICANT CHANGE UP (ref 1.8–7.4)
NEUTROPHILS # BLD AUTO: 7.06 K/UL — SIGNIFICANT CHANGE UP (ref 1.8–7.4)
NEUTROPHILS # BLD AUTO: 7.21 K/UL — SIGNIFICANT CHANGE UP (ref 1.8–7.4)
NEUTROPHILS # BLD AUTO: 7.21 K/UL — SIGNIFICANT CHANGE UP (ref 1.8–7.4)
NEUTROPHILS # BLD AUTO: 7.9 K/UL — HIGH (ref 1.8–7.4)
NEUTROPHILS # BLD AUTO: 7.9 K/UL — HIGH (ref 1.8–7.4)
NEUTROPHILS # BLD AUTO: 8.35 K/UL — HIGH (ref 1.8–7.4)
NEUTROPHILS # BLD AUTO: 8.35 K/UL — HIGH (ref 1.8–7.4)
NEUTROPHILS # BLD AUTO: 9.29 K/UL — HIGH (ref 1.8–7.4)
NEUTROPHILS # BLD AUTO: 9.29 K/UL — HIGH (ref 1.8–7.4)
NEUTROPHILS NFR BLD AUTO: 72.6 % — SIGNIFICANT CHANGE UP (ref 43–77)
NEUTROPHILS NFR BLD AUTO: 72.6 % — SIGNIFICANT CHANGE UP (ref 43–77)
NEUTROPHILS NFR BLD AUTO: 73 % — SIGNIFICANT CHANGE UP (ref 43–77)
NEUTROPHILS NFR BLD AUTO: 73 % — SIGNIFICANT CHANGE UP (ref 43–77)
NEUTROPHILS NFR BLD AUTO: 74.2 % — SIGNIFICANT CHANGE UP (ref 43–77)
NEUTROPHILS NFR BLD AUTO: 74.2 % — SIGNIFICANT CHANGE UP (ref 43–77)
NEUTROPHILS NFR BLD AUTO: 74.6 % — SIGNIFICANT CHANGE UP (ref 43–77)
NEUTROPHILS NFR BLD AUTO: 74.6 % — SIGNIFICANT CHANGE UP (ref 43–77)
NEUTROPHILS NFR BLD AUTO: 75.9 % — SIGNIFICANT CHANGE UP (ref 43–77)
NEUTROPHILS NFR BLD AUTO: 75.9 % — SIGNIFICANT CHANGE UP (ref 43–77)
NEUTROPHILS NFR BLD AUTO: 76.2 % — SIGNIFICANT CHANGE UP (ref 43–77)
NEUTROPHILS NFR BLD AUTO: 76.2 % — SIGNIFICANT CHANGE UP (ref 43–77)
NEUTROPHILS NFR BLD AUTO: 82 % — HIGH (ref 43–77)
NEUTROPHILS NFR BLD AUTO: 82 % — HIGH (ref 43–77)
NEUTROPHILS NFR BLD AUTO: 82.8 % — HIGH (ref 43–77)
NEUTROPHILS NFR BLD AUTO: 82.8 % — HIGH (ref 43–77)
NEUTROPHILS NFR BLD AUTO: 84.4 % — HIGH (ref 43–77)
NEUTROPHILS NFR BLD AUTO: 84.4 % — HIGH (ref 43–77)
NEUTROPHILS NFR BLD AUTO: 85.8 % — HIGH (ref 43–77)
NEUTROPHILS NFR BLD AUTO: 85.8 % — HIGH (ref 43–77)
NEUTROPHILS NFR BLD AUTO: 87.9 % — HIGH (ref 43–77)
NEUTROPHILS NFR BLD AUTO: 87.9 % — HIGH (ref 43–77)
NEUTROPHILS NFR BLD AUTO: 88.8 % — HIGH (ref 43–77)
NEUTROPHILS NFR BLD AUTO: 88.8 % — HIGH (ref 43–77)
NITRITE UR-MCNC: NEGATIVE — SIGNIFICANT CHANGE UP
NON HDL CHOLESTEROL: 103 MG/DL — SIGNIFICANT CHANGE UP
NON HDL CHOLESTEROL: 103 MG/DL — SIGNIFICANT CHANGE UP
NRBC # BLD: 0 /100 WBCS — SIGNIFICANT CHANGE UP (ref 0–0)
NRBC # BLD: SIGNIFICANT CHANGE UP /100 WBCS (ref 0–0)
NRBC # BLD: SIGNIFICANT CHANGE UP /100 WBCS (ref 0–0)
PH UR: 5 — SIGNIFICANT CHANGE UP (ref 5–8)
PLATELET # BLD AUTO: 127 K/UL — LOW (ref 150–400)
PLATELET # BLD AUTO: 178 K/UL — SIGNIFICANT CHANGE UP (ref 150–400)
PLATELET # BLD AUTO: 202 K/UL — SIGNIFICANT CHANGE UP (ref 150–400)
PLATELET # BLD AUTO: 202 K/UL — SIGNIFICANT CHANGE UP (ref 150–400)
PLATELET # BLD AUTO: 207 K/UL — SIGNIFICANT CHANGE UP (ref 150–400)
PLATELET # BLD AUTO: 207 K/UL — SIGNIFICANT CHANGE UP (ref 150–400)
PLATELET # BLD AUTO: 208 K/UL — SIGNIFICANT CHANGE UP (ref 150–400)
PLATELET # BLD AUTO: 208 K/UL — SIGNIFICANT CHANGE UP (ref 150–400)
PLATELET # BLD AUTO: 214 K/UL — SIGNIFICANT CHANGE UP (ref 150–400)
PLATELET # BLD AUTO: 214 K/UL — SIGNIFICANT CHANGE UP (ref 150–400)
PLATELET # BLD AUTO: 218 K/UL — SIGNIFICANT CHANGE UP (ref 150–400)
PLATELET # BLD AUTO: 218 K/UL — SIGNIFICANT CHANGE UP (ref 150–400)
PLATELET # BLD AUTO: 220 K/UL — SIGNIFICANT CHANGE UP (ref 150–400)
PLATELET # BLD AUTO: 220 K/UL — SIGNIFICANT CHANGE UP (ref 150–400)
PLATELET # BLD AUTO: 225 K/UL — SIGNIFICANT CHANGE UP (ref 150–400)
PLATELET # BLD AUTO: 225 K/UL — SIGNIFICANT CHANGE UP (ref 150–400)
PLATELET # BLD AUTO: 226 K/UL — SIGNIFICANT CHANGE UP (ref 150–400)
PLATELET # BLD AUTO: 226 K/UL — SIGNIFICANT CHANGE UP (ref 150–400)
PLATELET # BLD AUTO: 229 K/UL — SIGNIFICANT CHANGE UP (ref 150–400)
PLATELET # BLD AUTO: 229 K/UL — SIGNIFICANT CHANGE UP (ref 150–400)
PLATELET # BLD AUTO: 231 K/UL — SIGNIFICANT CHANGE UP (ref 150–400)
PLATELET # BLD AUTO: 231 K/UL — SIGNIFICANT CHANGE UP (ref 150–400)
PLATELET # BLD AUTO: 237 K/UL — SIGNIFICANT CHANGE UP (ref 150–400)
PLATELET # BLD AUTO: 237 K/UL — SIGNIFICANT CHANGE UP (ref 150–400)
PLATELET # BLD AUTO: 240 K/UL — SIGNIFICANT CHANGE UP (ref 150–400)
PLATELET # BLD AUTO: 240 K/UL — SIGNIFICANT CHANGE UP (ref 150–400)
PLATELET # BLD AUTO: 241 K/UL — SIGNIFICANT CHANGE UP (ref 150–400)
PLATELET # BLD AUTO: 241 K/UL — SIGNIFICANT CHANGE UP (ref 150–400)
PLATELET # BLD AUTO: 243 K/UL — SIGNIFICANT CHANGE UP (ref 150–400)
PLATELET # BLD AUTO: 254 K/UL — SIGNIFICANT CHANGE UP (ref 150–400)
PLATELET # BLD AUTO: 254 K/UL — SIGNIFICANT CHANGE UP (ref 150–400)
PLATELET # BLD AUTO: 268 K/UL — SIGNIFICANT CHANGE UP (ref 150–400)
PLATELET # BLD AUTO: 268 K/UL — SIGNIFICANT CHANGE UP (ref 150–400)
PLATELET # BLD AUTO: 281 K/UL — SIGNIFICANT CHANGE UP (ref 150–400)
PLATELET # BLD AUTO: 281 K/UL — SIGNIFICANT CHANGE UP (ref 150–400)
POTASSIUM SERPL-MCNC: 3.3 MMOL/L — LOW (ref 3.5–5.3)
POTASSIUM SERPL-MCNC: 3.3 MMOL/L — LOW (ref 3.5–5.3)
POTASSIUM SERPL-MCNC: 3.6 MMOL/L — SIGNIFICANT CHANGE UP (ref 3.5–5.3)
POTASSIUM SERPL-MCNC: 3.7 MMOL/L — SIGNIFICANT CHANGE UP (ref 3.5–5.3)
POTASSIUM SERPL-MCNC: 3.8 MMOL/L — SIGNIFICANT CHANGE UP (ref 3.5–5.3)
POTASSIUM SERPL-MCNC: 3.9 MMOL/L — SIGNIFICANT CHANGE UP (ref 3.5–5.3)
POTASSIUM SERPL-MCNC: 3.9 MMOL/L — SIGNIFICANT CHANGE UP (ref 3.5–5.3)
POTASSIUM SERPL-MCNC: 4.2 MMOL/L — SIGNIFICANT CHANGE UP (ref 3.5–5.3)
POTASSIUM SERPL-MCNC: 4.2 MMOL/L — SIGNIFICANT CHANGE UP (ref 3.5–5.3)
POTASSIUM SERPL-MCNC: 4.3 MMOL/L — SIGNIFICANT CHANGE UP (ref 3.5–5.3)
POTASSIUM SERPL-MCNC: 4.3 MMOL/L — SIGNIFICANT CHANGE UP (ref 3.5–5.3)
POTASSIUM SERPL-MCNC: 4.5 MMOL/L — SIGNIFICANT CHANGE UP (ref 3.5–5.3)
POTASSIUM SERPL-MCNC: 5 MMOL/L — SIGNIFICANT CHANGE UP (ref 3.5–5.3)
POTASSIUM SERPL-SCNC: 3.3 MMOL/L — LOW (ref 3.5–5.3)
POTASSIUM SERPL-SCNC: 3.3 MMOL/L — LOW (ref 3.5–5.3)
POTASSIUM SERPL-SCNC: 3.6 MMOL/L — SIGNIFICANT CHANGE UP (ref 3.5–5.3)
POTASSIUM SERPL-SCNC: 3.7 MMOL/L — SIGNIFICANT CHANGE UP (ref 3.5–5.3)
POTASSIUM SERPL-SCNC: 3.8 MMOL/L — SIGNIFICANT CHANGE UP (ref 3.5–5.3)
POTASSIUM SERPL-SCNC: 3.9 MMOL/L — SIGNIFICANT CHANGE UP (ref 3.5–5.3)
POTASSIUM SERPL-SCNC: 3.9 MMOL/L — SIGNIFICANT CHANGE UP (ref 3.5–5.3)
POTASSIUM SERPL-SCNC: 4.2 MMOL/L — SIGNIFICANT CHANGE UP (ref 3.5–5.3)
POTASSIUM SERPL-SCNC: 4.2 MMOL/L — SIGNIFICANT CHANGE UP (ref 3.5–5.3)
POTASSIUM SERPL-SCNC: 4.3 MMOL/L — SIGNIFICANT CHANGE UP (ref 3.5–5.3)
POTASSIUM SERPL-SCNC: 4.3 MMOL/L — SIGNIFICANT CHANGE UP (ref 3.5–5.3)
POTASSIUM SERPL-SCNC: 4.5 MMOL/L — SIGNIFICANT CHANGE UP (ref 3.5–5.3)
POTASSIUM SERPL-SCNC: 5 MMOL/L — SIGNIFICANT CHANGE UP (ref 3.5–5.3)
PROT SERPL-MCNC: 5.7 G/DL — LOW (ref 6–8.3)
PROT SERPL-MCNC: 5.7 G/DL — LOW (ref 6–8.3)
PROT SERPL-MCNC: 6.2 G/DL — SIGNIFICANT CHANGE UP (ref 6–8.3)
PROT SERPL-MCNC: 6.3 G/DL — SIGNIFICANT CHANGE UP (ref 6–8.3)
PROT SERPL-MCNC: 6.4 G/DL — SIGNIFICANT CHANGE UP (ref 6–8.3)
PROT SERPL-MCNC: 6.4 G/DL — SIGNIFICANT CHANGE UP (ref 6–8.3)
PROT SERPL-MCNC: 6.5 G/DL — SIGNIFICANT CHANGE UP (ref 6–8.3)
PROT SERPL-MCNC: 6.8 G/DL — SIGNIFICANT CHANGE UP (ref 6–8.3)
PROT SERPL-MCNC: 6.8 G/DL — SIGNIFICANT CHANGE UP (ref 6–8.3)
PROT SERPL-MCNC: 7.2 G/DL — SIGNIFICANT CHANGE UP (ref 6–8.3)
PROT SERPL-MCNC: 7.2 G/DL — SIGNIFICANT CHANGE UP (ref 6–8.3)
PROT SERPL-MCNC: 7.6 G/DL — SIGNIFICANT CHANGE UP (ref 6–8.3)
PROT SERPL-MCNC: 7.6 G/DL — SIGNIFICANT CHANGE UP (ref 6–8.3)
PROT SERPL-MCNC: 7.7 G/DL — SIGNIFICANT CHANGE UP (ref 6–8.3)
PROT SERPL-MCNC: 7.7 G/DL — SIGNIFICANT CHANGE UP (ref 6–8.3)
PROT UR-MCNC: 100 MG/DL
PROT UR-MCNC: 100 MG/DL
PROT UR-MCNC: 300 MG/DL
PROTHROM AB SERPL-ACNC: 18.4 SEC — HIGH (ref 9.5–13)
PROTHROM AB SERPL-ACNC: 18.4 SEC — HIGH (ref 9.5–13)
PROTHROM AB SERPL-ACNC: 22.8 SEC — HIGH (ref 9.5–13)
PROTHROM AB SERPL-ACNC: 22.8 SEC — HIGH (ref 9.5–13)
PROTHROM AB SERPL-ACNC: 23 SEC — HIGH (ref 9.5–13)
PROTHROM AB SERPL-ACNC: 23 SEC — HIGH (ref 9.5–13)
PROTHROM AB SERPL-ACNC: 24 SEC — HIGH (ref 9.5–13)
PROTHROM AB SERPL-ACNC: 24 SEC — HIGH (ref 9.5–13)
PROTHROM AB SERPL-ACNC: 26.3 SEC — HIGH (ref 9.5–13)
PROTHROM AB SERPL-ACNC: 26.3 SEC — HIGH (ref 9.5–13)
PROTHROM AB SERPL-ACNC: 27.5 SEC — HIGH (ref 9.5–13)
PROTHROM AB SERPL-ACNC: 27.5 SEC — HIGH (ref 9.5–13)
PROTHROM AB SERPL-ACNC: 32.1 SEC — HIGH (ref 9.5–13)
PROTHROM AB SERPL-ACNC: 32.1 SEC — HIGH (ref 9.5–13)
PROTHROM AB SERPL-ACNC: 32.3 SEC — HIGH (ref 9.5–13)
PROTHROM AB SERPL-ACNC: 32.3 SEC — HIGH (ref 9.5–13)
PROTHROM AB SERPL-ACNC: 39.8 SEC — HIGH (ref 9.5–13)
PROTHROM AB SERPL-ACNC: 39.8 SEC — HIGH (ref 9.5–13)
PROTHROM AB SERPL-ACNC: 41.2 SEC — HIGH (ref 9.5–13)
PROTHROM AB SERPL-ACNC: 41.2 SEC — HIGH (ref 9.5–13)
PROTHROM AB SERPL-ACNC: 42.2 SEC — HIGH (ref 9.5–13)
PROTHROM AB SERPL-ACNC: 42.2 SEC — HIGH (ref 9.5–13)
PROTHROM AB SERPL-ACNC: 54.2 SEC — HIGH (ref 9.5–13)
PROTHROM AB SERPL-ACNC: 54.8 SEC — HIGH (ref 9.5–13)
PROTHROM AB SERPL-ACNC: 54.9 SEC — HIGH (ref 9.5–13)
PROTHROM AB SERPL-ACNC: 54.9 SEC — HIGH (ref 9.5–13)
PROTHROM AB SERPL-ACNC: 82.5 SEC — HIGH (ref 9.5–13)
PROTHROM AB SERPL-ACNC: 82.5 SEC — HIGH (ref 9.5–13)
RBC # BLD: 3.16 M/UL — LOW (ref 4.2–5.8)
RBC # BLD: 3.16 M/UL — LOW (ref 4.2–5.8)
RBC # BLD: 3.33 M/UL — LOW (ref 4.2–5.8)
RBC # BLD: 3.33 M/UL — LOW (ref 4.2–5.8)
RBC # BLD: 3.37 M/UL — LOW (ref 4.2–5.8)
RBC # BLD: 3.37 M/UL — LOW (ref 4.2–5.8)
RBC # BLD: 3.41 M/UL — LOW (ref 4.2–5.8)
RBC # BLD: 3.41 M/UL — LOW (ref 4.2–5.8)
RBC # BLD: 3.42 M/UL — LOW (ref 4.2–5.8)
RBC # BLD: 3.43 M/UL — LOW (ref 4.2–5.8)
RBC # BLD: 3.44 M/UL — LOW (ref 4.2–5.8)
RBC # BLD: 3.44 M/UL — LOW (ref 4.2–5.8)
RBC # BLD: 3.47 M/UL — LOW (ref 4.2–5.8)
RBC # BLD: 3.47 M/UL — LOW (ref 4.2–5.8)
RBC # BLD: 3.48 M/UL — LOW (ref 4.2–5.8)
RBC # BLD: 3.48 M/UL — LOW (ref 4.2–5.8)
RBC # BLD: 3.5 M/UL — LOW (ref 4.2–5.8)
RBC # BLD: 3.5 M/UL — LOW (ref 4.2–5.8)
RBC # BLD: 3.56 M/UL — LOW (ref 4.2–5.8)
RBC # BLD: 3.61 M/UL — LOW (ref 4.2–5.8)
RBC # BLD: 3.61 M/UL — LOW (ref 4.2–5.8)
RBC # BLD: 3.66 M/UL — LOW (ref 4.2–5.8)
RBC # BLD: 3.66 M/UL — LOW (ref 4.2–5.8)
RBC # BLD: 3.79 M/UL — LOW (ref 4.2–5.8)
RBC # BLD: 3.79 M/UL — LOW (ref 4.2–5.8)
RBC # BLD: 3.8 M/UL — LOW (ref 4.2–5.8)
RBC # BLD: 3.8 M/UL — LOW (ref 4.2–5.8)
RBC # BLD: 3.96 M/UL — LOW (ref 4.2–5.8)
RBC # BLD: 3.96 M/UL — LOW (ref 4.2–5.8)
RBC # BLD: 4.04 M/UL — LOW (ref 4.2–5.8)
RBC # BLD: 4.04 M/UL — LOW (ref 4.2–5.8)
RBC # BLD: 4.25 M/UL — SIGNIFICANT CHANGE UP (ref 4.2–5.8)
RBC # BLD: 4.25 M/UL — SIGNIFICANT CHANGE UP (ref 4.2–5.8)
RBC # BLD: 4.53 M/UL — SIGNIFICANT CHANGE UP (ref 4.2–5.8)
RBC # BLD: 4.53 M/UL — SIGNIFICANT CHANGE UP (ref 4.2–5.8)
RBC # FLD: 13 % — SIGNIFICANT CHANGE UP (ref 10.3–14.5)
RBC # FLD: 13 % — SIGNIFICANT CHANGE UP (ref 10.3–14.5)
RBC # FLD: 13.2 % — SIGNIFICANT CHANGE UP (ref 10.3–14.5)
RBC # FLD: 13.3 % — SIGNIFICANT CHANGE UP (ref 10.3–14.5)
RBC # FLD: 13.4 % — SIGNIFICANT CHANGE UP (ref 10.3–14.5)
RBC # FLD: 13.4 % — SIGNIFICANT CHANGE UP (ref 10.3–14.5)
RBC # FLD: 13.5 % — SIGNIFICANT CHANGE UP (ref 10.3–14.5)
RBC # FLD: 13.7 % — SIGNIFICANT CHANGE UP (ref 10.3–14.5)
RBC # FLD: 13.9 % — SIGNIFICANT CHANGE UP (ref 10.3–14.5)
RBC # FLD: 13.9 % — SIGNIFICANT CHANGE UP (ref 10.3–14.5)
RBC # FLD: 15.4 % — HIGH (ref 10.3–14.5)
RBC # FLD: 15.7 % — HIGH (ref 10.3–14.5)
RBC CASTS # UR COMP ASSIST: 6 /HPF — HIGH (ref 0–4)
RSV RNA NPH QL NAA+NON-PROBE: SIGNIFICANT CHANGE UP
S AUREUS DNA NOSE QL NAA+PROBE: SIGNIFICANT CHANGE UP
S AUREUS DNA NOSE QL NAA+PROBE: SIGNIFICANT CHANGE UP
SARS-COV-2 RNA SPEC QL NAA+PROBE: SIGNIFICANT CHANGE UP
SODIUM SERPL-SCNC: 130 MMOL/L — LOW (ref 135–145)
SODIUM SERPL-SCNC: 133 MMOL/L — LOW (ref 135–145)
SODIUM SERPL-SCNC: 135 MMOL/L — SIGNIFICANT CHANGE UP (ref 135–145)
SODIUM SERPL-SCNC: 136 MMOL/L — SIGNIFICANT CHANGE UP (ref 135–145)
SODIUM SERPL-SCNC: 137 MMOL/L — SIGNIFICANT CHANGE UP (ref 135–145)
SODIUM SERPL-SCNC: 138 MMOL/L — SIGNIFICANT CHANGE UP (ref 135–145)
SODIUM SERPL-SCNC: 138 MMOL/L — SIGNIFICANT CHANGE UP (ref 135–145)
SODIUM SERPL-SCNC: 139 MMOL/L — SIGNIFICANT CHANGE UP (ref 135–145)
SODIUM SERPL-SCNC: 140 MMOL/L — SIGNIFICANT CHANGE UP (ref 135–145)
SODIUM SERPL-SCNC: 140 MMOL/L — SIGNIFICANT CHANGE UP (ref 135–145)
SP GR SPEC: 1.01 — SIGNIFICANT CHANGE UP (ref 1–1.03)
SP GR SPEC: 1.02 — SIGNIFICANT CHANGE UP (ref 1–1.03)
SP GR SPEC: 1.02 — SIGNIFICANT CHANGE UP (ref 1–1.03)
SPECIMEN SOURCE: SIGNIFICANT CHANGE UP
TRIGL SERPL-MCNC: 127 MG/DL — SIGNIFICANT CHANGE UP
TRIGL SERPL-MCNC: 127 MG/DL — SIGNIFICANT CHANGE UP
UROBILINOGEN FLD QL: 0.2 MG/DL — SIGNIFICANT CHANGE UP (ref 0.2–1)
UROBILINOGEN FLD QL: 0.2 MG/DL — SIGNIFICANT CHANGE UP (ref 0.2–1)
UROBILINOGEN FLD QL: 1 MG/DL — SIGNIFICANT CHANGE UP (ref 0.2–1)
VANCOMYCIN TROUGH SERPL-MCNC: 17.8 UG/ML — SIGNIFICANT CHANGE UP (ref 10–20)
VANCOMYCIN TROUGH SERPL-MCNC: 17.8 UG/ML — SIGNIFICANT CHANGE UP (ref 10–20)
WBC # BLD: 10.41 K/UL — SIGNIFICANT CHANGE UP (ref 3.8–10.5)
WBC # BLD: 10.41 K/UL — SIGNIFICANT CHANGE UP (ref 3.8–10.5)
WBC # BLD: 11.52 K/UL — HIGH (ref 3.8–10.5)
WBC # BLD: 11.52 K/UL — HIGH (ref 3.8–10.5)
WBC # BLD: 11.97 K/UL — HIGH (ref 3.8–10.5)
WBC # BLD: 11.97 K/UL — HIGH (ref 3.8–10.5)
WBC # BLD: 12.2 K/UL — HIGH (ref 3.8–10.5)
WBC # BLD: 12.2 K/UL — HIGH (ref 3.8–10.5)
WBC # BLD: 12.28 K/UL — HIGH (ref 3.8–10.5)
WBC # BLD: 12.89 K/UL — HIGH (ref 3.8–10.5)
WBC # BLD: 12.89 K/UL — HIGH (ref 3.8–10.5)
WBC # BLD: 13.37 K/UL — HIGH (ref 3.8–10.5)
WBC # BLD: 13.37 K/UL — HIGH (ref 3.8–10.5)
WBC # BLD: 13.4 K/UL — HIGH (ref 3.8–10.5)
WBC # BLD: 13.4 K/UL — HIGH (ref 3.8–10.5)
WBC # BLD: 14.27 K/UL — HIGH (ref 3.8–10.5)
WBC # BLD: 14.27 K/UL — HIGH (ref 3.8–10.5)
WBC # BLD: 15.76 K/UL — HIGH (ref 3.8–10.5)
WBC # BLD: 15.76 K/UL — HIGH (ref 3.8–10.5)
WBC # BLD: 19.13 K/UL — HIGH (ref 3.8–10.5)
WBC # BLD: 19.13 K/UL — HIGH (ref 3.8–10.5)
WBC # BLD: 21.22 K/UL — HIGH (ref 3.8–10.5)
WBC # BLD: 21.43 K/UL — HIGH (ref 3.8–10.5)
WBC # BLD: 21.43 K/UL — HIGH (ref 3.8–10.5)
WBC # BLD: 22.37 K/UL — HIGH (ref 3.8–10.5)
WBC # BLD: 22.37 K/UL — HIGH (ref 3.8–10.5)
WBC # BLD: 7.72 K/UL — SIGNIFICANT CHANGE UP (ref 3.8–10.5)
WBC # BLD: 7.72 K/UL — SIGNIFICANT CHANGE UP (ref 3.8–10.5)
WBC # BLD: 8.32 K/UL — SIGNIFICANT CHANGE UP (ref 3.8–10.5)
WBC # BLD: 8.32 K/UL — SIGNIFICANT CHANGE UP (ref 3.8–10.5)
WBC # BLD: 9.06 K/UL — SIGNIFICANT CHANGE UP (ref 3.8–10.5)
WBC # BLD: 9.06 K/UL — SIGNIFICANT CHANGE UP (ref 3.8–10.5)
WBC # BLD: 9.18 K/UL — SIGNIFICANT CHANGE UP (ref 3.8–10.5)
WBC # BLD: 9.18 K/UL — SIGNIFICANT CHANGE UP (ref 3.8–10.5)
WBC # BLD: 9.52 K/UL — SIGNIFICANT CHANGE UP (ref 3.8–10.5)
WBC # BLD: 9.52 K/UL — SIGNIFICANT CHANGE UP (ref 3.8–10.5)
WBC # BLD: 9.66 K/UL — SIGNIFICANT CHANGE UP (ref 3.8–10.5)
WBC # BLD: 9.66 K/UL — SIGNIFICANT CHANGE UP (ref 3.8–10.5)
WBC # FLD AUTO: 10.41 K/UL — SIGNIFICANT CHANGE UP (ref 3.8–10.5)
WBC # FLD AUTO: 10.41 K/UL — SIGNIFICANT CHANGE UP (ref 3.8–10.5)
WBC # FLD AUTO: 11.52 K/UL — HIGH (ref 3.8–10.5)
WBC # FLD AUTO: 11.52 K/UL — HIGH (ref 3.8–10.5)
WBC # FLD AUTO: 11.97 K/UL — HIGH (ref 3.8–10.5)
WBC # FLD AUTO: 11.97 K/UL — HIGH (ref 3.8–10.5)
WBC # FLD AUTO: 12.2 K/UL — HIGH (ref 3.8–10.5)
WBC # FLD AUTO: 12.2 K/UL — HIGH (ref 3.8–10.5)
WBC # FLD AUTO: 12.28 K/UL — HIGH (ref 3.8–10.5)
WBC # FLD AUTO: 12.89 K/UL — HIGH (ref 3.8–10.5)
WBC # FLD AUTO: 12.89 K/UL — HIGH (ref 3.8–10.5)
WBC # FLD AUTO: 13.37 K/UL — HIGH (ref 3.8–10.5)
WBC # FLD AUTO: 13.37 K/UL — HIGH (ref 3.8–10.5)
WBC # FLD AUTO: 13.4 K/UL — HIGH (ref 3.8–10.5)
WBC # FLD AUTO: 13.4 K/UL — HIGH (ref 3.8–10.5)
WBC # FLD AUTO: 14.27 K/UL — HIGH (ref 3.8–10.5)
WBC # FLD AUTO: 14.27 K/UL — HIGH (ref 3.8–10.5)
WBC # FLD AUTO: 15.76 K/UL — HIGH (ref 3.8–10.5)
WBC # FLD AUTO: 15.76 K/UL — HIGH (ref 3.8–10.5)
WBC # FLD AUTO: 19.13 K/UL — HIGH (ref 3.8–10.5)
WBC # FLD AUTO: 19.13 K/UL — HIGH (ref 3.8–10.5)
WBC # FLD AUTO: 21.22 K/UL — HIGH (ref 3.8–10.5)
WBC # FLD AUTO: 21.43 K/UL — HIGH (ref 3.8–10.5)
WBC # FLD AUTO: 21.43 K/UL — HIGH (ref 3.8–10.5)
WBC # FLD AUTO: 22.37 K/UL — HIGH (ref 3.8–10.5)
WBC # FLD AUTO: 22.37 K/UL — HIGH (ref 3.8–10.5)
WBC # FLD AUTO: 7.72 K/UL — SIGNIFICANT CHANGE UP (ref 3.8–10.5)
WBC # FLD AUTO: 7.72 K/UL — SIGNIFICANT CHANGE UP (ref 3.8–10.5)
WBC # FLD AUTO: 8.32 K/UL — SIGNIFICANT CHANGE UP (ref 3.8–10.5)
WBC # FLD AUTO: 8.32 K/UL — SIGNIFICANT CHANGE UP (ref 3.8–10.5)
WBC # FLD AUTO: 9.06 K/UL — SIGNIFICANT CHANGE UP (ref 3.8–10.5)
WBC # FLD AUTO: 9.06 K/UL — SIGNIFICANT CHANGE UP (ref 3.8–10.5)
WBC # FLD AUTO: 9.18 K/UL — SIGNIFICANT CHANGE UP (ref 3.8–10.5)
WBC # FLD AUTO: 9.18 K/UL — SIGNIFICANT CHANGE UP (ref 3.8–10.5)
WBC # FLD AUTO: 9.52 K/UL — SIGNIFICANT CHANGE UP (ref 3.8–10.5)
WBC # FLD AUTO: 9.52 K/UL — SIGNIFICANT CHANGE UP (ref 3.8–10.5)
WBC # FLD AUTO: 9.66 K/UL — SIGNIFICANT CHANGE UP (ref 3.8–10.5)
WBC # FLD AUTO: 9.66 K/UL — SIGNIFICANT CHANGE UP (ref 3.8–10.5)
WBC UR QL: >50 /HPF — HIGH (ref 0–5)

## 2024-01-01 PROCEDURE — 99233 SBSQ HOSP IP/OBS HIGH 50: CPT | Mod: GC

## 2024-01-01 PROCEDURE — 97166 OT EVAL MOD COMPLEX 45 MIN: CPT

## 2024-01-01 PROCEDURE — G2211 COMPLEX E/M VISIT ADD ON: CPT

## 2024-01-01 PROCEDURE — 71045 X-RAY EXAM CHEST 1 VIEW: CPT | Mod: 26

## 2024-01-01 PROCEDURE — 73630 X-RAY EXAM OF FOOT: CPT | Mod: 26,LT

## 2024-01-01 PROCEDURE — 99283 EMERGENCY DEPT VISIT LOW MDM: CPT

## 2024-01-01 PROCEDURE — A9570: CPT

## 2024-01-01 PROCEDURE — 93970 EXTREMITY STUDY: CPT

## 2024-01-01 PROCEDURE — 99232 SBSQ HOSP IP/OBS MODERATE 35: CPT

## 2024-01-01 PROCEDURE — 71250 CT THORAX DX C-: CPT | Mod: 26

## 2024-01-01 PROCEDURE — 87641 MR-STAPH DNA AMP PROBE: CPT

## 2024-01-01 PROCEDURE — 36415 COLL VENOUS BLD VENIPUNCTURE: CPT

## 2024-01-01 PROCEDURE — 93005 ELECTROCARDIOGRAM TRACING: CPT

## 2024-01-01 PROCEDURE — 87040 BLOOD CULTURE FOR BACTERIA: CPT

## 2024-01-01 PROCEDURE — 86140 C-REACTIVE PROTEIN: CPT

## 2024-01-01 PROCEDURE — 83605 ASSAY OF LACTIC ACID: CPT

## 2024-01-01 PROCEDURE — 71045 X-RAY EXAM CHEST 1 VIEW: CPT

## 2024-01-01 PROCEDURE — 81001 URINALYSIS AUTO W/SCOPE: CPT

## 2024-01-01 PROCEDURE — 96368 THER/DIAG CONCURRENT INF: CPT

## 2024-01-01 PROCEDURE — 74176 CT ABD & PELVIS W/O CONTRAST: CPT

## 2024-01-01 PROCEDURE — 99222 1ST HOSP IP/OBS MODERATE 55: CPT | Mod: GC

## 2024-01-01 PROCEDURE — 87077 CULTURE AEROBIC IDENTIFY: CPT

## 2024-01-01 PROCEDURE — 85730 THROMBOPLASTIN TIME PARTIAL: CPT

## 2024-01-01 PROCEDURE — 78800 RP LOCLZJ TUM 1 AREA 1 D IMG: CPT | Mod: 26

## 2024-01-01 PROCEDURE — 99203 OFFICE O/P NEW LOW 30 MIN: CPT

## 2024-01-01 PROCEDURE — 85652 RBC SED RATE AUTOMATED: CPT

## 2024-01-01 PROCEDURE — 83036 HEMOGLOBIN GLYCOSYLATED A1C: CPT

## 2024-01-01 PROCEDURE — 87086 URINE CULTURE/COLONY COUNT: CPT

## 2024-01-01 PROCEDURE — 82962 GLUCOSE BLOOD TEST: CPT

## 2024-01-01 PROCEDURE — 78102 BONE MARROW IMAGING LTD: CPT

## 2024-01-01 PROCEDURE — 80048 BASIC METABOLIC PNL TOTAL CA: CPT

## 2024-01-01 PROCEDURE — 99233 SBSQ HOSP IP/OBS HIGH 50: CPT

## 2024-01-01 PROCEDURE — 74176 CT ABD & PELVIS W/O CONTRAST: CPT | Mod: MC

## 2024-01-01 PROCEDURE — 87640 STAPH A DNA AMP PROBE: CPT

## 2024-01-01 PROCEDURE — 51703 INSERT BLADDER CATH COMPLEX: CPT

## 2024-01-01 PROCEDURE — 87186 SC STD MICRODIL/AGAR DIL: CPT

## 2024-01-01 PROCEDURE — 85025 COMPLETE CBC W/AUTO DIFF WBC: CPT

## 2024-01-01 PROCEDURE — 96365 THER/PROPH/DIAG IV INF INIT: CPT

## 2024-01-01 PROCEDURE — 80053 COMPREHEN METABOLIC PANEL: CPT

## 2024-01-01 PROCEDURE — 86850 RBC ANTIBODY SCREEN: CPT

## 2024-01-01 PROCEDURE — 99291 CRITICAL CARE FIRST HOUR: CPT

## 2024-01-01 PROCEDURE — 93970 EXTREMITY STUDY: CPT | Mod: 26

## 2024-01-01 PROCEDURE — 99239 HOSP IP/OBS DSCHRG MGMT >30: CPT

## 2024-01-01 PROCEDURE — 87637 SARSCOV2&INF A&B&RSV AMP PRB: CPT

## 2024-01-01 PROCEDURE — 87150 DNA/RNA AMPLIFIED PROBE: CPT

## 2024-01-01 PROCEDURE — 97110 THERAPEUTIC EXERCISES: CPT

## 2024-01-01 PROCEDURE — 99215 OFFICE O/P EST HI 40 MIN: CPT | Mod: 25

## 2024-01-01 PROCEDURE — 93306 TTE W/DOPPLER COMPLETE: CPT | Mod: 26

## 2024-01-01 PROCEDURE — 80202 ASSAY OF VANCOMYCIN: CPT

## 2024-01-01 PROCEDURE — 83880 ASSAY OF NATRIURETIC PEPTIDE: CPT

## 2024-01-01 PROCEDURE — 99291 CRITICAL CARE FIRST HOUR: CPT | Mod: 25

## 2024-01-01 PROCEDURE — 97162 PT EVAL MOD COMPLEX 30 MIN: CPT

## 2024-01-01 PROCEDURE — 85027 COMPLETE CBC AUTOMATED: CPT

## 2024-01-01 PROCEDURE — 85610 PROTHROMBIN TIME: CPT

## 2024-01-01 PROCEDURE — 85018 HEMOGLOBIN: CPT

## 2024-01-01 PROCEDURE — 76770 US EXAM ABDO BACK WALL COMP: CPT | Mod: 26

## 2024-01-01 PROCEDURE — G2212 PROLONG OUTPT/OFFICE VIS: CPT

## 2024-01-01 PROCEDURE — 93306 TTE W/DOPPLER COMPLETE: CPT

## 2024-01-01 PROCEDURE — 78102 BONE MARROW IMAGING LTD: CPT | Mod: 26

## 2024-01-01 PROCEDURE — 97535 SELF CARE MNGMENT TRAINING: CPT

## 2024-01-01 PROCEDURE — 0225U NFCT DS DNA&RNA 21 SARSCOV2: CPT

## 2024-01-01 PROCEDURE — 83735 ASSAY OF MAGNESIUM: CPT

## 2024-01-01 PROCEDURE — 74176 CT ABD & PELVIS W/O CONTRAST: CPT | Mod: 26

## 2024-01-01 PROCEDURE — 84145 PROCALCITONIN (PCT): CPT

## 2024-01-01 PROCEDURE — 73630 X-RAY EXAM OF FOOT: CPT

## 2024-01-01 PROCEDURE — 99223 1ST HOSP IP/OBS HIGH 75: CPT

## 2024-01-01 PROCEDURE — 86900 BLOOD TYPING SEROLOGIC ABO: CPT

## 2024-01-01 PROCEDURE — 99221 1ST HOSP IP/OBS SF/LOW 40: CPT

## 2024-01-01 PROCEDURE — 80061 LIPID PANEL: CPT

## 2024-01-01 PROCEDURE — 71250 CT THORAX DX C-: CPT

## 2024-01-01 PROCEDURE — 99285 EMERGENCY DEPT VISIT HI MDM: CPT

## 2024-01-01 PROCEDURE — 78800 RP LOCLZJ TUM 1 AREA 1 D IMG: CPT

## 2024-01-01 PROCEDURE — 93010 ELECTROCARDIOGRAM REPORT: CPT

## 2024-01-01 PROCEDURE — 97530 THERAPEUTIC ACTIVITIES: CPT

## 2024-01-01 PROCEDURE — 93925 LOWER EXTREMITY STUDY: CPT | Mod: 26

## 2024-01-01 PROCEDURE — 85014 HEMATOCRIT: CPT

## 2024-01-01 PROCEDURE — 99284 EMERGENCY DEPT VISIT MOD MDM: CPT

## 2024-01-01 PROCEDURE — 92610 EVALUATE SWALLOWING FUNCTION: CPT

## 2024-01-01 PROCEDURE — 93925 LOWER EXTREMITY STUDY: CPT

## 2024-01-01 PROCEDURE — 76770 US EXAM ABDO BACK WALL COMP: CPT

## 2024-01-01 PROCEDURE — 86901 BLOOD TYPING SEROLOGIC RH(D): CPT

## 2024-01-01 RX ORDER — WARFARIN SODIUM 2.5 MG/1
1 TABLET ORAL
Refills: 0 | DISCHARGE

## 2024-01-01 RX ORDER — DOCUSATE SODIUM 100 MG/1
100 CAPSULE ORAL DAILY
Refills: 0 | Status: ACTIVE | COMMUNITY

## 2024-01-01 RX ORDER — CEFEPIME 1 G/1
1000 INJECTION, POWDER, FOR SOLUTION INTRAMUSCULAR; INTRAVENOUS DAILY
Refills: 0 | Status: DISCONTINUED | OUTPATIENT
Start: 2024-01-01 | End: 2024-01-01

## 2024-01-01 RX ORDER — HEPARIN SODIUM 5000 [USP'U]/ML
2500 INJECTION INTRAVENOUS; SUBCUTANEOUS EVERY 6 HOURS
Refills: 0 | Status: DISCONTINUED | OUTPATIENT
Start: 2024-01-01 | End: 2024-01-01

## 2024-01-01 RX ORDER — HEPARIN SODIUM 5000 [USP'U]/ML
0 INJECTION INTRAVENOUS; SUBCUTANEOUS
Qty: 0 | Refills: 0 | DISCHARGE
Start: 2024-01-01

## 2024-01-01 RX ORDER — INSULIN GLARGINE 100 [IU]/ML
10 INJECTION, SOLUTION SUBCUTANEOUS
Qty: 0 | Refills: 0 | DISCHARGE
Start: 2024-01-01

## 2024-01-01 RX ORDER — INSULIN LISPRO 100/ML
VIAL (ML) SUBCUTANEOUS
Refills: 0 | Status: DISCONTINUED | OUTPATIENT
Start: 2024-01-01 | End: 2024-01-01

## 2024-01-01 RX ORDER — MAGNESIUM HYDROXIDE 400 MG/5ML
400 SUSPENSION ORAL
Refills: 0 | Status: ACTIVE | COMMUNITY

## 2024-01-01 RX ORDER — WARFARIN SODIUM 2.5 MG/1
2.5 TABLET ORAL ONCE
Refills: 0 | Status: COMPLETED | OUTPATIENT
Start: 2024-01-01 | End: 2024-01-01

## 2024-01-01 RX ORDER — TAMSULOSIN HYDROCHLORIDE 0.4 MG/1
1 CAPSULE ORAL
Qty: 30 | Refills: 0
Start: 2024-01-01 | End: 2024-01-01

## 2024-01-01 RX ORDER — SODIUM CHLORIDE 9 MG/ML
1000 INJECTION, SOLUTION INTRAVENOUS
Refills: 0 | Status: DISCONTINUED | OUTPATIENT
Start: 2024-01-01 | End: 2024-01-01

## 2024-01-01 RX ORDER — PIPERACILLIN AND TAZOBACTAM 4; .5 G/20ML; G/20ML
3.38 INJECTION, POWDER, LYOPHILIZED, FOR SOLUTION INTRAVENOUS ONCE
Refills: 0 | Status: COMPLETED | OUTPATIENT
Start: 2024-01-01 | End: 2024-01-01

## 2024-01-01 RX ORDER — HEPARIN SODIUM 5000 [USP'U]/ML
5000 INJECTION INTRAVENOUS; SUBCUTANEOUS EVERY 6 HOURS
Refills: 0 | Status: DISCONTINUED | OUTPATIENT
Start: 2024-01-01 | End: 2024-01-01

## 2024-01-01 RX ORDER — DEXTROSE 50 % IN WATER 50 %
25 SYRINGE (ML) INTRAVENOUS ONCE
Refills: 0 | Status: DISCONTINUED | OUTPATIENT
Start: 2024-01-01 | End: 2024-01-01

## 2024-01-01 RX ORDER — RAMIPRIL 5 MG
1 CAPSULE ORAL
Qty: 0 | Refills: 0 | DISCHARGE

## 2024-01-01 RX ORDER — CEFEPIME 1 G/1
1000 INJECTION, POWDER, FOR SOLUTION INTRAMUSCULAR; INTRAVENOUS EVERY 12 HOURS
Refills: 0 | Status: DISCONTINUED | OUTPATIENT
Start: 2024-01-01 | End: 2024-01-01

## 2024-01-01 RX ORDER — CEFEPIME 1 G/1
INJECTION, POWDER, FOR SOLUTION INTRAMUSCULAR; INTRAVENOUS
Refills: 0 | Status: DISCONTINUED | OUTPATIENT
Start: 2024-01-01 | End: 2024-01-01

## 2024-01-01 RX ORDER — METOPROLOL TARTRATE 50 MG
5 TABLET ORAL ONCE
Refills: 0 | Status: COMPLETED | OUTPATIENT
Start: 2024-01-01 | End: 2024-01-01

## 2024-01-01 RX ORDER — SILODOSIN 4 MG/1
1 CAPSULE ORAL
Qty: 0 | Refills: 0 | DISCHARGE

## 2024-01-01 RX ORDER — HEPARIN SODIUM 5000 [USP'U]/ML
5000 INJECTION INTRAVENOUS; SUBCUTANEOUS ONCE
Refills: 0 | Status: COMPLETED | OUTPATIENT
Start: 2024-01-01 | End: 2024-01-01

## 2024-01-01 RX ORDER — ATORVASTATIN CALCIUM 80 MG/1
1 TABLET, FILM COATED ORAL
Qty: 0 | Refills: 0 | DISCHARGE

## 2024-01-01 RX ORDER — LANOLIN ALCOHOL/MO/W.PET/CERES
3 CREAM (GRAM) TOPICAL AT BEDTIME
Refills: 0 | Status: DISCONTINUED | OUTPATIENT
Start: 2024-01-01 | End: 2024-01-01

## 2024-01-01 RX ORDER — DEXTROSE 50 % IN WATER 50 %
12.5 SYRINGE (ML) INTRAVENOUS ONCE
Refills: 0 | Status: DISCONTINUED | OUTPATIENT
Start: 2024-01-01 | End: 2024-01-01

## 2024-01-01 RX ORDER — HEPARIN SODIUM 5000 [USP'U]/ML
2500 INJECTION INTRAVENOUS; SUBCUTANEOUS ONCE
Refills: 0 | Status: DISCONTINUED | OUTPATIENT
Start: 2024-01-01 | End: 2024-01-01

## 2024-01-01 RX ORDER — POTASSIUM CHLORIDE 20 MEQ
40 PACKET (EA) ORAL EVERY 4 HOURS
Refills: 0 | Status: COMPLETED | OUTPATIENT
Start: 2024-01-01 | End: 2024-01-01

## 2024-01-01 RX ORDER — DEXTROSE 50 % IN WATER 50 %
15 SYRINGE (ML) INTRAVENOUS ONCE
Refills: 0 | Status: DISCONTINUED | OUTPATIENT
Start: 2024-01-01 | End: 2024-01-01

## 2024-01-01 RX ORDER — HEPARIN SODIUM 5000 [USP'U]/ML
INJECTION INTRAVENOUS; SUBCUTANEOUS
Qty: 25000 | Refills: 0 | Status: DISCONTINUED | OUTPATIENT
Start: 2024-01-01 | End: 2024-01-01

## 2024-01-01 RX ORDER — AMLODIPINE BESYLATE 5 MG/1
5 TABLET ORAL
Qty: 90 | Refills: 2 | Status: DISCONTINUED | COMMUNITY
Start: 2021-12-20 | End: 2024-01-01

## 2024-01-01 RX ORDER — SODIUM CHLORIDE 9 MG/ML
2300 INJECTION INTRAMUSCULAR; INTRAVENOUS; SUBCUTANEOUS ONCE
Refills: 0 | Status: COMPLETED | OUTPATIENT
Start: 2024-01-01 | End: 2024-01-01

## 2024-01-01 RX ORDER — INSULIN LISPRO 100/ML
VIAL (ML) SUBCUTANEOUS AT BEDTIME
Refills: 0 | Status: DISCONTINUED | OUTPATIENT
Start: 2024-01-01 | End: 2024-01-01

## 2024-01-01 RX ORDER — SODIUM CHLORIDE 9 MG/ML
1000 INJECTION INTRAMUSCULAR; INTRAVENOUS; SUBCUTANEOUS ONCE
Refills: 0 | Status: COMPLETED | OUTPATIENT
Start: 2024-01-01 | End: 2024-01-01

## 2024-01-01 RX ORDER — SODIUM CHLORIDE 9 MG/ML
1000 INJECTION INTRAMUSCULAR; INTRAVENOUS; SUBCUTANEOUS
Refills: 0 | Status: DISCONTINUED | OUTPATIENT
Start: 2024-01-01 | End: 2024-01-01

## 2024-01-01 RX ORDER — GLUCAGON INJECTION, SOLUTION 0.5 MG/.1ML
1 INJECTION, SOLUTION SUBCUTANEOUS ONCE
Refills: 0 | Status: DISCONTINUED | OUTPATIENT
Start: 2024-01-01 | End: 2024-01-01

## 2024-01-01 RX ORDER — NADOLOL 80 MG/1
20 TABLET ORAL DAILY
Refills: 0 | Status: DISCONTINUED | OUTPATIENT
Start: 2024-01-01 | End: 2024-01-01

## 2024-01-01 RX ORDER — NADOLOL 20 MG/1
20 TABLET ORAL
Qty: 90 | Refills: 0 | Status: DISCONTINUED | COMMUNITY
Start: 2020-04-13 | End: 2024-01-01

## 2024-01-01 RX ORDER — CEFEPIME 1 G/1
0 INJECTION, POWDER, FOR SOLUTION INTRAMUSCULAR; INTRAVENOUS
Qty: 0 | Refills: 0 | DISCHARGE
Start: 2024-01-01

## 2024-01-01 RX ORDER — AMLODIPINE BESYLATE 2.5 MG/1
1 TABLET ORAL
Refills: 0 | DISCHARGE

## 2024-01-01 RX ORDER — ACETAMINOPHEN 325 MG/1
325 TABLET ORAL EVERY 6 HOURS
Refills: 0 | Status: ACTIVE | COMMUNITY

## 2024-01-01 RX ORDER — CEFEPIME 1 G/1
1000 INJECTION, POWDER, FOR SOLUTION INTRAMUSCULAR; INTRAVENOUS ONCE
Refills: 0 | Status: COMPLETED | OUTPATIENT
Start: 2024-01-01 | End: 2024-01-01

## 2024-01-01 RX ORDER — ACETAMINOPHEN 500 MG
650 TABLET ORAL EVERY 6 HOURS
Refills: 0 | Status: DISCONTINUED | OUTPATIENT
Start: 2024-01-01 | End: 2024-01-01

## 2024-01-01 RX ORDER — ATORVASTATIN CALCIUM 80 MG/1
10 TABLET, FILM COATED ORAL AT BEDTIME
Refills: 0 | Status: DISCONTINUED | OUTPATIENT
Start: 2024-01-01 | End: 2024-01-01

## 2024-01-01 RX ORDER — INSULIN LISPRO 100/ML
4 VIAL (ML) SUBCUTANEOUS
Qty: 0 | Refills: 0 | DISCHARGE
Start: 2024-01-01

## 2024-01-01 RX ORDER — TAMSULOSIN HYDROCHLORIDE 0.4 MG/1
0.4 CAPSULE ORAL AT BEDTIME
Refills: 0 | Status: DISCONTINUED | OUTPATIENT
Start: 2024-01-01 | End: 2024-01-01

## 2024-01-01 RX ORDER — NADOLOL 80 MG/1
1 TABLET ORAL
Qty: 0 | Refills: 0 | DISCHARGE

## 2024-01-01 RX ORDER — IPRATROPIUM/ALBUTEROL SULFATE 18-103MCG
3 AEROSOL WITH ADAPTER (GRAM) INHALATION EVERY 6 HOURS
Refills: 0 | Status: DISCONTINUED | OUTPATIENT
Start: 2024-01-01 | End: 2024-01-01

## 2024-01-01 RX ORDER — ONDANSETRON 8 MG/1
4 TABLET, FILM COATED ORAL EVERY 8 HOURS
Refills: 0 | Status: DISCONTINUED | OUTPATIENT
Start: 2024-01-01 | End: 2024-01-01

## 2024-01-01 RX ORDER — MAGNESIUM SULFATE 500 MG/ML
2 VIAL (ML) INJECTION ONCE
Refills: 0 | Status: COMPLETED | OUTPATIENT
Start: 2024-01-01 | End: 2024-01-01

## 2024-01-01 RX ORDER — TAMSULOSIN HYDROCHLORIDE 0.4 MG/1
0.4 CAPSULE ORAL
Refills: 0 | Status: ACTIVE | COMMUNITY

## 2024-01-01 RX ORDER — PIPERACILLIN AND TAZOBACTAM 4; .5 G/20ML; G/20ML
3.38 INJECTION, POWDER, LYOPHILIZED, FOR SOLUTION INTRAVENOUS EVERY 8 HOURS
Refills: 0 | Status: DISCONTINUED | OUTPATIENT
Start: 2024-01-01 | End: 2024-01-01

## 2024-01-01 RX ORDER — HEPARIN SODIUM 5000 [USP'U]/ML
900 INJECTION INTRAVENOUS; SUBCUTANEOUS
Qty: 25000 | Refills: 0 | Status: DISCONTINUED | OUTPATIENT
Start: 2024-01-01 | End: 2024-01-01

## 2024-01-01 RX ORDER — RAMIPRIL 5 MG
1 CAPSULE ORAL
Refills: 0 | DISCHARGE

## 2024-01-01 RX ORDER — TAMSULOSIN HYDROCHLORIDE 0.4 MG/1
1 CAPSULE ORAL
Qty: 0 | Refills: 0 | DISCHARGE
Start: 2024-01-01

## 2024-01-01 RX ORDER — CEFTRIAXONE 500 MG/1
1000 INJECTION, POWDER, FOR SOLUTION INTRAMUSCULAR; INTRAVENOUS ONCE
Refills: 0 | Status: COMPLETED | OUTPATIENT
Start: 2024-01-01 | End: 2024-01-01

## 2024-01-01 RX ORDER — STANDARDIZED SENNA CONCENTRATE 8.6 MG/1
8.6 TABLET ORAL
Refills: 0 | Status: ACTIVE | COMMUNITY

## 2024-01-01 RX ADMIN — ATORVASTATIN CALCIUM 10 MILLIGRAM(S): 80 TABLET, FILM COATED ORAL at 21:39

## 2024-01-01 RX ADMIN — Medication 1: at 08:28

## 2024-01-01 RX ADMIN — PIPERACILLIN AND TAZOBACTAM 25 GRAM(S): 4; .5 INJECTION, POWDER, LYOPHILIZED, FOR SOLUTION INTRAVENOUS at 06:24

## 2024-01-01 RX ADMIN — Medication 4 UNIT(S): at 08:45

## 2024-01-01 RX ADMIN — PIPERACILLIN AND TAZOBACTAM 25 GRAM(S): 4; .5 INJECTION, POWDER, LYOPHILIZED, FOR SOLUTION INTRAVENOUS at 06:54

## 2024-01-01 RX ADMIN — CEFTRIAXONE 100 MILLIGRAM(S): 500 INJECTION, POWDER, FOR SOLUTION INTRAMUSCULAR; INTRAVENOUS at 11:41

## 2024-01-01 RX ADMIN — Medication 1: at 12:40

## 2024-01-01 RX ADMIN — CEFEPIME 100 MILLIGRAM(S): 1 INJECTION, POWDER, FOR SOLUTION INTRAMUSCULAR; INTRAVENOUS at 23:05

## 2024-01-01 RX ADMIN — HEPARIN SODIUM 1100 UNIT(S)/HR: 5000 INJECTION INTRAVENOUS; SUBCUTANEOUS at 07:33

## 2024-01-01 RX ADMIN — Medication 4 UNIT(S): at 09:40

## 2024-01-01 RX ADMIN — HEPARIN SODIUM 1200 UNIT(S)/HR: 5000 INJECTION INTRAVENOUS; SUBCUTANEOUS at 23:16

## 2024-01-01 RX ADMIN — INSULIN GLARGINE 10 UNIT(S): 100 INJECTION, SOLUTION SUBCUTANEOUS at 22:15

## 2024-01-01 RX ADMIN — CEFEPIME 100 MILLIGRAM(S): 1 INJECTION, POWDER, FOR SOLUTION INTRAMUSCULAR; INTRAVENOUS at 12:40

## 2024-01-01 RX ADMIN — CEFEPIME 100 MILLIGRAM(S): 1 INJECTION, POWDER, FOR SOLUTION INTRAMUSCULAR; INTRAVENOUS at 23:18

## 2024-01-01 RX ADMIN — NADOLOL 20 MILLIGRAM(S): 80 TABLET ORAL at 06:26

## 2024-01-01 RX ADMIN — NADOLOL 20 MILLIGRAM(S): 80 TABLET ORAL at 05:22

## 2024-01-01 RX ADMIN — HEPARIN SODIUM 1100 UNIT(S)/HR: 5000 INJECTION INTRAVENOUS; SUBCUTANEOUS at 19:12

## 2024-01-01 RX ADMIN — LISINOPRIL 40 MILLIGRAM(S): 2.5 TABLET ORAL at 06:27

## 2024-01-01 RX ADMIN — SODIUM CHLORIDE 75 MILLILITER(S): 9 INJECTION INTRAMUSCULAR; INTRAVENOUS; SUBCUTANEOUS at 06:54

## 2024-01-01 RX ADMIN — Medication 1: at 12:48

## 2024-01-01 RX ADMIN — CEFEPIME 100 MILLIGRAM(S): 1 INJECTION, POWDER, FOR SOLUTION INTRAMUSCULAR; INTRAVENOUS at 11:54

## 2024-01-01 RX ADMIN — NADOLOL 20 MILLIGRAM(S): 80 TABLET ORAL at 05:08

## 2024-01-01 RX ADMIN — Medication 4 UNIT(S): at 17:13

## 2024-01-01 RX ADMIN — Medication 4 UNIT(S): at 12:06

## 2024-01-01 RX ADMIN — Medication 2: at 09:35

## 2024-01-01 RX ADMIN — HEPARIN SODIUM 1100 UNIT(S)/HR: 5000 INJECTION INTRAVENOUS; SUBCUTANEOUS at 22:59

## 2024-01-01 RX ADMIN — ATORVASTATIN CALCIUM 10 MILLIGRAM(S): 80 TABLET, FILM COATED ORAL at 23:06

## 2024-01-01 RX ADMIN — INSULIN GLARGINE 10 UNIT(S): 100 INJECTION, SOLUTION SUBCUTANEOUS at 21:30

## 2024-01-01 RX ADMIN — NADOLOL 20 MILLIGRAM(S): 80 TABLET ORAL at 06:24

## 2024-01-01 RX ADMIN — CEFEPIME 100 MILLIGRAM(S): 1 INJECTION, POWDER, FOR SOLUTION INTRAMUSCULAR; INTRAVENOUS at 11:45

## 2024-01-01 RX ADMIN — PIPERACILLIN AND TAZOBACTAM 25 GRAM(S): 4; .5 INJECTION, POWDER, LYOPHILIZED, FOR SOLUTION INTRAVENOUS at 17:31

## 2024-01-01 RX ADMIN — Medication 250 MILLIGRAM(S): at 20:28

## 2024-01-01 RX ADMIN — TAMSULOSIN HYDROCHLORIDE 0.4 MILLIGRAM(S): 0.4 CAPSULE ORAL at 21:39

## 2024-01-01 RX ADMIN — CEFEPIME 100 MILLIGRAM(S): 1 INJECTION, POWDER, FOR SOLUTION INTRAMUSCULAR; INTRAVENOUS at 17:42

## 2024-01-01 RX ADMIN — PIPERACILLIN AND TAZOBACTAM 200 GRAM(S): 4; .5 INJECTION, POWDER, LYOPHILIZED, FOR SOLUTION INTRAVENOUS at 13:41

## 2024-01-01 RX ADMIN — Medication 4 UNIT(S): at 08:42

## 2024-01-01 RX ADMIN — ATORVASTATIN CALCIUM 10 MILLIGRAM(S): 80 TABLET, FILM COATED ORAL at 21:08

## 2024-01-01 RX ADMIN — HEPARIN SODIUM 1100 UNIT(S)/HR: 5000 INJECTION INTRAVENOUS; SUBCUTANEOUS at 03:39

## 2024-01-01 RX ADMIN — Medication 250 MILLIGRAM(S): at 18:26

## 2024-01-01 RX ADMIN — LISINOPRIL 40 MILLIGRAM(S): 2.5 TABLET ORAL at 05:44

## 2024-01-01 RX ADMIN — Medication 2: at 11:28

## 2024-01-01 RX ADMIN — Medication 4 UNIT(S): at 17:15

## 2024-01-01 RX ADMIN — Medication 1: at 17:30

## 2024-01-01 RX ADMIN — CEFEPIME 100 MILLIGRAM(S): 1 INJECTION, POWDER, FOR SOLUTION INTRAMUSCULAR; INTRAVENOUS at 12:57

## 2024-01-01 RX ADMIN — HEPARIN SODIUM 1100 UNIT(S)/HR: 5000 INJECTION INTRAVENOUS; SUBCUTANEOUS at 15:07

## 2024-01-01 RX ADMIN — HEPARIN SODIUM 1100 UNIT(S)/HR: 5000 INJECTION INTRAVENOUS; SUBCUTANEOUS at 19:42

## 2024-01-01 RX ADMIN — WARFARIN SODIUM 2.5 MILLIGRAM(S): 2.5 TABLET ORAL at 12:40

## 2024-01-01 RX ADMIN — HEPARIN SODIUM 1100 UNIT(S)/HR: 5000 INJECTION INTRAVENOUS; SUBCUTANEOUS at 15:34

## 2024-01-01 RX ADMIN — TAMSULOSIN HYDROCHLORIDE 0.4 MILLIGRAM(S): 0.4 CAPSULE ORAL at 21:12

## 2024-01-01 RX ADMIN — HEPARIN SODIUM 1100 UNIT(S)/HR: 5000 INJECTION INTRAVENOUS; SUBCUTANEOUS at 15:55

## 2024-01-01 RX ADMIN — CEFEPIME 100 MILLIGRAM(S): 1 INJECTION, POWDER, FOR SOLUTION INTRAMUSCULAR; INTRAVENOUS at 11:28

## 2024-01-01 RX ADMIN — SODIUM CHLORIDE 75 MILLILITER(S): 9 INJECTION INTRAMUSCULAR; INTRAVENOUS; SUBCUTANEOUS at 22:00

## 2024-01-01 RX ADMIN — ATORVASTATIN CALCIUM 10 MILLIGRAM(S): 80 TABLET, FILM COATED ORAL at 21:12

## 2024-01-01 RX ADMIN — SODIUM CHLORIDE 75 MILLILITER(S): 9 INJECTION, SOLUTION INTRAVENOUS at 13:28

## 2024-01-01 RX ADMIN — INSULIN GLARGINE 10 UNIT(S): 100 INJECTION, SOLUTION SUBCUTANEOUS at 21:10

## 2024-01-01 RX ADMIN — LISINOPRIL 40 MILLIGRAM(S): 2.5 TABLET ORAL at 05:08

## 2024-01-01 RX ADMIN — WARFARIN SODIUM 2.5 MILLIGRAM(S): 2.5 TABLET ORAL at 21:08

## 2024-01-01 RX ADMIN — Medication 4 UNIT(S): at 08:48

## 2024-01-01 RX ADMIN — CEFEPIME 100 MILLIGRAM(S): 1 INJECTION, POWDER, FOR SOLUTION INTRAMUSCULAR; INTRAVENOUS at 12:00

## 2024-01-01 RX ADMIN — CEFEPIME 100 MILLIGRAM(S): 1 INJECTION, POWDER, FOR SOLUTION INTRAMUSCULAR; INTRAVENOUS at 12:28

## 2024-01-01 RX ADMIN — NADOLOL 20 MILLIGRAM(S): 80 TABLET ORAL at 06:32

## 2024-01-01 RX ADMIN — NADOLOL 20 MILLIGRAM(S): 80 TABLET ORAL at 06:27

## 2024-01-01 RX ADMIN — PIPERACILLIN AND TAZOBACTAM 25 GRAM(S): 4; .5 INJECTION, POWDER, LYOPHILIZED, FOR SOLUTION INTRAVENOUS at 22:14

## 2024-01-01 RX ADMIN — INSULIN GLARGINE 10 UNIT(S): 100 INJECTION, SOLUTION SUBCUTANEOUS at 21:29

## 2024-01-01 RX ADMIN — Medication 1: at 12:28

## 2024-01-01 RX ADMIN — ATORVASTATIN CALCIUM 10 MILLIGRAM(S): 80 TABLET, FILM COATED ORAL at 21:30

## 2024-01-01 RX ADMIN — HEPARIN SODIUM 1200 UNIT(S)/HR: 5000 INJECTION INTRAVENOUS; SUBCUTANEOUS at 06:47

## 2024-01-01 RX ADMIN — HEPARIN SODIUM 1200 UNIT(S)/HR: 5000 INJECTION INTRAVENOUS; SUBCUTANEOUS at 07:17

## 2024-01-01 RX ADMIN — Medication 4 UNIT(S): at 12:02

## 2024-01-01 RX ADMIN — ATORVASTATIN CALCIUM 10 MILLIGRAM(S): 80 TABLET, FILM COATED ORAL at 22:01

## 2024-01-01 RX ADMIN — HEPARIN SODIUM 1100 UNIT(S)/HR: 5000 INJECTION INTRAVENOUS; SUBCUTANEOUS at 19:09

## 2024-01-01 RX ADMIN — Medication 1: at 08:30

## 2024-01-01 RX ADMIN — CEFEPIME 100 MILLIGRAM(S): 1 INJECTION, POWDER, FOR SOLUTION INTRAMUSCULAR; INTRAVENOUS at 12:03

## 2024-01-01 RX ADMIN — NADOLOL 20 MILLIGRAM(S): 80 TABLET ORAL at 05:54

## 2024-01-01 RX ADMIN — WARFARIN SODIUM 2.5 MILLIGRAM(S): 2.5 TABLET ORAL at 22:53

## 2024-01-01 RX ADMIN — ATORVASTATIN CALCIUM 10 MILLIGRAM(S): 80 TABLET, FILM COATED ORAL at 21:13

## 2024-01-01 RX ADMIN — PIPERACILLIN AND TAZOBACTAM 25 GRAM(S): 4; .5 INJECTION, POWDER, LYOPHILIZED, FOR SOLUTION INTRAVENOUS at 14:52

## 2024-01-01 RX ADMIN — SODIUM CHLORIDE 2300 MILLILITER(S): 9 INJECTION INTRAMUSCULAR; INTRAVENOUS; SUBCUTANEOUS at 11:40

## 2024-01-01 RX ADMIN — TAMSULOSIN HYDROCHLORIDE 0.4 MILLIGRAM(S): 0.4 CAPSULE ORAL at 21:13

## 2024-01-01 RX ADMIN — CEFEPIME 100 MILLIGRAM(S): 1 INJECTION, POWDER, FOR SOLUTION INTRAMUSCULAR; INTRAVENOUS at 12:48

## 2024-01-01 RX ADMIN — SODIUM CHLORIDE 75 MILLILITER(S): 9 INJECTION INTRAMUSCULAR; INTRAVENOUS; SUBCUTANEOUS at 15:38

## 2024-01-01 RX ADMIN — CEFEPIME 100 MILLIGRAM(S): 1 INJECTION, POWDER, FOR SOLUTION INTRAMUSCULAR; INTRAVENOUS at 23:39

## 2024-01-01 RX ADMIN — HEPARIN SODIUM 1100 UNIT(S)/HR: 5000 INJECTION INTRAVENOUS; SUBCUTANEOUS at 10:15

## 2024-01-01 RX ADMIN — ATORVASTATIN CALCIUM 10 MILLIGRAM(S): 80 TABLET, FILM COATED ORAL at 22:13

## 2024-01-01 RX ADMIN — HEPARIN SODIUM 1100 UNIT(S)/HR: 5000 INJECTION INTRAVENOUS; SUBCUTANEOUS at 08:22

## 2024-01-01 RX ADMIN — TAMSULOSIN HYDROCHLORIDE 0.4 MILLIGRAM(S): 0.4 CAPSULE ORAL at 21:08

## 2024-01-01 RX ADMIN — HEPARIN SODIUM 1100 UNIT(S)/HR: 5000 INJECTION INTRAVENOUS; SUBCUTANEOUS at 16:21

## 2024-01-01 RX ADMIN — NADOLOL 20 MILLIGRAM(S): 80 TABLET ORAL at 05:44

## 2024-01-01 RX ADMIN — TAMSULOSIN HYDROCHLORIDE 0.4 MILLIGRAM(S): 0.4 CAPSULE ORAL at 23:06

## 2024-01-01 RX ADMIN — CEFTRIAXONE 1000 MILLIGRAM(S): 500 INJECTION, POWDER, FOR SOLUTION INTRAMUSCULAR; INTRAVENOUS at 12:01

## 2024-01-01 RX ADMIN — HEPARIN SODIUM 1100 UNIT(S)/HR: 5000 INJECTION INTRAVENOUS; SUBCUTANEOUS at 07:35

## 2024-01-01 RX ADMIN — CEFEPIME 100 MILLIGRAM(S): 1 INJECTION, POWDER, FOR SOLUTION INTRAMUSCULAR; INTRAVENOUS at 23:16

## 2024-01-01 RX ADMIN — CEFEPIME 100 MILLIGRAM(S): 1 INJECTION, POWDER, FOR SOLUTION INTRAMUSCULAR; INTRAVENOUS at 11:06

## 2024-01-01 RX ADMIN — Medication 4 UNIT(S): at 17:33

## 2024-01-01 RX ADMIN — SODIUM CHLORIDE 1000 MILLILITER(S): 9 INJECTION INTRAMUSCULAR; INTRAVENOUS; SUBCUTANEOUS at 14:12

## 2024-01-01 RX ADMIN — Medication 1: at 08:29

## 2024-01-01 RX ADMIN — WARFARIN SODIUM 2.5 MILLIGRAM(S): 2.5 TABLET ORAL at 21:39

## 2024-01-01 RX ADMIN — HEPARIN SODIUM 900 UNIT(S)/HR: 5000 INJECTION INTRAVENOUS; SUBCUTANEOUS at 08:06

## 2024-01-01 RX ADMIN — PIPERACILLIN AND TAZOBACTAM 25 GRAM(S): 4; .5 INJECTION, POWDER, LYOPHILIZED, FOR SOLUTION INTRAVENOUS at 21:28

## 2024-01-01 RX ADMIN — SODIUM CHLORIDE 2300 MILLILITER(S): 9 INJECTION INTRAMUSCULAR; INTRAVENOUS; SUBCUTANEOUS at 13:25

## 2024-01-01 RX ADMIN — PIPERACILLIN AND TAZOBACTAM 25 GRAM(S): 4; .5 INJECTION, POWDER, LYOPHILIZED, FOR SOLUTION INTRAVENOUS at 15:36

## 2024-01-01 RX ADMIN — ATORVASTATIN CALCIUM 10 MILLIGRAM(S): 80 TABLET, FILM COATED ORAL at 21:38

## 2024-01-01 RX ADMIN — TAMSULOSIN HYDROCHLORIDE 0.4 MILLIGRAM(S): 0.4 CAPSULE ORAL at 22:01

## 2024-01-01 RX ADMIN — HEPARIN SODIUM 1100 UNIT(S)/HR: 5000 INJECTION INTRAVENOUS; SUBCUTANEOUS at 19:14

## 2024-01-01 RX ADMIN — TAMSULOSIN HYDROCHLORIDE 0.4 MILLIGRAM(S): 0.4 CAPSULE ORAL at 22:12

## 2024-01-01 RX ADMIN — SODIUM CHLORIDE 75 MILLILITER(S): 9 INJECTION INTRAMUSCULAR; INTRAVENOUS; SUBCUTANEOUS at 14:11

## 2024-01-01 RX ADMIN — SODIUM CHLORIDE 75 MILLILITER(S): 9 INJECTION INTRAMUSCULAR; INTRAVENOUS; SUBCUTANEOUS at 17:31

## 2024-01-01 RX ADMIN — Medication 40 MILLIEQUIVALENT(S): at 17:45

## 2024-01-01 RX ADMIN — TAMSULOSIN HYDROCHLORIDE 0.4 MILLIGRAM(S): 0.4 CAPSULE ORAL at 22:53

## 2024-01-01 RX ADMIN — HEPARIN SODIUM 2500 UNIT(S): 5000 INJECTION INTRAVENOUS; SUBCUTANEOUS at 23:20

## 2024-01-01 RX ADMIN — ATORVASTATIN CALCIUM 10 MILLIGRAM(S): 80 TABLET, FILM COATED ORAL at 21:28

## 2024-01-01 RX ADMIN — Medication 1: at 12:56

## 2024-01-01 RX ADMIN — ATORVASTATIN CALCIUM 10 MILLIGRAM(S): 80 TABLET, FILM COATED ORAL at 22:15

## 2024-01-01 RX ADMIN — Medication 5 MILLIGRAM(S): at 13:41

## 2024-01-01 RX ADMIN — Medication 250 MILLIGRAM(S): at 20:01

## 2024-01-01 RX ADMIN — SODIUM CHLORIDE 75 MILLILITER(S): 9 INJECTION, SOLUTION INTRAVENOUS at 12:41

## 2024-01-01 RX ADMIN — HEPARIN SODIUM 1100 UNIT(S)/HR: 5000 INJECTION INTRAVENOUS; SUBCUTANEOUS at 10:41

## 2024-01-01 RX ADMIN — ATORVASTATIN CALCIUM 10 MILLIGRAM(S): 80 TABLET, FILM COATED ORAL at 21:10

## 2024-01-01 RX ADMIN — CEFEPIME 100 MILLIGRAM(S): 1 INJECTION, POWDER, FOR SOLUTION INTRAMUSCULAR; INTRAVENOUS at 00:14

## 2024-01-01 RX ADMIN — PIPERACILLIN AND TAZOBACTAM 25 GRAM(S): 4; .5 INJECTION, POWDER, LYOPHILIZED, FOR SOLUTION INTRAVENOUS at 00:43

## 2024-01-01 RX ADMIN — TAMSULOSIN HYDROCHLORIDE 0.4 MILLIGRAM(S): 0.4 CAPSULE ORAL at 21:38

## 2024-01-01 RX ADMIN — PIPERACILLIN AND TAZOBACTAM 25 GRAM(S): 4; .5 INJECTION, POWDER, LYOPHILIZED, FOR SOLUTION INTRAVENOUS at 05:44

## 2024-01-01 RX ADMIN — Medication 40 MILLIEQUIVALENT(S): at 14:09

## 2024-01-01 RX ADMIN — HEPARIN SODIUM 1100 UNIT(S)/HR: 5000 INJECTION INTRAVENOUS; SUBCUTANEOUS at 07:19

## 2024-01-01 RX ADMIN — SODIUM CHLORIDE 75 MILLILITER(S): 9 INJECTION, SOLUTION INTRAVENOUS at 05:54

## 2024-01-01 RX ADMIN — HEPARIN SODIUM 1100 UNIT(S)/HR: 5000 INJECTION INTRAVENOUS; SUBCUTANEOUS at 11:46

## 2024-01-01 RX ADMIN — ATORVASTATIN CALCIUM 10 MILLIGRAM(S): 80 TABLET, FILM COATED ORAL at 22:53

## 2024-01-01 RX ADMIN — PIPERACILLIN AND TAZOBACTAM 25 GRAM(S): 4; .5 INJECTION, POWDER, LYOPHILIZED, FOR SOLUTION INTRAVENOUS at 05:08

## 2024-01-01 RX ADMIN — Medication 4 UNIT(S): at 12:13

## 2024-01-01 RX ADMIN — SODIUM CHLORIDE 65 MILLILITER(S): 9 INJECTION, SOLUTION INTRAVENOUS at 19:42

## 2024-01-01 RX ADMIN — HEPARIN SODIUM 5000 UNIT(S): 5000 INJECTION INTRAVENOUS; SUBCUTANEOUS at 16:20

## 2024-01-01 RX ADMIN — Medication 25 GRAM(S): at 17:23

## 2024-01-01 RX ADMIN — WARFARIN SODIUM 2.5 MILLIGRAM(S): 2.5 TABLET ORAL at 23:06

## 2024-01-01 RX ADMIN — Medication 1: at 12:13

## 2024-01-01 NOTE — OCCUPATIONAL THERAPY INITIAL EVALUATION ADULT - NSACTIVITYREC_GEN_A_OT
Recommend subacute rehab upon discharge to improve functional independence and decrease risk of falls. Recommend 1x assist with RW for all functional transfers and ADLs in hospital setting.

## 2024-01-01 NOTE — PHYSICAL THERAPY INITIAL EVALUATION ADULT - PERTINENT HX OF CURRENT PROBLEM, REHAB EVAL
89yo M with history of  HTN, HLD, LBBB, paroxismal Atrial Fibrillation, thoracic Ao aneurysm and s/p mechanical AVR presented to urgent care for left foot wound. Per roomate, and further confirmed with only son Mr Bogdan Jefferson, earlier today patient complained of left foot pain, roommate took sock of and realized of left dorsum black scar, unable to date when lesion started. Patient taken to urgent care and referred for ED evaluation. Patient evaluated at bedside denies bleeding episodes, headaches, chest pain, abdominal pain. Son contacted and confirmed what was stated above, unable to provide more information for present illness.  Per outpatient chart review, patient recently seen and followed by cardiology for cardiovascular comorbidities, noticed stable INR for the most part. On recent blood work from 10/2023 noticed HbA1c 6.7% but no primary care provider following chronic care. 89yo M with history of  HTN, HLD, LBBB, paroxismal Atrial Fibrillation, thoracic Ao aneurysm and s/p mechanical AVR presented to urgent care for left foot wound. Per roomate, and further confirmed with only son Mr Bogdan Jefferson, earlier today patient complained of left foot pain, roommate took sock of and realized of left dorsum black scar, unable to date when lesion started. Patient taken to urgent care and referred for ED evaluation. Patient evaluated at bedside denies bleeding episodes, headaches, chest pain, abdominal pain. Son contacted and confirmed what was stated above, unable to provide more information for present illness.  Per outpatient chart review, patient recently seen and followed by cardiology for cardiovascular comorbidities, noticed stable INR for the most part. On recent blood work from 10/2023 noticed HbA1c 6.7% but no primary care provider following chronic care.

## 2024-01-01 NOTE — OCCUPATIONAL THERAPY INITIAL EVALUATION ADULT - PERTINENT HX OF CURRENT PROBLEM, REHAB EVAL
Pt is a 89 y/o M with history of  HTN, HLD, LBBB, paroxismal Atrial Fibrillation, thoracic Ao aneurysm and s/p mechanical AVR and diabetes mellitus type 2 presents for left foot wound. Admitted for medical management of cellulitis Pt is a 89 y/o M with history of  HTN, HLD, LBBB, paroxismal Atrial Fibrillation, thoracic Ao aneurysm and s/p mechanical AVR and diabetes mellitus type 2 presents for left foot wound. Admitted for medical management of cellulitis

## 2024-01-01 NOTE — PROGRESS NOTE ADULT - PROBLEM SELECTOR PLAN 7
-Goals of care discussion initiated with son Mr Scottie Jefferson who will speak to father about advance directives  -Currently no advance directives and non-defined code status

## 2024-01-01 NOTE — PROGRESS NOTE ADULT - ASSESSMENT
89yo M with history of  HTN, HLD, LBBB, paroxismal Atrial Fibrillation, thoracic Ao aneurysm and s/p mechanical AVR and diabetes mellitus type 2 presents for left foot wound. Admitted for medical management of cellulitis 89yo M with history of  HTN, HLD, LBBB, paroxismal Atrial Fibrillation, thoracic Ao aneurysm and s/p mechanical AVR and diabetes mellitus type 2 presents for left foot wound. Admitted for medical management of cellulitis

## 2024-01-01 NOTE — PROGRESS NOTE ADULT - SUBJECTIVE AND OBJECTIVE BOX
89yo M with history of  HTN, HLD, LBBB, paroxismal Atrial Fibrillation, thoracic Ao aneurysm and s/p mechanical AVR and diabetes mellitus type 2 presents for left foot wound. Patient complained of left foot pain, roommate suggested he take his sock out to discover a left dorsum foot black scar and surrounding erythema. Initially seeked urgent care evaluation, posteriorly sent to ED for management. On ED patient obtained blood cultures and started on empiric antibiotics. Left foot xray revealed moderate hallux valgus with some degeneration and mild first IP degeneration. No bone destruction or fracture is evident. Also, arterial calcifications noted. Admitted for medical management of cellulitis      INTERVAL HPI/OVERNIGHT EVENTS: Patient seen and examined at bedside. No overnight events.    MEDICATIONS  (STANDING):  atorvastatin 10 milliGRAM(s) Oral at bedtime  dextrose 5%. 1000 milliLiter(s) (50 mL/Hr) IV Continuous <Continuous>  dextrose 5%. 1000 milliLiter(s) (100 mL/Hr) IV Continuous <Continuous>  dextrose 50% Injectable 12.5 Gram(s) IV Push once  dextrose 50% Injectable 25 Gram(s) IV Push once  dextrose 50% Injectable 25 Gram(s) IV Push once  glucagon  Injectable 1 milliGRAM(s) IntraMuscular once  influenza  Vaccine (HIGH DOSE) 0.7 milliLiter(s) IntraMuscular once  insulin glargine Injectable (LANTUS) 10 Unit(s) SubCutaneous at bedtime  insulin lispro Injectable (ADMELOG) 4 Unit(s) SubCutaneous three times a day before meals  lactated ringers. 1000 milliLiter(s) (75 mL/Hr) IV Continuous <Continuous>  lisinopril 40 milliGRAM(s) Oral daily  nadolol 20 milliGRAM(s) Oral daily  piperacillin/tazobactam IVPB.. 3.375 Gram(s) IV Intermittent every 8 hours  vancomycin  IVPB 750 milliGRAM(s) IV Intermittent every 24 hours  vancomycin  IVPB        MEDICATIONS  (PRN):  acetaminophen     Tablet .. 650 milliGRAM(s) Oral every 6 hours PRN Temp greater or equal to 38C (100.4F), Mild Pain (1 - 3)  aluminum hydroxide/magnesium hydroxide/simethicone Suspension 30 milliLiter(s) Oral every 4 hours PRN Dyspepsia  dextrose Oral Gel 15 Gram(s) Oral once PRN Blood Glucose LESS THAN 70 milliGRAM(s)/deciliter  melatonin 3 milliGRAM(s) Oral at bedtime PRN Insomnia  ondansetron Injectable 4 milliGRAM(s) IV Push every 8 hours PRN Nausea and/or Vomiting      Allergies    No Known Allergies    Intolerances        REVIEW OF SYSTEMS:  CONSTITUTIONAL: No fever or chills  HEENT:  No headache, no sore throat, mild secretion of right eye  RESPIRATORY: No cough, wheezing, or shortness of breath  CARDIOVASCULAR: No chest pain, palpitations  GASTROINTESTINAL: No abd pain, nausea, vomiting, or diarrhea  GENITOURINARY: No dysuria, frequency, or hematuria  NEUROLOGICAL: no focal weakness or dizziness  MUSCULOSKELETAL: no myalgias   SKIN: left dorsum black scar 6x8cm, with surrounding erythema and mild left foot swelling, no active drainage, non tender, pulses palpable.    Vital Signs Last 24 Hrs  T(C): 36.8 (01 Jan 2024 18:25), Max: 36.8 (01 Jan 2024 18:25)  T(F): 98.3 (01 Jan 2024 18:25), Max: 98.3 (01 Jan 2024 18:25)  HR: 97 (01 Jan 2024 18:25) (86 - 98)  BP: 122/73 (01 Jan 2024 18:25) (122/73 - 152/87)  BP(mean): --  RR: 15 (01 Jan 2024 18:25) (15 - 16)  SpO2: 98% (01 Jan 2024 18:25) (95% - 99%)    Parameters below as of 01 Jan 2024 18:25  Patient On (Oxygen Delivery Method): room air      I&O's Summary    BMI (kg/m2): 23.9 (12-31-23 @ 12:27)    PHYSICAL EXAM:  GENERAL: NAD  HEENT:  AT/NC, anicteric, moist mucous membranes, EOMI, PERRL, no lid-lag, conjunctiva and sclera clear  CHEST/LUNG:  CTA b/l, no rales, wheezes, or rhonchi,  normal respiratory effort, no intercostal retractions  HEART:  RRR, S1, S2, no murmurs; no pitting edema  ABDOMEN:  BS+, soft, nontender, nondistended; No HSM  MSK/EXTREMITIES: 2+ peripheral pulses, no clubbing or cyanosis  NERVOUS SYSTEM: answers questions and follows commands appropriately, A&Ox3 grossly moves all extremities   PSYCH: Appropriate affect, Alert & Awake; Good judgement      LABS: Personally reviewed  CBC                        13.8   13.37 )-----------( 254      ( 01 Jan 2024 07:10 )             39.9     CMP  01-01    139  |  101  |  31  ----------------------------<  127  3.6   |  28  |  1.34    Ca    9.2      01 Jan 2024 07:10    TPro  7.7  /  Alb  2.8  /  TBili  0.7  /  DBili  x   /  AST  20  /  ALT  18  /  AlkPhos  80  01-01          PT/INR - ( 01 Jan 2024 06:35 )   PT: 82.5 sec;   INR: 7.68 ratio         PTT - ( 01 Jan 2024 06:35 )  PTT:54.1 sec  Lactate, Blood: 1.1 mmol/L (12-31 @ 13:10)          01-01 Chol 129 mg/dL LDL -- HDL 26 mg/dL Trig 127 mg/dL              POCT Blood Glucose.: 148 mg/dL (01 Jan 2024 11:58)  POCT Blood Glucose.: 140 mg/dL (01 Jan 2024 09:20)  POCT Blood Glucose.: 182 mg/dL (31 Dec 2023 22:06)      Urinalysis Basic - ( 01 Jan 2024 07:10 )    Color: x / Appearance: x / SG: x / pH: x  Gluc: 127 mg/dL / Ketone: x  / Bili: x / Urobili: x   Blood: x / Protein: x / Nitrite: x   Leuk Esterase: x / RBC: x / WBC x   Sq Epi: x / Non Sq Epi: x / Bacteria: x        Culture - Urine (collected 31 Dec 2023 13:38)  Source: Clean Catch Clean Catch (Midstream)  Final Report (01 Jan 2024 19:45):    <10,000 CFU/mL Normal Urogenital Christy            Culture - Urine (collected 12-31-23 @ 13:38)  Source: Clean Catch Clean Catch (Midstream)  Final Report (01-01-24 @ 19:45):    <10,000 CFU/mL Normal Urogenital Christy        RADIOLOGY & ADDITIONAL TESTS: Personally reviewed.     Consultant(s) Notes Reviewed:  [x] YES  [ ] NO   Discussed with SW/CM, RN     Xray Foot AP + Lateral + Oblique, Left    There is a moderate hallux valgus with some degeneration and there is   mild first IP degeneration. No bone destruction or fracture is evident.   Arterial calcifications noted.    IMPRESSION: No acute finding of the chest or left foot.     89yo M with history of  HTN, HLD, LBBB, paroxismal Atrial Fibrillation, thoracic Ao aneurysm and s/p mechanical AVR and diabetes mellitus type 2 presents for left foot wound. Patient complained of left foot pain, roommate suggested he take his sock out to discover a left dorsum foot black scar and surrounding erythema. Initially seeked urgent care evaluation, posteriorly sent to ED for management. On ED patient obtained blood cultures and started on empiric antibiotics. Left foot xray revealed moderate hallux valgus with some degeneration and mild first IP degeneration. No bone destruction or fracture is evident. Also, arterial calcifications noted. Admitted for medical management of cellulitis      INTERVAL HPI/OVERNIGHT EVENTS: Patient seen and examined at bedside. No overnight events.    MEDICATIONS  (STANDING):  atorvastatin 10 milliGRAM(s) Oral at bedtime  dextrose 5%. 1000 milliLiter(s) (50 mL/Hr) IV Continuous <Continuous>  dextrose 5%. 1000 milliLiter(s) (100 mL/Hr) IV Continuous <Continuous>  dextrose 50% Injectable 12.5 Gram(s) IV Push once  dextrose 50% Injectable 25 Gram(s) IV Push once  dextrose 50% Injectable 25 Gram(s) IV Push once  glucagon  Injectable 1 milliGRAM(s) IntraMuscular once  influenza  Vaccine (HIGH DOSE) 0.7 milliLiter(s) IntraMuscular once  insulin glargine Injectable (LANTUS) 10 Unit(s) SubCutaneous at bedtime  insulin lispro Injectable (ADMELOG) 4 Unit(s) SubCutaneous three times a day before meals  lactated ringers. 1000 milliLiter(s) (75 mL/Hr) IV Continuous <Continuous>  lisinopril 40 milliGRAM(s) Oral daily  nadolol 20 milliGRAM(s) Oral daily  piperacillin/tazobactam IVPB.. 3.375 Gram(s) IV Intermittent every 8 hours  vancomycin  IVPB 750 milliGRAM(s) IV Intermittent every 24 hours  vancomycin  IVPB        MEDICATIONS  (PRN):  acetaminophen     Tablet .. 650 milliGRAM(s) Oral every 6 hours PRN Temp greater or equal to 38C (100.4F), Mild Pain (1 - 3)  aluminum hydroxide/magnesium hydroxide/simethicone Suspension 30 milliLiter(s) Oral every 4 hours PRN Dyspepsia  dextrose Oral Gel 15 Gram(s) Oral once PRN Blood Glucose LESS THAN 70 milliGRAM(s)/deciliter  melatonin 3 milliGRAM(s) Oral at bedtime PRN Insomnia  ondansetron Injectable 4 milliGRAM(s) IV Push every 8 hours PRN Nausea and/or Vomiting      Allergies    No Known Allergies    Intolerances        REVIEW OF SYSTEMS:  CONSTITUTIONAL: No fever or chills  HEENT:  No headache, no sore throat, mild secretion of right eye  RESPIRATORY: No cough, wheezing, or shortness of breath  CARDIOVASCULAR: No chest pain, palpitations  GASTROINTESTINAL: No abd pain, nausea, vomiting, or diarrhea  GENITOURINARY: No dysuria, frequency, or hematuria  NEUROLOGICAL: no focal weakness or dizziness  MUSCULOSKELETAL: no myalgias   SKIN: left dorsum black scar 6x8cm, with surrounding erythema and mild left foot swelling, no active drainage, non tender, pulses palpable.    Vital Signs Last 24 Hrs  T(C): 36.8 (01 Jan 2024 18:25), Max: 36.8 (01 Jan 2024 18:25)  T(F): 98.3 (01 Jan 2024 18:25), Max: 98.3 (01 Jan 2024 18:25)  HR: 97 (01 Jan 2024 18:25) (86 - 98)  BP: 122/73 (01 Jan 2024 18:25) (122/73 - 152/87)  BP(mean): --  RR: 15 (01 Jan 2024 18:25) (15 - 16)  SpO2: 98% (01 Jan 2024 18:25) (95% - 99%)    Parameters below as of 01 Jan 2024 18:25  Patient On (Oxygen Delivery Method): room air      I&O's Summary    BMI (kg/m2): 23.9 (12-31-23 @ 12:27)    PHYSICAL EXAM:  GENERAL: NAD  HEENT:  AT/NC, anicteric, moist mucous membranes, EOMI, PERRL, no lid-lag, conjunctiva and sclera clear  CHEST/LUNG:  CTA b/l, no rales, wheezes, or rhonchi,  normal respiratory effort, no intercostal retractions  HEART:  RRR, S1, S2, no murmurs; no pitting edema  ABDOMEN:  BS+, soft, nontender, nondistended; No HSM  MSK/EXTREMITIES: 2+ peripheral pulses, no clubbing or cyanosis  NERVOUS SYSTEM: answers questions and follows commands appropriately, A&Ox3 grossly moves all extremities   PSYCH: Appropriate affect, Alert & Awake; Good judgement      LABS: Personally reviewed  CBC                        13.8   13.37 )-----------( 254      ( 01 Jan 2024 07:10 )             39.9     CMP  01-01    139  |  101  |  31  ----------------------------<  127  3.6   |  28  |  1.34    Ca    9.2      01 Jan 2024 07:10    TPro  7.7  /  Alb  2.8  /  TBili  0.7  /  DBili  x   /  AST  20  /  ALT  18  /  AlkPhos  80  01-01          PT/INR - ( 01 Jan 2024 06:35 )   PT: 82.5 sec;   INR: 7.68 ratio         PTT - ( 01 Jan 2024 06:35 )  PTT:54.1 sec  Lactate, Blood: 1.1 mmol/L (12-31 @ 13:10)          01-01 Chol 129 mg/dL LDL -- HDL 26 mg/dL Trig 127 mg/dL              POCT Blood Glucose.: 148 mg/dL (01 Jan 2024 11:58)  POCT Blood Glucose.: 140 mg/dL (01 Jan 2024 09:20)  POCT Blood Glucose.: 182 mg/dL (31 Dec 2023 22:06)      Urinalysis Basic - ( 01 Jan 2024 07:10 )    Color: x / Appearance: x / SG: x / pH: x  Gluc: 127 mg/dL / Ketone: x  / Bili: x / Urobili: x   Blood: x / Protein: x / Nitrite: x   Leuk Esterase: x / RBC: x / WBC x   Sq Epi: x / Non Sq Epi: x / Bacteria: x        Culture - Urine (collected 31 Dec 2023 13:38)  Source: Clean Catch Clean Catch (Midstream)  Final Report (01 Jan 2024 19:45):    <10,000 CFU/mL Normal Urogenital Christy            Culture - Urine (collected 12-31-23 @ 13:38)  Source: Clean Catch Clean Catch (Midstream)  Final Report (01-01-24 @ 19:45):    <10,000 CFU/mL Normal Urogenital Christy        RADIOLOGY & ADDITIONAL TESTS: Personally reviewed.     Consultant(s) Notes Reviewed:  [x] YES  [ ] NO   Discussed with SW/CM, RN     Xray Foot AP + Lateral + Oblique, Left    There is a moderate hallux valgus with some degeneration and there is   mild first IP degeneration. No bone destruction or fracture is evident.   Arterial calcifications noted.    IMPRESSION: No acute finding of the chest or left foot.

## 2024-01-01 NOTE — PROGRESS NOTE ADULT - PROBLEM SELECTOR PLAN 1
-Undatable left dorsum necrotic wound with no active drainage  -No sepsis criteria met on arrival/admission  -WBC 11.71, follow trend  -10/2023 HbA1c 6.7% under no medical management  -Left foot xray: moderate hallux valgus with some degeneration and mild first IP generation. No bone destruction noted or fracture evident  -ID consult requested pending  -Podiatry consult requested pending  -F/up am labs: CBC, CMP  -Continue zosyn and renally dosed vancomycin for MRSA coverage given DMT2 status  -Obtain vanc through after 3rd dose -Undatable left dorsum necrotic wound with no active drainage  -No sepsis criteria met on arrival/admission  -WBC 11.71, follow trend  -10/2023 HbA1c 6.7% under no medical management  -Left foot xray: moderate hallux valgus with some degeneration and mild first IP generation. No bone destruction noted or fracture evident  -ID consult requested pending  -Podiatry consult requested pending  -F/up am labs: CBC, CMP  -Continue zosyn and renally dosed vancomycin for MRSA coverage given DMT2 status  -Obtain vanc through after 3rd dose  -F/up lesion demarcation for progression

## 2024-01-01 NOTE — OCCUPATIONAL THERAPY INITIAL EVALUATION ADULT - GENERAL OBSERVATIONS, REHAB EVAL
Pt received in semi-beltran position in bed +NAD +VSS +no c/o pain +all needs in reach +A&Ox4 +cardiac monitor, agreeable to OT. Pt left in semi-beltran position in bed +NAD +VSS +no c/o pain +all needs in reach +A&Ox4 +cardiac monitor.

## 2024-01-01 NOTE — PROGRESS NOTE ADULT - ATTENDING COMMENTS
89yo M with history of  HTN, HLD, LBBB, paroxismal Atrial Fibrillation, thoracic Ao aneurysm and s/p mechanical AVR and diabetes mellitus type 2 presents for left foot wound. Admitted for medical management of cellulitis Plan: cont iv antibxs, f/u blood cultures, monitor clinical course, pt improving, wean off O2 89yo M with history of  HTN, HLD, LBBB, paroxismal Atrial Fibrillation, thoracic Ao aneurysm and s/p mechanical AVR and diabetes mellitus type 2 presents for left foot wound. Admitted for medical management of cellulitis Plan: cont iv antibxs, f/u blood cultures, monitor clinical course, pt improving, wean off O2

## 2024-01-01 NOTE — PHYSICAL THERAPY INITIAL EVALUATION ADULT - ADDITIONAL COMMENTS
pt lives in senior apt complex, 3 [address] w/rail. pt uses rolling walker to ambulate, independent w/most ADL;s. pt states he has friends in apt complex that assist as needed

## 2024-01-01 NOTE — PROGRESS NOTE ADULT - PROBLEM SELECTOR PLAN 5
-currently rate controlled  -Supratherapeutic INR 6.59  -Oupatient trend reviewed, last POCT INR 2.3 (12.24.23)> 1.4> 5.1> 3.9> 1.6> 2.2>2.2> 1.5>1.3>3.0 (9.28.23)  -Currently not actively bleeding  -Remote telemetry in place  -If INR increases or active bleeding, tachycardia, anemia, hypotension, start vitamin K and PCC.  -Cont hold warfarin,  f/up INR am

## 2024-01-01 NOTE — OCCUPATIONAL THERAPY INITIAL EVALUATION ADULT - ADDITIONAL COMMENTS
Pt with [address] apartment (bilateral handrails).  Pt with shower/tub with shower curtain. Pt reports owning shower chair, but prefers to sponge bath 2/2 fear of falling. Pt reports no handrails in tub or near toilet. Laundry on 1st floor.

## 2024-01-01 NOTE — PHYSICAL THERAPY INITIAL EVALUATION ADULT - GENERAL OBSERVATIONS, REHAB EVAL
pt in bed, [place], reports feeling better, vss per monitor, black escar dorsum left foot, a+ox3 pt in bed, [place], reports feeling better, vss per monitor, black escar dorsum left foot, a+ox3

## 2024-01-02 NOTE — DISCHARGE NOTE PROVIDER - INSTRUCTIONS
MEDICATIONS  (PRN):  BACItracin   Ointment 1 Application(s) Topical two times a day PRN wound  diphenhydrAMINE 50 milliGRAM(s) Oral every 6 hours PRN Extrapyramidal prophylaxis  diphenhydrAMINE Injectable 50 milliGRAM(s) IntraMuscular once PRN Extrapyramidal prophylaxis  haloperidol     Tablet 5 milliGRAM(s) Oral every 6 hours PRN agitation  haloperidol    Injectable 5 milliGRAM(s) IntraMuscular Once PRN agitation  LORazepam     Tablet 2 milliGRAM(s) Oral every 6 hours PRN Anxiety  LORazepam   Injectable 2 milliGRAM(s) IntraMuscular once PRN Agitation  nicotine  Polacrilex Gum 2 milliGRAM(s) Oral every 2 hours PRN breakthrough cravings   MEDICATIONS  (PRN):  BACItracin   Ointment 1 Application(s) Topical two times a day PRN wound  diphenhydrAMINE 50 milliGRAM(s) Oral every 6 hours PRN Extrapyramidal prophylaxis  diphenhydrAMINE Injectable 50 milliGRAM(s) IntraMuscular once PRN Extrapyramidal prophylaxis  haloperidol     Tablet 5 milliGRAM(s) Oral every 6 hours PRN agitation  haloperidol    Injectable 5 milliGRAM(s) IntraMuscular Once PRN agitation  LORazepam   Injectable 2 milliGRAM(s) IntraMuscular once PRN Agitation  nicotine  Polacrilex Gum 2 milliGRAM(s) Oral every 2 hours PRN breakthrough cravings   MEDICATIONS  (PRN):  BACItracin   Ointment 1 Application(s) Topical two times a day PRN wound  diphenhydrAMINE 50 milliGRAM(s) Oral every 6 hours PRN Extrapyramidal prophylaxis  diphenhydrAMINE Injectable 50 milliGRAM(s) IntraMuscular once PRN Extrapyramidal prophylaxis  haloperidol     Tablet 5 milliGRAM(s) Oral every 6 hours PRN agitation  haloperidol    Injectable 5 milliGRAM(s) IntraMuscular Once PRN agitation  LORazepam     Tablet 2 milliGRAM(s) Oral every 6 hours PRN Anxiety  LORazepam   Injectable 2 milliGRAM(s) IntraMuscular once PRN Agitation  nicotine  Polacrilex Gum 2 milliGRAM(s) Oral every 2 hours PRN breakthrough cravings   MEDICATIONS  (PRN):  BACItracin   Ointment 1 Application(s) Topical two times a day PRN wound  diphenhydrAMINE 50 milliGRAM(s) Oral every 6 hours PRN Extrapyramidal prophylaxis  diphenhydrAMINE Injectable 50 milliGRAM(s) IntraMuscular once PRN Extrapyramidal prophylaxis  haloperidol     Tablet 5 milliGRAM(s) Oral every 6 hours PRN agitation  haloperidol    Injectable 5 milliGRAM(s) IntraMuscular Once PRN agitation  LORazepam     Tablet 2 milliGRAM(s) Oral every 6 hours PRN Anxiety  LORazepam   Injectable 2 milliGRAM(s) IntraMuscular once PRN Agitation  nicotine  Polacrilex Gum 2 milliGRAM(s) Oral every 2 hours PRN breakthrough cravings   MEDICATIONS  (PRN):  BACItracin   Ointment 1 Application(s) Topical two times a day PRN wound  diphenhydrAMINE 50 milliGRAM(s) Oral every 6 hours PRN Extrapyramidal prophylaxis  diphenhydrAMINE Injectable 50 milliGRAM(s) IntraMuscular once PRN Extrapyramidal prophylaxis  haloperidol     Tablet 5 milliGRAM(s) Oral every 6 hours PRN agitation  haloperidol    Injectable 5 milliGRAM(s) IntraMuscular Once PRN agitation  LORazepam     Tablet 2 milliGRAM(s) Oral every 6 hours PRN Anxiety  LORazepam   Injectable 2 milliGRAM(s) IntraMuscular once PRN Agitation  nicotine  Polacrilex Gum 2 milliGRAM(s) Oral every 2 hours PRN breakthrough cravings   MEDICATIONS  (PRN):  BACItracin   Ointment 1 Application(s) Topical two times a day PRN wound  diphenhydrAMINE 50 milliGRAM(s) Oral every 6 hours PRN Extrapyramidal prophylaxis  diphenhydrAMINE Injectable 50 milliGRAM(s) IntraMuscular once PRN Extrapyramidal prophylaxis  haloperidol     Tablet 5 milliGRAM(s) Oral every 6 hours PRN agitation  haloperidol    Injectable 5 milliGRAM(s) IntraMuscular Once PRN agitation  LORazepam     Tablet 2 milliGRAM(s) Oral every 6 hours PRN Anxiety  LORazepam   Injectable 2 milliGRAM(s) IntraMuscular once PRN Agitation  nicotine  Polacrilex Gum 2 milliGRAM(s) Oral every 2 hours PRN breakthrough cravings   MEDICATIONS  (PRN):  BACItracin   Ointment 1 Application(s) Topical two times a day PRN wound  diphenhydrAMINE 50 milliGRAM(s) Oral every 6 hours PRN Extrapyramidal prophylaxis  diphenhydrAMINE Injectable 50 milliGRAM(s) IntraMuscular once PRN Extrapyramidal prophylaxis  haloperidol     Tablet 5 milliGRAM(s) Oral every 6 hours PRN agitation  haloperidol    Injectable 5 milliGRAM(s) IntraMuscular Once PRN agitation  LORazepam     Tablet 2 milliGRAM(s) Oral every 6 hours PRN Anxiety  LORazepam   Injectable 2 milliGRAM(s) IntraMuscular once PRN Agitation  nicotine  Polacrilex Gum 2 milliGRAM(s) Oral every 2 hours PRN breakthrough cravings   MEDICATIONS  (PRN):  diphenhydrAMINE 50 milliGRAM(s) Oral every 6 hours PRN Extrapyramidal prophylaxis  diphenhydrAMINE Injectable 50 milliGRAM(s) IntraMuscular once PRN Extrapyramidal prophylaxis  haloperidol     Tablet 5 milliGRAM(s) Oral every 6 hours PRN agitation  haloperidol    Injectable 5 milliGRAM(s) IntraMuscular Once PRN agitation  LORazepam     Tablet 2 milliGRAM(s) Oral every 6 hours PRN Anxiety  LORazepam   Injectable 2 milliGRAM(s) IntraMuscular once PRN Agitation  nicotine  Polacrilex Gum 2 milliGRAM(s) Oral every 2 hours PRN breakthrough cravings     Diet, Minced and Moist:   Consistent Carbohydrate {No Snacks}  Supplement Feeding Modality:  Oral  Ensure Plus High Protein Cans or Servings Per Day:  1       Frequency:  Two Times a day (01-03-24 @ 13:17) [Active] MEDICATIONS  (PRN):  BACItracin   Ointment 1 Application(s) Topical two times a day PRN wound  diphenhydrAMINE 50 milliGRAM(s) Oral every 6 hours PRN Extrapyramidal prophylaxis  diphenhydrAMINE Injectable 50 milliGRAM(s) IntraMuscular once PRN Extrapyramidal prophylaxis  haloperidol     Tablet 5 milliGRAM(s) Oral every 6 hours PRN agitation  haloperidol    Injectable 5 milliGRAM(s) IntraMuscular Once PRN agitation  LORazepam     Tablet 2 milliGRAM(s) Oral every 6 hours PRN Anxiety  LORazepam   Injectable 2 milliGRAM(s) IntraMuscular once PRN Agitation  nicotine  Polacrilex Gum 2 milliGRAM(s) Oral every 2 hours PRN breakthrough cravings

## 2024-01-02 NOTE — PROGRESS NOTE ADULT - PROBLEM SELECTOR PLAN 5
-currently rate controlled  -Supratherapeutic INR downtrending  -Oupatient trend reviewed, last POCT INR 2.3 (12.24.23)> 1.4> 5.1> 3.9> 1.6> 2.2>2.2> 1.5>1.3>3.0 (9.28.23)  -Currently not actively bleeding  -Remote telemetry in place  -If INR increases or active bleeding, tachycardia, anemia, hypotension, start vitamin K and PCC.  -Cont hold warfarin,  f/up INR am

## 2024-01-02 NOTE — PROGRESS NOTE ADULT - SUBJECTIVE AND OBJECTIVE BOX
Patient is a 89y old  Male who presents with a chief complaint of left foot wound (02 Jan 2024 12:00)      INTERVAL HPI:  OVERNIGHT EVENTS:  T(F): 98.5 (01-02-24 @ 14:46), Max: 98.5 (01-02-24 @ 14:46)  HR: 95 (01-02-24 @ 14:46) (95 - 97)  BP: 128/71 (01-02-24 @ 14:46) (118/69 - 128/71)  RR: 16 (01-02-24 @ 14:46) (15 - 16)  SpO2: 95% (01-02-24 @ 14:46) (95% - 98%)  I&O's Summary        PHYSICAL EXAM:  GENERAL: NAD  HEENT:  AT/NC, anicteric, moist mucous membranes, EOMI, no lid-lag, conjunctiva and sclera clear  CHEST/LUNG:  CTA b/l, no rales, wheezes, or rhonchi,  normal respiratory effort, no intercostal retractions  HEART:  RRR, S1, S2, no murmurs; no pitting edema  ABDOMEN:  BS+, soft, nontender, nondistended; No HSM  MSK/EXTREMITIES: 2+ peripheral pulses, no clubbing or cyanosis  NERVOUS SYSTEM: answers questions and follows commands appropriately, A&Ox3 grossly moves all extremities   PSYCH: Appropriate affect, Alert & Awake; Good judgement    LABS:                        11.9   12.89 )-----------( 225      ( 02 Jan 2024 06:00 )             35.0     01-02    138  |  104  |  37<H>  ----------------------------<  125<H>  3.6   |  27  |  1.57<H>    Ca    9.1      02 Jan 2024 06:00    TPro  6.8  /  Alb  2.4<L>  /  TBili  0.7  /  DBili  x   /  AST  24  /  ALT  19  /  AlkPhos  68  01-02    PT/INR - ( 02 Jan 2024 06:00 )   PT: 54.9 sec;   INR: 4.92 ratio         PTT - ( 01 Jan 2024 06:35 )  PTT:54.1 sec  Urinalysis Basic - ( 02 Jan 2024 06:00 )    Color: x / Appearance: x / SG: x / pH: x  Gluc: 125 mg/dL / Ketone: x  / Bili: x / Urobili: x   Blood: x / Protein: x / Nitrite: x   Leuk Esterase: x / RBC: x / WBC x   Sq Epi: x / Non Sq Epi: x / Bacteria: x      CAPILLARY BLOOD GLUCOSE      POCT Blood Glucose.: 93 mg/dL (02 Jan 2024 12:50)  POCT Blood Glucose.: 140 mg/dL (02 Jan 2024 08:46)  POCT Blood Glucose.: 226 mg/dL (01 Jan 2024 21:26)      12-31 @ 13:38   <10,000 CFU/mL Normal Urogenital Christy  --  --  12-31 @ 13:05   No growth at 24 hours  --  --  12-31 @ 13:00   No growth at 24 hours  --  --          MEDICATIONS  (STANDING):  atorvastatin 10 milliGRAM(s) Oral at bedtime  dextrose 5%. 1000 milliLiter(s) (50 mL/Hr) IV Continuous <Continuous>  dextrose 5%. 1000 milliLiter(s) (100 mL/Hr) IV Continuous <Continuous>  dextrose 50% Injectable 12.5 Gram(s) IV Push once  dextrose 50% Injectable 25 Gram(s) IV Push once  dextrose 50% Injectable 25 Gram(s) IV Push once  glucagon  Injectable 1 milliGRAM(s) IntraMuscular once  influenza  Vaccine (HIGH DOSE) 0.7 milliLiter(s) IntraMuscular once  insulin glargine Injectable (LANTUS) 10 Unit(s) SubCutaneous at bedtime  insulin lispro Injectable (ADMELOG) 4 Unit(s) SubCutaneous three times a day before meals  lactated ringers. 1000 milliLiter(s) (75 mL/Hr) IV Continuous <Continuous>  lisinopril 40 milliGRAM(s) Oral daily  nadolol 20 milliGRAM(s) Oral daily  piperacillin/tazobactam IVPB.. 3.375 Gram(s) IV Intermittent every 8 hours  vancomycin  IVPB 750 milliGRAM(s) IV Intermittent every 24 hours  vancomycin  IVPB        MEDICATIONS  (PRN):  acetaminophen     Tablet .. 650 milliGRAM(s) Oral every 6 hours PRN Temp greater or equal to 38C (100.4F), Mild Pain (1 - 3)  aluminum hydroxide/magnesium hydroxide/simethicone Suspension 30 milliLiter(s) Oral every 4 hours PRN Dyspepsia  dextrose Oral Gel 15 Gram(s) Oral once PRN Blood Glucose LESS THAN 70 milliGRAM(s)/deciliter  melatonin 3 milliGRAM(s) Oral at bedtime PRN Insomnia  ondansetron Injectable 4 milliGRAM(s) IV Push every 8 hours PRN Nausea and/or Vomiting       Patient is a 89y old  Male who presents with a chief complaint of left foot wound (02 Jan 2024 12:00)      INTERVAL HPI: Pt seen and examined. States he feels ok, denies any acute complaints at this time.     OVERNIGHT EVENTS: none noted  T(F): 98.5 (01-02-24 @ 14:46), Max: 98.5 (01-02-24 @ 14:46)  HR: 95 (01-02-24 @ 14:46) (95 - 97)  BP: 128/71 (01-02-24 @ 14:46) (118/69 - 128/71)  RR: 16 (01-02-24 @ 14:46) (15 - 16)  SpO2: 95% (01-02-24 @ 14:46) (95% - 98%)  I&O's Summary        PHYSICAL EXAM:  GENERAL: NAD  HEENT:  AT/NC, anicteric, moist mucous membranes, EOMI, no lid-lag, conjunctiva and sclera clear  CHEST/LUNG:  CTA b/l, no rales, wheezes, or rhonchi,  normal respiratory effort, no intercostal retractions  HEART:  RRR, S1, S2, no murmurs; no pitting edema  ABDOMEN:  BS+, soft, nontender, nondistended; No HSM  MSK/EXTREMITIES: 2+ peripheral pulses, no clubbing or cyanosis  NERVOUS SYSTEM: answers questions and follows commands appropriately, A&Ox3 grossly moves all extremities   PSYCH: Appropriate affect, Alert & Awake; Good judgement    LABS:                        11.9   12.89 )-----------( 225      ( 02 Jan 2024 06:00 )             35.0     01-02    138  |  104  |  37<H>  ----------------------------<  125<H>  3.6   |  27  |  1.57<H>    Ca    9.1      02 Jan 2024 06:00    TPro  6.8  /  Alb  2.4<L>  /  TBili  0.7  /  DBili  x   /  AST  24  /  ALT  19  /  AlkPhos  68  01-02    PT/INR - ( 02 Jan 2024 06:00 )   PT: 54.9 sec;   INR: 4.92 ratio         PTT - ( 01 Jan 2024 06:35 )  PTT:54.1 sec  Urinalysis Basic - ( 02 Jan 2024 06:00 )    Color: x / Appearance: x / SG: x / pH: x  Gluc: 125 mg/dL / Ketone: x  / Bili: x / Urobili: x   Blood: x / Protein: x / Nitrite: x   Leuk Esterase: x / RBC: x / WBC x   Sq Epi: x / Non Sq Epi: x / Bacteria: x      CAPILLARY BLOOD GLUCOSE      POCT Blood Glucose.: 93 mg/dL (02 Jan 2024 12:50)  POCT Blood Glucose.: 140 mg/dL (02 Jan 2024 08:46)  POCT Blood Glucose.: 226 mg/dL (01 Jan 2024 21:26)      12-31 @ 13:38   <10,000 CFU/mL Normal Urogenital Christy  --  --  12-31 @ 13:05   No growth at 24 hours  --  --  12-31 @ 13:00   No growth at 24 hours  --  --          MEDICATIONS  (STANDING):  atorvastatin 10 milliGRAM(s) Oral at bedtime  dextrose 5%. 1000 milliLiter(s) (50 mL/Hr) IV Continuous <Continuous>  dextrose 5%. 1000 milliLiter(s) (100 mL/Hr) IV Continuous <Continuous>  dextrose 50% Injectable 12.5 Gram(s) IV Push once  dextrose 50% Injectable 25 Gram(s) IV Push once  dextrose 50% Injectable 25 Gram(s) IV Push once  glucagon  Injectable 1 milliGRAM(s) IntraMuscular once  influenza  Vaccine (HIGH DOSE) 0.7 milliLiter(s) IntraMuscular once  insulin glargine Injectable (LANTUS) 10 Unit(s) SubCutaneous at bedtime  insulin lispro Injectable (ADMELOG) 4 Unit(s) SubCutaneous three times a day before meals  lactated ringers. 1000 milliLiter(s) (75 mL/Hr) IV Continuous <Continuous>  lisinopril 40 milliGRAM(s) Oral daily  nadolol 20 milliGRAM(s) Oral daily  piperacillin/tazobactam IVPB.. 3.375 Gram(s) IV Intermittent every 8 hours  vancomycin  IVPB 750 milliGRAM(s) IV Intermittent every 24 hours  vancomycin  IVPB        MEDICATIONS  (PRN):  acetaminophen     Tablet .. 650 milliGRAM(s) Oral every 6 hours PRN Temp greater or equal to 38C (100.4F), Mild Pain (1 - 3)  aluminum hydroxide/magnesium hydroxide/simethicone Suspension 30 milliLiter(s) Oral every 4 hours PRN Dyspepsia  dextrose Oral Gel 15 Gram(s) Oral once PRN Blood Glucose LESS THAN 70 milliGRAM(s)/deciliter  melatonin 3 milliGRAM(s) Oral at bedtime PRN Insomnia  ondansetron Injectable 4 milliGRAM(s) IV Push every 8 hours PRN Nausea and/or Vomiting

## 2024-01-02 NOTE — DISCHARGE NOTE PROVIDER - CARE PROVIDER_API CALL
Cleveland Linares  Cardiology  [address], [address]  [place] [address]  Phone: [phone]  Fax: [phone]  Follow Up Time:    Cleveland Linares  Cardiology  [address], [address]  [place] [address]  Phone: [phone]  Fax: [phone]  Follow Up Time:

## 2024-01-02 NOTE — DISCHARGE NOTE PROVIDER - NSDCMRMEDTOKEN_GEN_ALL_CORE_FT
atorvastatin 10 mg oral tablet: 1 tab(s) orally once a day  nadolol 20 mg oral tablet: 1 tab(s) orally once a day  Norvasc 5 mg oral tablet: 1 tab(s) orally once a day  ramipril 10 mg oral capsule: 1 cap(s) orally once a day  Rapaflo 8 mg oral capsule: 1 cap(s) orally once a day  warfarin 2.5 mg oral tablet: 1 tab(s) orally once a day    atorvastatin 10 mg oral tablet: 1 tab(s) orally once a day  cefepime: once a day as directed for L foot cellulitis  heparin 100 units/mL-D5% intravenous solution: intravenous once a day as per nomogram for full dose AC  insulin glargine 100 units/mL subcutaneous solution: 10 unit(s) subcutaneous once a day (at bedtime)  insulin lispro 100 units/mL injectable solution: 4 unit(s) injectable 3 times a day  nadolol 20 mg oral tablet: 1 tab(s) orally once a day  Rapaflo 8 mg oral capsule: 1 cap(s) orally once a day

## 2024-01-02 NOTE — PROGRESS NOTE ADULT - PROBLEM SELECTOR PLAN 1
-Undatable left dorsum necrotic wound with no active drainage  -No sepsis criteria met on arrival/admission  -WBC 11.71, follow trend  -10/2023 HbA1c 6.7% under no medical management  -Left foot xray: moderate hallux valgus with some degeneration and mild first IP generation. No bone destruction noted or fracture evident  -ID consult recs apprec Dr Adams  -Podiatry recs apprec obtain MRI to rule out bone pathology or deep abscess  Would also recommend vascular evaluation as there might be component of PAD attributing to non-healing   -F/up am labs: CBC, CMP  -Continue zosyn and renally dosed vancomycin for MRSA coverage given DMT2 status  -check vtrough  -F/up lesion demarcation for progression

## 2024-01-02 NOTE — GOALS OF CARE CONVERSATION - ADVANCED CARE PLANNING - CONVERSATION DETAILS
Pt. here from home with cellulitis of left wound. He has hx. PAF, HTN, DM, left BBB. Pt. currently sleeping. I called his son Pierce to f/u GOC discussion. ANGEL Klein stated Pierce unavailable until this evening. She stated she would discuss GOC with her . She stated that although the patient does not have dementia, he has been a little forgetful and confused lately.

## 2024-01-02 NOTE — DISCHARGE NOTE PROVIDER - NSDCCPCAREPLAN_GEN_ALL_CORE_FT
PRINCIPAL DISCHARGE DIAGNOSIS  Diagnosis: Cellulitis  Assessment and Plan of Treatment: You were admitted for left foot wound  You were diagnosed with left foot cellulitis and arterial occlusion  You were treated with IV antibiotics and to be transferred to [facility] for angioplasty  You will need to follow up with your primary care physician.  Discharging Provider:  Dominique Hollis D.O.  Contact Info: Cell [phone] - Please call with any questions or concerns.     PRINCIPAL DISCHARGE DIAGNOSIS  Diagnosis: Cellulitis  Assessment and Plan of Treatment: You were admitted for left foot wound  You were diagnosed with left foot cellulitis and arterial occlusion  You were treated with IV antibiotics and to be transferred to [facility] for angioplasty  You will need to follow up with your primary care physician.  Discharging Provider:  Dominique Hollis D.O.  Contact Info: Cell [phone] - Please call with any questions or concerns.

## 2024-01-02 NOTE — DISCHARGE NOTE PROVIDER - ATTENDING DISCHARGE PHYSICAL EXAMINATION:
GENERAL: NAD  HEENT:  AT/NC, anicteric, moist mucous membranes, EOMI, no lid-lag, conjunctiva and sclera clear  CHEST/LUNG:  CTA b/l, no rales, wheezes, or rhonchi,  normal respiratory effort, no intercostal retractions  HEART:  RRR, S1, S2, no murmurs; no pitting edema  ABDOMEN:  BS+, soft, nontender, nondistended; No HSM  MSK/EXTREMITIES: 2+ peripheral pulses, no clubbing or cyanosis  NERVOUS SYSTEM: answers questions and follows commands appropriately, A&Ox2-3 grossly moves all extremities   PSYCH: mild masked affect, Alert & Awake but at times not orientated

## 2024-01-02 NOTE — CONSULT NOTE ADULT - ASSESSMENT
Patient is a 89y old  Male who presents with a chief complaint of left foot wound. Left foot noted to have dry eschar on dorsum, palpable Left pedal pulse. No active draining noted, cellulitis improving from demarcation.     Plan  Patient admitted to medical team for Cellulitis  Continue IV antibiotics  Pending LUE Ultrasound  Pending MRI  Pending ID consult  Continue trend WBC    Plan discussed with Dr. Alvarez

## 2024-01-02 NOTE — DISCHARGE NOTE PROVIDER - HOSPITAL COURSE
89 yopilar soto 89 y[age]ilar soto 89 yo M with history of HTN, HLD, diabetes mellitus type 2, LBBB, paroxismal atrial fibrillation, thoracic Ao aneurysm, s/p mechanical AVR admitted for left foot cellulitis. Initial imaging of left foot xray showed moderate haluz valgus with degeneration and mild first IP degeneration, no bone destruction  Pt was treated with antibiotics asn supportive care. ID, Podiatry and Vascular were involved in patients care. 89 yo M with history of HTN, HLD, diabetes mellitus type 2, LBBB, paroxismal atrial fibrillation, thoracic Ao aneurysm, s/p mechanical AVR admitted for left foot cellulitis. Initial imaging of left foot xray showed moderate haluz valgus with degeneration and mild first IP degeneration, no bone destruction  Pt was treated with antibiotics asn supportive care. ID, Podiatry and Vascular were involved in patients care. 89 yo M with history of HTN, HLD, diabetes mellitus type 2, LBBB, paroxismal atrial fibrillation, thoracic Ao aneurysm, s/p mechanical AVR admitted for left foot cellulitis. Initial imaging of left foot xray showed moderate haluz valgus with degeneration and mild first IP degeneration, no bone destruction  Pt was treated with antibiotics asn supportive care. ID, Podiatry and Vascular were involved in patients care. dopplers performed and popliteal artery is noted to be occluded, vascualr Dr Alvarez recommends transfer to [place] for angioplasty. Pt stable for transfer. Pt pending MRI foot but forms could not be completed due to cognitive impairment and family called but no callback received. 89 yo M with history of HTN, HLD, diabetes mellitus type 2, LBBB, paroxismal atrial fibrillation, thoracic Ao aneurysm, s/p mechanical AVR admitted for left foot cellulitis. Initial imaging of left foot xray showed moderate haluz valgus with degeneration and mild first IP degeneration, no bone destruction  Pt was treated with antibiotics asn supportive care. ID, Podiatry and Vascular were involved in patients care. dopplers performed and popliteal artery is noted to be occluded, vascualr Dr Alvarez recommends transfer to [place] for angioplasty. Pt stable for transfer. Pt pending MRI foot but forms could not be completed due to cognitive impairment and family called but no callback received. 	Hospital Course  HPI:  89yo M with history of  HTN, HLD, LBBB, paroxismal Atrial Fibrillation, thoracic Ao aneurysm and s/p mechanical AVR presented to urgent care for left foot wound. Per roomate, and further confirmed with only son Mr Bogdan Jefferson, earlier today patient complained of left foot pain, roommate took sock of and realized of left dorsum black scar, unable to date when lesion started. Patient taken to urgent care and referred for ED evaluation. Patient evaluated at bedside denies bleeding episodes, headaches, chest pain, abdominal pain. Son contacted and confirmed what was stated above, unable to provide more information for present illness.  Per outpatient chart review, patient recently seen and followed by cardiology for cardiovascular comorbidities, noticed stable INR for the most part. On recent blood work from 10/2023 noticed HbA1c 6.7% but no primary care provider following chronic care.   (31 Dec 2023 16:18)    89 yo M with history of HTN, HLD, diabetes mellitus type 2, LBBB, paroxysmal atrial fibrillation, thoracic Ao aneurysm, s/p mechanical AVR admitted for left foot cellulitis. Initial imaging of left foot xray showed moderate haluz valgus with degeneration and mild first IP degeneration, no bone destruction. He was placed on abx for L foot cellulitis and seen by ID, transitioned to cefepime only.Podiatry and Vascular were involved in patients care. dopplers performed and popliteal artery is noted to be occluded, vascular Dr Alvarez recommends transfer to [place] for angioplasty. Pt stable for transfer. Pt pending MRI foot but forms could not be completed due to cognitive impairment and family called but no callback received. He was started on heparin gtt for AC due to history of afib.     You were admitted for left foot wound  You were diagnosed with left foot cellulitis and arterial occlusion  You were treated with IV antibiotics and to be transferred to [facility] for angioplasty    You will need to follow up with your primary care physician.    Discharging Provider:  Dominique Hollis D.O.  Contact Info: Cell [phone] - Please call with any questions or concerns.   	Hospital Course  HPI:  89yo M with history of  HTN, HLD, LBBB, paroxismal Atrial Fibrillation, thoracic Ao aneurysm and s/p mechanical AVR presented to urgent care for left foot wound. Per roomate, and further confirmed with only son Mr Bogdan Jefferson, earlier today patient complained of left foot pain, roommate took sock of and realized of left dorsum black scar, unable to date when lesion started. Patient taken to urgent care and referred for ED evaluation. Patient evaluated at bedside denies bleeding episodes, headaches, chest pain, abdominal pain. Son contacted and confirmed what was stated above, unable to provide more information for present illness.  Per outpatient chart review, patient recently seen and followed by cardiology for cardiovascular comorbidities, noticed stable INR for the most part. On recent blood work from 10/2023 noticed HbA1c 6.7% but no primary care provider following chronic care.   (31 Dec 2023 16:18)    89 yo M with history of HTN, HLD, diabetes mellitus type 2, LBBB, paroxysmal atrial fibrillation, thoracic Ao aneurysm, s/p mechanical AVR admitted for left foot cellulitis. Initial imaging of left foot xray showed moderate haluz valgus with degeneration and mild first IP degeneration, no bone destruction. He was placed on abx for L foot cellulitis and seen by ID, transitioned to cefepime only.Podiatry and Vascular were involved in patients care. dopplers performed and popliteal artery is noted to be occluded, vascular Dr Alvarez recommends transfer to [place] for angioplasty. Pt stable for transfer. Pt pending MRI foot but forms could not be completed due to cognitive impairment and family called but no callback received. He was started on heparin gtt for AC due to history of afib.     You were admitted for left foot wound  You were diagnosed with left foot cellulitis and arterial occlusion  You were treated with IV antibiotics and to be transferred to [facility] for angioplasty    You will need to follow up with your primary care physician.    Discharging Provider:  Dominique Hollis D.O.  Contact Info: Cell [phone] - Please call with any questions or concerns.

## 2024-01-02 NOTE — CONSULT NOTE ADULT - SUBJECTIVE AND OBJECTIVE BOX
S : 89y year old Male seen at bedside for left foot cellulitis with dry eschar dorsum foot.    Chief Complaint : Patient is a 89y old  Male who presents with a chief complaint of left foot wound (01 Jan 2024 18:49)    HPI : HPI:  89yo M with history of  HTN, HLD, LBBB, paroxismal Atrial Fibrillation, thoracic Ao aneurysm and s/p mechanical AVR presented to urgent care for left foot wound. Per roomate, and further confirmed with only son Mr Bogdan Jefferson, earlier today patient complained of left foot pain, roommate took sock of and realized of left dorsum black scar, unable to date when lesion started. Patient taken to urgent care and referred for ED evaluation. Patient evaluated at bedside denies bleeding episodes, headaches, chest pain, abdominal pain. Son contacted and confirmed what was stated above, unable to provide more information for present illness.  Per outpatient chart review, patient recently seen and followed by cardiology for cardiovascular comorbidities, noticed stable INR for the most part. On recent blood work from 10/2023 noticed HbA1c 6.7% but no primary care provider following chronic care.   (31 Dec 2023 16:18)            PMH: HTN (hypertension)    HLD (hyperlipidemia)    Paroxysmal atrial fibrillation      PSH:H/O aortic valve repair    H/O thoracic aortic aneurysm repair        Allergies:No Known Allergies      Labs:                          11.9   12.89 )-----------( 225      ( 02 Jan 2024 06:00 )             35.0     WBC Trend  12.89<H> Date (01-02 @ 06:00)  13.37<H> Date (01-01 @ 07:10)  11.71<H> Date (12-31 @ 13:00)      Chem  01-02    138  |  104  |  37<H>  ----------------------------<  125<H>  3.6   |  27  |  1.57<H>    Ca    9.1      02 Jan 2024 06:00    TPro  6.8  /  Alb  2.4<L>  /  TBili  0.7  /  DBili  x   /  AST  24  /  ALT  19  /  AlkPhos  68  01-02          T(F): 97 (01-02-24 @ 05:00), Max: 98.3 (01-01-24 @ 18:25)  HR: 97 (01-01-24 @ 18:25) (97 - 97)  BP: 118/69 (01-02-24 @ 05:00) (118/69 - 122/73)  RR: 15 (01-02-24 @ 05:00) (15 - 15)  SpO2: 98% (01-02-24 @ 05:00) (98% - 98%)  Wt(kg): --    O:   Noted is dry eschar localized on the dorusm aspect of the left foot with erythema. No active drainage from the wound site. It appears that the cellulitis is improving from the line of demaraction that is drawn.  Non palpable pedal pulses    A: left foot cellulitis with eschar      P:   Chart reviewed and Patient evaluated  Continue with IV antibiotics  Unknown how the wound started and for how long it has been present  Can obtain MRI to rule out bone pathology or deep abscess  Would also recommend vascular evaluation as there might be component of PAD attributing to non-healing wound S : 89y year old Male seen at bedside for left foot cellulitis with dry eschar dorsum foot.    Chief Complaint : Patient is a 89y old  Male who presents with a chief complaint of left foot wound (01 Jan 2024 18:49)    HPI : HPI:  89yo M with history of  HTN, HLD, LBBB, paroxismal Atrial Fibrillation, thoracic Ao aneurysm and s/p mechanical AVR presented to urgent care for left foot wound. Per roomate, and further confirmed with only son Mr Bogdan Jefferson, earlier today patient complained of left foot pain, roommate took sock of and realized of left dorsum black scar, unable to date when lesion started. Patient taken to urgent care and referred for ED evaluation. Patient evaluated at bedside denies bleeding episodes, headaches, chest pain, abdominal pain. Son contacted and confirmed what was stated above, unable to provide more information for present illness.  Per outpatient chart review, patient recently seen and followed by cardiology for cardiovascular comorbidities, noticed stable INR for the most part. On recent blood work from 10/2023 noticed HbA1c 6.7% but no primary care provider following chronic care.   (31 Dec 2023 16:18)            PMH: HTN (hypertension)    HLD (hyperlipidemia)    Paroxysmal atrial fibrillation      PSH:H/O aortic valve repair    H/O thoracic aortic aneurysm repair        Allergies:No Known Allergies      Labs:                          11.9   12.89 )-----------( 225      ( 02 Jan 2024 06:00 )             35.0     WBC Trend  12.89<H> Date (01-02 @ 06:00)  13.37<H> Date (01-01 @ 07:10)  11.71<H> Date (12-31 @ 13:00)      Chem  01-02    138  |  104  |  37<H>  ----------------------------<  125<H>  3.6   |  27  |  1.57<H>    Ca    9.1      02 Jan 2024 06:00    TPro  6.8  /  Alb  2.4<L>  /  TBili  0.7  /  DBili  x   /  AST  24  /  ALT  19  /  AlkPhos  68  01-02          T(F): 97 (01-02-24 @ 05:00), Max: 98.3 (01-01-24 @ 18:25)  HR: 97 (01-01-24 @ 18:25) (97 - 97)  BP: 118/69 (01-02-24 @ 05:00) (118/69 - 122/73)  RR: 15 (01-02-24 @ 05:00) (15 - 15)  SpO2: 98% (01-02-24 @ 05:00) (98% - 98%)  Wt(kg): --    O:   Noted is dry eschar localized on the dorusm aspect of the left foot with erythema. No active drainage from the wound site. It appears that the cellulitis is improving from the line of demaraction that is drawn.  Non palpable pedal pulses    A: left foot cellulitis with eschar      P:   Chart reviewed and Patient evaluated  Continue with IV antibiotics  Unknown how the wound started and for how long it has been present  Can obtain MRI to rule out bone pathology or deep abscess  Would also recommend vascular evaluation as there might be component of PAD attributing to non-healing wound

## 2024-01-02 NOTE — CONSULT NOTE ADULT - SUBJECTIVE AND OBJECTIVE BOX
VASCULAR SURGERY CONSULT NOTE    Patient is a 89y old  Male who presents with a chief complaint of left foot wound (02 Jan 2024 12:00)      HPI:  89yo M with history of  HTN, HLD, LBBB, paroxismal Atrial Fibrillation, thoracic Ao aneurysm and s/p mechanical AVR presented to urgent care for left foot wound. Per roomate, and further confirmed with only son Mr Bogdan Jefferson, earlier today patient complained of left foot pain, roommate took sock of and realized of left dorsum black scar, unable to date when lesion started. Patient taken to urgent care and referred for ED evaluation. Patient evaluated at bedside denies bleeding episodes, headaches, chest pain, abdominal pain. Son contacted and confirmed what was stated above, unable to provide more information for present illness.  Per outpatient chart review, patient recently seen and followed by cardiology for cardiovascular comorbidities, noticed stable INR for the most part. On recent blood work from 10/2023 noticed HbA1c 6.7% but no primary care provider following chronic care.   (31 Dec 2023 16:18)    Vascular Surgery consulted for left foot cellulitis    PAST MEDICAL & SURGICAL HISTORY:  HTN (hypertension)      HLD (hyperlipidemia)      Paroxysmal atrial fibrillation      H/O aortic valve repair      H/O thoracic aortic aneurysm repair          Review of Systems:  Contained within HPI.    MEDICATIONS  (STANDING):  atorvastatin 10 milliGRAM(s) Oral at bedtime  dextrose 5%. 1000 milliLiter(s) (50 mL/Hr) IV Continuous <Continuous>  dextrose 5%. 1000 milliLiter(s) (100 mL/Hr) IV Continuous <Continuous>  dextrose 50% Injectable 25 Gram(s) IV Push once  dextrose 50% Injectable 12.5 Gram(s) IV Push once  dextrose 50% Injectable 25 Gram(s) IV Push once  glucagon  Injectable 1 milliGRAM(s) IntraMuscular once  influenza  Vaccine (HIGH DOSE) 0.7 milliLiter(s) IntraMuscular once  insulin glargine Injectable (LANTUS) 10 Unit(s) SubCutaneous at bedtime  insulin lispro Injectable (ADMELOG) 4 Unit(s) SubCutaneous three times a day before meals  lactated ringers. 1000 milliLiter(s) (75 mL/Hr) IV Continuous <Continuous>  lisinopril 40 milliGRAM(s) Oral daily  nadolol 20 milliGRAM(s) Oral daily  piperacillin/tazobactam IVPB.. 3.375 Gram(s) IV Intermittent every 8 hours  vancomycin  IVPB 750 milliGRAM(s) IV Intermittent every 24 hours  vancomycin  IVPB        MEDICATIONS  (PRN):  acetaminophen     Tablet .. 650 milliGRAM(s) Oral every 6 hours PRN Temp greater or equal to 38C (100.4F), Mild Pain (1 - 3)  aluminum hydroxide/magnesium hydroxide/simethicone Suspension 30 milliLiter(s) Oral every 4 hours PRN Dyspepsia  dextrose Oral Gel 15 Gram(s) Oral once PRN Blood Glucose LESS THAN 70 milliGRAM(s)/deciliter  melatonin 3 milliGRAM(s) Oral at bedtime PRN Insomnia  ondansetron Injectable 4 milliGRAM(s) IV Push every 8 hours PRN Nausea and/or Vomiting      Allergies    No Known Allergies    Intolerances        SOCIAL HISTORY       FAMILY HISTORY:      Vital Signs Last 24 Hrs  T(C): 36.9 (02 Jan 2024 14:46), Max: 36.9 (02 Jan 2024 14:46)  T(F): 98.5 (02 Jan 2024 14:46), Max: 98.5 (02 Jan 2024 14:46)  HR: 95 (02 Jan 2024 14:46) (95 - 97)  BP: 128/71 (02 Jan 2024 14:46) (118/69 - 128/71)  BP(mean): --  RR: 16 (02 Jan 2024 14:46) (15 - 16)  SpO2: 95% (02 Jan 2024 14:46) (95% - 98%)    Parameters below as of 02 Jan 2024 14:46  Patient On (Oxygen Delivery Method): room air        Physical Exam:    General:  Appears stated age, hard of hearing  Chest:  bilateral chest rise and fall  Cardiovascular:  Not tachycardic  Abdomen:  Nondistended, nontender  MSK/EXTREMITIES:, Left foot noted to have dry eschar/ no active draining, palpable pedal pulse +2, warm to touch          LABS:                        11.9   12.89 )-----------( 225      ( 02 Jan 2024 06:00 )             35.0     01-02    138  |  104  |  37<H>  ----------------------------<  125<H>  3.6   |  27  |  1.57<H>    Ca    9.1      02 Jan 2024 06:00    TPro  6.8  /  Alb  2.4<L>  /  TBili  0.7  /  DBili  x   /  AST  24  /  ALT  19  /  AlkPhos  68  01-02    PT/INR - ( 02 Jan 2024 06:00 )   PT: 54.9 sec;   INR: 4.92 ratio         PTT - ( 01 Jan 2024 06:35 )  PTT:54.1 sec  Urinalysis Basic - ( 02 Jan 2024 06:00 )    Color: x / Appearance: x / SG: x / pH: x  Gluc: 125 mg/dL / Ketone: x  / Bili: x / Urobili: x   Blood: x / Protein: x / Nitrite: x   Leuk Esterase: x / RBC: x / WBC x   Sq Epi: x / Non Sq Epi: x / Bacteria: x        RADIOLOGY & ADDITIONAL STUDIES: VASCULAR SURGERY CONSULT NOTE    Patient is a 89y old  Male who presents with a chief complaint of left foot wound (02 Jan 2024 12:00)      HPI:  89yo M with history of  HTN, HLD, LBBB, paroxismal Atrial Fibrillation, thoracic Ao aneurysm and s/p mechanical AVR presented to urgent care for left foot wound. Per roomate, and further confirmed with only son Mr Bogdan Jefferson, earlier today patient complained of left foot pain, roommate took sock of and realized of left dorsum black scar, unable to date when lesion started. Patient taken to urgent care and referred for ED evaluation. Patient evaluated at bedside denies bleeding episodes, headaches, chest pain, abdominal pain. Son contacted and confirmed what was stated above, unable to provide more information for present illness.  Per outpatient chart review, patient recently seen and followed by cardiology for cardiovascular comorbidities, noticed stable INR for the most part. On recent blood work from 10/2023 noticed HbA1c 6.7% but no primary care provider following chronic care.   (31 Dec 2023 16:18)    Vascular Surgery consulted for left foot cellulitis    PAST MEDICAL & SURGICAL HISTORY:  HTN (hypertension)      HLD (hyperlipidemia)      Paroxysmal atrial fibrillation      H/O aortic valve repair      H/O thoracic aortic aneurysm repair          Review of Systems:  Contained within HPI.    MEDICATIONS  (STANDING):  atorvastatin 10 milliGRAM(s) Oral at bedtime  dextrose 5%. 1000 milliLiter(s) (50 mL/Hr) IV Continuous <Continuous>  dextrose 5%. 1000 milliLiter(s) (100 mL/Hr) IV Continuous <Continuous>  dextrose 50% Injectable 25 Gram(s) IV Push once  dextrose 50% Injectable 12.5 Gram(s) IV Push once  dextrose 50% Injectable 25 Gram(s) IV Push once  glucagon  Injectable 1 milliGRAM(s) IntraMuscular once  influenza  Vaccine (HIGH DOSE) 0.7 milliLiter(s) IntraMuscular once  insulin glargine Injectable (LANTUS) 10 Unit(s) SubCutaneous at bedtime  insulin lispro Injectable (ADMELOG) 4 Unit(s) SubCutaneous three times a day before meals  lactated ringers. 1000 milliLiter(s) (75 mL/Hr) IV Continuous <Continuous>  lisinopril 40 milliGRAM(s) Oral daily  nadolol 20 milliGRAM(s) Oral daily  piperacillin/tazobactam IVPB.. 3.375 Gram(s) IV Intermittent every 8 hours  vancomycin  IVPB 750 milliGRAM(s) IV Intermittent every 24 hours  vancomycin  IVPB        MEDICATIONS  (PRN):  acetaminophen     Tablet .. 650 milliGRAM(s) Oral every 6 hours PRN Temp greater or equal to 38C (100.4F), Mild Pain (1 - 3)  aluminum hydroxide/magnesium hydroxide/simethicone Suspension 30 milliLiter(s) Oral every 4 hours PRN Dyspepsia  dextrose Oral Gel 15 Gram(s) Oral once PRN Blood Glucose LESS THAN 70 milliGRAM(s)/deciliter  melatonin 3 milliGRAM(s) Oral at bedtime PRN Insomnia  ondansetron Injectable 4 milliGRAM(s) IV Push every 8 hours PRN Nausea and/or Vomiting      Allergies    No Known Allergies    Intolerances        SOCIAL HISTORY       FAMILY HISTORY:      Vital Signs Last 24 Hrs  T(C): 36.9 (02 Jan 2024 14:46), Max: 36.9 (02 Jan 2024 14:46)  T(F): 98.5 (02 Jan 2024 14:46), Max: 98.5 (02 Jan 2024 14:46)  HR: 95 (02 Jan 2024 14:46) (95 - 97)  BP: 128/71 (02 Jan 2024 14:46) (118/69 - 128/71)  BP(mean): --  RR: 16 (02 Jan 2024 14:46) (15 - 16)  SpO2: 95% (02 Jan 2024 14:46) (95% - 98%)    Parameters below as of 02 Jan 2024 14:46  Patient On (Oxygen Delivery Method): room air        Physical Exam:    General:  Appears stated age, hard of hearing  Chest:  bilateral chest rise and fall  Cardiovascular:  Not tachycardic  Abdomen:  Nondistended, nontender  MSK/EXTREMITIES:, Left foot noted to have dry eschar/ no active draining, palpable pedal pulse +2, warm to touch          LABS:                        11.9   12.89 )-----------( 225      ( 02 Jan 2024 06:00 )             35.0     01-02    138  |  104  |  37<H>  ----------------------------<  125<H>  3.6   |  27  |  1.57<H>    Ca    9.1      02 Jan 2024 06:00    TPro  6.8  /  Alb  2.4<L>  /  TBili  0.7  /  DBili  x   /  AST  24  /  ALT  19  /  AlkPhos  68  01-02    PT/INR - ( 02 Jan 2024 06:00 )   PT: 54.9 sec;   INR: 4.92 ratio         PTT - ( 01 Jan 2024 06:35 )  PTT:54.1 sec  Urinalysis Basic - ( 02 Jan 2024 06:00 )    Color: x / Appearance: x / SG: x / pH: x  Gluc: 125 mg/dL / Ketone: x  / Bili: x / Urobili: x   Blood: x / Protein: x / Nitrite: x   Leuk Esterase: x / RBC: x / WBC x   Sq Epi: x / Non Sq Epi: x / Bacteria: x        RADIOLOGY & ADDITIONAL STUDIES:

## 2024-01-02 NOTE — DISCHARGE NOTE PROVIDER - DETAILS OF MALNUTRITION DIAGNOSIS/DIAGNOSES
This patient has been assessed with a concern for Malnutrition and was treated during this hospitalization for the following Nutrition diagnosis/diagnoses:     -  01/03/2024: Severe protein-calorie malnutrition

## 2024-01-02 NOTE — DISCHARGE NOTE PROVIDER - CARE PROVIDERS DIRECT ADDRESSES
[email or web address]

## 2024-01-03 NOTE — PROGRESS NOTE ADULT - PROBLEM SELECTOR PLAN 8
-From H&P: Goals of care discussion initiated with son Mr Scottie Jefferson who will speak to father about advance directives.  -I attempted to call patient's son today 1/3/24 no reponse, left voicemail asking for callback.   -Currently no advance directives and non-defined code status

## 2024-01-03 NOTE — PROGRESS NOTE ADULT - PROBLEM SELECTOR PLAN 5
-currently rate controlled  -Supratherapeutic INR downtrending  -Oupatient trend reviewed, last POCT INR 2.3 (12.24.23)> 1.4> 5.1> 3.9> 1.6> 2.2>2.2> 1.5>1.3>3.0 (9.28.23)  -Currently not actively bleeding  -Remote telemetry in place  -If INR increases or active bleeding, tachycardia, anemia, hypotension, start vitamin K and PCC.  -Cont hold warfarin,  f/up INR am -currently rate controlled  -Supratherapeutic INR downtrending  -Oupatient trend reviewed, last POCT INR 2.3 (12.24.23)> 1.4> 5.1> 3.9> 1.6> 2.2>2.2> 1.5>1.3>3.0 (9.28.23)  -Currently not actively bleeding  -Remote telemetry in place  -INR 1/3/24: 3.75  -Daily INR, currently holding warfarin for supratherapeutic -HbA1c from outpatient chart review noted 6.7% under no medical management  -For age, at goal  -Lantus 10, Lispro 4   -Speech & Swallow minced and moist

## 2024-01-03 NOTE — PROGRESS NOTE ADULT - TIME BILLING
care coordination, plan of care discussed with patient face to face, son/jes called and left vmsg by primary team for callback
care coordination, plan of care discussed with patient face to face, son/jes
care coordination, plan of care discussed with patient face to face, vas surg

## 2024-01-03 NOTE — DIETITIAN INITIAL EVALUATION ADULT - PROBLEM SELECTOR PLAN 7
-Goals od care discussion initiated with son Mr Scottie Jefferson  -States he will visit patient 1/1/24 and initiate conversation  -Currently no advance directives and non-defined code status

## 2024-01-03 NOTE — PROGRESS NOTE ADULT - PROBLEM SELECTOR PLAN 1
-Undatable left dorsum necrotic wound with no active drainage  -No sepsis criteria met on arrival/admission  -WBC 11.71 > 13> 12  -10/2023 HbA1c 6.7% under no medical management  -Left foot xray: moderate hallux valgus with some degeneration and mild first IP generation. No bone destruction noted or fracture evident  -ID consult pending recs   -Podiatry recs apprec obtain MRI to rule out bone pathology or deep abscess  Would also recommend vascular evaluation as there might be component of PAD attributing to non-healing   -F/up am labs: CBC, CMP  -Continue zosyn and renally dosed vancomycin for MRSA coverage given DMT2 status  -check vtrough  -F/up lesion demarcation for progression -Undatable left dorsum necrotic wound with no active drainage  -No sepsis criteria met on arrival/admission  -WBC 11.71 > 13> 12  -10/2023 HbA1c 6.7% under no medical management  -Left foot xray: moderate hallux valgus with some degeneration and mild first IP generation. No bone destruction noted or fracture evident  -ID consult pending recs   -Podiatry recs apprec obtain MRI to rule out bone pathology or deep abscess. Patient is unable to provide good history of his possible prior medical procedures - unable to fill MRI safety checklist.   -Vascular recs appreciated: plan for xfer to [place] for angiogram.   -Switching from Zosyn to Cefepime today renally dosed for increase Cr  -check vanco trough tonight before 3rd dose  -F/up lesion demarcation for progression -Undatable left dorsum necrotic wound with no active drainage  -No sepsis criteria met on arrival/admission  -WBC 11.71 > 13> 12  -10/2023 HbA1c 6.7% under no medical management  -Left foot xray: moderate hallux valgus with some degeneration and mild first IP generation. No bone destruction noted or fracture evident  -ID consult pending recs   -Podiatry recs apprec obtain MRI to rule out bone pathology or deep abscess. Patient is unable to provide good history of his possible prior medical procedures - unable to fill MRI safety checklist.   -Vascular recs appreciated: plan for xfer to [place] for angiogram.   -Switching from Zosyn to Cefepime today renally dosed for increase Cr  -check vanco trough tonight before 3rd dose  -F/up lesion demarcation for progression

## 2024-01-03 NOTE — DIETITIAN INITIAL EVALUATION ADULT - MUSCLE MASS (LOSS OF MUSCLE)
Temples.../Clavicles.../Shoulders... Temples.../Clavicles.../Shoulders.../Scapula.../Thigh.../Calf.../Dorsal hand...

## 2024-01-03 NOTE — PROGRESS NOTE ADULT - NS ATTEND AMEND GEN_ALL_CORE FT
Agree with the above. Given abnormal arterial US, I would perform angiogram if patient gets transferred to [facility] hospital. Agree with the above. Given abnormal arterial US, I would perform angiogram if patient gets transferred to [facility] hospital.

## 2024-01-03 NOTE — PROGRESS NOTE ADULT - PROBLEM SELECTOR PLAN 6
-Outpatient cardiologist Dr Cleveland Bergman  -ECG 12/5/22 sinus rhythm first degree block LBBB, 8/14/23 sinus LBBB, 1st degree block, 12/14/23 sinus LBBB  -ECHO: 2021 mechanical AVR normal LV  function  -S/P mechanical AVR and thoracic aneursym repair -Outpatient cardiologist Dr Cleveland Linares  -ECG 12/5/22 sinus rhythm first degree block LBBB, 8/14/23 sinus LBBB, 1st degree block, 12/14/23 sinus LBBB  -ECHO: 2021 mechanical AVR normal LV  function  -S/P mechanical AVR and thoracic aneursym repair  -Monitor -currently rate controlled  -Supratherapeutic INR downtrending  -Oupatient trend reviewed, last POCT INR 2.3 (12.24.23)> 1.4> 5.1> 3.9> 1.6> 2.2>2.2> 1.5>1.3>3.0 (9.28.23)  -Currently not actively bleeding  -Remote telemetry in place  -INR 1/3/24: 3.75  -Daily INR, currently holding warfarin for supratherapeutic

## 2024-01-03 NOTE — PROGRESS NOTE ADULT - PROBLEM SELECTOR PLAN 2
-Cont' lisinopril (in conversion from Ramipril)  -Continue nadolol 20mg -Cont' lisinopril (in conversion from Ramipril)  -Continue nadolol 20mg  -BP controlled

## 2024-01-03 NOTE — DIETITIAN INITIAL EVALUATION ADULT - ORAL INTAKE PTA/DIET HISTORY
As per patient reports a usual good appetite ,dentulous , reports lost dentures , receptive to minced moist consistency

## 2024-01-03 NOTE — DIETITIAN INITIAL EVALUATION ADULT - PROBLEM SELECTOR PLAN 5
-currently rate controlled  -Supratherapeutic INR 6.59  -Oupatient trend reviewed, last POCT INR 2.3 (12.24.23)> 1.4> 5.1> 3.9> 1.6> 2.2>2.2> 1.5>1.3>3.0 (9.28.23)  -Hold warfarin dose  -Currently not actively bleeding  -Recheck INR am  -Remote telemetry in place  -If INR increases or active bleeding, tachycardia, anemia, hypotension, start vitamin K and PCC.

## 2024-01-03 NOTE — PROGRESS NOTE ADULT - ATTENDING COMMENTS
89yo M with history of  HTN, HLD, LBBB, paroxismal Atrial Fibrillation, thoracic Ao aneurysm and s/p mechanical AVR and diabetes mellitus type 2 presents for left foot wound. 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 Admitted for medical management of cellulitis Plan: cont iv antibxs, f/u blood cultures, monitor clinical course, pt improving, wean off O2, apprec vasc recs has occluded peroneal artery Dr Alvarez recommends transfer to [place] for angiogram, pt accepted awaiting bed avail, dc note mostly completed except med rec

## 2024-01-03 NOTE — DIETITIAN INITIAL EVALUATION ADULT - PERTINENT MEDS FT
MEDICATIONS  (STANDING):  atorvastatin 10 milliGRAM(s) Oral at bedtime  dextrose 5%. 1000 milliLiter(s) (50 mL/Hr) IV Continuous <Continuous>  dextrose 5%. 1000 milliLiter(s) (100 mL/Hr) IV Continuous <Continuous>  dextrose 50% Injectable 25 Gram(s) IV Push once  dextrose 50% Injectable 12.5 Gram(s) IV Push once  dextrose 50% Injectable 25 Gram(s) IV Push once  glucagon  Injectable 1 milliGRAM(s) IntraMuscular once  influenza  Vaccine (HIGH DOSE) 0.7 milliLiter(s) IntraMuscular once  insulin glargine Injectable (LANTUS) 10 Unit(s) SubCutaneous at bedtime  insulin lispro Injectable (ADMELOG) 4 Unit(s) SubCutaneous three times a day before meals  lisinopril 40 milliGRAM(s) Oral daily  nadolol 20 milliGRAM(s) Oral daily  piperacillin/tazobactam IVPB.. 3.375 Gram(s) IV Intermittent every 8 hours  vancomycin  IVPB 750 milliGRAM(s) IV Intermittent every 24 hours  vancomycin  IVPB        MEDICATIONS  (PRN):  acetaminophen     Tablet .. 650 milliGRAM(s) Oral every 6 hours PRN Temp greater or equal to 38C (100.4F), Mild Pain (1 - 3)  aluminum hydroxide/magnesium hydroxide/simethicone Suspension 30 milliLiter(s) Oral every 4 hours PRN Dyspepsia  dextrose Oral Gel 15 Gram(s) Oral once PRN Blood Glucose LESS THAN 70 milliGRAM(s)/deciliter  melatonin 3 milliGRAM(s) Oral at bedtime PRN Insomnia  ondansetron Injectable 4 milliGRAM(s) IV Push every 8 hours PRN Nausea and/or Vomiting  
[Post Hospitalization] : a post hospitalization visit

## 2024-01-03 NOTE — DIETITIAN INITIAL EVALUATION ADULT - OTHER INFO
89yo M with history of  HTN, HLD, LBBB, paroxismal Atrial Fibrillation, thoracic Ao aneurysm and s/p mechanical AVR presented to urgent care for left foot wound. Patient noted with LLE cellulitis , Vascular note reviewed , IV Vanc/Zosyn noted , NFPE conducted patient with evidence of severe protein calorie malnutrition (+) muscle wasting /loss of body fat observed , Hx of DM , A1c 7.1% insulin regimen noted. No BM since PTA , No edema, LLE cellulitis noted , transfer for LLE angiogram,  89yo M with history of  HTN, HLD, LBBB, paroxismal Atrial Fibrillation, thoracic Ao aneurysm and s/p mechanical AVR presented to urgent care for left foot wound. Patient noted with LLE cellulitis , Vascular note reviewed , IV Vanc/Zosyn noted , NFPE conducted patient with evidence of severe protein calorie malnutrition (+) muscle wasting /loss of body fat observed , Hx of DM , A1c 7.1% insulin regimen noted. No BM since PTA , No edema, LLE cellulitis noted , transfer for LLE angiogram,  89yo M with history of  HTN, HLD, LBBB, paroxismal Atrial Fibrillation, thoracic Ao aneurysm and s/p mechanical AVR presented to urgent care for left foot wound. Patient noted with LLE cellulitis , Vascular note reviewed , IV Vanc/Zosyn noted , NFPE conducted patient with evidence of severe protein calorie malnutrition (+) muscle wasting /loss of body fat observed , Hx of DM , A1c 7.1% insulin regimen noted. suggest addition of Ensure High protein BID ,due to poor po & advanced age , appropriate po supplement . No BM since PTA , No edema, LLE cellulitis noted , transfer for LLE angiogram,

## 2024-01-03 NOTE — DIETITIAN INITIAL EVALUATION ADULT - PERTINENT LABORATORY DATA
01-03    140  |  103  |  42<H>  ----------------------------<  151<H>  3.8   |  26  |  1.96<H>    Ca    9.1      03 Jan 2024 07:23    TPro  7.2  /  Alb  2.5<L>  /  TBili  0.8  /  DBili  x   /  AST  25  /  ALT  20  /  AlkPhos  73  01-03  POCT Blood Glucose.: 136 mg/dL (01-03-24 @ 11:40)  A1C with Estimated Average Glucose Result: 7.1 % (01-01-24 @ 07:10)

## 2024-01-03 NOTE — PROGRESS NOTE ADULT - PROBLEM SELECTOR PLAN 4
-HbA1c from outpatient chart review noted 6.7% under no medical management  -For age, at goal.  -Insulin orders in place as needed  -Continue carbohydrate diet if pass bedside speech and swallow test  -Follow aspiration precautions -HbA1c from outpatient chart review noted 6.7% under no medical management  -For age, at goal  -Lantus 10, Lispro 4   -Speech & Swallow minced and moist -LFT normal  -Continue atorvastatin

## 2024-01-03 NOTE — DIETITIAN INITIAL EVALUATION ADULT - PROBLEM SELECTOR PLAN 1
-Undatable left dorsum necrotic wound with no active drainage  -No sepsis criteria met on arrival/admission  -WBC 11.71, follow trend  -10/2023 HbA1c 6.7% under no medical management  -Left foot xray: moderate hallux valgus with some degeneration and mild first IP generation. No bone destruction noted or fracture evident  -F/up blood cultures  -S/P zosyn  -ID consult requested. Answering service message placed  -Podiatry consult requested, text send to Dr Randal Ross  -F/up am labs: CBC, CMP, ESR, CRP  -Continue zosyn and renally dosed vancomycin for MRSA coverage given DMT2 status  -Obtain vanc through after 3rd dose  -MRI order for 1/1/24

## 2024-01-03 NOTE — PROGRESS NOTE ADULT - SUBJECTIVE AND OBJECTIVE BOX
VASCULAR SURGERY PROGRESS NOTE  Pt. seen and examined at bedside resting comfortably. NAEO. No acute complaints.   Denies fever/chills, chest pain, dyspnea, cough, dizziness.     Vital Signs Last 24 Hrs  T(C): 36.3 (03 Jan 2024 05:00), Max: 36.9 (02 Jan 2024 14:46)  T(F): 97.4 (03 Jan 2024 05:00), Max: 98.5 (02 Jan 2024 14:46)  HR: 88 (03 Jan 2024 11:33) (88 - 98)  BP: 136/71 (03 Jan 2024 11:33) (109/58 - 138/74)  BP(mean): --  RR: 18 (03 Jan 2024 05:00) (14 - 18)  SpO2: 96% (03 Jan 2024 11:33) (94% - 96%)    Parameters below as of 03 Jan 2024 11:33  Patient On (Oxygen Delivery Method): room air        PHYSICAL EXAM:    GENERAL: NAD  HEAD:  Atraumatic, Normocephalic  EYES: EOMI, PERRLA, conjunctiva and sclera clear  ABDOMEN: Nondistended, nontender  EXTREMITIES: Left foot noted to have dry eschar/ no active draining, sensorimotor intact, palpable pedal pulse +2, warm to touch, cap refill < 3 seconds    I&O's Detail    02 Jan 2024 07:01  -  03 Jan 2024 07:00  --------------------------------------------------------  IN:    Lactated Ringers: 900 mL  Total IN: 900 mL    OUT:  Total OUT: 0 mL    Total NET: 900 mL          LABS:                        12.1   12.20 )-----------( 241      ( 03 Jan 2024 07:23 )             35.8     01-03    140  |  103  |  42<H>  ----------------------------<  151<H>  3.8   |  26  |  1.96<H>    Ca    9.1      03 Jan 2024 07:23    TPro  7.2  /  Alb  2.5<L>  /  TBili  0.8  /  DBili  x   /  AST  25  /  ALT  20  /  AlkPhos  73  01-03    PT/INR - ( 03 Jan 2024 07:23 )   PT: 42.2 sec;   INR: 3.75 ratio           Urinalysis Basic - ( 03 Jan 2024 07:23 )    Color: x / Appearance: x / SG: x / pH: x  Gluc: 151 mg/dL / Ketone: x  / Bili: x / Urobili: x   Blood: x / Protein: x / Nitrite: x   Leuk Esterase: x / RBC: x / WBC x   Sq Epi: x / Non Sq Epi: x / Bacteria: x      Culture Results:   <10,000 CFU/mL Normal Urogenital Christy (12-31 @ 13:38)  Culture Results:   No growth at 48 Hours (12-31 @ 13:05)  Culture Results:   No growth at 48 Hours (12-31 @ 13:00)    type    RADIOLOGY & ADDITIONAL STUDIES:    < from: US Duplex Venous Lower Ext Complete, Bilateral (01.02.24 @ 18:47) >  ACC: [id number] EXAM:  US DPLX LWR EXT VEINS COMPL BI   ORDERED BY: LINDSAY GUADARRAMA     PROCEDURE DATE:  01/02/2024          INTERPRETATION:  CLINICAL INFORMATION: Cellulitis.  Lower extremity   swelling and redness.    COMPARISON: None available.    TECHNIQUE: Duplex sonography of the BILATERAL LOWER extremity veins with   color and spectral Doppler, with and without compression.    FINDINGS:    RIGHT:  Normal compressibility of the RIGHT common femoral, femoral and popliteal   veins.  Doppler examination shows normal spontaneous and phasic flow.  No RIGHT calf vein thrombosis is detected.    LEFT:  Normal compressibility of the LEFT common femoral, femoral and popliteal   veins.  Doppler examination shows normal spontaneous and phasic flow.  No LEFT calf vein thrombosis is detected.    IMPRESSION:  No evidence of deep venous thrombosis in either lower extremity.            --- End of Report ---            DARRELL OCASIO MD; Attending Radiologist  This document has been electronically signed. Jan 2 2024  7:04PM    < end of copied text >        < from: US Duplex Arterial Lower Ext Compl, Bilateral (01.02.24 @ 18:50) >  ACC: [id number] EXAM:  US DPLX LWR EXT ARTS COMPL BI   ORDERED BY: DERIAN RICHARDS     PROCEDURE DATE:  01/02/2024          INTERPRETATION:  CLINICAL INFORMATION: Type 2 diabetes mellitus.  Left   foot wound.  No palpable pedal pulses    COMPARISON: None.    TECHNIQUE:  Grayscale, color Doppler and spectral Doppler imaging of the bilateral   lower extremity arterial systems was performed.      FINDINGS:  The lower extremity arterial systems are patent bilaterally.  No elevated   velocities or tardus parvus waveforms are encountered.  There are   abnormal monophasic waveforms in the left anterior tibial artery and in   the left dorsalis pedis artery, which course to the patient's left foot   wound, suspicious for significant peripheral vascular disease.  Abnormal   monophasic waveforms are also visualized in the right deep femoral artery   and right dorsalis pedis artery.      Peak systolic velocities and waveform morphology.  Right common femoral artery: 114 cm/s.  Triphasic waveforms.  Right deep femoral artery: 100 cm/s.  Monophasic waveforms.  Right superficial femoral artery: 69 cm/s proximal, 46 cm/s mid, 64 cm/s   distal.  Triphasic waveforms.  Right popliteal artery: 75 cm/s.  Triphasic waveforms.  Right posterior tibial artery: 53 cm/s.  Biphasic waveforms  Right peroneal artery: 46 cm/s.  Biphasic waveforms.  Right anterior tibial artery: 58 cm/s.  Biphasic waveforms.  Right dorsalis pedis artery: 51 cm/s.  Monophasic waveform.      Left common femoral artery: 60 cm/s.Triphasic waveforms  Left deep femoral artery: 67 cm/s.  Triphasic waveforms.  Left superficial femoral artery: 64 cm/s proximal, 63 cm/s mid, 52 cm/s   distally.  Triphasic waveforms.  Left popliteal artery: 43 cm/s.  Triphasic waveforms.  Left posterior tibial artery: 49 cm/s.  Triphasic waveforms.  Left peroneal artery: 79 cm/s.  Waveform morphology not well   characterized.  Left anterior tibial artery: 28 cm/s.  Monophasic waveforms.  Left dorsalis pedis artery: 66 cm/s.  Low resistance monophasic waveforms.      IMPRESSION:  There are abnormal monophasic waveforms in the left anterior tibial   artery and in the left dorsalis pedis artery, which course to the   patient's left foot wound, suspicious for significant peripheral vascular   disease.    Abnormal monophasic waveforms are also present in the right deep femoral   artery and right posterior tibial artery.    No vascular occlusion or focal flow-limiting stenosis is identified   sonographically.    --- End of Report ---            DARRELL OCASIO MD; Attending Radiologist  This document has been electronically signed. Jan 2 2024  7:38PM    < end of copied text >      Impression: 89y old  Male who presents with a chief complaint of left foot wound. Left foot noted to have dry eschar on dorsum, palpable Left pedal pulse. No active draining noted, cellulitis improving from demarcation. venous and arterial US reviewed with attending. no occlusion but noted abnormal waveform in L PT and DP arteries concern for significant PVD.    Plan:  - [facility]  - No further imaging needed while @ [facility]  - C/w IV abx, trend wbc  - Medical management per medicine team    Discussed w/ Dr. Dale  Vascular VASCULAR SURGERY PROGRESS NOTE  Pt. seen and examined at bedside resting comfortably. NAEO. No acute complaints.   Denies fever/chills, chest pain, dyspnea, cough, dizziness.     Vital Signs Last 24 Hrs  T(C): 36.3 (03 Jan 2024 05:00), Max: 36.9 (02 Jan 2024 14:46)  T(F): 97.4 (03 Jan 2024 05:00), Max: 98.5 (02 Jan 2024 14:46)  HR: 88 (03 Jan 2024 11:33) (88 - 98)  BP: 136/71 (03 Jan 2024 11:33) (109/58 - 138/74)  BP(mean): --  RR: 18 (03 Jan 2024 05:00) (14 - 18)  SpO2: 96% (03 Jan 2024 11:33) (94% - 96%)    Parameters below as of 03 Jan 2024 11:33  Patient On (Oxygen Delivery Method): room air        PHYSICAL EXAM:    GENERAL: NAD  HEAD:  Atraumatic, Normocephalic  EYES: EOMI, PERRLA, conjunctiva and sclera clear  ABDOMEN: Nondistended, nontender  EXTREMITIES: Left foot noted to have dry eschar/ no active draining, sensorimotor intact, palpable pedal pulse +2, warm to touch, cap refill < 3 seconds    I&O's Detail    02 Jan 2024 07:01  -  03 Jan 2024 07:00  --------------------------------------------------------  IN:    Lactated Ringers: 900 mL  Total IN: 900 mL    OUT:  Total OUT: 0 mL    Total NET: 900 mL          LABS:                        12.1   12.20 )-----------( 241      ( 03 Jan 2024 07:23 )             35.8     01-03    140  |  103  |  42<H>  ----------------------------<  151<H>  3.8   |  26  |  1.96<H>    Ca    9.1      03 Jan 2024 07:23    TPro  7.2  /  Alb  2.5<L>  /  TBili  0.8  /  DBili  x   /  AST  25  /  ALT  20  /  AlkPhos  73  01-03    PT/INR - ( 03 Jan 2024 07:23 )   PT: 42.2 sec;   INR: 3.75 ratio           Urinalysis Basic - ( 03 Jan 2024 07:23 )    Color: x / Appearance: x / SG: x / pH: x  Gluc: 151 mg/dL / Ketone: x  / Bili: x / Urobili: x   Blood: x / Protein: x / Nitrite: x   Leuk Esterase: x / RBC: x / WBC x   Sq Epi: x / Non Sq Epi: x / Bacteria: x      Culture Results:   <10,000 CFU/mL Normal Urogenital Christy (12-31 @ 13:38)  Culture Results:   No growth at 48 Hours (12-31 @ 13:05)  Culture Results:   No growth at 48 Hours (12-31 @ 13:00)    type    RADIOLOGY & ADDITIONAL STUDIES:    < from: US Duplex Venous Lower Ext Complete, Bilateral (01.02.24 @ 18:47) >  ACC: [id number] EXAM:  US DPLX LWR EXT VEINS COMPL BI   ORDERED BY: LINDSAY GUADARRAMA     PROCEDURE DATE:  01/02/2024          INTERPRETATION:  CLINICAL INFORMATION: Cellulitis.  Lower extremity   swelling and redness.    COMPARISON: None available.    TECHNIQUE: Duplex sonography of the BILATERAL LOWER extremity veins with   color and spectral Doppler, with and without compression.    FINDINGS:    RIGHT:  Normal compressibility of the RIGHT common femoral, femoral and popliteal   veins.  Doppler examination shows normal spontaneous and phasic flow.  No RIGHT calf vein thrombosis is detected.    LEFT:  Normal compressibility of the LEFT common femoral, femoral and popliteal   veins.  Doppler examination shows normal spontaneous and phasic flow.  No LEFT calf vein thrombosis is detected.    IMPRESSION:  No evidence of deep venous thrombosis in either lower extremity.            --- End of Report ---            DARRELL OCASIO MD; Attending Radiologist  This document has been electronically signed. Jan 2 2024  7:04PM    < end of copied text >        < from: US Duplex Arterial Lower Ext Compl, Bilateral (01.02.24 @ 18:50) >  ACC: [id number] EXAM:  US DPLX LWR EXT ARTS COMPL BI   ORDERED BY: DERIAN RICHARDS     PROCEDURE DATE:  01/02/2024          INTERPRETATION:  CLINICAL INFORMATION: Type 2 diabetes mellitus.  Left   foot wound.  No palpable pedal pulses    COMPARISON: None.    TECHNIQUE:  Grayscale, color Doppler and spectral Doppler imaging of the bilateral   lower extremity arterial systems was performed.      FINDINGS:  The lower extremity arterial systems are patent bilaterally.  No elevated   velocities or tardus parvus waveforms are encountered.  There are   abnormal monophasic waveforms in the left anterior tibial artery and in   the left dorsalis pedis artery, which course to the patient's left foot   wound, suspicious for significant peripheral vascular disease.  Abnormal   monophasic waveforms are also visualized in the right deep femoral artery   and right dorsalis pedis artery.      Peak systolic velocities and waveform morphology.  Right common femoral artery: 114 cm/s.  Triphasic waveforms.  Right deep femoral artery: 100 cm/s.  Monophasic waveforms.  Right superficial femoral artery: 69 cm/s proximal, 46 cm/s mid, 64 cm/s   distal.  Triphasic waveforms.  Right popliteal artery: 75 cm/s.  Triphasic waveforms.  Right posterior tibial artery: 53 cm/s.  Biphasic waveforms  Right peroneal artery: 46 cm/s.  Biphasic waveforms.  Right anterior tibial artery: 58 cm/s.  Biphasic waveforms.  Right dorsalis pedis artery: 51 cm/s.  Monophasic waveform.      Left common femoral artery: 60 cm/s.Triphasic waveforms  Left deep femoral artery: 67 cm/s.  Triphasic waveforms.  Left superficial femoral artery: 64 cm/s proximal, 63 cm/s mid, 52 cm/s   distally.  Triphasic waveforms.  Left popliteal artery: 43 cm/s.  Triphasic waveforms.  Left posterior tibial artery: 49 cm/s.  Triphasic waveforms.  Left peroneal artery: 79 cm/s.  Waveform morphology not well   characterized.  Left anterior tibial artery: 28 cm/s.  Monophasic waveforms.  Left dorsalis pedis artery: 66 cm/s.  Low resistance monophasic waveforms.      IMPRESSION:  There are abnormal monophasic waveforms in the left anterior tibial   artery and in the left dorsalis pedis artery, which course to the   patient's left foot wound, suspicious for significant peripheral vascular   disease.    Abnormal monophasic waveforms are also present in the right deep femoral   artery and right posterior tibial artery.    No vascular occlusion or focal flow-limiting stenosis is identified   sonographically.    --- End of Report ---            DARRELL OCASIO MD; Attending Radiologist  This document has been electronically signed. Jan 2 2024  7:38PM    < end of copied text >      Impression: 89y old  Male who presents with a chief complaint of left foot wound. Left foot noted to have dry eschar on dorsum, palpable Left pedal pulse. No active draining noted, cellulitis improving from demarcation. venous and arterial US reviewed with attending. no occlusion but noted abnormal waveform in L PT and DP arteries concern for significant PVD.    Plan:  - Recommend transfer to [facility] for LLE angiogram  - No further imaging needed while @ [facility]  - C/w IV abx, trend wbc  - Medical management per medicine team    Discussed w/ Dr. Dale  Vascular

## 2024-01-03 NOTE — PROGRESS NOTE ADULT - PROBLEM SELECTOR PLAN 3
-LFT normal  -Continue atorvastatin -No prior labs in HIE unable to determine if BETINA or BETINA on CKD  -Cr 1.6 > 1.3 > 1.9  -BUN 46 > 31 > 42  -Seems to have been present on admission, originally improved after first fluid bolus, but now worsening. Was given initial 1900cc bolus in the ED then continued on NS 75cc/hr (12/31-1/3)  -Stop IVF, hold losartan, change Vanc+Zosyn to Vanc+Cefepime, follow Vanco trough for dosing adjustment in setting of worsened renal fxn  -If Cr worsens will consult Nephrology

## 2024-01-03 NOTE — PROGRESS NOTE ADULT - ASSESSMENT
89yo M with history of  HTN, HLD, LBBB, paroxismal Atrial Fibrillation, thoracic Ao aneurysm and s/p mechanical AVR and diabetes mellitus type 2 presents for left foot wound. Admitted for medical management of cellulitis vs OM    INTERVAL HPI: Pt seen and examined. States he feels ok, denies any acute complaints at this time.   OVERNIGHT EVENTS: none noted    REVIEW OF SYSTEMS:  CONSTITUTIONAL: (-) weakness, (-) fevers, (-) chills  EYES/ENT: (-) visual changes,  (-) vertigo,  (-) throat pain   NECK:  (-) pain, (-) stiffness  RESPIRATORY:  (-) shortness of breath, (-) cough,  (-) wheezing,  (-) hemoptysis   CARDIOVASCULAR:  (-) chest pain, (-) palpitations  GASTROINTESTINAL:  (-) abdominal or epigastric pain, (-) nausea, (-) vomiting, (-) diarrhea, (-) constipation, (-) melena,  (-) hematemesis,  (-) hematochezia  GENITOURINARY: (-) dysuria, (-) frequency, (-) hematuria  NEUROLOGICAL: (-) numbness, (-) weakness  SKIN: (-) itching, (-) rashes, (-) lesions    Vital Signs Last 24 Hrs  T(C): 36.3 (03 Jan 2024 12:29), Max: 36.3 (02 Jan 2024 20:00)  T(F): 97.4 (03 Jan 2024 12:29), Max: 97.4 (02 Jan 2024 20:00)  HR: 90 (03 Jan 2024 12:29) (88 - 98)  BP: 128/70 (03 Jan 2024 12:29) (109/58 - 138/74)  BP(mean): --  RR: 16 (03 Jan 2024 12:29) (14 - 18)  SpO2: 95% (03 Jan 2024 12:29) (94% - 96%)    Parameters below as of 03 Jan 2024 12:29  Patient On (Oxygen Delivery Method): room air    PHYSICAL EXAM:  GENERAL: NAD  HEENT:  AT/NC, anicteric, moist mucous membranes, EOMI, no lid-lag, conjunctiva and sclera clear  CHEST/LUNG:  CTA b/l, no rales, wheezes, or rhonchi,  normal respiratory effort, no intercostal retractions  HEART:  RRR, S1, S2, no murmurs; no pitting edema  ABDOMEN:  BS+, soft, nontender, nondistended; No HSM  MSK/EXTREMITIES: Palpable B/L pulses, black ehscar on L foot dorsum   NERVOUS SYSTEM: answers questions and follows commands appropriately, A&Ox3 grossly moves all extremities   PSYCH: Appropriate affect, Alert & Awake; Good judgement    MEDICATIONS:  MEDICATIONS  (STANDING):  atorvastatin 10 milliGRAM(s) Oral at bedtime  cefepime   IVPB      dextrose 5%. 1000 milliLiter(s) (50 mL/Hr) IV Continuous <Continuous>  dextrose 5%. 1000 milliLiter(s) (100 mL/Hr) IV Continuous <Continuous>  dextrose 50% Injectable 25 Gram(s) IV Push once  dextrose 50% Injectable 12.5 Gram(s) IV Push once  dextrose 50% Injectable 25 Gram(s) IV Push once  glucagon  Injectable 1 milliGRAM(s) IntraMuscular once  influenza  Vaccine (HIGH DOSE) 0.7 milliLiter(s) IntraMuscular once  insulin glargine Injectable (LANTUS) 10 Unit(s) SubCutaneous at bedtime  insulin lispro Injectable (ADMELOG) 4 Unit(s) SubCutaneous three times a day before meals  nadolol 20 milliGRAM(s) Oral daily  vancomycin  IVPB      vancomycin  IVPB 750 milliGRAM(s) IV Intermittent every 24 hours      LABS: All Labs Reviewed:                        12.1   12.20 )-----------( 241      ( 03 Jan 2024 07:23 )             35.8     01-03    140  |  103  |  42<H>  ----------------------------<  151<H>  3.8   |  26  |  1.96<H>    Ca    9.1      03 Jan 2024 07:23    TPro  7.2  /  Alb  2.5<L>  /  TBili  0.8  /  DBili  x   /  AST  25  /  ALT  20  /  AlkPhos  73  01-03    PT/INR - ( 03 Jan 2024 07:23 )   PT: 42.2 sec;   INR: 3.75 ratio               Blood Culture: 12-31 @ 13:38  Organism --  Gram Stain Blood -- Gram Stain --  Specimen Source Clean Catch Clean Catch (Midstream)  Culture-Blood --    12-31 @ 13:05  Organism --  Gram Stain Blood -- Gram Stain --  Specimen Source .Blood Blood-Peripheral  Culture-Blood --    12-31 @ 13:00  Organism --  Gram Stain Blood -- Gram Stain --  Specimen Source .Blood Blood-Peripheral  Culture-Blood --      I&O's Summary    02 Jan 2024 07:01  -  03 Jan 2024 07:00  --------------------------------------------------------  IN: 900 mL / OUT: 0 mL / NET: 900 mL      CAPILLARY BLOOD GLUCOSE      POCT Blood Glucose.: 136 mg/dL (03 Jan 2024 11:40)  POCT Blood Glucose.: 155 mg/dL (03 Jan 2024 08:10)  POCT Blood Glucose.: 217 mg/dL (02 Jan 2024 21:59)  POCT Blood Glucose.: 197 mg/dL (02 Jan 2024 17:10)      RADIOLOGY/EKG:     89yo M with history of  HTN, HLD, LBBB, paroxismal Atrial Fibrillation, thoracic Ao aneurysm and s/p mechanical AVR and diabetes mellitus type 2 presents for left foot wound. Admitted for medical management of cellulitis vs OM    INTERVAL HPI: Pt seen and examined. States he feels ok, denies any acute complaints at this time.   OVERNIGHT EVENTS: none noted    REVIEW OF SYSTEMS:  CONSTITUTIONAL: (-) weakness, (-) fevers, (-) chills  EYES/ENT: (-) visual changes,  (-) vertigo,  (-) throat pain   NECK:  (-) pain, (-) stiffness  RESPIRATORY:  (-) shortness of breath, (-) cough,  (-) wheezing,  (-) hemoptysis   CARDIOVASCULAR:  (-) chest pain, (-) palpitations  GASTROINTESTINAL:  (-) abdominal or epigastric pain, (-) nausea, (-) vomiting, (-) diarrhea, (-) constipation, (-) melena,  (-) hematemesis,  (-) hematochezia  GENITOURINARY: (-) dysuria, (-) frequency, (-) hematuria  NEUROLOGICAL: (-) numbness, (-) weakness  SKIN: (-) itching, (-) rashes, (-) lesions    Vital Signs Last 24 Hrs  T(C): 36.3 (03 Jan 2024 12:29), Max: 36.3 (02 Jan 2024 20:00)  T(F): 97.4 (03 Jan 2024 12:29), Max: 97.4 (02 Jan 2024 20:00)  HR: 90 (03 Jan 2024 12:29) (88 - 98)  BP: 128/70 (03 Jan 2024 12:29) (109/58 - 138/74)  BP(mean): --  RR: 16 (03 Jan 2024 12:29) (14 - 18)  SpO2: 95% (03 Jan 2024 12:29) (94% - 96%)    Parameters below as of 03 Jan 2024 12:29  Patient On (Oxygen Delivery Method): room air    PHYSICAL EXAM:  GENERAL: NAD  HEENT:  AT/NC, anicteric, moist mucous membranes, EOMI, no lid-lag, conjunctiva and sclera clear  CHEST/LUNG:  CTA b/l, no rales, wheezes, or rhonchi,  normal respiratory effort, no intercostal retractions  HEART:  RRR, S1, S2, no murmurs; no pitting edema  ABDOMEN:  BS+, soft, nontender, nondistended; No HSM  MSK/EXTREMITIES: Palpable B/L pulses, black ehscar on L foot dorsum   NERVOUS SYSTEM: answers questions and follows commands appropriately, A&Ox3 grossly moves all extremities   PSYCH: Appropriate affect, Alert & Awake; Good judgement    MEDICATIONS:  MEDICATIONS  (STANDING):  atorvastatin 10 milliGRAM(s) Oral at bedtime  cefepime   IVPB      dextrose 5%. 1000 milliLiter(s) (50 mL/Hr) IV Continuous <Continuous>  dextrose 5%. 1000 milliLiter(s) (100 mL/Hr) IV Continuous <Continuous>  dextrose 50% Injectable 25 Gram(s) IV Push once  dextrose 50% Injectable 12.5 Gram(s) IV Push once  dextrose 50% Injectable 25 Gram(s) IV Push once  glucagon  Injectable 1 milliGRAM(s) IntraMuscular once  influenza  Vaccine (HIGH DOSE) 0.7 milliLiter(s) IntraMuscular once  insulin glargine Injectable (LANTUS) 10 Unit(s) SubCutaneous at bedtime  insulin lispro Injectable (ADMELOG) 4 Unit(s) SubCutaneous three times a day before meals  nadolol 20 milliGRAM(s) Oral daily  vancomycin  IVPB      vancomycin  IVPB 750 milliGRAM(s) IV Intermittent every 24 hours      LABS: All Labs Reviewed:                        12.1   12.20 )-----------( 241      ( 03 Jan 2024 07:23 )             35.8     01-03    140  |  103  |  42<H>  ----------------------------<  151<H>  3.8   |  26  |  1.96<H>    Ca    9.1      03 Jan 2024 07:23    TPro  7.2  /  Alb  2.5<L>  /  TBili  0.8  /  DBili  x   /  AST  25  /  ALT  20  /  AlkPhos  73  01-03    PT/INR - ( 03 Jan 2024 07:23 )   PT: 42.2 sec;   INR: 3.75 ratio               Blood Culture: 12-31 @ 13:38  Organism --  Gram Stain Blood -- Gram Stain --  Specimen Source Clean Catch Clean Catch (Midstream)  Culture-Blood --    12-31 @ 13:05  Organism --  Gram Stain Blood -- Gram Stain --  Specimen Source .Blood Blood-Peripheral  Culture-Blood --    12-31 @ 13:00  Organism --  Gram Stain Blood -- Gram Stain --  Specimen Source .Blood Blood-Peripheral  Culture-Blood --      I&O's Summary    02 Jan 2024 07:01  -  03 Jan 2024 07:00  --------------------------------------------------------  IN: 900 mL / OUT: 0 mL / NET: 900 mL      CAPILLARY BLOOD GLUCOSE      POCT Blood Glucose.: 136 mg/dL (03 Jan 2024 11:40)  POCT Blood Glucose.: 155 mg/dL (03 Jan 2024 08:10)  POCT Blood Glucose.: 217 mg/dL (02 Jan 2024 21:59)  POCT Blood Glucose.: 197 mg/dL (02 Jan 2024 17:10)      RADIOLOGY/EKG:     89yo M with history of  HTN, HLD, LBBB, paroxismal Atrial Fibrillation, thoracic Ao aneurysm and s/p mechanical AVR and diabetes mellitus type 2 presents for left foot wound. Admitted for medical management of cellulitis vs OM     89yo M with history of  HTN, HLD, LBBB, paroxismal Atrial Fibrillation, thoracic Ao aneurysm and s/p mechanical AVR and diabetes mellitus type 2 presents for left foot wound. Admitted for medical management of cellulitis vs OM

## 2024-01-04 NOTE — PROGRESS NOTE ADULT - ASSESSMENT
89yo M with history of  HTN, HLD, LBBB, paroxismal Atrial Fibrillation, thoracic Ao aneurysm and s/p mechanical AVR and diabetes mellitus type 2 presents for left foot wound. Admitted for medical management of cellulitis vs OM     89yo M with history of  HTN, HLD, LBBB, paroxismal Atrial Fibrillation, thoracic Ao aneurysm and s/p mechanical AVR and diabetes mellitus type 2 presents for left foot wound. Admitted for medical management of cellulitis vs OM

## 2024-01-04 NOTE — CONSULT NOTE ADULT - SUBJECTIVE AND OBJECTIVE BOX
HPI:   Patient is a 89y male with afib , av repair, thoracic aorta aneurysm repair, admitted 4 d ago for a black eschar on the left dorsal foot with surrounding erythema. Vascular surgery is concerned about ischemia so he is planned for transfer to [facility] for an angiogram. He has been on vanco zosyn until 1/3 then yesterday changed to vanco cefepime. He has developed cristhian. The patient cannot give me any history    REVIEW OF SYSTEMS:  All other review of systems negative (Comprehensive ROS)    PAST MEDICAL & SURGICAL HISTORY:  HTN (hypertension)      HLD (hyperlipidemia)      Paroxysmal atrial fibrillation      H/O aortic valve repair      H/O thoracic aortic aneurysm repair          Allergies    No Known Allergies    Intolerances        Antimicrobials Day #  :5 total abx  cefepime   IVPB      cefepime   IVPB 1000 milliGRAM(s) IV Intermittent daily    Other Medications:  acetaminophen     Tablet .. 650 milliGRAM(s) Oral every 6 hours PRN  aluminum hydroxide/magnesium hydroxide/simethicone Suspension 30 milliLiter(s) Oral every 4 hours PRN  atorvastatin 10 milliGRAM(s) Oral at bedtime  dextrose 5%. 1000 milliLiter(s) IV Continuous <Continuous>  dextrose 5%. 1000 milliLiter(s) IV Continuous <Continuous>  dextrose 50% Injectable 25 Gram(s) IV Push once  dextrose 50% Injectable 12.5 Gram(s) IV Push once  dextrose 50% Injectable 25 Gram(s) IV Push once  dextrose Oral Gel 15 Gram(s) Oral once PRN  glucagon  Injectable 1 milliGRAM(s) IntraMuscular once  heparin   Injectable 5000 Unit(s) IV Push every 6 hours PRN  heparin   Injectable 2500 Unit(s) IV Push every 6 hours PRN  heparin   Injectable 2500 Unit(s) IV Push once PRN  heparin  Infusion.  Unit(s)/Hr IV Continuous <Continuous>  influenza  Vaccine (HIGH DOSE) 0.7 milliLiter(s) IntraMuscular once  insulin glargine Injectable (LANTUS) 10 Unit(s) SubCutaneous at bedtime  insulin lispro Injectable (ADMELOG) 4 Unit(s) SubCutaneous three times a day before meals  melatonin 3 milliGRAM(s) Oral at bedtime PRN  nadolol 20 milliGRAM(s) Oral daily  ondansetron Injectable 4 milliGRAM(s) IV Push every 8 hours PRN      FAMILY HISTORY:      SOCIAL HISTORY:  Smoking: [ ]Yes x[ ]No  ETOH: [ ]Yesx [ ]No  Drug Use: [ ]Yes x[ ]No   [ ] Single[x ]    T(F): 98.6 (01-04-24 @ 13:01), Max: 98.6 (01-04-24 @ 13:01)  HR: 94 (01-04-24 @ 13:01)  BP: 125/76 (01-04-24 @ 13:01)  RR: 18 (01-04-24 @ 13:01)  SpO2: 97% (01-04-24 @ 13:01)  Wt(kg): --    PHYSICAL EXAM:  General: alert, no acute distress  Eyes:  anicteric, no conjunctival injection, no discharge  Oropharynx: no lesions or injection 	  Neck: supple, without adenopathy  Lungs: clear to auscultation  Heart: regular rate and rhythm; no murmur, rubs or gallops  Abdomen: soft, nondistended, nontender, without mass or organomegaly  Skin: no lesions  Extremities: left dorsal foot with black eschar, some malodor, no drainage, no crepitus with a surrounding border of redness. I cannot feel the dp pulse but can feel the posterior tibial . no streak.   Neurologic: alert,, moves all extremities    LAB RESULTS:                        12.7   13.40 )-----------( 281      ( 04 Jan 2024 07:35 )             37.1     01-04    139  |  103  |  51<H>  ----------------------------<  177<H>  3.8   |  25  |  2.22<H>    Ca    9.2      04 Jan 2024 07:35    TPro  7.6  /  Alb  2.6<L>  /  TBili  0.8  /  DBili  x   /  AST  25  /  ALT  25  /  AlkPhos  68  01-04    LIVER FUNCTIONS - ( 04 Jan 2024 07:35 )  Alb: 2.6 g/dL / Pro: 7.6 g/dL / ALK PHOS: 68 U/L / ALT: 25 U/L / AST: 25 U/L / GGT: x           Urinalysis Basic - ( 04 Jan 2024 07:35 )    Color: x / Appearance: x / SG: x / pH: x  Gluc: 177 mg/dL / Ketone: x  / Bili: x / Urobili: x   Blood: x / Protein: x / Nitrite: x   Leuk Esterase: x / RBC: x / WBC x   Sq Epi: x / Non Sq Epi: x / Bacteria: x        MICROBIOLOGY:  RECENT CULTURES:  12-31 @ 13:38 Clean Catch Clean Catch (Midstream)     <10,000 CFU/mL Normal Urogenital Christy      12-31 @ 13:05 .Blood Blood-Peripheral     No growth at 72 Hours      12-31 @ 13:00 .Blood Blood-Peripheral     No growth at 72 Hours            RADIOLOGY REVIEWED:  < from: US Duplex Venous Lower Ext Complete, Bilateral (01.02.24 @ 18:47) >    ACC: [id number] EXAM:  US DPLX LWR EXT VEINS COMPL BI   ORDERED BY: LINDSAY GUADARRAMA     PROCEDURE DATE:  01/02/2024          INTERPRETATION:  CLINICAL INFORMATION: Cellulitis.  Lower extremity   swelling and redness.    COMPARISON: None available.    TECHNIQUE: Duplex sonography of the BILATERAL LOWER extremity veins with   color and spectral Doppler, with and without compression.    FINDINGS:    RIGHT:  Normal compressibility of the RIGHT common femoral, femoral and popliteal   veins.  Doppler examination shows normal spontaneous and phasic flow.  No RIGHT calf vein thrombosis is detected.    LEFT:  Normal compressibility of the LEFT common femoral, femoral and popliteal   veins.  Doppler examination shows normal spontaneous and phasic flow.  No LEFT calf vein thrombosis is detected.    IMPRESSION:  No evidence of deep venous thrombosis in either lower extremity.      < end of copied text >    ACC: [id number] EXAM:  US DPLX LWR EXT ARTS COMPL BI   ORDERED BY: DERIAN RICHARDS     PROCEDURE DATE:  01/02/2024          INTERPRETATION:  CLINICAL INFORMATION: Type 2 diabetes mellitus.  Left   foot wound.  No palpable pedal pulses    COMPARISON: None.    TECHNIQUE:  Grayscale, color Doppler and spectral Doppler imaging of the bilateral   lower extremity arterial systems was performed.      FINDINGS:  The lower extremity arterial systems are patent bilaterally.  No elevated   velocities or tardus parvus waveforms are encountered.  There are   abnormal monophasic waveforms in the left anterior tibial artery and in   the left dorsalis pedis artery, which course to the patient's left foot   wound, suspicious for significant peripheral vascular disease.  Abnormal   monophasic waveforms are also visualized in the right deep femoral artery   and right dorsalis pedis artery.      Peak systolic velocities and waveform morphology.  Right common femoral artery: 114 cm/s.  Triphasic waveforms.  Right deep femoral artery: 100 cm/s.  Monophasic waveforms.  Right superficial femoral artery: 69 cm/s proximal, 46 cm/s mid, 64 cm/s   distal.  Triphasic waveforms.  Right popliteal artery: 75 cm/s.  Triphasic waveforms.  Right posterior tibial artery: 53 cm/s.  Biphasic waveforms  Right peroneal artery: 46 cm/s.  Biphasic waveforms.  Right anterior tibial artery: 58 cm/s.  Biphasic waveforms.  Right dorsalis pedis artery: 51 cm/s.  Monophasic waveform.      Left common femoral artery: 60 cm/s.Triphasic waveforms  Left deep femoral artery: 67 cm/s.  Triphasic waveforms.  Left superficial femoral artery: 64 cm/s proximal, 63 cm/s mid, 52 cm/s   distally.  Triphasic waveforms.  Left popliteal artery: 43 cm/s.  Triphasic waveforms.  Left posterior tibial artery: 49 cm/s.  Triphasic waveforms.  Left peroneal artery: 79 cm/s.  Waveform morphology not well   characterized.  Left anterior tibial artery: 28 cm/s.  Monophasic waveforms.  Left dorsalis pedis artery: 66 cm/s.  Low resistance monophasic waveforms.      IMPRESSION:  There are abnormal monophasic waveforms in the left anterior tibial   artery and in the left dorsalis pedis artery, which course to the   patient's left foot wound, suspicious for significant peripheral vascular   disease.    Abnormal monophasic waveforms are also present in the right deep femoral   artery and right posterior tibial artery.    No vascular occlusion or focal flow-limiting stenosis is identified   sonographically.    --- End of Report ---            < from: Xray Foot AP + Lateral + Oblique, Left (12.31.23 @ 13:37) >  ACC: [id number] EXAM:  XR FOOT COMP MIN 3 VIEWS LT   ORDERED BY: BRITTANY PINEDA     ACC: [id number] EXAM:  XR CHEST AP OR PA 1V   ORDERED BY: BRITTANY PINEDA     PROCEDURE DATE:  12/31/2023          INTERPRETATION:  Chest and right foot. Concern is osteomyelitis of the   right foot. Patient has sepsis.    AP chest on December 31, 2023 at 1:08 PM. 2 images.    Heart magnified by technique. Clips over the aortic knob area again noted.    Lungs are clear and chest is similar to September 7, 2021.    Left foot.    COMPARISON: None available. 2 views.    There is a moderate hallux valgus with some degeneration and there is   mild first IP degeneration. No bone destruction or fracture is evident.   Arterial calcifications noted.    IMPRESSION: No acute finding of the chest or left foot.      < end of copied text >    Impression: Elderly man with a large dryly necrotic area on the dorsal left foot. Unclear how he got it and if there was any trauma. I suspect some component of ischemia since I cannot palpate the dp on the left and there is some abnormality on the kalpana. He is now in cristhian, maybe from vanco or zosyn or the combination . There can be some component of cellulitis but could all just be ischemia    Recommendations:  for now will continue cefepime alone  await planned transfer to [place] for angiogram  mri foot to assess depth of process  suspect will need some surgical intervention at some point HPI:   Patient is a 89y male with afib , av repair, thoracic aorta aneurysm repair, admitted 4 d ago for a black eschar on the left dorsal foot with surrounding erythema. Vascular surgery is concerned about ischemia so he is planned for transfer to [facility] for an angiogram. He has been on vanco zosyn until 1/3 then yesterday changed to vanco cefepime. He has developed cristhian. The patient cannot give me any history    REVIEW OF SYSTEMS:  All other review of systems negative (Comprehensive ROS)    PAST MEDICAL & SURGICAL HISTORY:  HTN (hypertension)      HLD (hyperlipidemia)      Paroxysmal atrial fibrillation      H/O aortic valve repair      H/O thoracic aortic aneurysm repair          Allergies    No Known Allergies    Intolerances        Antimicrobials Day #  :5 total abx  cefepime   IVPB      cefepime   IVPB 1000 milliGRAM(s) IV Intermittent daily    Other Medications:  acetaminophen     Tablet .. 650 milliGRAM(s) Oral every 6 hours PRN  aluminum hydroxide/magnesium hydroxide/simethicone Suspension 30 milliLiter(s) Oral every 4 hours PRN  atorvastatin 10 milliGRAM(s) Oral at bedtime  dextrose 5%. 1000 milliLiter(s) IV Continuous <Continuous>  dextrose 5%. 1000 milliLiter(s) IV Continuous <Continuous>  dextrose 50% Injectable 25 Gram(s) IV Push once  dextrose 50% Injectable 12.5 Gram(s) IV Push once  dextrose 50% Injectable 25 Gram(s) IV Push once  dextrose Oral Gel 15 Gram(s) Oral once PRN  glucagon  Injectable 1 milliGRAM(s) IntraMuscular once  heparin   Injectable 5000 Unit(s) IV Push every 6 hours PRN  heparin   Injectable 2500 Unit(s) IV Push every 6 hours PRN  heparin   Injectable 2500 Unit(s) IV Push once PRN  heparin  Infusion.  Unit(s)/Hr IV Continuous <Continuous>  influenza  Vaccine (HIGH DOSE) 0.7 milliLiter(s) IntraMuscular once  insulin glargine Injectable (LANTUS) 10 Unit(s) SubCutaneous at bedtime  insulin lispro Injectable (ADMELOG) 4 Unit(s) SubCutaneous three times a day before meals  melatonin 3 milliGRAM(s) Oral at bedtime PRN  nadolol 20 milliGRAM(s) Oral daily  ondansetron Injectable 4 milliGRAM(s) IV Push every 8 hours PRN      FAMILY HISTORY:      SOCIAL HISTORY:  Smoking: [ ]Yes x[ ]No  ETOH: [ ]Yesx [ ]No  Drug Use: [ ]Yes x[ ]No   [ ] Single[x ]    T(F): 98.6 (01-04-24 @ 13:01), Max: 98.6 (01-04-24 @ 13:01)  HR: 94 (01-04-24 @ 13:01)  BP: 125/76 (01-04-24 @ 13:01)  RR: 18 (01-04-24 @ 13:01)  SpO2: 97% (01-04-24 @ 13:01)  Wt(kg): --    PHYSICAL EXAM:  General: alert, no acute distress  Eyes:  anicteric, no conjunctival injection, no discharge  Oropharynx: no lesions or injection 	  Neck: supple, without adenopathy  Lungs: clear to auscultation  Heart: regular rate and rhythm; no murmur, rubs or gallops  Abdomen: soft, nondistended, nontender, without mass or organomegaly  Skin: no lesions  Extremities: left dorsal foot with black eschar, some malodor, no drainage, no crepitus with a surrounding border of redness. I cannot feel the dp pulse but can feel the posterior tibial . no streak.   Neurologic: alert,, moves all extremities    LAB RESULTS:                        12.7   13.40 )-----------( 281      ( 04 Jan 2024 07:35 )             37.1     01-04    139  |  103  |  51<H>  ----------------------------<  177<H>  3.8   |  25  |  2.22<H>    Ca    9.2      04 Jan 2024 07:35    TPro  7.6  /  Alb  2.6<L>  /  TBili  0.8  /  DBili  x   /  AST  25  /  ALT  25  /  AlkPhos  68  01-04    LIVER FUNCTIONS - ( 04 Jan 2024 07:35 )  Alb: 2.6 g/dL / Pro: 7.6 g/dL / ALK PHOS: 68 U/L / ALT: 25 U/L / AST: 25 U/L / GGT: x           Urinalysis Basic - ( 04 Jan 2024 07:35 )    Color: x / Appearance: x / SG: x / pH: x  Gluc: 177 mg/dL / Ketone: x  / Bili: x / Urobili: x   Blood: x / Protein: x / Nitrite: x   Leuk Esterase: x / RBC: x / WBC x   Sq Epi: x / Non Sq Epi: x / Bacteria: x        MICROBIOLOGY:  RECENT CULTURES:  12-31 @ 13:38 Clean Catch Clean Catch (Midstream)     <10,000 CFU/mL Normal Urogenital Christy      12-31 @ 13:05 .Blood Blood-Peripheral     No growth at 72 Hours      12-31 @ 13:00 .Blood Blood-Peripheral     No growth at 72 Hours            RADIOLOGY REVIEWED:  < from: US Duplex Venous Lower Ext Complete, Bilateral (01.02.24 @ 18:47) >    ACC: [id number] EXAM:  US DPLX LWR EXT VEINS COMPL BI   ORDERED BY: LINDSAY GUADARRAMA     PROCEDURE DATE:  01/02/2024          INTERPRETATION:  CLINICAL INFORMATION: Cellulitis.  Lower extremity   swelling and redness.    COMPARISON: None available.    TECHNIQUE: Duplex sonography of the BILATERAL LOWER extremity veins with   color and spectral Doppler, with and without compression.    FINDINGS:    RIGHT:  Normal compressibility of the RIGHT common femoral, femoral and popliteal   veins.  Doppler examination shows normal spontaneous and phasic flow.  No RIGHT calf vein thrombosis is detected.    LEFT:  Normal compressibility of the LEFT common femoral, femoral and popliteal   veins.  Doppler examination shows normal spontaneous and phasic flow.  No LEFT calf vein thrombosis is detected.    IMPRESSION:  No evidence of deep venous thrombosis in either lower extremity.      < end of copied text >    ACC: [id number] EXAM:  US DPLX LWR EXT ARTS COMPL BI   ORDERED BY: DERIAN RICHARDS     PROCEDURE DATE:  01/02/2024          INTERPRETATION:  CLINICAL INFORMATION: Type 2 diabetes mellitus.  Left   foot wound.  No palpable pedal pulses    COMPARISON: None.    TECHNIQUE:  Grayscale, color Doppler and spectral Doppler imaging of the bilateral   lower extremity arterial systems was performed.      FINDINGS:  The lower extremity arterial systems are patent bilaterally.  No elevated   velocities or tardus parvus waveforms are encountered.  There are   abnormal monophasic waveforms in the left anterior tibial artery and in   the left dorsalis pedis artery, which course to the patient's left foot   wound, suspicious for significant peripheral vascular disease.  Abnormal   monophasic waveforms are also visualized in the right deep femoral artery   and right dorsalis pedis artery.      Peak systolic velocities and waveform morphology.  Right common femoral artery: 114 cm/s.  Triphasic waveforms.  Right deep femoral artery: 100 cm/s.  Monophasic waveforms.  Right superficial femoral artery: 69 cm/s proximal, 46 cm/s mid, 64 cm/s   distal.  Triphasic waveforms.  Right popliteal artery: 75 cm/s.  Triphasic waveforms.  Right posterior tibial artery: 53 cm/s.  Biphasic waveforms  Right peroneal artery: 46 cm/s.  Biphasic waveforms.  Right anterior tibial artery: 58 cm/s.  Biphasic waveforms.  Right dorsalis pedis artery: 51 cm/s.  Monophasic waveform.      Left common femoral artery: 60 cm/s.Triphasic waveforms  Left deep femoral artery: 67 cm/s.  Triphasic waveforms.  Left superficial femoral artery: 64 cm/s proximal, 63 cm/s mid, 52 cm/s   distally.  Triphasic waveforms.  Left popliteal artery: 43 cm/s.  Triphasic waveforms.  Left posterior tibial artery: 49 cm/s.  Triphasic waveforms.  Left peroneal artery: 79 cm/s.  Waveform morphology not well   characterized.  Left anterior tibial artery: 28 cm/s.  Monophasic waveforms.  Left dorsalis pedis artery: 66 cm/s.  Low resistance monophasic waveforms.      IMPRESSION:  There are abnormal monophasic waveforms in the left anterior tibial   artery and in the left dorsalis pedis artery, which course to the   patient's left foot wound, suspicious for significant peripheral vascular   disease.    Abnormal monophasic waveforms are also present in the right deep femoral   artery and right posterior tibial artery.    No vascular occlusion or focal flow-limiting stenosis is identified   sonographically.    --- End of Report ---            < from: Xray Foot AP + Lateral + Oblique, Left (12.31.23 @ 13:37) >  ACC: [id number] EXAM:  XR FOOT COMP MIN 3 VIEWS LT   ORDERED BY: BRITTANY PINEDA     ACC: [id number] EXAM:  XR CHEST AP OR PA 1V   ORDERED BY: BRITTANY PINEDA     PROCEDURE DATE:  12/31/2023          INTERPRETATION:  Chest and right foot. Concern is osteomyelitis of the   right foot. Patient has sepsis.    AP chest on December 31, 2023 at 1:08 PM. 2 images.    Heart magnified by technique. Clips over the aortic knob area again noted.    Lungs are clear and chest is similar to September 7, 2021.    Left foot.    COMPARISON: None available. 2 views.    There is a moderate hallux valgus with some degeneration and there is   mild first IP degeneration. No bone destruction or fracture is evident.   Arterial calcifications noted.    IMPRESSION: No acute finding of the chest or left foot.      < end of copied text >    Impression: Elderly man with a large dryly necrotic area on the dorsal left foot. Unclear how he got it and if there was any trauma. I suspect some component of ischemia since I cannot palpate the dp on the left and there is some abnormality on the kalpana. He is now in cristhian, maybe from vanco or zosyn or the combination . There can be some component of cellulitis but could all just be ischemia    Recommendations:  for now will continue cefepime alone  await planned transfer to [place] for angiogram  mri foot to assess depth of process  suspect will need some surgical intervention at some point

## 2024-01-04 NOTE — PROGRESS NOTE ADULT - SUBJECTIVE AND OBJECTIVE BOX
H&P HPI: 89yo M with history of  HTN, HLD, LBBB, paroxismal Atrial Fibrillation, thoracic Ao aneurysm and s/p mechanical AVR presented to urgent care for left foot wound. Per roomate and further confirmed with only son Mr Bogdan Jefferson, on presentation patient complained of left foot pain, roommate took sock of and realized of left dorsum black scar, unable to date when lesion started. Patient taken to urgent care and referred for ED evaluation. Patient evaluated at bedside denies bleeding episodes, headaches, chest pain, abdominal pain. Per outpatient chart review, patient recently seen and followed by cardiology for cardiovascular comorbidities, noticed stable INR for the most part. On recent blood work from 10/2023 noticed HbA1c 6.7% but no primary care provider following chronic care.    Interval hx: Patient feeling well today, no complaints. No overnight events.     REVIEW OF SYSTEMS:  CONSTITUTIONAL: (-) weakness, (-) fevers, (-) chills  EYES/ENT: (-) visual changes,  (-) vertigo,  (-) throat pain   NECK:  (-) pain, (-) stiffness  RESPIRATORY:  (-) shortness of breath, (-) cough,  (-) wheezing,  (-) hemoptysis   CARDIOVASCULAR:  (-) chest pain, (-) palpitations  GASTROINTESTINAL:  (-) abdominal or epigastric pain, (-) nausea, (-) vomiting, (-) diarrhea, (-) constipation, (-) melena,  (-) hematemesis,  (-) hematochezia  GENITOURINARY: (-) dysuria, (-) frequency, (-) hematuria  NEUROLOGICAL: (-) numbness, (-) weakness  SKIN: (-) itching, (-) rashes, (+) lesions      Vital Signs Last 24 Hrs  T(C): 37 (04 Jan 2024 13:01), Max: 37 (04 Jan 2024 13:01)  T(F): 98.6 (04 Jan 2024 13:01), Max: 98.6 (04 Jan 2024 13:01)  HR: 94 (04 Jan 2024 13:01) (91 - 94)  BP: 125/76 (04 Jan 2024 13:01) (109/70 - 150/65)  BP(mean): --  RR: 18 (04 Jan 2024 13:01) (17 - 18)  SpO2: 97% (04 Jan 2024 13:01) (95% - 97%)  Parameters below as of 04 Jan 2024 13:01  Patient On (Oxygen Delivery Method): room air      PHYSICAL EXAM:  GENERAL: NAD  HEENT:  AT/NC, anicteric, moist mucous membranes, EOMI, conjunctiva and sclera clear  CHEST/LUNG:  CTA b/l, no rales, wheezes, or rhonchi,  normal respiratory effort, no intercostal retractions  HEART:  RRR, S1, S2, no murmurs; no pitting edema  ABDOMEN:  BS+, soft, nontender, nondistended; No HSM  MSK/EXTREMITIES: Palpable b/l pulses, black scar on L foot dorsum   NERVOUS SYSTEM: answers questions and follows commands appropriately  PSYCH: Appropriate affect, Alert & Awake; Good judgement    MEDICATIONS:  MEDICATIONS  (STANDING):  atorvastatin 10 milliGRAM(s) Oral at bedtime  cefepime   IVPB      dextrose 5%. 1000 milliLiter(s) (50 mL/Hr) IV Continuous <Continuous>  dextrose 5%. 1000 milliLiter(s) (100 mL/Hr) IV Continuous <Continuous>  dextrose 50% Injectable 25 Gram(s) IV Push once  dextrose 50% Injectable 12.5 Gram(s) IV Push once  dextrose 50% Injectable 25 Gram(s) IV Push once  glucagon  Injectable 1 milliGRAM(s) IntraMuscular once  influenza  Vaccine (HIGH DOSE) 0.7 milliLiter(s) IntraMuscular once  insulin glargine Injectable (LANTUS) 10 Unit(s) SubCutaneous at bedtime  insulin lispro Injectable (ADMELOG) 4 Unit(s) SubCutaneous three times a day before meals  nadolol 20 milliGRAM(s) Oral daily  vancomycin  IVPB      vancomycin  IVPB 750 milliGRAM(s) IV Intermittent every 24 hours      LABS: All Labs Reviewed:                     12.7   13.40 )-----------( 281      ( 04 Jan 2024 07:35 )             37.1     01-04  139  |  103  |  51<H>  ----------------------------<  177<H>  3.8   |  25  |  2.22<H>  Ca    9.2      04 Jan 2024 07:35  TPro  7.6  /  Alb  2.6<L>  /  TBili  0.8  /  DBili  x   /  AST  25  /  ALT  25  /  AlkPhos  68  01-04      Urinalysis Basic - ( 04 Jan 2024 07:35 )  Color: x / Appearance: x / SG: x / pH: x  Gluc: 177 mg/dL / Ketone: x  / Bili: x / Urobili: x   Blood: x / Protein: x / Nitrite: x   Leuk Esterase: x / RBC: x / WBC x   Sq Epi: x / Non Sq Epi: x / Bacteria: x    PT/INR - ( 04 Jan 2024 07:35 )   PT: 32.1 sec;   INR: 2.90 ratio    PTT - ( 04 Jan 2024 14:05 )  PTT:37.2 sec    CAPILLARY BLOOD GLUCOSE  POCT Blood Glucose.: 234 mg/dL (04 Jan 2024 12:04)                           H&P HPI: 89yo M with history of  HTN, HLD, LBBB, paroxismal Atrial Fibrillation, thoracic Ao aneurysm and s/p mechanical AVR presented to urgent care for left foot wound. Per roomate and further confirmed with only son Mr Bogdan Jefferson, on presentation patient complained of left foot pain, roommate took sock of and realized of left dorsum black scar, unable to date when lesion started. Patient taken to urgent care and referred for ED evaluation. Patient evaluated at bedside denies bleeding episodes, headaches, chest pain, abdominal pain. Per outpatient chart review, patient recently seen and followed by cardiology for cardiovascular comorbidities, noticed stable INR for the most part. On recent blood work from 10/2023 noticed HbA1c 6.7% but no primary care provider following chronic care.    Interval hx: Patient feeling well today, no complaints. No overnight events.     REVIEW OF SYSTEMS:  CONSTITUTIONAL: (-) weakness, (-) fevers, (-) chills  EYES/ENT: (-) visual changes,  (-) vertigo,  (-) throat pain   NECK:  (-) pain, (-) stiffness  RESPIRATORY:  (-) shortness of breath, (-) cough,  (-) wheezing,  (-) hemoptysis   CARDIOVASCULAR:  (-) chest pain, (-) palpitations  GASTROINTESTINAL:  (-) abdominal or epigastric pain, (-) nausea, (-) vomiting, (-) diarrhea, (-) constipation, (-) melena,  (-) hematemesis,  (-) hematochezia  GENITOURINARY: (-) dysuria, (-) frequency, (-) hematuria  NEUROLOGICAL: (-) numbness, (-) weakness  SKIN: (-) itching, (-) rashes, (+) lesions      Vital Signs Last 24 Hrs  T(C): 37 (04 Jan 2024 13:01), Max: 37 (04 Jan 2024 13:01)  T(F): 98.6 (04 Jan 2024 13:01), Max: 98.6 (04 Jan 2024 13:01)  HR: 94 (04 Jan 2024 13:01) (91 - 94)  BP: 125/76 (04 Jan 2024 13:01) (109/70 - 150/65)  BP(mean): --  RR: 18 (04 Jan 2024 13:01) (17 - 18)  SpO2: 97% (04 Jan 2024 13:01) (95% - 97%)  Parameters below as of 04 Jan 2024 13:01  Patient On (Oxygen Delivery Method): room air      PHYSICAL EXAM:  GENERAL: NAD  HEENT:  AT/NC, anicteric, moist mucous membranes, EOMI, conjunctiva and sclera clear  CHEST/LUNG:  CTA b/l, no rales, wheezes, or rhonchi,  normal respiratory effort, no intercostal retractions  HEART:  RRR, S1, S2, no murmurs; no pitting edema  ABDOMEN:  BS+, soft, nontender, nondistended; No HSM  MSK/EXTREMITIES: Palpable b/l pulses, black scar on L foot dorsum   NERVOUS SYSTEM: answers questions and follows commands appropriately  PSYCH: Appropriate affect, Alert & Awake; Good judgement    MEDICATIONS:  MEDICATIONS  (STANDING):  atorvastatin 10 milliGRAM(s) Oral at bedtime  cefepime   IVPB      dextrose 5%. 1000 milliLiter(s) (50 mL/Hr) IV Continuous <Continuous>  dextrose 5%. 1000 milliLiter(s) (100 mL/Hr) IV Continuous <Continuous>  dextrose 50% Injectable 25 Gram(s) IV Push once  dextrose 50% Injectable 12.5 Gram(s) IV Push once  dextrose 50% Injectable 25 Gram(s) IV Push once  glucagon  Injectable 1 milliGRAM(s) IntraMuscular once  influenza  Vaccine (HIGH DOSE) 0.7 milliLiter(s) IntraMuscular once  insulin glargine Injectable (LANTUS) 10 Unit(s) SubCutaneous at bedtime  insulin lispro Injectable (ADMELOG) 4 Unit(s) SubCutaneous three times a day before meals  nadolol 20 milliGRAM(s) Oral daily  vancomycin  IVPB      vancomycin  IVPB 750 milliGRAM(s) IV Intermittent every 24 hours      LABS: All Labs Reviewed:                     12.7   13.40 )-----------( 281      ( 04 Jan 2024 07:35 )             37.1     01-04  139  |  103  |  51<H>  ----------------------------<  177<H>  3.8   |  25  |  2.22<H>  Ca    9.2      04 Jan 2024 07:35  TPro  7.6  /  Alb  2.6<L>  /  TBili  0.8  /  DBili  x   /  AST  25  /  ALT  25  /  AlkPhos  68  01-04      Urinalysis Basic - ( 04 Jan 2024 07:35 )  Color: x / Appearance: x / SG: x / pH: x  Gluc: 177 mg/dL / Ketone: x  / Bili: x / Urobili: x   Blood: x / Protein: x / Nitrite: x   Leuk Esterase: x / RBC: x / WBC x   Sq Epi: x / Non Sq Epi: x / Bacteria: x    PT/INR - ( 04 Jan 2024 07:35 )   PT: 32.1 sec;   INR: 2.90 ratio    PTT - ( 04 Jan 2024 14:05 )  PTT:37.2 sec    CAPILLARY BLOOD GLUCOSE  POCT Blood Glucose.: 234 mg/dL (04 Jan 2024 12:04)

## 2024-01-04 NOTE — PROGRESS NOTE ADULT - PROBLEM SELECTOR PLAN 6
-currently rate controlled  -Supratherapeutic INR downtrending  -Oupatient trend reviewed, last POCT INR 2.3 (12.24.23)> 1.4> 5.1> 3.9> 1.6> 2.2>2.2> 1.5>1.3>3.0 (9.28.23)  -Currently not actively bleeding  -Remote telemetry in place  -INR 1/3/24: 3.75  -Daily INR, currently holding warfarin for supratherapeutic  -Started heparin drip

## 2024-01-04 NOTE — PROGRESS NOTE ADULT - PROBLEM SELECTOR PLAN 5
-HbA1c from outpatient chart review noted 6.7% under no medical management  -For age, at goal  -Lantus 10, Lispro 4   -Speech & Swallow minced and moist

## 2024-01-04 NOTE — PROGRESS NOTE ADULT - NUTRITIONAL ASSESSMENT
This patient has been assessed with a concern for Malnutrition and has been determined to have a diagnosis/diagnoses of Severe protein-calorie malnutrition.    This patient is being managed with:   Diet Minced and Moist-  Consistent Carbohydrate {No Snacks}  Supplement Feeding Modality:  Oral  Ensure Plus High Protein Cans or Servings Per Day:  1       Frequency:  Two Times a day  Entered: Wilman  3 2024  1:17PM  
This patient has been assessed with a concern for Malnutrition and has been determined to have a diagnosis/diagnoses of Severe protein-calorie malnutrition.    This patient is being managed with:   Diet Minced and Moist-  Consistent Carbohydrate {No Snacks}  Supplement Feeding Modality:  Oral  Ensure Plus High Protein Cans or Servings Per Day:  1       Frequency:  Two Times a day  Entered: Wilman  3 2024  1:17PM

## 2024-01-04 NOTE — CHART NOTE - NSCHARTNOTEFT_GEN_A_CORE
89y old  Male who presents with a chief complaint of left foot wound. Left foot noted to have dry eschar on dorsum, palpable Left pedal pulse. No active draining noted, cellulitis improving from demarcation. venous and arterial US reviewed with attending. no occlusion but noted abnormal waveform in L PT and DP arteries concern for significant PVD.  Plan to transfer pt to [facility] for Angiogram.   INR 2.9   If pt requires anticoagulants based on Cardio/Med HX  can start Heparin drip  discussed with Vascular surgeon. 89y old  Male who presents with a chief complaint of left foot wound. Left foot noted to have dry eschar on dorsum, palpable Left pedal pulse. No active draining noted, cellulitis improving from demarcation. venous and arterial US reviewed with attending. no occlusion but noted abnormal waveform in L PT and DP arteries concern for significant PVD.  Plan to transfer pt to [facility] for Angiogram.   INR 2.9   If pt requires anticoagulants based on Cardio/Med HX  can start Heparin drip  discussed with Vascular surgeon.

## 2024-01-04 NOTE — PROGRESS NOTE ADULT - ATTENDING COMMENTS
89yo M with history of  HTN, HLD, LBBB, paroxismal Atrial Fibrillation, thoracic Ao aneurysm and s/p mechanical AVR and diabetes mellitus type 2 presents for left foot wound. Admitted for medical management of cellulitis vs OM    #cellulitis of L foot  - continue cefepime, dc vanco as per ID  - xray without fracture  - podiatry recommended MRI but unable to complete safety form therefore not pursued  - vascular consulted and noted to have abnormal arterial duplex  - plan to transfer to \[facility]\"" for angiogram to be planned for Tuesday as per vascular Dr. Ventura, pending bed  - ID recs appreciated    #BETINA  - possibly due to abx  - dc vanco  - started on gentle IVF    #pAF  - on coumadin at home, INR was supratherapeutic at first now therapeutic range  - discussed with Nikos vascular PA who discussed with vascular surgeon- ok to start on heparin gtt for AC     pending transfer to \[facility]\"" 89yo M with history of  HTN, HLD, LBBB, paroxismal Atrial Fibrillation, thoracic Ao aneurysm and s/p mechanical AVR and diabetes mellitus type 2 presents for left foot wound. Admitted for medical management of cellulitis vs OM    #cellulitis of L foot  - continue cefepime, dc vanco as per ID  - xray without fracture  - podiatry recommended MRI but unable to complete safety form therefore not pursued  - vascular consulted and noted to have abnormal arterial duplex  - plan to transfer to [facility] for angiogram to be planned for Tuesday as per vascular Dr. Ventura, pending bed  - ID recs appreciated    #BETINA  - possibly due to abx  - dc vanco  - started on gentle IVF    #pAF  - on coumadin at home, INR was supratherapeutic at first now therapeutic range  - discussed with Nikos vascular PA who discussed with vascular surgeon- ok to start on heparin gtt for AC     pending transfer to [facility]

## 2024-01-04 NOTE — DISCHARGE NOTE NURSING/CASE MANAGEMENT/SOCIAL WORK - PATIENT PORTAL LINK FT
You can access the FollowMyHealth Patient Portal offered by [facility] by registering at the following website: [email or web address]. By joining [employer]’s FollowMyHealth portal, you will also be able to view your health information using other applications (apps) compatible with our system. You can access the FollowMyHealth Patient Portal offered by [facility] by registering at the following website: [email or web address]. By joining [employer]’s FollowMyHealth portal, you will also be able to view your health information using other applications (apps) compatible with our system.

## 2024-01-04 NOTE — PROGRESS NOTE ADULT - PROBLEM SELECTOR PLAN 8
-From H&P: Goals of care discussion initiated with son Mr Scottie Jefferson who will speak to father about advance directives.  -Called son Bogdan Jefferson, unable to speak to him but wife advised to contact him 1/5/24 7am  -Spoke to Dae Wolf (states he is HCP) bedside. Ph: [phone]  -Currently no advance directives -From H&P: Goals of care discussion initiated with son Mr Scottie Jefferson who will speak to father about advance directives.  -Called son Bogdan Jefferson, unable to speak to him but wife advised to contact him 1/5/24 7am  -Spoke to Dae Wolf (states he is HCP) bedside. Ph: [phone]  -Currently no advance directives

## 2024-01-04 NOTE — PROGRESS NOTE ADULT - PROBLEM SELECTOR PLAN 1
-Undatable left dorsum necrotic wound with no active drainage  -No sepsis criteria met on arrival/admission  -WBC 11.71 > 13> 12  -10/2023 HbA1c 6.7% under no medical management  -Left foot xray: moderate hallux valgus with some degeneration and mild first IP generation. No bone destruction noted or fracture evident  -ID consult pending recs   -Podiatry recs apprec obtain MRI to rule out bone pathology or deep abscess. Patient is unable to provide good history of his possible prior medical procedures - unable to fill MRI safety checklist- and son difficult to reach.  -Vascular recs appreciated: plan for transfer to [place] for angiogram.   -Switching from Zosyn to Cefepime today renally dosed for increase Cr  -check vanco trough tonight before 3rd dose  -F/up lesion demarcation for progression  -ID reccs appreciated. DC vanco  -IV gentle hydration, bump in Cr 2.2 -Undatable left dorsum necrotic wound with no active drainage  -No sepsis criteria met on arrival/admission  -WBC 11.71 > 13> 12  -10/2023 HbA1c 6.7% under no medical management  -Left foot xray: moderate hallux valgus with some degeneration and mild first IP generation. No bone destruction noted or fracture evident  -ID consult pending recs   -Podiatry recs apprec obtain MRI to rule out bone pathology or deep abscess. Patient is unable to provide good history of his possible prior medical procedures - unable to fill MRI safety checklist- and son difficult to reach.  -Vascular recs appreciated: plan for transfer to [place] for angiogram.   -Switching from Zosyn to Cefepime today renally dosed for increase Cr  -check vanco trough tonight before 3rd dose  -F/up lesion demarcation for progression  -ID reccs appreciated. DC vanco  -IV gentle hydration, bump in Cr 2.2

## 2024-01-04 NOTE — DISCHARGE NOTE NURSING/CASE MANAGEMENT/SOCIAL WORK - NSDCPEFALRISK_GEN_ALL_CORE
For information on Fall & Injury Prevention, visit: [email or web address] OR  [email or web address] OR  https://www.cdc.gov/steadi/patient.html For information on Fall & Injury Prevention, visit: [email or web address] OR  [email or web address] OR  https://www.cdc.gov/steadi/patient.html

## 2024-01-04 NOTE — PROGRESS NOTE ADULT - PROBLEM SELECTOR PLAN 7
-Outpatient cardiologist Dr Cleveland Linares  -ECG 12/5/22 sinus rhythm first degree block LBBB, 8/14/23 sinus LBBB, 1st degree block, 12/14/23 sinus LBBB  -ECHO: 2021 mechanical AVR normal LV  function  -S/P mechanical AVR and thoracic aneursym repair  -Monitor

## 2024-01-04 NOTE — PROGRESS NOTE ADULT - PROBLEM SELECTOR PLAN 3
-No prior labs in HIE unable to determine if BETINA or BETINA on CKD  -Cr 1.6 > 1.3 > 1.9  -BUN 46 > 31 > 42  -Seems to have been present on admission, originally improved after first fluid bolus, but now worsening. Was given initial 1900cc bolus in the ED then continued on NS 75cc/hr (12/31-1/3)  -Stop IVF, hold losartan, change Vanc+Zosyn to Vanc+Cefepime, follow Vanco trough for dosing adjustment in setting of worsened renal fxn  -ID reccs appreciated  -If Cr worsens will consult Nephrology  -Vanco d/c  -IVF gentle hydration (Cr 2.2> 1.96 > 1.57)  -F/up am Cr

## 2024-01-05 NOTE — H&P ADULT - PROBLEM SELECTOR PLAN 1
Pt with original presentation to [facility] with L foot wound and cellulitis, transferred to [facility] for L angioplasty  - doppler with occlusion of the popliteal artery on the left  - s/p vanc/zosyn -> vanc/cefepime -> cefepime (vanco d/c today per ID)  - WBC on admission to  11.71 -> 13,4 this AM  - afebrile, VS stable  - cont cefepime 1g q24 renal dosing  - f/u AM cbc  - will hold off on ordering Bcx as pt has completed several days of abx tx; consider Bcx if pt has new fever  - cont heparin gtt  - cont minced and moist diet per  chart review  - ID consulted Dr. Orosco, f/u recs  - Vascular consulted  - Cardio consulted for cardiac clearance Pt with original presentation to [facility] with L foot wound and cellulitis, transferred to [facility] for L angioplasty  - doppler with occlusion of the popliteal artery on the left  - s/p vanc/zosyn -> vanc/cefepime -> cefepime (vanco d/c today per ID)  - WBC on admission to  11.71 -> 13,4 this AM  - afebrile, VS stable  - cont cefepime 1g q24 renal dosing  - f/u AM cbc  - will hold off on ordering Bcx as pt has completed several days of abx tx; consider Bcx if pt has new fever  - cont heparin gtt  - cont minced and moist diet per  chart review  - ID consulted Dr. Orosco, f/u recs  - Vascular consulted  - Cardio consulted for cardiac clearance

## 2024-01-05 NOTE — CONSULT NOTE ADULT - ASSESSMENT
89 yo M with PMH HTN, HLD, LBBB, paroxismal Atrial Fibrillation, thoracic aortic aneurysm, s/p mechanical AVR, who was transferred from  for angioplasty of his L foot. He has a left foot eschar, suspect ischemic in etiology. Can continue empiric antibiotics to treat for potential soft tissue infection. Has mild leukocytosis but no fevers. Blood cultures from 12/31 remain no growth.    #Left foot eschar  #Leukocytosis    -continue cefepime 1g IV q24h (renally dosed)  -suggest MRSA nasal PCR  -follow cultures to completion  -monitor WBC  -consider MRI L foot if able to tolerate  -suggest Podiatry evaluation    Thank you for courtesy of this consult.     Will follow.  Discussed with the primary team.     Yvette Loredo MD  Division of Infectious Diseases   Cell [phone] between 8am and 6pm   After 6pm and weekends please call ID service at [phone].     55 minutes spent on total encounter assessing patient, examination, chart review, counseling and coordinating care by the attending physician/nurse/care manager.  89 yo M with PMH HTN, HLD, LBBB, paroxismal Atrial Fibrillation, thoracic aortic aneurysm, s/p mechanical AVR, who was transferred from  for angioplasty of his L foot. He has a left foot eschar, suspect ischemic in etiology. Can continue empiric antibiotics to treat for potential soft tissue infection. Has mild leukocytosis but no fevers. Blood cultures from 12/31 remain no growth.    #Left foot eschar  #Leukocytosis    -continue cefepime 1g IV q24h (renally dosed)  -suggest MRSA nasal PCR  -follow cultures to completion  -monitor WBC  -consider MRI L foot if able to tolerate  -suggest Podiatry evaluation    Thank you for courtesy of this consult.     Will follow.  Discussed with the primary team.     Yvette Loredo MD  Division of Infectious Diseases   Cell [phone] between 8am and 6pm   After 6pm and weekends please call ID service at [phone].     55 minutes spent on total encounter assessing patient, examination, chart review, counseling and coordinating care by the attending physician/nurse/care manager.

## 2024-01-05 NOTE — CHART NOTE - NSCHARTNOTEFT_GEN_A_CORE
Hospitalist Attending Chart Update / Progress Note    Please see H&P written early today by Dr. Tao, for more information.     Pt seen, interviewed, and examined by me. He is [place] but oriented x 3. Denies all complaints at this time. He was transferred from [place] for further vascular intervention.    T(C): 36.6 (01-05-24 @ 12:10), Max: 37.1 (01-04-24 @ 21:04)  HR: 88 (01-05-24 @ 12:10) (88 - 98)  BP: 146/65 (01-05-24 @ 12:10) (91/53 - 146/65)  RR: 19 (01-05-24 @ 12:10) (16 - 19)  SpO2: 95% (01-05-24 @ 12:10) (91% - 98%)      GENERAL: NAD, resting comfortably in bed  HEENMT: NCAT, EOMI  LUNGS: CTA b/l  HEART: S1/S2, regular rate and rhythm, no murmurs noted, no lower extremity edema  GASTROINTESTINAL: abdomen is soft, nontender, nondistended, normoactive bowel sounds  EXTREMITIES: +black eschar on dorsum of L foot, pedal pulse non palpable on L  MUSCULOSKELETAL: no clubbing or cyanosis  NEUROLOGIC: awake and alert, answers questions and follows commands appropriately      89 yo M with PMH HTN, HLD, LBBB, paroxismal Atrial Fibrillation, thoracic aortic aneurysm, s/p mechanical AVR presented transferred from  for angioplasty of his L foot.     #LE Wound:   - transferred to [facility] for angioplasty. planned for 1/9  - continue heparin gtt   - continue cefepime, vancomycin discontinued  - Vascular and ID following   - Cardiology consulted for optimization    #BETINA on CKD?3   - obtain renal ultrasound   - avoid nephrotoxic meds   - Neprho following     #Urinary Retention   - bladder scan >1000cc this am   - due to severe phimosis Urology consulted for castañeda placement   - castañeda placed by Dr. Garcia.   - Do not remove castañeda unless discussing with uro given difficult placement.     Remainder of plan per H&P        Time spent: 40min Hospitalist Attending Chart Update / Progress Note    Please see H&P written early today by Dr. Tao, for more information.     Pt seen, interviewed, and examined by me. He is [place] but oriented x 3. Denies all complaints at this time. He was transferred from [place] for further vascular intervention.    T(C): 36.6 (01-05-24 @ 12:10), Max: 37.1 (01-04-24 @ 21:04)  HR: 88 (01-05-24 @ 12:10) (88 - 98)  BP: 146/65 (01-05-24 @ 12:10) (91/53 - 146/65)  RR: 19 (01-05-24 @ 12:10) (16 - 19)  SpO2: 95% (01-05-24 @ 12:10) (91% - 98%)      GENERAL: NAD, resting comfortably in bed  HEENMT: NCAT, EOMI  LUNGS: CTA b/l  HEART: S1/S2, regular rate and rhythm, no murmurs noted, no lower extremity edema  GASTROINTESTINAL: abdomen is soft, nontender, nondistended, normoactive bowel sounds  EXTREMITIES: +black eschar on dorsum of L foot, pedal pulse non palpable on L  MUSCULOSKELETAL: no clubbing or cyanosis  NEUROLOGIC: awake and alert, answers questions and follows commands appropriately      89 yo M with PMH HTN, HLD, LBBB, paroxismal Atrial Fibrillation, thoracic aortic aneurysm, s/p mechanical AVR presented transferred from  for angioplasty of his L foot.     #LE Wound:   - transferred to [facility] for angioplasty. planned for 1/9  - continue heparin gtt   - continue cefepime, vancomycin discontinued  - Vascular and ID following   - Cardiology consulted for optimization    #BETINA on CKD?3   - obtain renal ultrasound   - avoid nephrotoxic meds   - Neprho following     #Urinary Retention   - bladder scan >1000cc this am   - due to severe phimosis Urology consulted for castañeda placement   - castañeda placed by Dr. Garcia.   - Do not remove castañeda unless discussing with uro given difficult placement.     Remainder of plan per H&P        Time spent: 40min

## 2024-01-05 NOTE — H&P ADULT - NSHPPHYSICALEXAM_GEN_ALL_CORE
T(C): 37 (01-05-24 @ 00:26), Max: 37.1 (01-04-24 @ 21:04)  HR: 89 (01-05-24 @ 00:26) (89 - 98)  BP: 111/72 (01-05-24 @ 00:26) (91/53 - 125/76)  RR: 19 (01-05-24 @ 00:26) (16 - 19)  SpO2: 92% (01-05-24 @ 00:26) (92% - 98%)    GENERAL: NAD, resting comfortably in bed  HEENMT: NCAT, EOMI  LUNGS: CTA b/l  HEART: S1/S2, regular rate and rhythm, no murmurs noted, no lower extremity edema  GASTROINTESTINAL: abdomen is soft, nontender, nondistended, normoactive bowel sounds  EXTREMITIES: +black eschar on dorsum of L foot, pedal pulse non palpable on L  MUSCULOSKELETAL: no clubbing or cyanosis  NEUROLOGIC: awake and alert, answers questions and follows commands appropriately

## 2024-01-05 NOTE — CONSULT NOTE ADULT - ASSESSMENT
Impressions  Urinary Retention, severe Phimosis, altered renal function, PVD with foot ulcer, cognitive imapirment    Under sterile conditions I placed a 16 [place] difficult cat because of severe phosis and altered anatomy, >1000 cc grossly clear urine  drained, UR C&S sent, Tamsulosin and Renal U/S ordered    Leave Turcios in place, monitor renal function  Impressions  Urinary Retention, severe Phimosis, altered renal function, PVD with foot ulcer, cognitive imapirment    Under sterile conditions I placed a 16 [place] difficult cat because of severe phosis and altered anatomy, >1000 cc grossly clear urine  drained, UR C&S sent, Tamsulosin and Renal U/S ordered    Leave Turcios in place, monitor renal function

## 2024-01-05 NOTE — CARE COORDINATION ASSESSMENT. - CAREGIVER ADDRESS
[address]  [facility] [id number] [address]  [facility] [id number]

## 2024-01-05 NOTE — CASE MANAGEMENT PROGRESS NOTE - NSCMPROGRESSNOTE_GEN_ALL_CORE
Discussed pt in rounds, pt admitted with left foot wound, popliteal occlusion. Receiving IV antibiotics, heparin drip. Palliative care and PT evaluation ordered. Plan for LE angiogram on 1/9/24.

## 2024-01-05 NOTE — CHART NOTE - NSCHARTNOTEFT_GEN_A_CORE
Pt with blood tinged urine on heparin gtt.   Pt transferred to [facility] for angiogram scheduled for 1/9, currently on heparin gtt     Imaging from 1/2 reviewed: There are abnormal monophasic waveforms in the left anterior tibial   artery and in the left dorsalis pedis artery, which course to the   patient's left foot wound, suspicious for significant peripheral vascular   disease.     now s/p castañeda placement for urinary retention with difficult placement due to severe phimosis. Mild hematuria possibly 2/2 traumatic catheterization. Continue heparin for now with low threshold to hold for significant hematuria.     recheck H&H Pt with blood tinged urine on heparin gtt.   Pt transferred to hospitals for angiogram scheduled for 1/9, currently on heparin gtt     Imaging from 1/2 reviewed: There are abnormal monophasic waveforms in the left anterior tibial   artery and in the left dorsalis pedis artery, which course to the   patient's left foot wound, suspicious for significant peripheral vascular   disease.     now s/p castañeda placement for urinary retention with difficult placement due to severe phimosis. Mild hematuria possibly 2/2 traumatic catheterization. Continue heparin for now with low threshold to hold for significant hematuria.     recheck H&H

## 2024-01-05 NOTE — PHYSICAL THERAPY INITIAL EVALUATION ADULT - ADDITIONAL COMMENTS
Patient is a poor historian, limited due to pt very hard of hearing. Pt states he ambulated with a rolling walker. Per chart review, pt lives with "roommate". Patient is a poor historian, limited due to pt very hard of hearing. Pt states he ambulated with a rolling walker. Pt states he lives alone in an apartment, +1 flight of stairs, states he drags his walker up/down the stairs.

## 2024-01-05 NOTE — CARE COORDINATION ASSESSMENT. - OTHER PERTINENT REFERRAL INFORMATION
CM met with patient (severe hard of hearing)  at bedside to explain role and transitions of care. Patient lives alone in a private apartment on the second floor with no elevator.  No caregiver identified ("no one").  No home care prior to admission.  Patient uses a walker for ambulation and drag it climbing the stairs. Patient is unclear who will transport him home. Physical therapy recommended [facility].  Resource folder left at bedside.  All questions answered to the best of my abilities.  CM remains available throughout the hospital stay.

## 2024-01-05 NOTE — CHART NOTE - NSCHARTNOTEFT_GEN_A_CORE
Patient transferred from [facility] overnight for upcoming LLE angiogram planned for Tuesday 1/9. Patient seen and examined at beside. Heparin gtt currently running.    Vital Signs Last 24 Hrs  T(C): 36.4 (05 Jan 2024 04:56), Max: 37.1 (04 Jan 2024 21:04)  T(F): 97.5 (05 Jan 2024 04:56), Max: 98.7 (04 Jan 2024 21:04)  HR: 88 (05 Jan 2024 04:56) (88 - 98)  BP: 104/64 (05 Jan 2024 04:56) (91/53 - 125/76)  RR: 18 (05 Jan 2024 04:56) (16 - 19)  SpO2: 91% (05 Jan 2024 04:56) (91% - 98%)    Parameters below as of 05 Jan 2024 04:56  Patient On (Oxygen Delivery Method): room air    Gen: NAD, lying in bed  Ext: BLE warm to touch. LLE DP palpable. L foot dorsal aspect with necrotic eschar.    A/P:  89yoM with PMHx HTN, HLD, LBBB, paroxismal Atrial Fibrillation, thoracic aortic aneurysm, s/p mechanical AVR, [facility], pending LLE angio tuesday 1/9 with Dr. Dale.  Formal consult to follow. Patient transferred from [facility] overnight for upcoming LLE angiogram planned for Tuesday 1/9. Patient seen and examined at beside. Heparin gtt currently running.    Vital Signs Last 24 Hrs  T(C): 36.4 (05 Jan 2024 04:56), Max: 37.1 (04 Jan 2024 21:04)  T(F): 97.5 (05 Jan 2024 04:56), Max: 98.7 (04 Jan 2024 21:04)  HR: 88 (05 Jan 2024 04:56) (88 - 98)  BP: 104/64 (05 Jan 2024 04:56) (91/53 - 125/76)  RR: 18 (05 Jan 2024 04:56) (16 - 19)  SpO2: 91% (05 Jan 2024 04:56) (91% - 98%)    Parameters below as of 05 Jan 2024 04:56  Patient On (Oxygen Delivery Method): room air    Gen: NAD, lying in bed  Ext: BLE warm to touch. LLE DP palpable. L foot dorsal aspect with necrotic eschar.    A/P:  89yoM with PMHx HTN, HLD, LBBB, paroxismal Atrial Fibrillation, thoracic aortic aneurysm, s/p mechanical AVR, transferred to [facility] from [facility], pending LLE angio tuesday 1/9 with Dr. Dale.  Formal consult to follow. Patient transferred from [facility] overnight for upcoming LLE angiogram planned for Tuesday 1/9. Patient seen and examined at beside. Heparin gtt currently running.    MEDICATIONS  (STANDING):  atorvastatin 10 milliGRAM(s) Oral at bedtime  cefepime   IVPB 1000 milliGRAM(s) IV Intermittent daily  dextrose 5%. 1000 milliLiter(s) (50 mL/Hr) IV Continuous <Continuous>  dextrose 5%. 1000 milliLiter(s) (100 mL/Hr) IV Continuous <Continuous>  dextrose 50% Injectable 25 Gram(s) IV Push once  dextrose 50% Injectable 12.5 Gram(s) IV Push once  dextrose 50% Injectable 25 Gram(s) IV Push once  glucagon  Injectable 1 milliGRAM(s) IntraMuscular once  heparin  Infusion.  Unit(s)/Hr (11 mL/Hr) IV Continuous <Continuous>  insulin lispro (ADMELOG) corrective regimen sliding scale   SubCutaneous at bedtime  insulin lispro (ADMELOG) corrective regimen sliding scale   SubCutaneous three times a day before meals  lactated ringers. 1000 milliLiter(s) (65 mL/Hr) IV Continuous <Continuous>  nadolol 20 milliGRAM(s) Oral daily    MEDICATIONS  (PRN):  dextrose Oral Gel 15 Gram(s) Oral once PRN Blood Glucose LESS THAN 70 milliGRAM(s)/deciliter  heparin   Injectable 5000 Unit(s) IV Push every 6 hours PRN For aPTT less than 40  heparin   Injectable 2500 Unit(s) IV Push every 6 hours PRN For aPTT between 40 - 57      Vital Signs Last 24 Hrs  T(C): 36.4 (05 Jan 2024 04:56), Max: 37.1 (04 Jan 2024 21:04)  T(F): 97.5 (05 Jan 2024 04:56), Max: 98.7 (04 Jan 2024 21:04)  HR: 88 (05 Jan 2024 04:56) (88 - 98)  BP: 104/64 (05 Jan 2024 04:56) (91/53 - 125/76)  RR: 18 (05 Jan 2024 04:56) (16 - 19)  SpO2: 91% (05 Jan 2024 04:56) (91% - 98%)    Parameters below as of 05 Jan 2024 04:56  Patient On (Oxygen Delivery Method): room air    Gen: NAD, lying in bed  Ext: BLE warm to touch. LLE DP palpable. L foot dorsal aspect with necrotic eschar.    A/P:  89yoM with PMHx HTN, HLD, LBBB, paroxismal Atrial Fibrillation, thoracic aortic aneurysm, s/p mechanical AVR, transferred to [facility] from [facility], pending LLE angio tuesday 1/9 with Dr. Dale.  Coumadin on hold and transitioned to Heparin gtt  Local wound care by podiatry  Abx as per ID  Cardiology risk assessment requested for LLE angio  Medical optimization for procedure  Vascular surgery will continue to follow Patient transferred from [facility] overnight for upcoming LLE angiogram planned for Tuesday 1/9. Patient seen and examined at beside. Heparin gtt currently running.    MEDICATIONS  (STANDING):  atorvastatin 10 milliGRAM(s) Oral at bedtime  cefepime   IVPB 1000 milliGRAM(s) IV Intermittent daily  dextrose 5%. 1000 milliLiter(s) (50 mL/Hr) IV Continuous <Continuous>  dextrose 5%. 1000 milliLiter(s) (100 mL/Hr) IV Continuous <Continuous>  dextrose 50% Injectable 25 Gram(s) IV Push once  dextrose 50% Injectable 12.5 Gram(s) IV Push once  dextrose 50% Injectable 25 Gram(s) IV Push once  glucagon  Injectable 1 milliGRAM(s) IntraMuscular once  heparin  Infusion.  Unit(s)/Hr (11 mL/Hr) IV Continuous <Continuous>  insulin lispro (ADMELOG) corrective regimen sliding scale   SubCutaneous at bedtime  insulin lispro (ADMELOG) corrective regimen sliding scale   SubCutaneous three times a day before meals  lactated ringers. 1000 milliLiter(s) (65 mL/Hr) IV Continuous <Continuous>  nadolol 20 milliGRAM(s) Oral daily    MEDICATIONS  (PRN):  dextrose Oral Gel 15 Gram(s) Oral once PRN Blood Glucose LESS THAN 70 milliGRAM(s)/deciliter  heparin   Injectable 5000 Unit(s) IV Push every 6 hours PRN For aPTT less than 40  heparin   Injectable 2500 Unit(s) IV Push every 6 hours PRN For aPTT between 40 - 57      Vital Signs Last 24 Hrs  T(C): 36.4 (05 Jan 2024 04:56), Max: 37.1 (04 Jan 2024 21:04)  T(F): 97.5 (05 Jan 2024 04:56), Max: 98.7 (04 Jan 2024 21:04)  HR: 88 (05 Jan 2024 04:56) (88 - 98)  BP: 104/64 (05 Jan 2024 04:56) (91/53 - 125/76)  RR: 18 (05 Jan 2024 04:56) (16 - 19)  SpO2: 91% (05 Jan 2024 04:56) (91% - 98%)    Parameters below as of 05 Jan 2024 04:56  Patient On (Oxygen Delivery Method): room air    Gen: NAD, lying in bed  Ext: BLE warm to touch. LLE DP palpable. L foot dorsal aspect with necrotic eschar.    A/P:  89yoM with PMHx HTN, HLD, LBBB, paroxismal Atrial Fibrillation, thoracic aortic aneurysm, s/p mechanical AVR, transferred to [facility] from [facility], pending LLE angio tuesday 1/9 with Dr. Dale.  Coumadin on hold and transitioned to Heparin gtt  Local wound care by podiatry  Abx as per ID  Cardiology risk assessment requested for LLE angio  Medical optimization for procedure  Vascular surgery will continue to follow Patient transferred from [facility] overnight for upcoming LLE angiogram planned for Tuesday 1/9. Patient seen and examined at beside. Heparin gtt currently running.    MEDICATIONS  (STANDING):  atorvastatin 10 milliGRAM(s) Oral at bedtime  cefepime   IVPB 1000 milliGRAM(s) IV Intermittent daily  dextrose 5%. 1000 milliLiter(s) (50 mL/Hr) IV Continuous <Continuous>  dextrose 5%. 1000 milliLiter(s) (100 mL/Hr) IV Continuous <Continuous>  dextrose 50% Injectable 25 Gram(s) IV Push once  dextrose 50% Injectable 12.5 Gram(s) IV Push once  dextrose 50% Injectable 25 Gram(s) IV Push once  glucagon  Injectable 1 milliGRAM(s) IntraMuscular once  heparin  Infusion.  Unit(s)/Hr (11 mL/Hr) IV Continuous <Continuous>  insulin lispro (ADMELOG) corrective regimen sliding scale   SubCutaneous at bedtime  insulin lispro (ADMELOG) corrective regimen sliding scale   SubCutaneous three times a day before meals  lactated ringers. 1000 milliLiter(s) (65 mL/Hr) IV Continuous <Continuous>  nadolol 20 milliGRAM(s) Oral daily    MEDICATIONS  (PRN):  dextrose Oral Gel 15 Gram(s) Oral once PRN Blood Glucose LESS THAN 70 milliGRAM(s)/deciliter  heparin   Injectable 5000 Unit(s) IV Push every 6 hours PRN For aPTT less than 40  heparin   Injectable 2500 Unit(s) IV Push every 6 hours PRN For aPTT between 40 - 57      Vital Signs Last 24 Hrs  T(C): 36.4 (05 Jan 2024 04:56), Max: 37.1 (04 Jan 2024 21:04)  T(F): 97.5 (05 Jan 2024 04:56), Max: 98.7 (04 Jan 2024 21:04)  HR: 88 (05 Jan 2024 04:56) (88 - 98)  BP: 104/64 (05 Jan 2024 04:56) (91/53 - 125/76)  RR: 18 (05 Jan 2024 04:56) (16 - 19)  SpO2: 91% (05 Jan 2024 04:56) (91% - 98%)    Parameters below as of 05 Jan 2024 04:56  Patient On (Oxygen Delivery Method): room air    Gen: NAD, lying in bed  Ext: BLE warm to touch. LLE DP palpable. L foot dorsal aspect with necrotic eschar.    LABS:                        10.2   11.52 )-----------( 226      ( 05 Jan 2024 05:52 )             29.6   01-05    135  |  107  |  67<H>  ----------------------------<  143<H>  3.9   |  24  |  2.40<H>    Ca    8.4<L>      05 Jan 2024 05:52    TPro  6.2  /  Alb  2.2<L>  /  TBili  0.5  /  DBili  x   /  AST  21  /  ALT  19  /  AlkPhos  54  01-05    RECENT CULTURES:  12-31 Clean Catch Clean Catch (Midstream) XXXX XXXX   <10,000 CFU/mL Normal Urogenital Christy    12-31 .Blood Blood-Peripheral XXXX XXXX   No growth at 4 days    12-31 .Blood Blood-Peripheral XXXX XXXX   No growth at 4 days      A/P:  89yoM with PMHx HTN, HLD, LBBB, paroxismal Atrial Fibrillation, thoracic aortic aneurysm, s/p mechanical AVR, transferred to [facility] from [facility], pending LLE angio tuesday 1/9 with Dr. Dale.  Coumadin on hold and transitioned to Heparin gtt  Local wound care by podiatry  Abx as per ID  Cardiology risk assessment requested for LLE angio  Medical optimization for procedure  Monitor BUN/Cr  Nephrology consult  Vascular surgery will continue to follow Patient transferred from [facility] overnight for upcoming LLE angiogram planned for Tuesday 1/9. Patient seen and examined at beside. Heparin gtt currently running.    MEDICATIONS  (STANDING):  atorvastatin 10 milliGRAM(s) Oral at bedtime  cefepime   IVPB 1000 milliGRAM(s) IV Intermittent daily  dextrose 5%. 1000 milliLiter(s) (50 mL/Hr) IV Continuous <Continuous>  dextrose 5%. 1000 milliLiter(s) (100 mL/Hr) IV Continuous <Continuous>  dextrose 50% Injectable 25 Gram(s) IV Push once  dextrose 50% Injectable 12.5 Gram(s) IV Push once  dextrose 50% Injectable 25 Gram(s) IV Push once  glucagon  Injectable 1 milliGRAM(s) IntraMuscular once  heparin  Infusion.  Unit(s)/Hr (11 mL/Hr) IV Continuous <Continuous>  insulin lispro (ADMELOG) corrective regimen sliding scale   SubCutaneous at bedtime  insulin lispro (ADMELOG) corrective regimen sliding scale   SubCutaneous three times a day before meals  lactated ringers. 1000 milliLiter(s) (65 mL/Hr) IV Continuous <Continuous>  nadolol 20 milliGRAM(s) Oral daily    MEDICATIONS  (PRN):  dextrose Oral Gel 15 Gram(s) Oral once PRN Blood Glucose LESS THAN 70 milliGRAM(s)/deciliter  heparin   Injectable 5000 Unit(s) IV Push every 6 hours PRN For aPTT less than 40  heparin   Injectable 2500 Unit(s) IV Push every 6 hours PRN For aPTT between 40 - 57      Vital Signs Last 24 Hrs  T(C): 36.4 (05 Jan 2024 04:56), Max: 37.1 (04 Jan 2024 21:04)  T(F): 97.5 (05 Jan 2024 04:56), Max: 98.7 (04 Jan 2024 21:04)  HR: 88 (05 Jan 2024 04:56) (88 - 98)  BP: 104/64 (05 Jan 2024 04:56) (91/53 - 125/76)  RR: 18 (05 Jan 2024 04:56) (16 - 19)  SpO2: 91% (05 Jan 2024 04:56) (91% - 98%)    Parameters below as of 05 Jan 2024 04:56  Patient On (Oxygen Delivery Method): room air    Gen: NAD, lying in bed  Ext: BLE warm to touch. LLE DP palpable. L foot dorsal aspect with necrotic eschar.    LABS:                        10.2   11.52 )-----------( 226      ( 05 Jan 2024 05:52 )             29.6   01-05    135  |  107  |  67<H>  ----------------------------<  143<H>  3.9   |  24  |  2.40<H>    Ca    8.4<L>      05 Jan 2024 05:52    TPro  6.2  /  Alb  2.2<L>  /  TBili  0.5  /  DBili  x   /  AST  21  /  ALT  19  /  AlkPhos  54  01-05    RECENT CULTURES:  12-31 Clean Catch Clean Catch (Midstream) XXXX XXXX   <10,000 CFU/mL Normal Urogenital Christy    12-31 .Blood Blood-Peripheral XXXX XXXX   No growth at 4 days    12-31 .Blood Blood-Peripheral XXXX XXXX   No growth at 4 days      A/P:  89yoM with PMHx HTN, HLD, LBBB, paroxismal Atrial Fibrillation, thoracic aortic aneurysm, s/p mechanical AVR, transferred to [facility] from [facility], pending LLE angio tuesday 1/9 with Dr. Dale.  Coumadin on hold and transitioned to Heparin gtt  Local wound care by podiatry  Abx as per ID  Cardiology risk assessment requested for LLE angio  Medical optimization for procedure  Monitor BUN/Cr  Nephrology consult  Vascular surgery will continue to follow

## 2024-01-05 NOTE — CONSULT NOTE ADULT - ASSESSMENT
BETINA on CKD 3: ? Baseline around 1.3  Left foot wound, Cellulitis  Diabetes  Hypotension    Hold antihypertensive meds. IV hydration. Will get kidney and bladder sonogram. Monitor blood sugar levels. Insulin coverage as needed.   Monitor BP trend. Avoid nephrotoxic meds as possible. Avoid ACEI, ARB, NSAIDs and IV contrast. Will follow electrolytes and renal function trend.     Further recommendations pending clinical course. Thank you for the courtesy of this referral.

## 2024-01-05 NOTE — H&P ADULT - HISTORY OF PRESENT ILLNESS
89 yo M with PMH HTN, HLD, LBBB, paroxismal Atrial Fibrillation, thoracic aortic aneurysm, s/p mechanical AVR presented transferred from  for angioplasty of his L foot. Pt originally presented to  on 12/31 after experiencing left foot pain which upon removal of his sock revealed a black eschar on the dorsum of his foot. He had an xray done which showed no bone destruction and he was started on IV abx for cellulitis, currently on cefepime only. 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 Pt was unable to have an MRI performed as they were unable to complete the MRI safety forms due to pts cognitive impairment and inability to reach family. Pt reports currently feeling well and denies any complaints. Pt [place].       Vitals: BP: 111/72, HR: 89, Temp: 98.6F, RR: 19, SpO2: 92% on RA   AM Labs:  WBC 13.40, BUN/Cr 51/2.22   89 yo M with PMH HTN, HLD, LBBB, paroxismal Atrial Fibrillation, thoracic aortic aneurysm, s/p mechanical AVR presented transferred from  for angioplasty of his L foot. Pt originally presented to  on 12/31 after experiencing left foot pain which upon removal of his sock revealed a black eschar on the dorsum of his foot. He had an xray done which showed no bone destruction and he was started on IV abx for cellulitis, currently on cefepime only. 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 Pt was unable to have an MRI performed as they were unable to complete the MRI safety forms due to pts cognitive impairment and inability to reach family. Pt reports currently feeling well and denies any complaints. Pt [place].       Vitals: BP: 111/72, HR: 89, Temp: 98.6F, RR: 19, SpO2: 92% on RA   AM Labs:  WBC 13.40, BUN/Cr 51/2.22

## 2024-01-05 NOTE — PATIENT PROFILE ADULT - FALL HARM RISK - PATIENT NEEDS ASSISTANCE
[address] ENCOUNTER      Pt Name: Brittanie King  MRN: [id number]  [place] 1941  Date of evaluation: 1/15/2021  Provider: Francesca Martin MD    CHIEF COMPLAINT       Chief Complaint   Patient presents with    Wound Check         HISTORY OF PRESENT ILLNESS   (Location/Symptom, Timing/Onset,Context/Setting, Quality, Duration, Modifying Factors, Severity)  Note limiting factors. Brittanie King is a 78 y.o. male who presents to the emergency department for wound reevaluation. Pt lacerated his L anterior leg 4 days ago on a snow plow he was unable to see while he was walking through his field and was dx with complicating cellulitis 2 days ago, started on clindamycin and recommended to return to the ED today for wound reevaluation to ensure proper healing today. He states he is feeling better overall, the pain in his leg has gone down and he states his wife thinks the redness is going down. He thinks the redness is slightly better or about the same. Denies fevers, chills, purulent drainage, nausea, vomiting, sensory loss, wound separation. Symptoms not otherwise alleviated or exacerbated by other factors. NursingNotes were reviewed. REVIEW OF SYSTEMS    (2-9 systems for level 4, 10 or more for level 5)       Constitutional: No fever or chills. Eye: No visual disturbances. No eye pain. Ear/Nose/Mouth/Throat: No nasal congestion. No sore throat. Respiratory: No cough, No shortness of breath, No sputum production. Cardiovascular: No chest pain. No palpitations. Gastrointestinal: No abdominal pain. No nausea or vomiting  Genitourinary: No dysuria. No hematuria. Hematology/Lymphatics: No bleeding or bruising tendency. Immunologic: No malaise. No swollen glands. Musculoskeletal: No back pain. No joint pain. Integumentary: Laceration and redness as in HPI. Neurologic: No headache. No focal numbness or weakness.       PAST MEDICAL HISTORY Past Medical History:   Diagnosis Date    Cancer ([facility] [address]     skin    GERD (gastroesophageal reflux disease)     Vertigo          SURGICALHISTORY       Past Surgical History:   Procedure Laterality Date    CATARACT REMOVAL WITH IMPLANT      bilateral    ECTROPION REPAIR Left 2014    JOINT REPLACEMENT      bilateral knee    SKIN CANCER EXCISION      left ear    SMALL INTESTINE SURGERY      bowel surgery         CURRENT MEDICATIONS       Current Discharge Medication List      CONTINUE these medications which have NOT CHANGED    Details   clindamycin (CLEOCIN) 300 MG capsule Take 1 capsule by mouth 3 times daily for 10 days  Qty: 30 capsule, Refills: 0      metoprolol succinate (TOPROL XL) 25 MG extended release tablet Take 25 mg by mouth daily      rivaroxaban (XARELTO) 20 MG TABS tablet Take 20 mg by mouth daily      ferrous sulfate 325 (65 Fe) MG tablet Take 325 mg by mouth daily      vitamin B-12 (CYANOCOBALAMIN) 1000 MCG tablet Take 1,000 mcg by mouth daily      pantoprazole (PROTONIX) 40 MG tablet Take 40 mg by mouth 2 times daily              ALLERGIES     Patient has no known allergies.     FAMILY HISTORY       Family History   Problem Relation Age of Onset    Cancer Mother     Stroke Father     Cancer Father         prostate          SOCIAL HISTORY       Social History     Socioeconomic History    Marital status:      Spouse name: None    Number of children: None    Years of education: None    Highest education level: None   Occupational History    None   Social Needs    Financial resource strain: None    Food insecurity     Worry: None     Inability: None    Transportation needs     Medical: None     Non-medical: None   Tobacco Use    Smoking status: Never Smoker    Smokeless tobacco: Never Used   Substance and Sexual Activity    Alcohol use: No    Drug use: No    Sexual activity: None   Lifestyle    Physical activity     Days per week: None     Minutes per session: None    Stress: None   Relationships    Social connections     Talks on phone: None     Gets together: None     Attends Baptism service: None     Active member of club or organization: None     Attends meetings of clubs or organizations: None     Relationship status: None    Intimate partner violence     Fear of current or ex partner: None     Emotionally abused: None     Physically abused: None     Forced sexual activity: None   Other Topics Concern    None   Social History Narrative    None       SCREENINGS             PHYSICAL EXAM    (up to 7 for level 4, 8 or more for level 5)     ED Triage Vitals [01/15/21 0913]   BP Temp Temp Source Pulse Resp SpO2 Height Weight   139/72 97.2 °F (36.2 °C) Temporal 105 17 98 % 6' (1.829 m) 266 lb 12.1 oz (121 kg)       General: Alert and oriented appropriately for age, No acute distress. Eye: Normal conjunctiva. Pupils equal and reactive. HENT: Oral mucosa is moist.  Respiratory: Respirations even and non-labored. Cardiovascular: Normal rate, Regular rhythm. Gastrointestinal: Soft, Non-tender, Non-distended. Musculoskeletal: Compartments soft and compressible. No deformities, bears weight without difficulty LLE. Integumentary: Dsg +ve, taken down for exam to reveal an 18 cm V shaped LLE laceration well approximated with nylon sutures, compared to the prior documentation and photo, has improved erythema to approx 5 cm above the most superior aspect of the wound (prior documentation stated was to the knee) and to 1.5-2 cm laterally from the lateral wound margins improved from prior documentation (was to the medial and lateral calf). Within the borders of the erythema, which is marked with a surgical marker during my exam so patient can track, the skin is ttp. There are slightly macerated wound margins toward the point of the closed flap, otherwise granulation tissue is forming from the lateral aspects of the wound margins.  Scant transudative/serous drainage expressed with palpation of the wound. No pus or purulent aspect, no underlying fluctuance. No necrotic or devascularized areas, capillary refill intact in all areas surrounding or involving the wound margins. Neurologic: Alert and appropriate for age. No focal deficits. Psychiatric: Cooperative. DIAGNOSTIC RESULTS         ED BEDSIDE ULTRASOUND:   Performed by ED Physician - none    LABS:  Labs Reviewed   CBC WITH AUTO DIFFERENTIAL - Abnormal; Notable for the following components:       Result Value    RBC 3.74 (*)     Hemoglobin 11.0 (*)     Hematocrit 33.8 (*)     RDW 16.8 (*)     Lymphocytes Absolute 0.6 (*)     All other components within normal limits    Narrative:     Performed at:  [facility]  [address] [facility]   Phone [phone]   BASIC METABOLIC PANEL W/ REFLEX TO MG FOR LOW K       All other labs were within normal range or not returned as of this dictation. EMERGENCY DEPARTMENT COURSE and DIFFERENTIAL DIAGNOSIS/MDM:   Vitals:    Vitals:    01/15/21 0913 01/15/21 0925   BP: 139/72    Pulse: 105 96   Resp: 17    Temp: 97.2 °F (36.2 °C)    TempSrc: Temporal    SpO2: 98%    Weight: 266 lb 12.1 oz (121 kg)    Height: 6' (1.829 m)          Medical decision makin yo M who presents for wound reevaluation 4 days after sustaining the wound and 2 days after being dx with complicating cellulitis. He is HDS, afebrile and NVI without e/o compartment syndrome. Wound appears to be healing well with serous drainage from the wound without purulence, slight maceration of the inferior wound margins likely 2/2 pt keeping the wound dressed with copious amts of antibiotic ointment. Exam not c/f deep tissue infection, and cellulitis is improving with abx and wound care. Checking CBC and BMP for trend of WBC and kidney fxn. Kidney fxn stable to slightly improved. WBC remains wnl, stable.       Given his improving exam and stable lab findings, lack of systemic findings, pt stable for discharge, follow up in wound clinic on Monday or his PCP or return to the ED for reeval if unable to follow up as outpt. Given laceration care instructions including to let the wound open to air while not active to allow drying and to otherwise keep the wound clean and dry. Recommend avoid further applications of abx ointment to avoid further tissue maceration. Pt voices understanding of this including return precautions provided that wound necessitate earlier return for concerning signs of worsening cellulitis or deeper tissue infection. Recommended to finish his abx course. Rest and elevation for swelling and pain, limited activity until wound healed. Given d/c instructions and return precautions, pt voices understanding. D/c home, ambulated steadily from the ED. FINAL IMPRESSION      1. Laceration of left leg excluding thigh, subsequent encounter    2.  Cellulitis of left lower extremity          DISPOSITION/PLAN   DISPOSITION Decision To Discharge 01/15/2021 09:41:31 AM      PATIENT REFERRED TO:  Nilo [address] [facility]  [phone]    In 2 days  For wound re-check    [address] [facility]  [phone]  In 3 days  For wound re-check    [facility] [address] [id number] [phone]  In 2 days  If symptoms worsen, For wound re-check      DISCHARGE MEDICATIONS:  Current Discharge Medication List             (Please note that portions of this note were completed with a voice recognition program.Efforts were made to edit the dictations but occasionally words are mis-transcribed.)    Lafaye Boeck, MD (electronically signed)  Attending Emergency Physician         Lafaye Boeck, MD  01/15/21 4165 Standing/Walking/Toileting/Moving from bed to chair

## 2024-01-05 NOTE — CONSULT NOTE ADULT - SUBJECTIVE AND OBJECTIVE BOX
Patient is a 89y old  Male who presents with a chief complaint of left foot wound (05 Jan 2024 03:24)    HPI:  89 yo M with PMH HTN, HLD, LBBB, paroxismal Atrial Fibrillation, thoracic aortic aneurysm, s/p mechanical AVR presented transferred from  for angioplasty of his L foot. Pt originally presented to  on 12/31 after experiencing left foot pain which upon removal of his sock revealed a black eschar on the dorsum of his foot. He had an xray done which showed no bone destruction and he was started on IV abx for cellulitis, currently on cefepime only. 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 Pt was unable to have an MRI performed as they were unable to complete the MRI safety forms due to pts cognitive impairment and inability to reach family. Pt reports currently feeling well and denies any complaints. Pt [place].       Vitals: BP: 111/72, HR: 89, Temp: 98.6F, RR: 19, SpO2: 92% on RA   AM Labs:  WBC 13.40, BUN/Cr 51/2.22   (05 Jan 2024 03:24)      PAST MEDICAL HISTORY:  HTN (hypertension)    HLD (hyperlipidemia)    Paroxysmal atrial fibrillation        PAST SURGICAL HISTORY:  H/O aortic valve repair    H/O thoracic aortic aneurysm repair        FAMILY HISTORY:      SOCIAL HISTORY:    Allergies    No Known Allergies    Intolerances      Home Medications:  atorvastatin 10 mg oral tablet: 1 tab(s) orally once a day (05 Jan 2024 04:01)  nadolol 20 mg oral tablet: 1 tab(s) orally once a day (05 Jan 2024 04:01)  ramipril 10 mg oral capsule: 1 cap(s) orally once a day (05 Jan 2024 04:01)  Rapaflo 8 mg oral capsule: 1 cap(s) orally once a day (05 Jan 2024 04:01)  warfarin 2.5 mg oral tablet: 1 tab(s) orally once a day (05 Jan 2024 03:54)    MEDICATIONS  (STANDING):  atorvastatin 10 milliGRAM(s) Oral at bedtime  cefepime   IVPB 1000 milliGRAM(s) IV Intermittent daily  dextrose 5%. 1000 milliLiter(s) (50 mL/Hr) IV Continuous <Continuous>  dextrose 5%. 1000 milliLiter(s) (100 mL/Hr) IV Continuous <Continuous>  dextrose 50% Injectable 25 Gram(s) IV Push once  dextrose 50% Injectable 12.5 Gram(s) IV Push once  dextrose 50% Injectable 25 Gram(s) IV Push once  glucagon  Injectable 1 milliGRAM(s) IntraMuscular once  heparin  Infusion.  Unit(s)/Hr (11 mL/Hr) IV Continuous <Continuous>  insulin lispro (ADMELOG) corrective regimen sliding scale   SubCutaneous three times a day before meals  insulin lispro (ADMELOG) corrective regimen sliding scale   SubCutaneous at bedtime  lactated ringers. 1000 milliLiter(s) (65 mL/Hr) IV Continuous <Continuous>  nadolol 20 milliGRAM(s) Oral daily    MEDICATIONS  (PRN):  dextrose Oral Gel 15 Gram(s) Oral once PRN Blood Glucose LESS THAN 70 milliGRAM(s)/deciliter  heparin   Injectable 5000 Unit(s) IV Push every 6 hours PRN For aPTT less than 40  heparin   Injectable 2500 Unit(s) IV Push every 6 hours PRN For aPTT between 40 - 57      REVIEW OF SYSTEMS:  General:   Respiratory: No cough, SOB  Cardiovascular: No CP or Palpitations	  Gastrointestinal: No nausea, Vomiting. No diarrhea  Genitourinary: No urinary complaints	  Musculoskeletal: No leg swelling, No new rash or lesions	  Neurological: 	  all other systems negative    T(F): 97.5 (01-05-24 @ 04:56), Max: 98.7 (01-04-24 @ 21:04)  HR: 88 (01-05-24 @ 04:56) (88 - 98)  BP: 104/64 (01-05-24 @ 04:56) (91/53 - 125/76)  RR: 18 (01-05-24 @ 04:56) (16 - 19)  SpO2: 91% (01-05-24 @ 04:56) (91% - 98%)  Wt(kg): --    PHYSICAL EXAM:  General: NAD  Respiratory: b/l air entry  Cardiovascular: S1 S2  Gastrointestinal: soft  Extremities: edema        01-05    135  |  107  |  67<H>  ----------------------------<  143<H>  3.9   |  24  |  2.40<H>    Ca    8.4<L>      05 Jan 2024 05:52    TPro  6.2  /  Alb  2.2<L>  /  TBili  0.5  /  DBili  x   /  AST  21  /  ALT  19  /  AlkPhos  54  01-05                          10.2   11.52 )-----------( 226      ( 05 Jan 2024 05:52 )             29.6       Potassium: 3.9 mmol/L (01-05 @ 05:52)  Blood Urea Nitrogen: 67 mg/dL (01-05 @ 05:52)  Calcium: 8.4 mg/dL (01-05 @ 05:52)  Hemoglobin: 10.2 g/dL (01-05 @ 05:52)      Creatinine, Serum: 2.40 (01-05 @ 05:52)  Creatinine, Serum: 2.22 (01-04 @ 07:35)  Creatinine, Serum: 1.96 (01-03 @ 07:23)      Urinalysis Basic - ( 05 Jan 2024 05:52 )    Color: x / Appearance: x / SG: x / pH: x  Gluc: 143 mg/dL / Ketone: x  / Bili: x / Urobili: x   Blood: x / Protein: x / Nitrite: x   Leuk Esterase: x / RBC: x / WBC x   Sq Epi: x / Non Sq Epi: x / Bacteria: x      LIVER FUNCTIONS - ( 05 Jan 2024 05:52 )  Alb: 2.2 g/dL / Pro: 6.2 g/dL / ALK PHOS: 54 U/L / ALT: 19 U/L / AST: 21 U/L / GGT: x                       I&O's Detail    04 Jan 2024 07:01  -  05 Jan 2024 07:00  --------------------------------------------------------  IN:    Heparin Infusion: 66 mL    Lactated Ringers: 390 mL  Total IN: 456 mL    OUT:  Total OUT: 0 mL    Total NET: 456 mL            Culture - Urine (collected 31 Dec 2023 13:38)  Source: Clean Catch Clean Catch (Midstream)  Final Report (01 Jan 2024 19:45):    <10,000 CFU/mL Normal Urogenital Christy    Culture - Blood (collected 31 Dec 2023 13:05)  Source: .Blood Blood-Peripheral  Preliminary Report (04 Jan 2024 22:00):    No growth at 4 days    Culture - Blood (collected 31 Dec 2023 13:00)  Source: .Blood Blood-Peripheral  Preliminary Report (04 Jan 2024 22:00):    No growth at 4 days         Patient is a 89y old  Male who presents with a chief complaint of left foot wound (05 Jan 2024 03:24)    HPI:  89 yo M with PMH HTN, HLD, LBBB, paroxismal Atrial Fibrillation, thoracic aortic aneurysm, s/p mechanical AVR presented transferred from  for angioplasty of his L foot. Pt originally presented to  on 12/31 after experiencing left foot pain which upon removal of his sock revealed a black eschar on the dorsum of his foot. He had an xray done which showed no bone destruction and he was started on IV abx for cellulitis, currently on cefepime only. 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 Pt was unable to have an MRI performed as they were unable to complete the MRI safety forms due to pts cognitive impairment and inability to reach family. Pt reports currently feeling well and denies any complaints. Pt [place].       Vitals: BP: 111/72, HR: 89, Temp: 98.6F, RR: 19, SpO2: 92% on RA   AM Labs:  WBC 13.40, BUN/Cr 51/2.22   (05 Jan 2024 03:24)      PAST MEDICAL HISTORY:  HTN (hypertension)    HLD (hyperlipidemia)    Paroxysmal atrial fibrillation        PAST SURGICAL HISTORY:  H/O aortic valve repair    H/O thoracic aortic aneurysm repair        FAMILY HISTORY:      SOCIAL HISTORY:    Allergies    No Known Allergies    Intolerances      Home Medications:  atorvastatin 10 mg oral tablet: 1 tab(s) orally once a day (05 Jan 2024 04:01)  nadolol 20 mg oral tablet: 1 tab(s) orally once a day (05 Jan 2024 04:01)  ramipril 10 mg oral capsule: 1 cap(s) orally once a day (05 Jan 2024 04:01)  Rapaflo 8 mg oral capsule: 1 cap(s) orally once a day (05 Jan 2024 04:01)  warfarin 2.5 mg oral tablet: 1 tab(s) orally once a day (05 Jan 2024 03:54)    MEDICATIONS  (STANDING):  atorvastatin 10 milliGRAM(s) Oral at bedtime  cefepime   IVPB 1000 milliGRAM(s) IV Intermittent daily  dextrose 5%. 1000 milliLiter(s) (50 mL/Hr) IV Continuous <Continuous>  dextrose 5%. 1000 milliLiter(s) (100 mL/Hr) IV Continuous <Continuous>  dextrose 50% Injectable 25 Gram(s) IV Push once  dextrose 50% Injectable 12.5 Gram(s) IV Push once  dextrose 50% Injectable 25 Gram(s) IV Push once  glucagon  Injectable 1 milliGRAM(s) IntraMuscular once  heparin  Infusion.  Unit(s)/Hr (11 mL/Hr) IV Continuous <Continuous>  insulin lispro (ADMELOG) corrective regimen sliding scale   SubCutaneous three times a day before meals  insulin lispro (ADMELOG) corrective regimen sliding scale   SubCutaneous at bedtime  lactated ringers. 1000 milliLiter(s) (65 mL/Hr) IV Continuous <Continuous>  nadolol 20 milliGRAM(s) Oral daily    MEDICATIONS  (PRN):  dextrose Oral Gel 15 Gram(s) Oral once PRN Blood Glucose LESS THAN 70 milliGRAM(s)/deciliter  heparin   Injectable 5000 Unit(s) IV Push every 6 hours PRN For aPTT less than 40  heparin   Injectable 2500 Unit(s) IV Push every 6 hours PRN For aPTT between 40 - 57      REVIEW OF SYSTEMS:  General:   Respiratory: No cough, SOB  Cardiovascular: No CP or Palpitations	  Gastrointestinal: No nausea, Vomiting. No diarrhea  Genitourinary: No urinary complaints	  Musculoskeletal: No leg swelling, No new rash or lesions	  Neurological: 	  all other systems negative    T(F): 97.5 (01-05-24 @ 04:56), Max: 98.7 (01-04-24 @ 21:04)  HR: 88 (01-05-24 @ 04:56) (88 - 98)  BP: 104/64 (01-05-24 @ 04:56) (91/53 - 125/76)  RR: 18 (01-05-24 @ 04:56) (16 - 19)  SpO2: 91% (01-05-24 @ 04:56) (91% - 98%)  Wt(kg): --    PHYSICAL EXAM:  General: NAD  Respiratory: b/l air entry  Cardiovascular: S1 S2  Gastrointestinal: soft  Extremities: edema        01-05    135  |  107  |  67<H>  ----------------------------<  143<H>  3.9   |  24  |  2.40<H>    Ca    8.4<L>      05 Jan 2024 05:52    TPro  6.2  /  Alb  2.2<L>  /  TBili  0.5  /  DBili  x   /  AST  21  /  ALT  19  /  AlkPhos  54  01-05                          10.2   11.52 )-----------( 226      ( 05 Jan 2024 05:52 )             29.6       Potassium: 3.9 mmol/L (01-05 @ 05:52)  Blood Urea Nitrogen: 67 mg/dL (01-05 @ 05:52)  Calcium: 8.4 mg/dL (01-05 @ 05:52)  Hemoglobin: 10.2 g/dL (01-05 @ 05:52)      Creatinine, Serum: 2.40 (01-05 @ 05:52)  Creatinine, Serum: 2.22 (01-04 @ 07:35)  Creatinine, Serum: 1.96 (01-03 @ 07:23)      Urinalysis Basic - ( 05 Jan 2024 05:52 )    Color: x / Appearance: x / SG: x / pH: x  Gluc: 143 mg/dL / Ketone: x  / Bili: x / Urobili: x   Blood: x / Protein: x / Nitrite: x   Leuk Esterase: x / RBC: x / WBC x   Sq Epi: x / Non Sq Epi: x / Bacteria: x      LIVER FUNCTIONS - ( 05 Jan 2024 05:52 )  Alb: 2.2 g/dL / Pro: 6.2 g/dL / ALK PHOS: 54 U/L / ALT: 19 U/L / AST: 21 U/L / GGT: x                       I&O's Detail    04 Jan 2024 07:01  -  05 Jan 2024 07:00  --------------------------------------------------------  IN:    Heparin Infusion: 66 mL    Lactated Ringers: 390 mL  Total IN: 456 mL    OUT:  Total OUT: 0 mL    Total NET: 456 mL            Culture - Urine (collected 31 Dec 2023 13:38)  Source: Clean Catch Clean Catch (Midstream)  Final Report (01 Jan 2024 19:45):    <10,000 CFU/mL Normal Urogenital Christy    Culture - Blood (collected 31 Dec 2023 13:05)  Source: .Blood Blood-Peripheral  Preliminary Report (04 Jan 2024 22:00):    No growth at 4 days    Culture - Blood (collected 31 Dec 2023 13:00)  Source: .Blood Blood-Peripheral  Preliminary Report (04 Jan 2024 22:00):    No growth at 4 days         Patient is a 89y old  Male who presents with a chief complaint of left foot wound (05 Jan 2024 03:24)    HPI:  89 yo M with PMH HTN, HLD, LBBB, paroxismal Atrial Fibrillation, thoracic aortic aneurysm, s/p mechanical AVR presented transferred from  for angioplasty of his L foot. Pt originally presented to  on 12/31 after experiencing left foot pain which upon removal of his sock revealed a black eschar on the dorsum of his foot. He had an xray done which showed no bone destruction and he was started on IV abx for cellulitis, currently on cefepime only. 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 Pt was unable to have an MRI performed as they were unable to complete the MRI safety forms due to pts cognitive impairment and inability to reach family. Pt reports currently feeling well and denies any complaints. Pt [place].       Vitals: BP: 111/72, HR: 89, Temp: 98.6F, RR: 19, SpO2: 92% on RA   AM Labs:  WBC 13.40, BUN/Cr 51/2.22   (05 Jan 2024 03:24)    Renal consult called for BETINA. History obtained from chart.    PAST MEDICAL HISTORY:  HTN (hypertension)    HLD (hyperlipidemia)    Paroxysmal atrial fibrillation        PAST SURGICAL HISTORY:  H/O aortic valve repair    H/O thoracic aortic aneurysm repair        FAMILY HISTORY:      SOCIAL HISTORY: No smoking or alcohol use     Allergies    No Known Allergies    Intolerances      Home Medications:  atorvastatin 10 mg oral tablet: 1 tab(s) orally once a day (05 Jan 2024 04:01)  nadolol 20 mg oral tablet: 1 tab(s) orally once a day (05 Jan 2024 04:01)  ramipril 10 mg oral capsule: 1 cap(s) orally once a day (05 Jan 2024 04:01)  Rapaflo 8 mg oral capsule: 1 cap(s) orally once a day (05 Jan 2024 04:01)  warfarin 2.5 mg oral tablet: 1 tab(s) orally once a day (05 Jan 2024 03:54)    MEDICATIONS  (STANDING):  atorvastatin 10 milliGRAM(s) Oral at bedtime  cefepime   IVPB 1000 milliGRAM(s) IV Intermittent daily  dextrose 5%. 1000 milliLiter(s) (50 mL/Hr) IV Continuous <Continuous>  dextrose 5%. 1000 milliLiter(s) (100 mL/Hr) IV Continuous <Continuous>  dextrose 50% Injectable 25 Gram(s) IV Push once  dextrose 50% Injectable 12.5 Gram(s) IV Push once  dextrose 50% Injectable 25 Gram(s) IV Push once  glucagon  Injectable 1 milliGRAM(s) IntraMuscular once  heparin  Infusion.  Unit(s)/Hr (11 mL/Hr) IV Continuous <Continuous>  insulin lispro (ADMELOG) corrective regimen sliding scale   SubCutaneous three times a day before meals  insulin lispro (ADMELOG) corrective regimen sliding scale   SubCutaneous at bedtime  lactated ringers. 1000 milliLiter(s) (65 mL/Hr) IV Continuous <Continuous>  nadolol 20 milliGRAM(s) Oral daily    MEDICATIONS  (PRN):  dextrose Oral Gel 15 Gram(s) Oral once PRN Blood Glucose LESS THAN 70 milliGRAM(s)/deciliter  heparin   Injectable 5000 Unit(s) IV Push every 6 hours PRN For aPTT less than 40  heparin   Injectable 2500 Unit(s) IV Push every 6 hours PRN For aPTT between 40 - 57      REVIEW OF SYSTEMS:  General: no distress  Respiratory: No cough, SOB  Cardiovascular: No CP or Palpitations	  Gastrointestinal: No nausea, Vomiting. No diarrhea  Genitourinary: No urinary complaints	  Musculoskeletal: No new rash or lesions, left foot wound  all other systems negative    T(F): 97.5 (01-05-24 @ 04:56), Max: 98.7 (01-04-24 @ 21:04)  HR: 88 (01-05-24 @ 04:56) (88 - 98)  BP: 104/64 (01-05-24 @ 04:56) (91/53 - 125/76)  RR: 18 (01-05-24 @ 04:56) (16 - 19)  SpO2: 91% (01-05-24 @ 04:56) (91% - 98%)  Wt(kg): --    PHYSICAL EXAM:  General: NAD  Respiratory: b/l air entry  Cardiovascular: S1 S2  Gastrointestinal: soft  Extremities: + edema        01-05    135  |  107  |  67<H>  ----------------------------<  143<H>  3.9   |  24  |  2.40<H>    Ca    8.4<L>      05 Jan 2024 05:52    TPro  6.2  /  Alb  2.2<L>  /  TBili  0.5  /  DBili  x   /  AST  21  /  ALT  19  /  AlkPhos  54  01-05                          10.2   11.52 )-----------( 226      ( 05 Jan 2024 05:52 )             29.6       Potassium: 3.9 mmol/L (01-05 @ 05:52)  Blood Urea Nitrogen: 67 mg/dL (01-05 @ 05:52)  Calcium: 8.4 mg/dL (01-05 @ 05:52)  Hemoglobin: 10.2 g/dL (01-05 @ 05:52)      Creatinine, Serum: 2.40 (01-05 @ 05:52)  Creatinine, Serum: 2.22 (01-04 @ 07:35)  Creatinine, Serum: 1.96 (01-03 @ 07:23)      Urinalysis Basic - ( 05 Jan 2024 05:52 )    Color: x / Appearance: x / SG: x / pH: x  Gluc: 143 mg/dL / Ketone: x  / Bili: x / Urobili: x   Blood: x / Protein: x / Nitrite: x   Leuk Esterase: x / RBC: x / WBC x   Sq Epi: x / Non Sq Epi: x / Bacteria: x      LIVER FUNCTIONS - ( 05 Jan 2024 05:52 )  Alb: 2.2 g/dL / Pro: 6.2 g/dL / ALK PHOS: 54 U/L / ALT: 19 U/L / AST: 21 U/L / GGT: x               I&O's Detail    04 Jan 2024 07:01  -  05 Jan 2024 07:00  --------------------------------------------------------  IN:    Heparin Infusion: 66 mL    Lactated Ringers: 390 mL  Total IN: 456 mL    OUT:  Total OUT: 0 mL    Total NET: 456 mL      Culture - Urine (collected 31 Dec 2023 13:38)  Source: Clean Catch Clean Catch (Midstream)  Final Report (01 Jan 2024 19:45):    <10,000 CFU/mL Normal Urogenital Christy    Culture - Blood (collected 31 Dec 2023 13:05)  Source: .Blood Blood-Peripheral  Preliminary Report (04 Jan 2024 22:00):    No growth at 4 days    Culture - Blood (collected 31 Dec 2023 13:00)  Source: .Blood Blood-Peripheral  Preliminary Report (04 Jan 2024 22:00):    No growth at 4 days      < from: US Duplex Arterial Lower Ext Compl, Bilateral (01.02.24 @ 18:50) >    ACC: [id number] EXAM:  US DPLX LWR EXT ARTS COMPL BI   ORDERED BY: DERIAN RICHARDS     PROCEDURE DATE:  01/02/2024          INTERPRETATION:  CLINICAL INFORMATION: Type 2 diabetes mellitus.  Left   foot wound.  No palpable pedal pulses    COMPARISON: None.    TECHNIQUE:  Grayscale, color Doppler and spectral Doppler imaging of the bilateral   lower extremity arterial systems was performed.      FINDINGS:  The lower extremity arterial systems are patent bilaterally.  No elevated   velocities or tardus parvus waveforms are encountered.  There are   abnormal monophasic waveforms in the left anterior tibial artery and in   the left dorsalis pedis artery, which course to the patient's left foot   wound, suspicious for significant peripheral vascular disease.  Abnormal   monophasic waveforms are also visualized in the right deep femoral artery   and right dorsalis pedis artery.      Peak systolic velocities and waveform morphology.  Right common femoral artery: 114 cm/s.  Triphasic waveforms.  Right deep femoral artery: 100 cm/s.  Monophasic waveforms.  Right superficial femoral artery: 69 cm/s proximal, 46 cm/s mid, 64 cm/s   distal.  Triphasic waveforms.  Right popliteal artery: 75 cm/s.  Triphasic waveforms.  Right posterior tibial artery: 53 cm/s.  Biphasic waveforms  Right peroneal artery: 46 cm/s.  Biphasic waveforms.  Right anterior tibial artery: 58 cm/s.  Biphasic waveforms.  Right dorsalis pedis artery: 51 cm/s.  Monophasic waveform.      Left common femoral artery: 60 cm/s.Triphasic waveforms  Left deep femoral artery: 67 cm/s.  Triphasic waveforms.  Left superficial femoral artery: 64 cm/s proximal, 63 cm/s mid, 52 cm/s   distally.  Triphasic waveforms.  Left popliteal artery: 43 cm/s.  Triphasic waveforms.  Left posterior tibial artery: 49 cm/s.  Triphasic waveforms.  Left peroneal artery: 79 cm/s.  Waveform morphology not well   characterized.  Left anterior tibial artery: 28 cm/s.  Monophasic waveforms.  Left dorsalis pedis artery: 66 cm/s.  Low resistance monophasic waveforms.      IMPRESSION:  There are abnormal monophasic waveforms in the left anterior tibial   artery and in the left dorsalis pedis artery, which course to the   patient's left foot wound, suspicious for significant peripheral vascular   disease.    Abnormal monophasic waveforms are also present in the right deep femoral   artery and right posterior tibial artery.    No vascular occlusion or focal flow-limiting stenosis is identified   sonographically.    --- End of Report ---            DARRELL OCASIO MD; Attending Radiologist  This document has been electronically signed. Jan 2 2024  7:38PM    < end of copied text >  < from: Xray Chest 1 View AP/PA (12.31.23 @ 13:37) >    ACC: [id number] EXAM:  XR FOOT COMP MIN 3 VIEWS LT   ORDERED BY: BRITTANY PINEDA     ACC: [id number] EXAM:  XR CHEST AP OR PA 1V   ORDERED BY: BRITTANY PINEDA     PROCEDURE DATE:  12/31/2023          INTERPRETATION:  Chest and right foot. Concern is osteomyelitis of the   right foot. Patient has sepsis.    AP chest on December 31, 2023 at 1:08 PM. 2 images.    Heart magnified by technique. Clips over the aortic knob area again noted.    Lungs are clear and chest is similar to September 7, 2021.    Left foot.    COMPARISON: None available. 2 views.    There is a moderate hallux valgus with some degeneration and there is   mild first IP degeneration. No bone destruction or fracture is evident.   Arterial calcifications noted.    IMPRESSION: No acute finding of the chest or left foot.    --- End of Report ---            NORI SULLIVAN MD; Attending Radiologist  This document has been electronically signed. Dec 31 2023  2:02PM    < end of copied text >           Patient is a 89y old  Male who presents with a chief complaint of left foot wound (05 Jan 2024 03:24)    HPI:  89 yo M with PMH HTN, HLD, LBBB, paroxismal Atrial Fibrillation, thoracic aortic aneurysm, s/p mechanical AVR presented transferred from  for angioplasty of his L foot. Pt originally presented to  on 12/31 after experiencing left foot pain which upon removal of his sock revealed a black eschar on the dorsum of his foot. He had an xray done which showed no bone destruction and he was started on IV abx for cellulitis, currently on cefepime only. 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 Pt was unable to have an MRI performed as they were unable to complete the MRI safety forms due to pts cognitive impairment and inability to reach family. Pt reports currently feeling well and denies any complaints. Pt [place].       Vitals: BP: 111/72, HR: 89, Temp: 98.6F, RR: 19, SpO2: 92% on RA   AM Labs:  WBC 13.40, BUN/Cr 51/2.22   (05 Jan 2024 03:24)    Renal consult called for BETINA. History obtained from chart.    PAST MEDICAL HISTORY:  HTN (hypertension)    HLD (hyperlipidemia)    Paroxysmal atrial fibrillation        PAST SURGICAL HISTORY:  H/O aortic valve repair    H/O thoracic aortic aneurysm repair        FAMILY HISTORY:      SOCIAL HISTORY: No smoking or alcohol use     Allergies    No Known Allergies    Intolerances      Home Medications:  atorvastatin 10 mg oral tablet: 1 tab(s) orally once a day (05 Jan 2024 04:01)  nadolol 20 mg oral tablet: 1 tab(s) orally once a day (05 Jan 2024 04:01)  ramipril 10 mg oral capsule: 1 cap(s) orally once a day (05 Jan 2024 04:01)  Rapaflo 8 mg oral capsule: 1 cap(s) orally once a day (05 Jan 2024 04:01)  warfarin 2.5 mg oral tablet: 1 tab(s) orally once a day (05 Jan 2024 03:54)    MEDICATIONS  (STANDING):  atorvastatin 10 milliGRAM(s) Oral at bedtime  cefepime   IVPB 1000 milliGRAM(s) IV Intermittent daily  dextrose 5%. 1000 milliLiter(s) (50 mL/Hr) IV Continuous <Continuous>  dextrose 5%. 1000 milliLiter(s) (100 mL/Hr) IV Continuous <Continuous>  dextrose 50% Injectable 25 Gram(s) IV Push once  dextrose 50% Injectable 12.5 Gram(s) IV Push once  dextrose 50% Injectable 25 Gram(s) IV Push once  glucagon  Injectable 1 milliGRAM(s) IntraMuscular once  heparin  Infusion.  Unit(s)/Hr (11 mL/Hr) IV Continuous <Continuous>  insulin lispro (ADMELOG) corrective regimen sliding scale   SubCutaneous three times a day before meals  insulin lispro (ADMELOG) corrective regimen sliding scale   SubCutaneous at bedtime  lactated ringers. 1000 milliLiter(s) (65 mL/Hr) IV Continuous <Continuous>  nadolol 20 milliGRAM(s) Oral daily    MEDICATIONS  (PRN):  dextrose Oral Gel 15 Gram(s) Oral once PRN Blood Glucose LESS THAN 70 milliGRAM(s)/deciliter  heparin   Injectable 5000 Unit(s) IV Push every 6 hours PRN For aPTT less than 40  heparin   Injectable 2500 Unit(s) IV Push every 6 hours PRN For aPTT between 40 - 57      REVIEW OF SYSTEMS:  General: no distress  Respiratory: No cough, SOB  Cardiovascular: No CP or Palpitations	  Gastrointestinal: No nausea, Vomiting. No diarrhea  Genitourinary: No urinary complaints	  Musculoskeletal: No new rash or lesions, left foot wound  all other systems negative    T(F): 97.5 (01-05-24 @ 04:56), Max: 98.7 (01-04-24 @ 21:04)  HR: 88 (01-05-24 @ 04:56) (88 - 98)  BP: 104/64 (01-05-24 @ 04:56) (91/53 - 125/76)  RR: 18 (01-05-24 @ 04:56) (16 - 19)  SpO2: 91% (01-05-24 @ 04:56) (91% - 98%)  Wt(kg): --    PHYSICAL EXAM:  General: NAD  Respiratory: b/l air entry  Cardiovascular: S1 S2  Gastrointestinal: soft  Extremities: + edema        01-05    135  |  107  |  67<H>  ----------------------------<  143<H>  3.9   |  24  |  2.40<H>    Ca    8.4<L>      05 Jan 2024 05:52    TPro  6.2  /  Alb  2.2<L>  /  TBili  0.5  /  DBili  x   /  AST  21  /  ALT  19  /  AlkPhos  54  01-05                          10.2   11.52 )-----------( 226      ( 05 Jan 2024 05:52 )             29.6       Potassium: 3.9 mmol/L (01-05 @ 05:52)  Blood Urea Nitrogen: 67 mg/dL (01-05 @ 05:52)  Calcium: 8.4 mg/dL (01-05 @ 05:52)  Hemoglobin: 10.2 g/dL (01-05 @ 05:52)      Creatinine, Serum: 2.40 (01-05 @ 05:52)  Creatinine, Serum: 2.22 (01-04 @ 07:35)  Creatinine, Serum: 1.96 (01-03 @ 07:23)      Urinalysis Basic - ( 05 Jan 2024 05:52 )    Color: x / Appearance: x / SG: x / pH: x  Gluc: 143 mg/dL / Ketone: x  / Bili: x / Urobili: x   Blood: x / Protein: x / Nitrite: x   Leuk Esterase: x / RBC: x / WBC x   Sq Epi: x / Non Sq Epi: x / Bacteria: x      LIVER FUNCTIONS - ( 05 Jan 2024 05:52 )  Alb: 2.2 g/dL / Pro: 6.2 g/dL / ALK PHOS: 54 U/L / ALT: 19 U/L / AST: 21 U/L / GGT: x               I&O's Detail    04 Jan 2024 07:01  -  05 Jan 2024 07:00  --------------------------------------------------------  IN:    Heparin Infusion: 66 mL    Lactated Ringers: 390 mL  Total IN: 456 mL    OUT:  Total OUT: 0 mL    Total NET: 456 mL      Culture - Urine (collected 31 Dec 2023 13:38)  Source: Clean Catch Clean Catch (Midstream)  Final Report (01 Jan 2024 19:45):    <10,000 CFU/mL Normal Urogenital Christy    Culture - Blood (collected 31 Dec 2023 13:05)  Source: .Blood Blood-Peripheral  Preliminary Report (04 Jan 2024 22:00):    No growth at 4 days    Culture - Blood (collected 31 Dec 2023 13:00)  Source: .Blood Blood-Peripheral  Preliminary Report (04 Jan 2024 22:00):    No growth at 4 days      < from: US Duplex Arterial Lower Ext Compl, Bilateral (01.02.24 @ 18:50) >    ACC: [id number] EXAM:  US DPLX LWR EXT ARTS COMPL BI   ORDERED BY: DERIAN RICHARDS     PROCEDURE DATE:  01/02/2024          INTERPRETATION:  CLINICAL INFORMATION: Type 2 diabetes mellitus.  Left   foot wound.  No palpable pedal pulses    COMPARISON: None.    TECHNIQUE:  Grayscale, color Doppler and spectral Doppler imaging of the bilateral   lower extremity arterial systems was performed.      FINDINGS:  The lower extremity arterial systems are patent bilaterally.  No elevated   velocities or tardus parvus waveforms are encountered.  There are   abnormal monophasic waveforms in the left anterior tibial artery and in   the left dorsalis pedis artery, which course to the patient's left foot   wound, suspicious for significant peripheral vascular disease.  Abnormal   monophasic waveforms are also visualized in the right deep femoral artery   and right dorsalis pedis artery.      Peak systolic velocities and waveform morphology.  Right common femoral artery: 114 cm/s.  Triphasic waveforms.  Right deep femoral artery: 100 cm/s.  Monophasic waveforms.  Right superficial femoral artery: 69 cm/s proximal, 46 cm/s mid, 64 cm/s   distal.  Triphasic waveforms.  Right popliteal artery: 75 cm/s.  Triphasic waveforms.  Right posterior tibial artery: 53 cm/s.  Biphasic waveforms  Right peroneal artery: 46 cm/s.  Biphasic waveforms.  Right anterior tibial artery: 58 cm/s.  Biphasic waveforms.  Right dorsalis pedis artery: 51 cm/s.  Monophasic waveform.      Left common femoral artery: 60 cm/s.Triphasic waveforms  Left deep femoral artery: 67 cm/s.  Triphasic waveforms.  Left superficial femoral artery: 64 cm/s proximal, 63 cm/s mid, 52 cm/s   distally.  Triphasic waveforms.  Left popliteal artery: 43 cm/s.  Triphasic waveforms.  Left posterior tibial artery: 49 cm/s.  Triphasic waveforms.  Left peroneal artery: 79 cm/s.  Waveform morphology not well   characterized.  Left anterior tibial artery: 28 cm/s.  Monophasic waveforms.  Left dorsalis pedis artery: 66 cm/s.  Low resistance monophasic waveforms.      IMPRESSION:  There are abnormal monophasic waveforms in the left anterior tibial   artery and in the left dorsalis pedis artery, which course to the   patient's left foot wound, suspicious for significant peripheral vascular   disease.    Abnormal monophasic waveforms are also present in the right deep femoral   artery and right posterior tibial artery.    No vascular occlusion or focal flow-limiting stenosis is identified   sonographically.    --- End of Report ---            DARRELL OCASIO MD; Attending Radiologist  This document has been electronically signed. Jan 2 2024  7:38PM    < end of copied text >  < from: Xray Chest 1 View AP/PA (12.31.23 @ 13:37) >    ACC: [id number] EXAM:  XR FOOT COMP MIN 3 VIEWS LT   ORDERED BY: BRITTANY PINEDA     ACC: [id number] EXAM:  XR CHEST AP OR PA 1V   ORDERED BY: BRITTANY PINEDA     PROCEDURE DATE:  12/31/2023          INTERPRETATION:  Chest and right foot. Concern is osteomyelitis of the   right foot. Patient has sepsis.    AP chest on December 31, 2023 at 1:08 PM. 2 images.    Heart magnified by technique. Clips over the aortic knob area again noted.    Lungs are clear and chest is similar to September 7, 2021.    Left foot.    COMPARISON: None available. 2 views.    There is a moderate hallux valgus with some degeneration and there is   mild first IP degeneration. No bone destruction or fracture is evident.   Arterial calcifications noted.    IMPRESSION: No acute finding of the chest or left foot.    --- End of Report ---            NORI SULLIVAN MD; Attending Radiologist  This document has been electronically signed. Dec 31 2023  2:02PM    < end of copied text >

## 2024-01-05 NOTE — CONSULT NOTE ADULT - PROBLEM SELECTOR RECOMMENDATION 9
Chart reviewed and Patient evaluated  Discussed diagnosis and treatment with patient  Applied dry sterile dressing  X-rays reviewed : Shows moderate hallux valgus with some degeneration and there is mild first IP degeneration. No bone destruction or fracture is evident. Arterial calcifications noted.  Rec IV antibiotics As Per ID  Rec vascular recommendations  Rec MRI left foot if able to get in contact with family due to cognitive impairment  wbc 11.97  WB with assistance  Podiatry will follow while in house.   will discuss care plan  with all  Attendings

## 2024-01-05 NOTE — H&P ADULT - ASSESSMENT
89 yo M with PMH HTN, HLD, LBBB, paroxismal Atrial Fibrillation, thoracic aortic aneurysm, s/p mechanical AVR presented transferred from  for angioplasty of his L foot.

## 2024-01-05 NOTE — CONSULT NOTE ADULT - NS ATTEND AMEND GEN_ALL_CORE FT
89 year old male with PMH HTN, HLD, LBBB, paroxysmal Atrial Fibrillation   thoracic aortic aneurysm, s/p mechanical AVR presented transferred from  for angioplasty of his L foot.  Cardiology consultation now being obtained.     Presenting with cellulitis left foot, for angioplasty called for cardiac optimization   EKG SR with old LBBB   has known history of p afib, mechanical valve on home coumadin - now on IV heparin preoperatively   no signs of ischemia or HF    holding home ace for BETINA / soft bp   continue home nadolol with parameters   continue home statin      would check baseline echo as routine but should not delay any necessary surgical procedures.     BETINA fu renal recs  plan per vascular notes for LLE angio - no objection from cv standpoint to proceed with low risk procedure  will follow with you

## 2024-01-05 NOTE — CONSULT NOTE ADULT - SUBJECTIVE AND OBJECTIVE BOX
S : 89y year old Male seen at bedside for left foot ulcer  Chief Complaint : Patient is a 89y old  Male who presents with a chief complaint of left foot wound (05 Jan 2024 13:42)    HPI : HPI:  89 yo M with PMH HTN, HLD, LBBB, paroxismal Atrial Fibrillation, thoracic aortic aneurysm, s/p mechanical AVR presented transferred from  for angioplasty of his L foot. Pt originally presented to  on 12/31 after experiencing left foot pain which upon removal of his sock revealed a black eschar on the dorsum of his foot. He had an xray done which showed no bone destruction and he was started on IV abx for cellulitis, currently on cefepime only. 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 Pt was unable to have an MRI performed as they were unable to complete the MRI safety forms due to pts cognitive impairment and inability to reach family. Pt reports currently feeling well and denies any complaints. Pt [place].       Vitals: BP: 111/72, HR: 89, Temp: 98.6F, RR: 19, SpO2: 92% on RA   AM Labs:  WBC 13.40, BUN/Cr 51/2.22   (05 Jan 2024 03:24)      Patient admits to  (-) Fevers, (-) Chills, (-) Nausea, (-) Vomiting, (-) Shortness of Breath      PMH: HTN (hypertension)    HLD (hyperlipidemia)    Paroxysmal atrial fibrillation      PSH:H/O aortic valve repair    H/O thoracic aortic aneurysm repair        Allergies:No Known Allergies      Labs:                          10.7   x     )-----------( x        ( 05 Jan 2024 18:39 )             31.3     WBC Trend  11.97<H> Date (01-05 @ 09:25)  11.52<H> Date (01-05 @ 05:52)  13.40<H> Date (01-04 @ 07:35)  12.20<H> Date (01-03 @ 07:23)  12.89<H> Date (01-02 @ 06:00)  13.37<H> Date (01-01 @ 07:10)  11.71<H> Date (12-31 @ 13:00)      Chem  01-05    135  |  107  |  67<H>  ----------------------------<  143<H>  3.9   |  24  |  2.40<H>    Ca    8.4<L>      05 Jan 2024 05:52    TPro  6.2  /  Alb  2.2<L>  /  TBili  0.5  /  DBili  x   /  AST  21  /  ALT  19  /  AlkPhos  54  01-05          T(F): 97.8 (01-05-24 @ 12:10), Max: 98.7 (01-04-24 @ 21:04)  HR: 88 (01-05-24 @ 12:10) (88 - 98)  BP: 146/65 (01-05-24 @ 12:10) (91/53 - 146/65)  RR: 19 (01-05-24 @ 12:10) (16 - 19)  SpO2: 95% (01-05-24 @ 12:10) (91% - 98%)  Wt(kg): --    O:   General: Pleasant  male NAD & AOX3.    Integument:  Skin warm, dry and supple bilateral.    Left dorsum foot unstageable ulcer, + hyperkeratotic border, wound base Necrotic eschar, - edema, + anjel-wound erythema, - purulence, - fluctuance, - tracking/tunneling, - probe to bone.   Cellulitic changes noted  Vascular: Dorsalis Pedis and Posterior Tibial pulses not palpable.  Capillary re-fill time less then 3 seconds digits 1-5 bilateral.    Neuro: Unable to perform  MSK: Unable to perform     S : 89y year old Male seen at bedside for left foot ulcer  Chief Complaint : Patient is a 89y old  Male who presents with a chief complaint of left foot wound (05 Jan 2024 13:42)    HPI : HPI:  89 yo M with PMH HTN, HLD, LBBB, paroxismal Atrial Fibrillation, thoracic aortic aneurysm, s/p mechanical AVR presented transferred from  for angioplasty of his L foot. Pt originally presented to  on 12/31 after experiencing left foot pain which upon removal of his sock revealed a black eschar on the dorsum of his foot. He had an xray done which showed no bone destruction and he was started on IV abx for cellulitis, currently on cefepime only. 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 Pt was unable to have an MRI performed as they were unable to complete the MRI safety forms due to pts cognitive impairment and inability to reach family. Pt reports currently feeling well and denies any complaints. Pt [place].       Vitals: BP: 111/72, HR: 89, Temp: 98.6F, RR: 19, SpO2: 92% on RA   AM Labs:  WBC 13.40, BUN/Cr 51/2.22   (05 Jan 2024 03:24)      Patient admits to  (-) Fevers, (-) Chills, (-) Nausea, (-) Vomiting, (-) Shortness of Breath      PMH: HTN (hypertension)    HLD (hyperlipidemia)    Paroxysmal atrial fibrillation      PSH:H/O aortic valve repair    H/O thoracic aortic aneurysm repair        Allergies:No Known Allergies      Labs:                          10.7   x     )-----------( x        ( 05 Jan 2024 18:39 )             31.3     WBC Trend  11.97<H> Date (01-05 @ 09:25)  11.52<H> Date (01-05 @ 05:52)  13.40<H> Date (01-04 @ 07:35)  12.20<H> Date (01-03 @ 07:23)  12.89<H> Date (01-02 @ 06:00)  13.37<H> Date (01-01 @ 07:10)  11.71<H> Date (12-31 @ 13:00)      Chem  01-05    135  |  107  |  67<H>  ----------------------------<  143<H>  3.9   |  24  |  2.40<H>    Ca    8.4<L>      05 Jan 2024 05:52    TPro  6.2  /  Alb  2.2<L>  /  TBili  0.5  /  DBili  x   /  AST  21  /  ALT  19  /  AlkPhos  54  01-05          T(F): 97.8 (01-05-24 @ 12:10), Max: 98.7 (01-04-24 @ 21:04)  HR: 88 (01-05-24 @ 12:10) (88 - 98)  BP: 146/65 (01-05-24 @ 12:10) (91/53 - 146/65)  RR: 19 (01-05-24 @ 12:10) (16 - 19)  SpO2: 95% (01-05-24 @ 12:10) (91% - 98%)  Wt(kg): --    O:   General: Pleasant  male NAD & AOX3.    Integument:  Skin warm, dry and supple bilateral.    Left dorsum foot unstageable ulcer, + hyperkeratotic border, wound base Necrotic eschar, - edema, + anjel-wound erythema, - purulence, - fluctuance, - tracking/tunneling, - probe to bone.   Cellulitic changes noted  Vascular: Dorsalis Pedis and Posterior Tibial pulses not palpable.  Capillary re-fill time less then 3 seconds digits 1-5 bilateral.    Neuro: Unable to perform  MSK: Unable to perform

## 2024-01-05 NOTE — H&P ADULT - PROBLEM SELECTOR PLAN 3
Pt with history of afib on warfarin at home, presented to [facility] originally with supratherapeutic INR  - pt NSR on exam  - cont with heparin gtt   - f/u AM coags

## 2024-01-05 NOTE — CARE COORDINATION ASSESSMENT. - NSPASTMEDSURGHISTORY_GEN_ALL_CORE_FT
PAST MEDICAL & SURGICAL HISTORY:  HLD (hyperlipidemia)      HTN (hypertension)      Paroxysmal atrial fibrillation      H/O thoracic aortic aneurysm repair      H/O aortic valve repair

## 2024-01-05 NOTE — CONSULT NOTE ADULT - ASSESSMENT
89 year old male with PMH HTN, HLD, LBBB, paroxysmal Atrial Fibrillation   thoracic aortic aneurysm, s/p mechanical AVR presented transferred from  for angioplasty of his L foot.  Cardiology consultation now being obtained.     Presenting with cellulitis left foot, for angioplasty called for cardiac optimization   EKG SR with old LBBB   has known history of p afib, mechanical valve on home coumadin - now on IV heparin preoperatively     /64   holding home ace for BETINA   continue home nadolol with parameters   continue home statin     no sign volume overload on room air   BETINA fu renal recs     plan per vascular notes for LLE angio - no objection from cv standpoint to proceed with low risk procedure    Monitor and replete electrolytes. Keep K>4.0 and Mg>2.0.  Morelia Shook FNP-C  call teams          89 year old male with PMH HTN, HLD, LBBB, paroxysmal Atrial Fibrillation   thoracic aortic aneurysm, s/p mechanical AVR presented transferred from  for angioplasty of his L foot.  Cardiology consultation now being obtained.     Presenting with cellulitis left foot, for angioplasty called for cardiac optimization   EKG SR with old LBBB   has known history of p afib, mechanical valve on home coumadin - now on IV heparin preoperatively     /64   holding home ace for BETINA / soft bp   continue home nadolol with parameters   continue home statin     no sign volume overload on room air laying flat   would check baseline echo -if further surgeries are needed does not need to be done to proceed with angiogram     BETINA fu renal recs  plan per vascular notes for LLE angio - no objection from cv standpoint to proceed with low risk procedure  will follow with you     Monitor and replete electrolytes. Keep K>4.0 and Mg>2.0.  Morelia Shook FNP-C  call teams

## 2024-01-05 NOTE — H&P ADULT - ATTENDING COMMENTS
89 y/o male transferred from [facility] to be evaluated for vascular intervention  pt has Lt foot eschar and requires angioplasty   was initially treated with vanc/zosyn which was changed to cefepime by ID at [facility] for worsening of renal fx  will need nephrology consult if pt were to undergo images with any contrast   hold off losartan  continue heparin ggt with high intensity statin  vascular sx/ID/nephro to follow 89 y/o male transferred from [facility] to be evaluated for vascular intervention  pt has Lt foot eschar and requires angioplasty   was initially treated with vanc/zosyn which was changed to cefepime by ID at [facility] for worsening of renal fx  will need nephrology consult if pt were to undergo images with any contrast   hold off losartan  continue heparin ggt with high intensity statin  vascular sx/ID/nephro to follow

## 2024-01-05 NOTE — GOALS OF CARE CONVERSATION - ADVANCED CARE PLANNING - CONVERSATION DETAILS
Palliative care SW placed a call to patient's son Bogdan ([phone]). Phone was answered by son's wife who explained that son was at work and unable to speak at this time. Message left with son's wife requesting son call back with SW contact information provided. PC team to follow up. Palliative care SW placed a call to patient's son Bogdan ([phone]). Phone was answered by son's wife who explained that son was at work and unable to speak at this time. Message left with son's wife requesting son call back with SW contact information provided. PC team to follow up.

## 2024-01-05 NOTE — CONSULT NOTE ADULT - SUBJECTIVE AND OBJECTIVE BOX
[facility]  INFECTIOUS DISEASES - Herlinda Orosco, [place] [address]  Tel: [phone]     Fax: [phone]  =======================================================    [id number]  POPPY MERCADO     CC: Patient is a 89y old  Male who presents with a chief complaint of left foot wound (05 Jan 2024 11:06)    HPI:  89 yo M with PMH HTN, HLD, LBBB, paroxismal Atrial Fibrillation, thoracic aortic aneurysm, s/p mechanical AVR, who was transferred from  for angioplasty of his L foot. Pt originally presented to  on 12/31 after experiencing left foot pain which upon removal of his sock revealed a black eschar on the dorsum of his foot. He had an xray done which showed no bone destruction and he was started on IV abx for cellulitis, currently on cefepime only. 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 Pt was unable to have an MRI performed as they were unable to complete the MRI safety forms due to pts cognitive impairment and inability to reach family.        PAST MEDICAL & SURGICAL HISTORY:  HTN (hypertension)      HLD (hyperlipidemia)      Paroxysmal atrial fibrillation      H/O aortic valve repair      H/O thoracic aortic aneurysm repair          Social Hx:     FAMILY HISTORY:      Allergies    No Known Allergies    Intolerances        Antibiotics:  MEDICATIONS  (STANDING):  atorvastatin 10 milliGRAM(s) Oral at bedtime  cefepime   IVPB 1000 milliGRAM(s) IV Intermittent daily  dextrose 5%. 1000 milliLiter(s) (100 mL/Hr) IV Continuous <Continuous>  dextrose 5%. 1000 milliLiter(s) (50 mL/Hr) IV Continuous <Continuous>  dextrose 50% Injectable 25 Gram(s) IV Push once  dextrose 50% Injectable 12.5 Gram(s) IV Push once  dextrose 50% Injectable 25 Gram(s) IV Push once  glucagon  Injectable 1 milliGRAM(s) IntraMuscular once  heparin  Infusion.  Unit(s)/Hr (11 mL/Hr) IV Continuous <Continuous>  insulin lispro (ADMELOG) corrective regimen sliding scale   SubCutaneous three times a day before meals  insulin lispro (ADMELOG) corrective regimen sliding scale   SubCutaneous at bedtime  nadolol 20 milliGRAM(s) Oral daily  sodium chloride 0.45%. 1000 milliLiter(s) (75 mL/Hr) IV Continuous <Continuous>  tamsulosin 0.4 milliGRAM(s) Oral at bedtime    MEDICATIONS  (PRN):  dextrose Oral Gel 15 Gram(s) Oral once PRN Blood Glucose LESS THAN 70 milliGRAM(s)/deciliter  heparin   Injectable 2500 Unit(s) IV Push every 6 hours PRN For aPTT between 40 - 57  heparin   Injectable 5000 Unit(s) IV Push every 6 hours PRN For aPTT less than 40       REVIEW OF SYSTEMS:  unable to obtain    Physical Exam:  Vital Signs Last 24 Hrs  T(C): 36.4 (05 Jan 2024 04:56), Max: 37.1 (04 Jan 2024 21:04)  T(F): 97.5 (05 Jan 2024 04:56), Max: 98.7 (04 Jan 2024 21:04)  HR: 88 (05 Jan 2024 04:56) (88 - 98)  BP: 104/64 (05 Jan 2024 04:56) (91/53 - 115/59)  BP(mean): --  RR: 18 (05 Jan 2024 04:56) (16 - 19)  SpO2: 91% (05 Jan 2024 04:56) (91% - 98%)    Parameters below as of 05 Jan 2024 04:56  Patient On (Oxygen Delivery Method): room air        Weight (kg): 61.2 (01-05 @ 00:26)    GEN: Not in apparemt distress  HEENT: normocephalic and atraumatic.   NECK: Supple.   LUNGS: normal respiratory effort  HEART: Regular rate and rhythm   ABDOMEN: Soft, nontender, and nondistended.    EXTREMITIES: No leg edema.  SKIN: (+) L foot eschar, some periwound erythema, no active drainage    Labs:  01-05    135  |  107  |  67<H>  ----------------------------<  143<H>  3.9   |  24  |  2.40<H>    Ca    8.4<L>      05 Jan 2024 05:52    TPro  6.2  /  Alb  2.2<L>  /  TBili  0.5  /  DBili  x   /  AST  21  /  ALT  19  /  AlkPhos  54  01-05                          10.3   11.97 )-----------( 243      ( 05 Jan 2024 09:25 )             30.7     PT/INR - ( 05 Jan 2024 02:05 )   PT: 22.8 sec;   INR: 1.99 ratio         PTT - ( 05 Jan 2024 09:25 )  PTT:74.1 sec  Urinalysis Basic - ( 05 Jan 2024 05:52 )    Color: x / Appearance: x / SG: x / pH: x  Gluc: 143 mg/dL / Ketone: x  / Bili: x / Urobili: x   Blood: x / Protein: x / Nitrite: x   Leuk Esterase: x / RBC: x / WBC x   Sq Epi: x / Non Sq Epi: x / Bacteria: x      LIVER FUNCTIONS - ( 05 Jan 2024 05:52 )  Alb: 2.2 g/dL / Pro: 6.2 g/dL / ALK PHOS: 54 U/L / ALT: 19 U/L / AST: 21 U/L / GGT: x                   C-Reactive Protein, Serum: 57 mg/L (12-31-23 @ 18:55)    Sedimentation Rate, Erythrocyte: 76 mm/hr (12-31-23 @ 18:55)    SARS-CoV-2: NotDetec (12-31-23 @ 13:00)      RECENT CULTURES:  12-31 @ 13:38 Clean Catch Clean Catch (Midstream)     <10,000 CFU/mL Normal Urogenital Christy        12-31 @ 13:05 .Blood Blood-Peripheral     No growth at 4 days        12-31 @ 13:00 .Blood Blood-Peripheral     No growth at 4 days      NotDetec        All imaging and other data have been reviewed.    < from: Xray Foot AP + Lateral + Oblique, Left (12.31.23 @ 13:37) >    Lungs are clear and chest is similar to September 7, 2021.    Left foot.    COMPARISON: None available. 2 views.    There is a moderate hallux valgus with some degeneration and there is   mild first IP degeneration. No bone destruction or fracture is evident.   Arterial calcifications noted.    IMPRESSION: No acute finding of the chest or left foot.    < end of copied text >   [facility]  INFECTIOUS DISEASES - Herlinda Orosco, [place] [address]  Tel: [phone]     Fax: [phone]  =======================================================    [id number]  POPPY MERCADO     CC: Patient is a 89y old  Male who presents with a chief complaint of left foot wound (05 Jan 2024 11:06)    HPI:  89 yo M with PMH HTN, HLD, LBBB, paroxismal Atrial Fibrillation, thoracic aortic aneurysm, s/p mechanical AVR, who was transferred from  for angioplasty of his L foot. Pt originally presented to  on 12/31 after experiencing left foot pain which upon removal of his sock revealed a black eschar on the dorsum of his foot. He had an xray done which showed no bone destruction and he was started on IV abx for cellulitis, currently on cefepime only. 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 Pt was unable to have an MRI performed as they were unable to complete the MRI safety forms due to pts cognitive impairment and inability to reach family.        PAST MEDICAL & SURGICAL HISTORY:  HTN (hypertension)      HLD (hyperlipidemia)      Paroxysmal atrial fibrillation      H/O aortic valve repair      H/O thoracic aortic aneurysm repair          Social Hx:     FAMILY HISTORY:      Allergies    No Known Allergies    Intolerances        Antibiotics:  MEDICATIONS  (STANDING):  atorvastatin 10 milliGRAM(s) Oral at bedtime  cefepime   IVPB 1000 milliGRAM(s) IV Intermittent daily  dextrose 5%. 1000 milliLiter(s) (100 mL/Hr) IV Continuous <Continuous>  dextrose 5%. 1000 milliLiter(s) (50 mL/Hr) IV Continuous <Continuous>  dextrose 50% Injectable 25 Gram(s) IV Push once  dextrose 50% Injectable 12.5 Gram(s) IV Push once  dextrose 50% Injectable 25 Gram(s) IV Push once  glucagon  Injectable 1 milliGRAM(s) IntraMuscular once  heparin  Infusion.  Unit(s)/Hr (11 mL/Hr) IV Continuous <Continuous>  insulin lispro (ADMELOG) corrective regimen sliding scale   SubCutaneous three times a day before meals  insulin lispro (ADMELOG) corrective regimen sliding scale   SubCutaneous at bedtime  nadolol 20 milliGRAM(s) Oral daily  sodium chloride 0.45%. 1000 milliLiter(s) (75 mL/Hr) IV Continuous <Continuous>  tamsulosin 0.4 milliGRAM(s) Oral at bedtime    MEDICATIONS  (PRN):  dextrose Oral Gel 15 Gram(s) Oral once PRN Blood Glucose LESS THAN 70 milliGRAM(s)/deciliter  heparin   Injectable 2500 Unit(s) IV Push every 6 hours PRN For aPTT between 40 - 57  heparin   Injectable 5000 Unit(s) IV Push every 6 hours PRN For aPTT less than 40       REVIEW OF SYSTEMS:  unable to obtain    Physical Exam:  Vital Signs Last 24 Hrs  T(C): 36.4 (05 Jan 2024 04:56), Max: 37.1 (04 Jan 2024 21:04)  T(F): 97.5 (05 Jan 2024 04:56), Max: 98.7 (04 Jan 2024 21:04)  HR: 88 (05 Jan 2024 04:56) (88 - 98)  BP: 104/64 (05 Jan 2024 04:56) (91/53 - 115/59)  BP(mean): --  RR: 18 (05 Jan 2024 04:56) (16 - 19)  SpO2: 91% (05 Jan 2024 04:56) (91% - 98%)    Parameters below as of 05 Jan 2024 04:56  Patient On (Oxygen Delivery Method): room air        Weight (kg): 61.2 (01-05 @ 00:26)    GEN: Not in apparemt distress  HEENT: normocephalic and atraumatic.   NECK: Supple.   LUNGS: normal respiratory effort  HEART: Regular rate and rhythm   ABDOMEN: Soft, nontender, and nondistended.    EXTREMITIES: No leg edema.  SKIN: (+) L foot eschar, some periwound erythema, no active drainage    Labs:  01-05    135  |  107  |  67<H>  ----------------------------<  143<H>  3.9   |  24  |  2.40<H>    Ca    8.4<L>      05 Jan 2024 05:52    TPro  6.2  /  Alb  2.2<L>  /  TBili  0.5  /  DBili  x   /  AST  21  /  ALT  19  /  AlkPhos  54  01-05                          10.3   11.97 )-----------( 243      ( 05 Jan 2024 09:25 )             30.7     PT/INR - ( 05 Jan 2024 02:05 )   PT: 22.8 sec;   INR: 1.99 ratio         PTT - ( 05 Jan 2024 09:25 )  PTT:74.1 sec  Urinalysis Basic - ( 05 Jan 2024 05:52 )    Color: x / Appearance: x / SG: x / pH: x  Gluc: 143 mg/dL / Ketone: x  / Bili: x / Urobili: x   Blood: x / Protein: x / Nitrite: x   Leuk Esterase: x / RBC: x / WBC x   Sq Epi: x / Non Sq Epi: x / Bacteria: x      LIVER FUNCTIONS - ( 05 Jan 2024 05:52 )  Alb: 2.2 g/dL / Pro: 6.2 g/dL / ALK PHOS: 54 U/L / ALT: 19 U/L / AST: 21 U/L / GGT: x                   C-Reactive Protein, Serum: 57 mg/L (12-31-23 @ 18:55)    Sedimentation Rate, Erythrocyte: 76 mm/hr (12-31-23 @ 18:55)    SARS-CoV-2: NotDetec (12-31-23 @ 13:00)      RECENT CULTURES:  12-31 @ 13:38 Clean Catch Clean Catch (Midstream)     <10,000 CFU/mL Normal Urogenital Christy        12-31 @ 13:05 .Blood Blood-Peripheral     No growth at 4 days        12-31 @ 13:00 .Blood Blood-Peripheral     No growth at 4 days      NotDetec        All imaging and other data have been reviewed.    < from: Xray Foot AP + Lateral + Oblique, Left (12.31.23 @ 13:37) >    Lungs are clear and chest is similar to September 7, 2021.    Left foot.    COMPARISON: None available. 2 views.    There is a moderate hallux valgus with some degeneration and there is   mild first IP degeneration. No bone destruction or fracture is evident.   Arterial calcifications noted.    IMPRESSION: No acute finding of the chest or left foot.    < end of copied text >

## 2024-01-05 NOTE — PATIENT PROFILE ADULT - FALL HARM RISK - HARM RISK INTERVENTIONS
Assistance with ambulation/Assistance OOB with selected safe patient handling equipment/Communicate Risk of Fall with Harm to all staff/Reinforce activity limits and safety measures with patient and family/Tailored Fall Risk Interventions/Visual Cue: Yellow wristband and red socks/Bed in lowest position, wheels locked, appropriate side rails in place/Call bell, personal items and telephone in reach/Instruct patient to call for assistance before getting out of bed or chair/Non-slip footwear when patient is out of bed/[place] to call system/Physically safe environment - no spills, clutter or unnecessary equipment/Purposeful Proactive Rounding/Room/bathroom lighting operational, light cord in reach Assistance with ambulation/Assistance OOB with selected safe patient handling equipment/Communicate Risk of Fall with Harm to all staff/Reinforce activity limits and safety measures with patient and family/Tailored Fall Risk Interventions/Visual Cue: Yellow wristband and red socks/Bed in lowest position, wheels locked, appropriate side rails in place/Call bell, personal items and telephone in reach/Instruct patient to call for assistance before getting out of bed or chair/Non-slip footwear when patient is out of bed/[place] to call system/Physically safe environment - no spills, clutter or unnecessary equipment/Purposeful Proactive Rounding/Room/bathroom lighting operational, light cord in reach

## 2024-01-05 NOTE — CONSULT NOTE ADULT - SUBJECTIVE AND OBJECTIVE BOX
CHIEF COMPLAINT:  Foot wound    HISTORY OF PRESENT ILLNESS:    Called to see patient, passing only drops of urine PVR 1000cc, nursing and resident physician unable to pass [place] due to severe phimosis,  consult requested.   Patient  follows with Urologist  Dr. RICK Escobar    89 year old male with PMH HTN, HLD, LBBB, paroxysmal Atrial Fibrillation   thoracic aortic aneurysm, s/p mechanical AVR presented transferred from  for angioplasty of his L foot. Pt originally presented to on 12/31 after experiencing left foot pain which upon removal of his sock revealed a black eschar on the dorsum of his foot. He had an xray done which showed no bone destruction and he was started on IV abx for cellulitis. 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 Patient has  cognitive impairmentPAST MEDICAL & SURGICAL HISTORY:  HTN (hypertension)      HLD (hyperlipidemia)      Paroxysmal atrial fibrillation      H/O aortic valve repair      H/O thoracic aortic aneurysm repair          REVIEW OF SYSTEMS:    Cognitive impairment      MEDICATIONS  (STANDING):  atorvastatin 10 milliGRAM(s) Oral at bedtime  cefepime   IVPB 1000 milliGRAM(s) IV Intermittent daily  dextrose 5%. 1000 milliLiter(s) (100 mL/Hr) IV Continuous <Continuous>  dextrose 5%. 1000 milliLiter(s) (50 mL/Hr) IV Continuous <Continuous>  dextrose 50% Injectable 25 Gram(s) IV Push once  dextrose 50% Injectable 12.5 Gram(s) IV Push once  dextrose 50% Injectable 25 Gram(s) IV Push once  glucagon  Injectable 1 milliGRAM(s) IntraMuscular once  heparin  Infusion.  Unit(s)/Hr (11 mL/Hr) IV Continuous <Continuous>  insulin lispro (ADMELOG) corrective regimen sliding scale   SubCutaneous at bedtime  insulin lispro (ADMELOG) corrective regimen sliding scale   SubCutaneous three times a day before meals  nadolol 20 milliGRAM(s) Oral daily  sodium chloride 0.45%. 1000 milliLiter(s) (75 mL/Hr) IV Continuous <Continuous>  tamsulosin 0.4 milliGRAM(s) Oral at bedtime    MEDICATIONS  (PRN):  dextrose Oral Gel 15 Gram(s) Oral once PRN Blood Glucose LESS THAN 70 milliGRAM(s)/deciliter  heparin   Injectable 5000 Unit(s) IV Push every 6 hours PRN For aPTT less than 40  heparin   Injectable 2500 Unit(s) IV Push every 6 hours PRN For aPTT between 40 - 57      Allergies    No Known Allergies    Intolerances        SOCIAL HISTORY:    FAMILY HISTORY:      Vital Signs Last 24 Hrs  T(C): 36.4 (05 Jan 2024 04:56), Max: 37.1 (04 Jan 2024 21:04)  T(F): 97.5 (05 Jan 2024 04:56), Max: 98.7 (04 Jan 2024 21:04)  HR: 88 (05 Jan 2024 04:56) (88 - 98)  BP: 104/64 (05 Jan 2024 04:56) (91/53 - 115/59)  BP(mean): --  RR: 18 (05 Jan 2024 04:56) (16 - 19)  SpO2: 91% (05 Jan 2024 04:56) (91% - 98%)    Parameters below as of 05 Jan 2024 04:56  Patient On (Oxygen Delivery Method): room air        PHYSICAL EXAM:    Constitutional: NAD, well-developed  HEENT: LAZARO, EOMI, Normal Hearing, MMM  Neck: No LAD, No JVD  Back: Normal spine flexure, No CVA tenderness  Respiratory: CTAB   Cardiovascular: S1 and S2, RRR, no M/G/R  Abd: BS+, soft, NT/ND, No CVAT  :  Phimotic  phallus, meatus barely visualized bilateral descended testes, ABELINO: Normal sphincter tone, Normal prostate, no masses  ABELINO:  good tone flat 20-30 gram prostate, no stool  Extremities: No peripheral edema  Vascular: 2+ peripheral pulses  Neurological: A/O x 3, no focal deficits  Psychiatric: Normal mood, normal affect      LABS:                        10.3   11.97 )-----------( 243      ( 05 Jan 2024 09:25 )             30.7     01-05    135  |  107  |  67<H>  ----------------------------<  143<H>  3.9   |  24  |  2.40<H>    Ca    8.4<L>      05 Jan 2024 05:52    TPro  6.2  /  Alb  2.2<L>  /  TBili  0.5  /  DBili  x   /  AST  21  /  ALT  19  /  AlkPhos  54  01-05    PT/INR - ( 05 Jan 2024 02:05 )   PT: 22.8 sec;   INR: 1.99 ratio         PTT - ( 05 Jan 2024 09:25 )  PTT:74.1 sec  Urinalysis Basic - ( 05 Jan 2024 05:52 )    Color: x / Appearance: x / SG: x / pH: x  Gluc: 143 mg/dL / Ketone: x  / Bili: x / Urobili: x   Blood: x / Protein: x / Nitrite: x   Leuk Esterase: x / RBC: x / WBC x   Sq Epi: x / Non Sq Epi: x / Bacteria: x      Urine Culture:   Hemoglobin: 10.3 g/dL (01-05 @ 09:25)  Hematocrit: 30.7 % (01-05 @ 09:25)  Hemoglobin: 10.2 g/dL (01-05 @ 05:52)  Hematocrit: 29.6 % (01-05 @ 05:52)  Hemoglobin: 12.7 g/dL (01-04 @ 07:35)  Hematocrit: 37.1 % (01-04 @ 07:35)      RADIOLOGY & ADDITIONAL STUDIES:

## 2024-01-05 NOTE — CHART NOTE - NSCHARTNOTEFT_GEN_A_CORE
-RN called reporting blood tinged urine in castañeda bag  -s/p castañeda insertion by urology for >1L urinary rentention  -Repeat H/H ordered, night team to follow up  -If worsening -- worsening H/H, gross blood or worsening vitals then d/c heparin  -For now, continue heparin and continue to watch  -RN to call if changes

## 2024-01-05 NOTE — H&P ADULT - PROBLEM SELECTOR PLAN 4
Pt with A1c 6.7%, not on home medications and does not follow with PCP  - start CASS with FS  - consistent cho diet minced and moist  - hypoglycemia protocol

## 2024-01-05 NOTE — H&P ADULT - PROBLEM SELECTOR PLAN 5
Pt with hx HTN  - takes ramipril, nadolol at home  - hold ACE in setting of BETINA  - cont nadolol 20mg with hold parameters in place

## 2024-01-05 NOTE — CONSULT NOTE ADULT - ATTENDING COMMENTS
Patient will need diagnostic imaging MRI vs bone scan. Will discuss with patient and patient's family treatment plan pending vascular intervention and diagnostic imaging

## 2024-01-05 NOTE — PHYSICAL THERAPY INITIAL EVALUATION ADULT - PERTINENT HX OF CURRENT PROBLEM, REHAB EVAL
Per H&P, Pt is a 89 year old Male with PMH HTN, HLD, LBBB, paroxismal Atrial Fibrillation, thoracic aortic aneurysm, s/p mechanical AVR presented transferred from  for angioplasty of his L foot. Pt originally presented to  on 12/31 after experiencing left foot pain which upon removal of his sock revealed a black eschar on the dorsum of his foot. He had an xray done which showed no bone destruction and he was started on IV abx for cellulitis, currently on cefepime only. 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 Pt was unable to have an MRI performed as they were unable to complete the MRI safety forms due to pts cognitive impairment and inability to reach family. Pt reports currently feeling well and denies any complaints. Pt [place]. Per H&P, Pt is a 89 year old Male with PMH HTN, HLD, LBBB, paroxismal Atrial Fibrillation, thoracic aortic aneurysm, s/p mechanical AVR presented transferred from  for angioplasty of his L foot. Pt originally presented to  on 12/31 after experiencing left foot pain which upon removal of his sock revealed a black eschar on the dorsum of his foot. He had an xray done which showed no bone destruction and he was started on IV abx for cellulitis, currently on cefepime only. 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 Pt was unable to have an MRI performed as they were unable to complete the MRI safety forms due to pts cognitive impairment and inability to reach family. Pt reports currently feeling well and denies any complaints. Pt [place].

## 2024-01-05 NOTE — H&P ADULT - PROBLEM SELECTOR PLAN 2
Pt with BETINA, unknown baseline  - BUN/Cr 51/2.22 this AM <- 46/1.6 on admission  - likely 2/2 abx, vanco discontinued  - hold lisinopril in setting of BETINA  - hold silodosin in setting of dec Cr clearance  - cont light mIVF LR @ 65cc/hr  - replace electrolytes as needed  - f/u AM BMP  - nephro consulted, f/u recs

## 2024-01-05 NOTE — CARE COORDINATION ASSESSMENT. - NSCAREPROVIDERS_GEN_ALL_CORE_FT
CARE PROVIDERS:  Administration: Lance Hodge  Administration: Prosper Vázquez  Administration: Sundeep Gómez  Admitting: Yahir Griffith  Attending: Taisha Chavez  Consultant: Irena Henriquez  Consultant: Morelia Shook  Consultant: Yvette Loredo  Consultant: Mary Orlando  Consultant: Juhi Ewing  Consultant: Jose Garcia  Consultant: Dalila Bello  Consultant: Richard Cazares  Consultant: Deepthi Llanes  Consultant: Weil, Patricia  Covering Team: Fernando Muhammad  Covering Team: Humberto Tao  Covering Team: Velia Maza  Nurse: Sima Wei  Nurse: Rebeca Gastelum  Nurse: Le Witt  Nurse: Rebeca Arceo  Ordered: Doctor, Unknown  Outpatient Provider: Richard Cazares  Override: Jesús Shetty  PCA/Nursing Assistant: Candace Garcia  Physical Therapy: Abena Mendoza  Primary Team: Mary Metcalf  Primary Team: Salma Sanders  Primary Team: Yvette Cali  Primary Team: Taisha Chavez  Primary Team: Rene Dickinson  : Jessie Jimenez  : Lisa Carroll  Student: Luis Alvarado  Team: [facility], Team  Team: [facility], Team

## 2024-01-05 NOTE — PHYSICAL THERAPY INITIAL EVALUATION ADULT - PATIENT PROFILE REVIEW, REHAB EVAL
PT orders received: ambulate with assistance. Consult with RN Rebeca, pt may participate in PT evaluation./yes

## 2024-01-05 NOTE — H&P ADULT - NSHPREVIEWOFSYSTEMS_GEN_ALL_CORE
CONSTITUTIONAL: denies fever, chills  HEENT: denies sore throat  SKIN: +black eschar on L foot  CARDIOVASCULAR: denies chest pain, chest pressure  RESPIRATORY: denies shortness of breath, sputum production  GASTROINTESTINAL: denies nausea, vomiting, diarrhea, abdominal pain  NEUROLOGICAL: denies headache  MUSCULOSKELETAL: denies new joint pain, muscle aches  HEMATOLOGIC: denies gross bleeding, bruising

## 2024-01-05 NOTE — PHYSICAL THERAPY INITIAL EVALUATION ADULT - GAIT TRAINING, PT EVAL
Patient will ambulate 50 feet with minAx1 with rolling walker by 2 weeks to allow for increased independence in the community.

## 2024-01-05 NOTE — CONSULT NOTE ADULT - SUBJECTIVE AND OBJECTIVE BOX
[facility] - An Barajas, Gildardo Marrero, Erik, Chacorta Crane  Office Number: [phone]    Initial Consult Note    CHIEF COMPLAINT: Patient is a 89y old  Male who presents with a chief complaint of left foot wound (05 Jan 2024 09:25)      HPI:  89 year old male with PMH HTN, HLD, LBBB, paroxysmal Atrial Fibrillation   thoracic aortic aneurysm, s/p mechanical AVR presented transferred from  for angioplasty of his L foot. Pt originally presented to on 12/31 after experiencing left foot pain which upon removal of his sock revealed a black eschar on the dorsum of his foot. He had an xray done which showed no bone destruction and he was started on IV abx for cellulitis. 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 Pt was unable to have an MRI performed as they were unable to complete the MRI safety forms due to pts cognitive impairment and inability to reach family.   Cardiology consultation now being obtained.     Vitals: BP: 111/72, HR: 89, Temp: 98.6F, RR: 19, SpO2: 92% on RA   AM Labs:  WBC 13.40, BUN/Cr 51/2.22    PAST MEDICAL & SURGICAL HISTORY:  HTN (hypertension)      HLD (hyperlipidemia)      Paroxysmal atrial fibrillation      H/O aortic valve repair      H/O thoracic aortic aneurysm repair          SOCIAL HISTORY:  No tobacco, ethanol, or drug abuse.    FAMILY HISTORY:    No family history of acute MI or sudden cardiac death.    MEDICATIONS  (STANDING):  atorvastatin 10 milliGRAM(s) Oral at bedtime  cefepime   IVPB 1000 milliGRAM(s) IV Intermittent daily  dextrose 5%. 1000 milliLiter(s) (100 mL/Hr) IV Continuous <Continuous>  dextrose 5%. 1000 milliLiter(s) (50 mL/Hr) IV Continuous <Continuous>  dextrose 50% Injectable 25 Gram(s) IV Push once  dextrose 50% Injectable 12.5 Gram(s) IV Push once  dextrose 50% Injectable 25 Gram(s) IV Push once  glucagon  Injectable 1 milliGRAM(s) IntraMuscular once  heparin  Infusion.  Unit(s)/Hr (11 mL/Hr) IV Continuous <Continuous>  insulin lispro (ADMELOG) corrective regimen sliding scale   SubCutaneous three times a day before meals  insulin lispro (ADMELOG) corrective regimen sliding scale   SubCutaneous at bedtime  lactated ringers. 1000 milliLiter(s) (65 mL/Hr) IV Continuous <Continuous>  nadolol 20 milliGRAM(s) Oral daily    MEDICATIONS  (PRN):  dextrose Oral Gel 15 Gram(s) Oral once PRN Blood Glucose LESS THAN 70 milliGRAM(s)/deciliter  heparin   Injectable 2500 Unit(s) IV Push every 6 hours PRN For aPTT between 40 - 57  heparin   Injectable 5000 Unit(s) IV Push every 6 hours PRN For aPTT less than 40      Allergies    No Known Allergies    Intolerances        REVIEW OF SYSTEMS:  All other review of systems is negative unless indicated above    VITAL SIGNS:   Vital Signs Last 24 Hrs  T(C): 36.4 (05 Jan 2024 04:56), Max: 37.1 (04 Jan 2024 21:04)  T(F): 97.5 (05 Jan 2024 04:56), Max: 98.7 (04 Jan 2024 21:04)  HR: 88 (05 Jan 2024 04:56) (88 - 98)  BP: 104/64 (05 Jan 2024 04:56) (91/53 - 125/76)  BP(mean): --  RR: 18 (05 Jan 2024 04:56) (16 - 19)  SpO2: 91% (05 Jan 2024 04:56) (91% - 98%)    Parameters below as of 05 Jan 2024 04:56  Patient On (Oxygen Delivery Method): room air        I&O's Summary    04 Jan 2024 07:01  -  05 Jan 2024 07:00  --------------------------------------------------------  IN: 456 mL / OUT: 0 mL / NET: 456 mL        On Exam:    Constitutional: NAD,   Lungs:  Non-labored, breath sounds are clear bilaterally, No wheezing, rales or rhonchi  Cardiovascular: RRR.  S1 and S2 positive.  No murmurs, rubs, gallops or clicks  Gastrointestinal: Bowel Sounds present, soft, nontender.   Lymph: No peripheral edema. No cervical lymphadenopathy.  Neurological: Alert, no focal deficits  Skin: No rashes or ulcers   Psych:  Mood & affect appropriate.    LABS: All Labs Reviewed:                        10.3   11.97 )-----------( 243      ( 05 Jan 2024 09:25 )             30.7                         10.2   11.52 )-----------( 226      ( 05 Jan 2024 05:52 )             29.6                         12.7   13.40 )-----------( 281      ( 04 Jan 2024 07:35 )             37.1     05 Jan 2024 05:52    135    |  107    |  67     ----------------------------<  143    3.9     |  24     |  2.40   04 Jan 2024 07:35    139    |  103    |  51     ----------------------------<  177    3.8     |  25     |  2.22   03 Jan 2024 07:23    140    |  103    |  42     ----------------------------<  151    3.8     |  26     |  1.96     Ca    8.4        05 Jan 2024 05:52  Ca    9.2        04 Jan 2024 07:35  Ca    9.1        03 Jan 2024 07:23    TPro  6.2    /  Alb  2.2    /  TBili  0.5    /  DBili  x      /  AST  21     /  ALT  19     /  AlkPhos  54     05 Jan 2024 05:52  TPro  7.6    /  Alb  2.6    /  TBili  0.8    /  DBili  x      /  AST  25     /  ALT  25     /  AlkPhos  68     04 Jan 2024 07:35  TPro  7.2    /  Alb  2.5    /  TBili  0.8    /  DBili  x      /  AST  25     /  ALT  20     /  AlkPhos  73     03 Jan 2024 07:23    PT/INR - ( 05 Jan 2024 02:05 )   PT: 22.8 sec;   INR: 1.99 ratio         PTT - ( 05 Jan 2024 09:25 )  PTT:74.1 sec      Blood Culture: Organism --  Gram Stain Blood -- Gram Stain --  Specimen Source Clean Catch Clean Catch (Midstream)  Culture-Blood --    Organism --  Gram Stain Blood -- Gram Stain --  Specimen Source .Blood Blood-Peripheral  Culture-Blood --    Organism --  Gram Stain Blood -- Gram Stain --  Specimen Source .Blood Blood-Peripheral  Culture-Blood --                   [facility] - An Barajas, Gildardo Marrero, Erik, Chacorta Crane  Office Number: [phone]    Initial Consult Note    CHIEF COMPLAINT: Patient is a 89y old  Male who presents with a chief complaint of left foot wound (05 Jan 2024 09:25)      HPI:  89 year old male with PMH HTN, HLD, LBBB, paroxysmal Atrial Fibrillation   thoracic aortic aneurysm, s/p mechanical AVR presented transferred from  for angioplasty of his L foot. Pt originally presented to on 12/31 after experiencing left foot pain which upon removal of his sock revealed a black eschar on the dorsum of his foot. He had an xray done which showed no bone destruction and he was started on IV abx for cellulitis. 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 Pt was unable to have an MRI performed as they were unable to complete the MRI safety forms due to pts cognitive impairment and inability to reach family.   Cardiology consultation now being obtained.     Vitals: BP: 111/72, HR: 89, Temp: 98.6F, RR: 19, SpO2: 92% on RA   AM Labs:  WBC 13.40, BUN/Cr 51/2.22    PAST MEDICAL & SURGICAL HISTORY:  HTN (hypertension)      HLD (hyperlipidemia)      Paroxysmal atrial fibrillation      H/O aortic valve repair      H/O thoracic aortic aneurysm repair          SOCIAL HISTORY:  No tobacco, ethanol, or drug abuse.    FAMILY HISTORY:    No family history of acute MI or sudden cardiac death.    MEDICATIONS  (STANDING):  atorvastatin 10 milliGRAM(s) Oral at bedtime  cefepime   IVPB 1000 milliGRAM(s) IV Intermittent daily  dextrose 5%. 1000 milliLiter(s) (100 mL/Hr) IV Continuous <Continuous>  dextrose 5%. 1000 milliLiter(s) (50 mL/Hr) IV Continuous <Continuous>  dextrose 50% Injectable 25 Gram(s) IV Push once  dextrose 50% Injectable 12.5 Gram(s) IV Push once  dextrose 50% Injectable 25 Gram(s) IV Push once  glucagon  Injectable 1 milliGRAM(s) IntraMuscular once  heparin  Infusion.  Unit(s)/Hr (11 mL/Hr) IV Continuous <Continuous>  insulin lispro (ADMELOG) corrective regimen sliding scale   SubCutaneous three times a day before meals  insulin lispro (ADMELOG) corrective regimen sliding scale   SubCutaneous at bedtime  lactated ringers. 1000 milliLiter(s) (65 mL/Hr) IV Continuous <Continuous>  nadolol 20 milliGRAM(s) Oral daily    MEDICATIONS  (PRN):  dextrose Oral Gel 15 Gram(s) Oral once PRN Blood Glucose LESS THAN 70 milliGRAM(s)/deciliter  heparin   Injectable 2500 Unit(s) IV Push every 6 hours PRN For aPTT between 40 - 57  heparin   Injectable 5000 Unit(s) IV Push every 6 hours PRN For aPTT less than 40      Allergies    No Known Allergies    Intolerances        REVIEW OF SYSTEMS:  All other review of systems is negative unless indicated above    VITAL SIGNS:   Vital Signs Last 24 Hrs  T(C): 36.4 (05 Jan 2024 04:56), Max: 37.1 (04 Jan 2024 21:04)  T(F): 97.5 (05 Jan 2024 04:56), Max: 98.7 (04 Jan 2024 21:04)  HR: 88 (05 Jan 2024 04:56) (88 - 98)  BP: 104/64 (05 Jan 2024 04:56) (91/53 - 125/76)  BP(mean): --  RR: 18 (05 Jan 2024 04:56) (16 - 19)  SpO2: 91% (05 Jan 2024 04:56) (91% - 98%)    Parameters below as of 05 Jan 2024 04:56  Patient On (Oxygen Delivery Method): room air        I&O's Summary    04 Jan 2024 07:01  -  05 Jan 2024 07:00  --------------------------------------------------------  IN: 456 mL / OUT: 0 mL / NET: 456 mL        On Exam:    Constitutional: NAD,   Lungs:  Non-labored, breath sounds are clear bilaterally, No wheezing, rales or rhonchi  Cardiovascular: RRR.  S1 and S2 positive.  No murmurs, rubs, gallops or clicks  Gastrointestinal: Bowel Sounds present, soft, nontender.   Lymph: No peripheral edema. No cervical lymphadenopathy.  Neurological: Alert, no focal deficits  Skin: No rashes or ulcers   Psych:  Mood & affect appropriate.    LABS: All Labs Reviewed:                        10.3   11.97 )-----------( 243      ( 05 Jan 2024 09:25 )             30.7                         10.2   11.52 )-----------( 226      ( 05 Jan 2024 05:52 )             29.6                         12.7   13.40 )-----------( 281      ( 04 Jan 2024 07:35 )             37.1     05 Jan 2024 05:52    135    |  107    |  67     ----------------------------<  143    3.9     |  24     |  2.40   04 Jan 2024 07:35    139    |  103    |  51     ----------------------------<  177    3.8     |  25     |  2.22   03 Jan 2024 07:23    140    |  103    |  42     ----------------------------<  151    3.8     |  26     |  1.96     Ca    8.4        05 Jan 2024 05:52  Ca    9.2        04 Jan 2024 07:35  Ca    9.1        03 Jan 2024 07:23    TPro  6.2    /  Alb  2.2    /  TBili  0.5    /  DBili  x      /  AST  21     /  ALT  19     /  AlkPhos  54     05 Jan 2024 05:52  TPro  7.6    /  Alb  2.6    /  TBili  0.8    /  DBili  x      /  AST  25     /  ALT  25     /  AlkPhos  68     04 Jan 2024 07:35  TPro  7.2    /  Alb  2.5    /  TBili  0.8    /  DBili  x      /  AST  25     /  ALT  20     /  AlkPhos  73     03 Jan 2024 07:23    PT/INR - ( 05 Jan 2024 02:05 )   PT: 22.8 sec;   INR: 1.99 ratio         PTT - ( 05 Jan 2024 09:25 )  PTT:74.1 sec      Blood Culture: Organism --  Gram Stain Blood -- Gram Stain --  Specimen Source Clean Catch Clean Catch (Midstream)  Culture-Blood --    Organism --  Gram Stain Blood -- Gram Stain --  Specimen Source .Blood Blood-Peripheral  Culture-Blood --    Organism --  Gram Stain Blood -- Gram Stain --  Specimen Source .Blood Blood-Peripheral  Culture-Blood --                   [facility] - An Barajas, Gildardo Marrero, Erik, Chacorta Crane  Office Number: [phone]    Initial Consult Note    CHIEF COMPLAINT: Patient is a 89y old  Male who presents with a chief complaint of left foot wound      HPI:  89 year old male with PMH HTN, HLD, LBBB, paroxysmal Atrial Fibrillation   thoracic aortic aneurysm, s/p mechanical AVR presented transferred from  for angioplasty of his L foot. Pt originally presented to on 12/31 after experiencing left foot pain which upon removal of his sock revealed a black eschar on the dorsum of his foot. He had an xray done which showed no bone destruction and he was started on IV abx for cellulitis. 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 Pt was unable to have an MRI performed as they were unable to complete the MRI safety forms due to pts cognitive impairment and inability to reach family.   Cardiology consultation now being obtained.   Seen at bedside, patient is unable to provide any meaningful information Laying flat on room air , iv heparin infusing.   Vitals: BP: 111/72, HR: 89, Temp: 98.6F, RR: 19, SpO2: 92% on RA   AM Labs:  WBC 13.40, BUN/Cr 51/2.22    PAST MEDICAL & SURGICAL HISTORY:  HTN (hypertension)      HLD (hyperlipidemia)      Paroxysmal atrial fibrillation      H/O aortic valve repair      H/O thoracic aortic aneurysm repair              MEDICATIONS  (STANDING):  atorvastatin 10 milliGRAM(s) Oral at bedtime  cefepime   IVPB 1000 milliGRAM(s) IV Intermittent daily  dextrose 5%. 1000 milliLiter(s) (100 mL/Hr) IV Continuous <Continuous>  dextrose 5%. 1000 milliLiter(s) (50 mL/Hr) IV Continuous <Continuous>  dextrose 50% Injectable 25 Gram(s) IV Push once  dextrose 50% Injectable 12.5 Gram(s) IV Push once  dextrose 50% Injectable 25 Gram(s) IV Push once  glucagon  Injectable 1 milliGRAM(s) IntraMuscular once  heparin  Infusion.  Unit(s)/Hr (11 mL/Hr) IV Continuous <Continuous>  insulin lispro (ADMELOG) corrective regimen sliding scale   SubCutaneous three times a day before meals  insulin lispro (ADMELOG) corrective regimen sliding scale   SubCutaneous at bedtime  lactated ringers. 1000 milliLiter(s) (65 mL/Hr) IV Continuous <Continuous>  nadolol 20 milliGRAM(s) Oral daily    MEDICATIONS  (PRN):  dextrose Oral Gel 15 Gram(s) Oral once PRN Blood Glucose LESS THAN 70 milliGRAM(s)/deciliter  heparin   Injectable 2500 Unit(s) IV Push every 6 hours PRN For aPTT between 40 - 57  heparin   Injectable 5000 Unit(s) IV Push every 6 hours PRN For aPTT less than 40      Allergies    No Known Allergies    Intolerances        REVIEW OF SYSTEMS:  All other review of systems is negative unless indicated above    VITAL SIGNS:   Vital Signs Last 24 Hrs  T(C): 36.4 (05 Jan 2024 04:56), Max: 37.1 (04 Jan 2024 21:04)  T(F): 97.5 (05 Jan 2024 04:56), Max: 98.7 (04 Jan 2024 21:04)  HR: 88 (05 Jan 2024 04:56) (88 - 98)  BP: 104/64 (05 Jan 2024 04:56) (91/53 - 125/76)  BP(mean): --  RR: 18 (05 Jan 2024 04:56) (16 - 19)  SpO2: 91% (05 Jan 2024 04:56) (91% - 98%)    Parameters below as of 05 Jan 2024 04:56  Patient On (Oxygen Delivery Method): room air        I&O's Summary    04 Jan 2024 07:01  -  05 Jan 2024 07:00  --------------------------------------------------------  IN: 456 mL / OUT: 0 mL / NET: 456 mL        On Exam:    Constitutional: lethargic, frail dry   Lungs:  Non-labored, breath sounds are clear bilaterally, No wheezing, rales or rhonchi  Cardiovascular: RRR.  S1 and S2 positive.  murmur   Gastrointestinal: Bowel Sounds present, soft, nontender.   Lymph: No peripheral edema. No cervical lymphadenopathy.  Neurological lethargic   Skin: No rashes or ulcers   Psych:  Mood & affect appropriate.    LABS: All Labs Reviewed:                        10.3   11.97 )-----------( 243      ( 05 Jan 2024 09:25 )             30.7                         10.2   11.52 )-----------( 226      ( 05 Jan 2024 05:52 )             29.6                         12.7   13.40 )-----------( 281      ( 04 Jan 2024 07:35 )             37.1     05 Jan 2024 05:52    135    |  107    |  67     ----------------------------<  143    3.9     |  24     |  2.40   04 Jan 2024 07:35    139    |  103    |  51     ----------------------------<  177    3.8     |  25     |  2.22   03 Jan 2024 07:23    140    |  103    |  42     ----------------------------<  151    3.8     |  26     |  1.96     Ca    8.4        05 Jan 2024 05:52  Ca    9.2        04 Jan 2024 07:35  Ca    9.1        03 Jan 2024 07:23    TPro  6.2    /  Alb  2.2    /  TBili  0.5    /  DBili  x      /  AST  21     /  ALT  19     /  AlkPhos  54     05 Jan 2024 05:52  TPro  7.6    /  Alb  2.6    /  TBili  0.8    /  DBili  x      /  AST  25     /  ALT  25     /  AlkPhos  68     04 Jan 2024 07:35  TPro  7.2    /  Alb  2.5    /  TBili  0.8    /  DBili  x      /  AST  25     /  ALT  20     /  AlkPhos  73     03 Jan 2024 07:23    PT/INR - ( 05 Jan 2024 02:05 )   PT: 22.8 sec;   INR: 1.99 ratio         PTT - ( 05 Jan 2024 09:25 )  PTT:74.1 sec      Blood Culture: Organism --  Gram Stain Blood -- Gram Stain --  Specimen Source Clean Catch Clean Catch (Midstream)  Culture-Blood --    Organism --  Gram Stain Blood -- Gram Stain --  Specimen Source .Blood Blood-Peripheral  Culture-Blood --    Organism --  Gram Stain Blood -- Gram Stain --  Specimen Source .Blood Blood-Peripheral  Culture-Blood --                   [facility] - An Barajas, Gildardo Marrero, Erik, Chacorta Crane  Office Number: [phone]    Initial Consult Note    CHIEF COMPLAINT: Patient is a 89y old  Male who presents with a chief complaint of left foot wound      HPI:  89 year old male with PMH HTN, HLD, LBBB, paroxysmal Atrial Fibrillation   thoracic aortic aneurysm, s/p mechanical AVR presented transferred from  for angioplasty of his L foot. Pt originally presented to on 12/31 after experiencing left foot pain which upon removal of his sock revealed a black eschar on the dorsum of his foot. He had an xray done which showed no bone destruction and he was started on IV abx for cellulitis. 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 Pt was unable to have an MRI performed as they were unable to complete the MRI safety forms due to pts cognitive impairment and inability to reach family.   Cardiology consultation now being obtained.   Seen at bedside, patient is unable to provide any meaningful information Laying flat on room air , iv heparin infusing.   Vitals: BP: 111/72, HR: 89, Temp: 98.6F, RR: 19, SpO2: 92% on RA   AM Labs:  WBC 13.40, BUN/Cr 51/2.22    PAST MEDICAL & SURGICAL HISTORY:  HTN (hypertension)      HLD (hyperlipidemia)      Paroxysmal atrial fibrillation      H/O aortic valve repair      H/O thoracic aortic aneurysm repair              MEDICATIONS  (STANDING):  atorvastatin 10 milliGRAM(s) Oral at bedtime  cefepime   IVPB 1000 milliGRAM(s) IV Intermittent daily  dextrose 5%. 1000 milliLiter(s) (100 mL/Hr) IV Continuous <Continuous>  dextrose 5%. 1000 milliLiter(s) (50 mL/Hr) IV Continuous <Continuous>  dextrose 50% Injectable 25 Gram(s) IV Push once  dextrose 50% Injectable 12.5 Gram(s) IV Push once  dextrose 50% Injectable 25 Gram(s) IV Push once  glucagon  Injectable 1 milliGRAM(s) IntraMuscular once  heparin  Infusion.  Unit(s)/Hr (11 mL/Hr) IV Continuous <Continuous>  insulin lispro (ADMELOG) corrective regimen sliding scale   SubCutaneous three times a day before meals  insulin lispro (ADMELOG) corrective regimen sliding scale   SubCutaneous at bedtime  lactated ringers. 1000 milliLiter(s) (65 mL/Hr) IV Continuous <Continuous>  nadolol 20 milliGRAM(s) Oral daily    MEDICATIONS  (PRN):  dextrose Oral Gel 15 Gram(s) Oral once PRN Blood Glucose LESS THAN 70 milliGRAM(s)/deciliter  heparin   Injectable 2500 Unit(s) IV Push every 6 hours PRN For aPTT between 40 - 57  heparin   Injectable 5000 Unit(s) IV Push every 6 hours PRN For aPTT less than 40      Allergies    No Known Allergies    Intolerances        REVIEW OF SYSTEMS:  All other review of systems is negative unless indicated above    VITAL SIGNS:   Vital Signs Last 24 Hrs  T(C): 36.4 (05 Jan 2024 04:56), Max: 37.1 (04 Jan 2024 21:04)  T(F): 97.5 (05 Jan 2024 04:56), Max: 98.7 (04 Jan 2024 21:04)  HR: 88 (05 Jan 2024 04:56) (88 - 98)  BP: 104/64 (05 Jan 2024 04:56) (91/53 - 125/76)  BP(mean): --  RR: 18 (05 Jan 2024 04:56) (16 - 19)  SpO2: 91% (05 Jan 2024 04:56) (91% - 98%)    Parameters below as of 05 Jan 2024 04:56  Patient On (Oxygen Delivery Method): room air        I&O's Summary    04 Jan 2024 07:01  -  05 Jan 2024 07:00  --------------------------------------------------------  IN: 456 mL / OUT: 0 mL / NET: 456 mL        On Exam:    Constitutional: lethargic, frail dry   Lungs:  Non-labored, breath sounds are clear bilaterally, No wheezing, rales or rhonchi  Cardiovascular: RRR.  S1 and S2 positive.  murmur   Gastrointestinal: Bowel Sounds present, soft, nontender.   Lymph: No peripheral edema. No cervical lymphadenopathy.  Neurological lethargic   Skin: No rashes or ulcers   Psych:  Mood & affect appropriate.    LABS: All Labs Reviewed:                        10.3   11.97 )-----------( 243      ( 05 Jan 2024 09:25 )             30.7                         10.2   11.52 )-----------( 226      ( 05 Jan 2024 05:52 )             29.6                         12.7   13.40 )-----------( 281      ( 04 Jan 2024 07:35 )             37.1     05 Jan 2024 05:52    135    |  107    |  67     ----------------------------<  143    3.9     |  24     |  2.40   04 Jan 2024 07:35    139    |  103    |  51     ----------------------------<  177    3.8     |  25     |  2.22   03 Jan 2024 07:23    140    |  103    |  42     ----------------------------<  151    3.8     |  26     |  1.96     Ca    8.4        05 Jan 2024 05:52  Ca    9.2        04 Jan 2024 07:35  Ca    9.1        03 Jan 2024 07:23    TPro  6.2    /  Alb  2.2    /  TBili  0.5    /  DBili  x      /  AST  21     /  ALT  19     /  AlkPhos  54     05 Jan 2024 05:52  TPro  7.6    /  Alb  2.6    /  TBili  0.8    /  DBili  x      /  AST  25     /  ALT  25     /  AlkPhos  68     04 Jan 2024 07:35  TPro  7.2    /  Alb  2.5    /  TBili  0.8    /  DBili  x      /  AST  25     /  ALT  20     /  AlkPhos  73     03 Jan 2024 07:23    PT/INR - ( 05 Jan 2024 02:05 )   PT: 22.8 sec;   INR: 1.99 ratio         PTT - ( 05 Jan 2024 09:25 )  PTT:74.1 sec      Blood Culture: Organism --  Gram Stain Blood -- Gram Stain --  Specimen Source Clean Catch Clean Catch (Midstream)  Culture-Blood --    Organism --  Gram Stain Blood -- Gram Stain --  Specimen Source .Blood Blood-Peripheral  Culture-Blood --    Organism --  Gram Stain Blood -- Gram Stain --  Specimen Source .Blood Blood-Peripheral  Culture-Blood --

## 2024-01-05 NOTE — H&P ADULT - PROBLEM SELECTOR PLAN 8
Heparin gtt    Per GC chart, GOC discussion started with son Bogdan Jefferson who appears hard to reach by phone  - currently no advanced directives in place  - son advised he would like to be contacted on 1/5/24 at 7am; primary team to address  - palliative consulted

## 2024-01-06 NOTE — PROGRESS NOTE ADULT - SUBJECTIVE AND OBJECTIVE BOX
[facility]  INFECTIOUS DISEASES - Herlinda Orosco, [place] [address]  Tel: [phone]     Fax: [phone]  =======================================================    POPPY MERCADO [id number]    Follow up: No fevers. Seen earlier today. Very hard of hearing but denies any pain.    Allergies:  No Known Allergies      Antibiotics:  atorvastatin 10 milliGRAM(s) Oral at bedtime  cefepime   IVPB 1000 milliGRAM(s) IV Intermittent daily  dextrose 5%. 1000 milliLiter(s) IV Continuous <Continuous>  dextrose 5%. 1000 milliLiter(s) IV Continuous <Continuous>  dextrose 50% Injectable 25 Gram(s) IV Push once  dextrose 50% Injectable 12.5 Gram(s) IV Push once  dextrose 50% Injectable 25 Gram(s) IV Push once  dextrose Oral Gel 15 Gram(s) Oral once PRN  glucagon  Injectable 1 milliGRAM(s) IntraMuscular once  heparin   Injectable 5000 Unit(s) IV Push every 6 hours PRN  heparin   Injectable 2500 Unit(s) IV Push every 6 hours PRN  heparin  Infusion.  Unit(s)/Hr IV Continuous <Continuous>  insulin lispro (ADMELOG) corrective regimen sliding scale   SubCutaneous three times a day before meals  insulin lispro (ADMELOG) corrective regimen sliding scale   SubCutaneous at bedtime  nadolol 20 milliGRAM(s) Oral daily  tamsulosin 0.4 milliGRAM(s) Oral at bedtime       REVIEW OF SYSTEMS:  Limited, as per HPI     Physical Exam:  ICU Vital Signs Last 24 Hrs  T(C): 36.7 (07 Jan 2024 04:40), Max: 36.7 (07 Jan 2024 04:40)  T(F): 98.1 (07 Jan 2024 04:40), Max: 98.1 (07 Jan 2024 04:40)  HR: 80 (07 Jan 2024 04:40) (80 - 98)  BP: 119/81 (07 Jan 2024 04:40) (119/81 - 130/60)  BP(mean): --  ABP: --  ABP(mean): --  RR: 18 (07 Jan 2024 04:40) (18 - 19)  SpO2: 91% (07 Jan 2024 04:40) (91% - 92%)    O2 Parameters below as of 07 Jan 2024 04:40  Patient On (Oxygen Delivery Method): room air           GEN: NAD  HEENT: normocephalic and atraumatic.   NECK: Supple.   LUNGS: normal respiratory effort  HEART: Regular rate and rhythm   ABDOMEN: Soft, nontender, and nondistended.    EXTREMITIES: No leg edema.  SKIN: (+) L foot eschar  NEUROLOGIC: Difficult to assess, very hard of hearing    Labs:  01-06    136  |  104  |  44<H>  ----------------------------<  175<H>  3.8   |  26  |  1.80<H>    Ca    8.1<L>      06 Jan 2024 09:15    TPro  6.5  /  Alb  2.1<L>  /  TBili  0.6  /  DBili  x   /  AST  21  /  ALT  19  /  AlkPhos  59  01-06                          11.3   9.66  )-----------( 243      ( 07 Jan 2024 09:01 )             33.4     PT/INR - ( 07 Jan 2024 09:01 )   PT: 18.4 sec;   INR: 1.59 ratio         PTT - ( 07 Jan 2024 09:01 )  PTT:68.8 sec  Urinalysis Basic - ( 06 Jan 2024 09:15 )    Color: x / Appearance: x / SG: x / pH: x  Gluc: 175 mg/dL / Ketone: x  / Bili: x / Urobili: x   Blood: x / Protein: x / Nitrite: x   Leuk Esterase: x / RBC: x / WBC x   Sq Epi: x / Non Sq Epi: x / Bacteria: x      LIVER FUNCTIONS - ( 06 Jan 2024 09:15 )  Alb: 2.1 g/dL / Pro: 6.5 g/dL / ALK PHOS: 59 U/L / ALT: 19 U/L / AST: 21 U/L / GGT: x             RECENT CULTURES:  01-05 @ 13:10 Catheterized Catheterized     No growth        12-31 @ 13:38 Clean Catch Clean Catch (Midstream)     <10,000 CFU/mL Normal Urogenital Christy        12-31 @ 13:05 .Blood Blood-Peripheral     No growth at 5 days        12-31 @ 13:00 .Blood Blood-Peripheral     No growth at 5 days      NotDete        All imaging and data are reviewed.    [facility]  INFECTIOUS DISEASES - Herlinda Orosco, [place] [address]  Tel: [phone]     Fax: [phone]  =======================================================    POPPY MERCADO [id number]    Follow up: No fevers. Seen earlier today. Very hard of hearing but denies any pain.    Allergies:  No Known Allergies      Antibiotics:  atorvastatin 10 milliGRAM(s) Oral at bedtime  cefepime   IVPB 1000 milliGRAM(s) IV Intermittent daily  dextrose 5%. 1000 milliLiter(s) IV Continuous <Continuous>  dextrose 5%. 1000 milliLiter(s) IV Continuous <Continuous>  dextrose 50% Injectable 25 Gram(s) IV Push once  dextrose 50% Injectable 12.5 Gram(s) IV Push once  dextrose 50% Injectable 25 Gram(s) IV Push once  dextrose Oral Gel 15 Gram(s) Oral once PRN  glucagon  Injectable 1 milliGRAM(s) IntraMuscular once  heparin   Injectable 5000 Unit(s) IV Push every 6 hours PRN  heparin   Injectable 2500 Unit(s) IV Push every 6 hours PRN  heparin  Infusion.  Unit(s)/Hr IV Continuous <Continuous>  insulin lispro (ADMELOG) corrective regimen sliding scale   SubCutaneous three times a day before meals  insulin lispro (ADMELOG) corrective regimen sliding scale   SubCutaneous at bedtime  nadolol 20 milliGRAM(s) Oral daily  tamsulosin 0.4 milliGRAM(s) Oral at bedtime       REVIEW OF SYSTEMS:  Limited, as per HPI     Physical Exam:  ICU Vital Signs Last 24 Hrs  T(C): 36.7 (07 Jan 2024 04:40), Max: 36.7 (07 Jan 2024 04:40)  T(F): 98.1 (07 Jan 2024 04:40), Max: 98.1 (07 Jan 2024 04:40)  HR: 80 (07 Jan 2024 04:40) (80 - 98)  BP: 119/81 (07 Jan 2024 04:40) (119/81 - 130/60)  BP(mean): --  ABP: --  ABP(mean): --  RR: 18 (07 Jan 2024 04:40) (18 - 19)  SpO2: 91% (07 Jan 2024 04:40) (91% - 92%)    O2 Parameters below as of 07 Jan 2024 04:40  Patient On (Oxygen Delivery Method): room air           GEN: NAD  HEENT: normocephalic and atraumatic.   NECK: Supple.   LUNGS: normal respiratory effort  HEART: Regular rate and rhythm   ABDOMEN: Soft, nontender, and nondistended.    EXTREMITIES: No leg edema.  SKIN: (+) L foot eschar  NEUROLOGIC: Difficult to assess, very hard of hearing    Labs:  01-06    136  |  104  |  44<H>  ----------------------------<  175<H>  3.8   |  26  |  1.80<H>    Ca    8.1<L>      06 Jan 2024 09:15    TPro  6.5  /  Alb  2.1<L>  /  TBili  0.6  /  DBili  x   /  AST  21  /  ALT  19  /  AlkPhos  59  01-06                          11.3   9.66  )-----------( 243      ( 07 Jan 2024 09:01 )             33.4     PT/INR - ( 07 Jan 2024 09:01 )   PT: 18.4 sec;   INR: 1.59 ratio         PTT - ( 07 Jan 2024 09:01 )  PTT:68.8 sec  Urinalysis Basic - ( 06 Jan 2024 09:15 )    Color: x / Appearance: x / SG: x / pH: x  Gluc: 175 mg/dL / Ketone: x  / Bili: x / Urobili: x   Blood: x / Protein: x / Nitrite: x   Leuk Esterase: x / RBC: x / WBC x   Sq Epi: x / Non Sq Epi: x / Bacteria: x      LIVER FUNCTIONS - ( 06 Jan 2024 09:15 )  Alb: 2.1 g/dL / Pro: 6.5 g/dL / ALK PHOS: 59 U/L / ALT: 19 U/L / AST: 21 U/L / GGT: x             RECENT CULTURES:  01-05 @ 13:10 Catheterized Catheterized     No growth        12-31 @ 13:38 Clean Catch Clean Catch (Midstream)     <10,000 CFU/mL Normal Urogenital Christy        12-31 @ 13:05 .Blood Blood-Peripheral     No growth at 5 days        12-31 @ 13:00 .Blood Blood-Peripheral     No growth at 5 days      NotDete        All imaging and data are reviewed.

## 2024-01-06 NOTE — PROGRESS NOTE ADULT - SUBJECTIVE AND OBJECTIVE BOX
Patient is a 89y old  Male who presents with a chief complaint of left foot wound (06 Jan 2024 10:05)      INTERVAL HPI/OVERNIGHT EVENTS: Overnight, Castañeda noted to contain blood tinged urine s/p castañeda cath insertion by urologist complicated by phimosis. Hemoglobin remained stable. This morning, patient seen and examined at bedside. Castañeda bag with blood tinged urine; patient denies any pain or any complaints. He is A&Ox3 but confused and does not know why he is in the hospital.    MEDICATIONS  (STANDING):  atorvastatin 10 milliGRAM(s) Oral at bedtime  cefepime   IVPB 1000 milliGRAM(s) IV Intermittent daily  dextrose 5%. 1000 milliLiter(s) (50 mL/Hr) IV Continuous <Continuous>  dextrose 5%. 1000 milliLiter(s) (100 mL/Hr) IV Continuous <Continuous>  dextrose 50% Injectable 25 Gram(s) IV Push once  dextrose 50% Injectable 12.5 Gram(s) IV Push once  dextrose 50% Injectable 25 Gram(s) IV Push once  glucagon  Injectable 1 milliGRAM(s) IntraMuscular once  heparin  Infusion.  Unit(s)/Hr (11 mL/Hr) IV Continuous <Continuous>  insulin lispro (ADMELOG) corrective regimen sliding scale   SubCutaneous at bedtime  insulin lispro (ADMELOG) corrective regimen sliding scale   SubCutaneous three times a day before meals  nadolol 20 milliGRAM(s) Oral daily  sodium chloride 0.45%. 1000 milliLiter(s) (75 mL/Hr) IV Continuous <Continuous>  tamsulosin 0.4 milliGRAM(s) Oral at bedtime  warfarin 2.5 milliGRAM(s) Oral once    MEDICATIONS  (PRN):  dextrose Oral Gel 15 Gram(s) Oral once PRN Blood Glucose LESS THAN 70 milliGRAM(s)/deciliter  heparin   Injectable 2500 Unit(s) IV Push every 6 hours PRN For aPTT between 40 - 57  heparin   Injectable 5000 Unit(s) IV Push every 6 hours PRN For aPTT less than 40      Allergies    No Known Allergies    Intolerances        REVIEW OF SYSTEMS:  CONSTITUTIONAL: No fever or chills  HEENT:  No headache, no sore throat  RESPIRATORY: No cough, wheezing, or shortness of breath  CARDIOVASCULAR: No chest pain, palpitations  GASTROINTESTINAL: No abd pain, nausea, vomiting, or diarrhea  GENITOURINARY: No dysuria, frequency, or hematuria  NEUROLOGICAL: no focal weakness or dizziness  MUSCULOSKELETAL: no myalgias     Vital Signs Last 24 Hrs  T(C): 36.8 (06 Jan 2024 04:39), Max: 36.8 (05 Jan 2024 21:25)  T(F): 98.2 (06 Jan 2024 04:39), Max: 98.2 (05 Jan 2024 21:25)  HR: 86 (06 Jan 2024 04:39) (78 - 88)  BP: 113/74 (06 Jan 2024 04:39) (95/59 - 146/65)  BP(mean): --  RR: 17 (06 Jan 2024 04:39) (17 - 19)  SpO2: 90% (06 Jan 2024 04:39) (89% - 95%)    Parameters below as of 06 Jan 2024 04:39  Patient On (Oxygen Delivery Method): room air        PHYSICAL EXAM:  GENERAL: NAD, A&Ox3 but confused  HEENT:  anicteric, moist mucous membranes  CHEST/LUNG:  CTA b/l, no rales, wheezes, or rhonchi  HEART:  RRR, S1, S2  ABDOMEN:  BS+, soft, nontender, nondistended  EXTREMITIES: no edema, cyanosis, or calf tenderness; black eschar on dorsum of L foot; pedal pulses present bilaterally  NERVOUS SYSTEM: answers questions and follows commands appropriately    LABS:                        10.9   10.41 )-----------( 237      ( 06 Jan 2024 09:15 )             32.6     CBC Full  -  ( 06 Jan 2024 09:15 )  WBC Count : 10.41 K/uL  Hemoglobin : 10.9 g/dL  Hematocrit : 32.6 %  Platelet Count - Automated : 237 K/uL  Mean Cell Volume : 89.1 fl  Mean Cell Hemoglobin : 29.8 pg  Mean Cell Hemoglobin Concentration : 33.4 gm/dL  Auto Neutrophil # : 7.90 K/uL  Auto Lymphocyte # : 1.18 K/uL  Auto Monocyte # : 0.85 K/uL  Auto Eosinophil # : 0.27 K/uL  Auto Basophil # : 0.04 K/uL  Auto Neutrophil % : 75.9 %  Auto Lymphocyte % : 11.3 %  Auto Monocyte % : 8.2 %  Auto Eosinophil % : 2.6 %  Auto Basophil % : 0.4 %      Ca    8.4        05 Jan 2024 05:52      PT/INR - ( 06 Jan 2024 09:15 )   PT: 18.8 sec;   INR: 1.63 ratio         PTT - ( 06 Jan 2024 09:15 )  PTT:74.6 sec  Urinalysis Basic - ( 05 Jan 2024 05:52 )    Color: x / Appearance: x / SG: x / pH: x  Gluc: 143 mg/dL / Ketone: x  / Bili: x / Urobili: x   Blood: x / Protein: x / Nitrite: x   Leuk Esterase: x / RBC: x / WBC x   Sq Epi: x / Non Sq Epi: x / Bacteria: x      CAPILLARY BLOOD GLUCOSE      POCT Blood Glucose.: 165 mg/dL (06 Jan 2024 08:06)  POCT Blood Glucose.: 184 mg/dL (05 Jan 2024 21:39)  POCT Blood Glucose.: 147 mg/dL (05 Jan 2024 16:52)  POCT Blood Glucose.: 185 mg/dL (05 Jan 2024 12:20)        Culture - Urine (collected 12-31-23 @ 13:38)  Source: Clean Catch Clean Catch (Midstream)  Final Report (01-01-24 @ 19:45):    <10,000 CFU/mL Normal Urogenital Christy    Culture - Blood (collected 12-31-23 @ 13:05)  Source: .Blood Blood-Peripheral  Final Report (01-05-24 @ 22:02):    No growth at 5 days    Culture - Blood (collected 12-31-23 @ 13:00)  Source: .Blood Blood-Peripheral  Final Report (01-05-24 @ 22:02):    No growth at 5 days        RADIOLOGY & ADDITIONAL TESTS:    Personally reviewed.     Consultant(s) Notes Reviewed:  [x] YES  [ ] NO     Patient is a 89y old  Male who presents with a chief complaint of left foot wound (06 Jan 2024 10:05)      INTERVAL HPI/OVERNIGHT EVENTS: Overnight, Castañeda noted to contain blood tinged urine s/p castañeda cath insertion by urologist complicated by phimosis. Hemoglobin remained stable. This morning, patient seen and examined at bedside. Castañeda bag with blood tinged urine; patient denies any pain or any complaints. He is A&Ox3 but confused and does not know why he is in the hospital. Feels okay with no CP or SOB.    MEDICATIONS  (STANDING):  atorvastatin 10 milliGRAM(s) Oral at bedtime  cefepime   IVPB 1000 milliGRAM(s) IV Intermittent daily  dextrose 5%. 1000 milliLiter(s) (50 mL/Hr) IV Continuous <Continuous>  dextrose 5%. 1000 milliLiter(s) (100 mL/Hr) IV Continuous <Continuous>  dextrose 50% Injectable 25 Gram(s) IV Push once  dextrose 50% Injectable 12.5 Gram(s) IV Push once  dextrose 50% Injectable 25 Gram(s) IV Push once  glucagon  Injectable 1 milliGRAM(s) IntraMuscular once  heparin  Infusion.  Unit(s)/Hr (11 mL/Hr) IV Continuous <Continuous>  insulin lispro (ADMELOG) corrective regimen sliding scale   SubCutaneous at bedtime  insulin lispro (ADMELOG) corrective regimen sliding scale   SubCutaneous three times a day before meals  nadolol 20 milliGRAM(s) Oral daily  sodium chloride 0.45%. 1000 milliLiter(s) (75 mL/Hr) IV Continuous <Continuous>  tamsulosin 0.4 milliGRAM(s) Oral at bedtime  warfarin 2.5 milliGRAM(s) Oral once    MEDICATIONS  (PRN):  dextrose Oral Gel 15 Gram(s) Oral once PRN Blood Glucose LESS THAN 70 milliGRAM(s)/deciliter  heparin   Injectable 2500 Unit(s) IV Push every 6 hours PRN For aPTT between 40 - 57  heparin   Injectable 5000 Unit(s) IV Push every 6 hours PRN For aPTT less than 40      Allergies    No Known Allergies    Intolerances        REVIEW OF SYSTEMS:  CONSTITUTIONAL: No fever or chills  HEENT:  No headache, no sore throat  RESPIRATORY: No cough, wheezing, or shortness of breath  CARDIOVASCULAR: No chest pain, palpitations  GASTROINTESTINAL: No abd pain, nausea, vomiting, or diarrhea  GENITOURINARY: No dysuria, frequency, or hematuria  NEUROLOGICAL: no focal weakness or dizziness  MUSCULOSKELETAL: no myalgias     Vital Signs Last 24 Hrs  T(C): 36.8 (06 Jan 2024 04:39), Max: 36.8 (05 Jan 2024 21:25)  T(F): 98.2 (06 Jan 2024 04:39), Max: 98.2 (05 Jan 2024 21:25)  HR: 86 (06 Jan 2024 04:39) (78 - 88)  BP: 113/74 (06 Jan 2024 04:39) (95/59 - 146/65)  BP(mean): --  RR: 17 (06 Jan 2024 04:39) (17 - 19)  SpO2: 90% (06 Jan 2024 04:39) (89% - 95%)    Parameters below as of 06 Jan 2024 04:39  Patient On (Oxygen Delivery Method): room air        PHYSICAL EXAM:  GENERAL: NAD, A&Ox3 but confused  HEENT:  anicteric, moist mucous membranes  CHEST/LUNG:  CTA b/l, no rales, wheezes, or rhonchi  HEART:  RRR, S1, S2  ABDOMEN:  BS+, soft, nontender, nondistended  EXTREMITIES: no edema, cyanosis, or calf tenderness; black eschar on dorsum of L foot; pedal pulses present bilaterally  NERVOUS SYSTEM: answers questions and follows commands appropriately    LABS:                        10.9   10.41 )-----------( 237      ( 06 Jan 2024 09:15 )             32.6     CBC Full  -  ( 06 Jan 2024 09:15 )  WBC Count : 10.41 K/uL  Hemoglobin : 10.9 g/dL  Hematocrit : 32.6 %  Platelet Count - Automated : 237 K/uL  Mean Cell Volume : 89.1 fl  Mean Cell Hemoglobin : 29.8 pg  Mean Cell Hemoglobin Concentration : 33.4 gm/dL  Auto Neutrophil # : 7.90 K/uL  Auto Lymphocyte # : 1.18 K/uL  Auto Monocyte # : 0.85 K/uL  Auto Eosinophil # : 0.27 K/uL  Auto Basophil # : 0.04 K/uL  Auto Neutrophil % : 75.9 %  Auto Lymphocyte % : 11.3 %  Auto Monocyte % : 8.2 %  Auto Eosinophil % : 2.6 %  Auto Basophil % : 0.4 %      Ca    8.4        05 Jan 2024 05:52      PT/INR - ( 06 Jan 2024 09:15 )   PT: 18.8 sec;   INR: 1.63 ratio         PTT - ( 06 Jan 2024 09:15 )  PTT:74.6 sec  Urinalysis Basic - ( 05 Jan 2024 05:52 )    Color: x / Appearance: x / SG: x / pH: x  Gluc: 143 mg/dL / Ketone: x  / Bili: x / Urobili: x   Blood: x / Protein: x / Nitrite: x   Leuk Esterase: x / RBC: x / WBC x   Sq Epi: x / Non Sq Epi: x / Bacteria: x      CAPILLARY BLOOD GLUCOSE      POCT Blood Glucose.: 165 mg/dL (06 Jan 2024 08:06)  POCT Blood Glucose.: 184 mg/dL (05 Jan 2024 21:39)  POCT Blood Glucose.: 147 mg/dL (05 Jan 2024 16:52)  POCT Blood Glucose.: 185 mg/dL (05 Jan 2024 12:20)        Culture - Urine (collected 12-31-23 @ 13:38)  Source: Clean Catch Clean Catch (Midstream)  Final Report (01-01-24 @ 19:45):    <10,000 CFU/mL Normal Urogenital Christy    Culture - Blood (collected 12-31-23 @ 13:05)  Source: .Blood Blood-Peripheral  Final Report (01-05-24 @ 22:02):    No growth at 5 days    Culture - Blood (collected 12-31-23 @ 13:00)  Source: .Blood Blood-Peripheral  Final Report (01-05-24 @ 22:02):    No growth at 5 days        RADIOLOGY & ADDITIONAL TESTS:    Personally reviewed.     Consultant(s) Notes Reviewed:  [x] YES  [ ] NO

## 2024-01-06 NOTE — PROGRESS NOTE ADULT - PROBLEM SELECTOR PLAN 2
Pt with BETNIA, unknown baseline  - BUN/Cr 46/1.6 on admission, 67/2.4 on 1/5  - likely 2/2 abx vs urinary retention - vanco discontinued and castañeda placed with >1L output  - hold lisinopril in setting of BETINA  - hold silodosin in setting of dec Cr clearance  - cont light mIVF LR @ 65cc/hr  - replace electrolytes as needed  - f/u AM BMP  - nephro consulted, recs appreciated Pt with BETINA, unknown baseline  - BUN/Cr 46/1.6 on admission, 67/2.4 on 1/5  - likely 2/2 abx vs urinary retention - vanco discontinued and castañeda placed with >1L output  - hold lisinopril in setting of BETINA  - hold silodosin in setting of dec Cr clearance  - cont light mIVF LR @ 65cc/hr  - replace electrolytes as needed  - f/u AM BMP  - nephro consulted, recs appreciated Pt with BETINA, unknown baseline - improving with fluids  - BUN/Cr 46/1.6 on admission, 67/2.4 on 1/5  - likely 2/2 abx vs urinary retention - vanco discontinued and castañeda placed with >1L output  - hold lisinopril in setting of BETINA  - hold silodosin in setting of dec Cr clearance  - cont light mIVF LR @ 75cc/hr  - replace electrolytes as needed  - f/u AM BMP  - nephro consulted, recs appreciated

## 2024-01-06 NOTE — PROGRESS NOTE ADULT - SUBJECTIVE AND OBJECTIVE BOX
SURGERY PA NOTE ON BEHALF OF DR. Ventura / Vascular SURGERY:    S: Patient seen and examined at bedside.     pain well controlled.   Tolerating diet without n/v.   Denies fever, chills, chest pain, SOB.      MEDICATIONS:  atorvastatin 10 milliGRAM(s) Oral at bedtime  cefepime   IVPB 1000 milliGRAM(s) IV Intermittent daily  dextrose 5%. 1000 milliLiter(s) IV Continuous <Continuous>  dextrose 5%. 1000 milliLiter(s) IV Continuous <Continuous>  dextrose 50% Injectable 25 Gram(s) IV Push once  dextrose 50% Injectable 12.5 Gram(s) IV Push once  dextrose 50% Injectable 25 Gram(s) IV Push once  dextrose Oral Gel 15 Gram(s) Oral once PRN  glucagon  Injectable 1 milliGRAM(s) IntraMuscular once  heparin   Injectable 5000 Unit(s) IV Push every 6 hours PRN  heparin   Injectable 2500 Unit(s) IV Push every 6 hours PRN  heparin  Infusion.  Unit(s)/Hr IV Continuous <Continuous>  insulin lispro (ADMELOG) corrective regimen sliding scale   SubCutaneous three times a day before meals  insulin lispro (ADMELOG) corrective regimen sliding scale   SubCutaneous at bedtime  nadolol 20 milliGRAM(s) Oral daily  sodium chloride 0.45%. 1000 milliLiter(s) IV Continuous <Continuous>  tamsulosin 0.4 milliGRAM(s) Oral at bedtime      O:  Vital Signs Last 24 Hrs  T(C): 36.8 (06 Jan 2024 04:39), Max: 36.8 (05 Jan 2024 21:25)  T(F): 98.2 (06 Jan 2024 04:39), Max: 98.2 (05 Jan 2024 21:25)  HR: 86 (06 Jan 2024 04:39) (78 - 88)  BP: 113/74 (06 Jan 2024 04:39) (95/59 - 146/65)  BP(mean): --  RR: 17 (06 Jan 2024 04:39) (17 - 19)  SpO2: 90% (06 Jan 2024 04:39) (89% - 95%)    Parameters below as of 06 Jan 2024 04:39  Patient On (Oxygen Delivery Method): room air        I&O SUMMARY:    01-05-24 @ 07:01  -  01-06-24 @ 07:00  --------------------------------------------------------  IN: 1949 mL / OUT: 1300 mL / NET: 649 mL        PHYSICAL EXAM:  Lungs: CTA bilat without W/R/R  Card: S1S2  Abd: Soft, NT, ND.  +BS x 4.  No rebound/guarding.    Gen: NAD, lying in bed  Ext: BLE warm to touch. LLE DP palpable. L foot dorsal aspect with necrotic eschar, wrapped with Kelix.        LABS:                        10.9   10.41 )-----------( 237      ( 06 Jan 2024 09:15 )             32.6     01-05    135  |  107  |  67<H>  ----------------------------<  143<H>  3.9   |  24  |  2.40<H>    Ca    8.4<L>      05 Jan 2024 05:52    TPro  6.2  /  Alb  2.2<L>  /  TBili  0.5  /  DBili  x   /  AST  21  /  ALT  19  /  AlkPhos  54  01-05    PT/INR - ( 05 Jan 2024 02:05 )   PT: 22.8 sec;   INR: 1.99 ratio         PTT - ( 05 Jan 2024 09:25 )  PTT:74.1 sec          Assessment/Plan:   89yoM with PMHx HTN, HLD, LBBB, paroxismal Atrial Fibrillation, thoracic aortic aneurysm, s/p mechanical AVR, transferred to [facility] from [facility], pending LLE angio tuesday 1/9 with Dr. Dale.    May transition to Coumadin and stop Heparin gtt  Local wound care by podiatry  Abx as per ID  Cardiology risk assessment requested for LLE angio: low risk and cleared for Angio.   Medical optimization for procedure  Monitor BUN/Cr  Nephrology consult  Vascular surgery will continue to follow.   Case and plan discussed with Dr. Ventura             SURGERY PA NOTE ON BEHALF OF DR. Ventura / Vascular SURGERY:    S: Patient seen and examined at bedside.     pain well controlled.   Tolerating diet without n/v.   Denies fever, chills, chest pain, SOB.      MEDICATIONS:  atorvastatin 10 milliGRAM(s) Oral at bedtime  cefepime   IVPB 1000 milliGRAM(s) IV Intermittent daily  dextrose 5%. 1000 milliLiter(s) IV Continuous <Continuous>  dextrose 5%. 1000 milliLiter(s) IV Continuous <Continuous>  dextrose 50% Injectable 25 Gram(s) IV Push once  dextrose 50% Injectable 12.5 Gram(s) IV Push once  dextrose 50% Injectable 25 Gram(s) IV Push once  dextrose Oral Gel 15 Gram(s) Oral once PRN  glucagon  Injectable 1 milliGRAM(s) IntraMuscular once  heparin   Injectable 5000 Unit(s) IV Push every 6 hours PRN  heparin   Injectable 2500 Unit(s) IV Push every 6 hours PRN  heparin  Infusion.  Unit(s)/Hr IV Continuous <Continuous>  insulin lispro (ADMELOG) corrective regimen sliding scale   SubCutaneous three times a day before meals  insulin lispro (ADMELOG) corrective regimen sliding scale   SubCutaneous at bedtime  nadolol 20 milliGRAM(s) Oral daily  sodium chloride 0.45%. 1000 milliLiter(s) IV Continuous <Continuous>  tamsulosin 0.4 milliGRAM(s) Oral at bedtime      O:  Vital Signs Last 24 Hrs  T(C): 36.8 (06 Jan 2024 04:39), Max: 36.8 (05 Jan 2024 21:25)  T(F): 98.2 (06 Jan 2024 04:39), Max: 98.2 (05 Jan 2024 21:25)  HR: 86 (06 Jan 2024 04:39) (78 - 88)  BP: 113/74 (06 Jan 2024 04:39) (95/59 - 146/65)  BP(mean): --  RR: 17 (06 Jan 2024 04:39) (17 - 19)  SpO2: 90% (06 Jan 2024 04:39) (89% - 95%)    Parameters below as of 06 Jan 2024 04:39  Patient On (Oxygen Delivery Method): room air        I&O SUMMARY:    01-05-24 @ 07:01  -  01-06-24 @ 07:00  --------------------------------------------------------  IN: 1949 mL / OUT: 1300 mL / NET: 649 mL        PHYSICAL EXAM:  Lungs: CTA bilat without W/R/R  Card: S1S2  Abd: Soft, NT, ND.  +BS x 4.  No rebound/guarding.    Gen: NAD, lying in bed  Ext: BLE warm to touch. LLE DP palpable. L foot dorsal aspect with necrotic eschar, wrapped with Kelix.        LABS:                        10.9   10.41 )-----------( 237      ( 06 Jan 2024 09:15 )             32.6     01-05    135  |  107  |  67<H>  ----------------------------<  143<H>  3.9   |  24  |  2.40<H>    Ca    8.4<L>      05 Jan 2024 05:52    TPro  6.2  /  Alb  2.2<L>  /  TBili  0.5  /  DBili  x   /  AST  21  /  ALT  19  /  AlkPhos  54  01-05    PT/INR - ( 05 Jan 2024 02:05 )   PT: 22.8 sec;   INR: 1.99 ratio         PTT - ( 05 Jan 2024 09:25 )  PTT:74.1 sec          Assessment/Plan:   89yoM with PMHx HTN, HLD, LBBB, paroxismal Atrial Fibrillation, thoracic aortic aneurysm, s/p mechanical AVR, transferred to [facility] from [facility], pending LLE angio tuesday 1/9 with Dr. Dale.    May transition to Coumadin and stop Heparin gtt  Local wound care by podiatry  Abx as per ID  Cardiology risk assessment requested for LLE angio: low risk and cleared for Angio.   Medical optimization for procedure  Monitor BUN/Cr  Nephrology consult  Vascular surgery will continue to follow.   Case and plan discussed with Dr. Ventura             SURGERY PA NOTE ON BEHALF OF DR. Ventura / Vascular SURGERY:    S: Patient seen and examined at bedside.     pain well controlled.   Tolerating diet without n/v.   Denies fever, chills, chest pain, SOB.      MEDICATIONS:  atorvastatin 10 milliGRAM(s) Oral at bedtime  cefepime   IVPB 1000 milliGRAM(s) IV Intermittent daily  dextrose 5%. 1000 milliLiter(s) IV Continuous <Continuous>  dextrose 5%. 1000 milliLiter(s) IV Continuous <Continuous>  dextrose 50% Injectable 25 Gram(s) IV Push once  dextrose 50% Injectable 12.5 Gram(s) IV Push once  dextrose 50% Injectable 25 Gram(s) IV Push once  dextrose Oral Gel 15 Gram(s) Oral once PRN  glucagon  Injectable 1 milliGRAM(s) IntraMuscular once  heparin   Injectable 5000 Unit(s) IV Push every 6 hours PRN  heparin   Injectable 2500 Unit(s) IV Push every 6 hours PRN  heparin  Infusion.  Unit(s)/Hr IV Continuous <Continuous>  insulin lispro (ADMELOG) corrective regimen sliding scale   SubCutaneous three times a day before meals  insulin lispro (ADMELOG) corrective regimen sliding scale   SubCutaneous at bedtime  nadolol 20 milliGRAM(s) Oral daily  sodium chloride 0.45%. 1000 milliLiter(s) IV Continuous <Continuous>  tamsulosin 0.4 milliGRAM(s) Oral at bedtime      O:  Vital Signs Last 24 Hrs  T(C): 36.8 (06 Jan 2024 04:39), Max: 36.8 (05 Jan 2024 21:25)  T(F): 98.2 (06 Jan 2024 04:39), Max: 98.2 (05 Jan 2024 21:25)  HR: 86 (06 Jan 2024 04:39) (78 - 88)  BP: 113/74 (06 Jan 2024 04:39) (95/59 - 146/65)  BP(mean): --  RR: 17 (06 Jan 2024 04:39) (17 - 19)  SpO2: 90% (06 Jan 2024 04:39) (89% - 95%)    Parameters below as of 06 Jan 2024 04:39  Patient On (Oxygen Delivery Method): room air        I&O SUMMARY:    01-05-24 @ 07:01  -  01-06-24 @ 07:00  --------------------------------------------------------  IN: 1949 mL / OUT: 1300 mL / NET: 649 mL        PHYSICAL EXAM:  Lungs: CTA bilat without W/R/R  Card: S1S2  Abd: Soft, NT, ND.  +BS x 4.  No rebound/guarding.    Gen: NAD, lying in bed  Ext: BLE warm to touch. LLE DP palpable. L foot dorsal aspect with necrotic eschar, wrapped with Kelix.        LABS:                        10.9   10.41 )-----------( 237      ( 06 Jan 2024 09:15 )             32.6     01-05    135  |  107  |  67<H>  ----------------------------<  143<H>  3.9   |  24  |  2.40<H>    Ca    8.4<L>      05 Jan 2024 05:52    TPro  6.2  /  Alb  2.2<L>  /  TBili  0.5  /  DBili  x   /  AST  21  /  ALT  19  /  AlkPhos  54  01-05    PT/INR - ( 05 Jan 2024 02:05 )   PT: 22.8 sec;   INR: 1.99 ratio         PTT - ( 05 Jan 2024 09:25 )  PTT:74.1 sec          Assessment/Plan:   89yoM with PMHx HTN, HLD, LBBB, paroxismal Atrial Fibrillation, thoracic aortic aneurysm, s/p mechanical AVR, transferred to [facility] from [facility], pending LLE angio tuesday 1/9 with Dr. Dale.    May transition to Coumadin and stop Heparin gtt  Local wound care by podiatry  Abx as per ID               SURGERY PA NOTE ON BEHALF OF DR. Ventura / Vascular SURGERY:    S: Patient seen and examined at bedside.     pain well controlled.   Tolerating diet without n/v.   Denies fever, chills, chest pain, SOB.      MEDICATIONS:  atorvastatin 10 milliGRAM(s) Oral at bedtime  cefepime   IVPB 1000 milliGRAM(s) IV Intermittent daily  dextrose 5%. 1000 milliLiter(s) IV Continuous <Continuous>  dextrose 5%. 1000 milliLiter(s) IV Continuous <Continuous>  dextrose 50% Injectable 25 Gram(s) IV Push once  dextrose 50% Injectable 12.5 Gram(s) IV Push once  dextrose 50% Injectable 25 Gram(s) IV Push once  dextrose Oral Gel 15 Gram(s) Oral once PRN  glucagon  Injectable 1 milliGRAM(s) IntraMuscular once  heparin   Injectable 5000 Unit(s) IV Push every 6 hours PRN  heparin   Injectable 2500 Unit(s) IV Push every 6 hours PRN  heparin  Infusion.  Unit(s)/Hr IV Continuous <Continuous>  insulin lispro (ADMELOG) corrective regimen sliding scale   SubCutaneous three times a day before meals  insulin lispro (ADMELOG) corrective regimen sliding scale   SubCutaneous at bedtime  nadolol 20 milliGRAM(s) Oral daily  sodium chloride 0.45%. 1000 milliLiter(s) IV Continuous <Continuous>  tamsulosin 0.4 milliGRAM(s) Oral at bedtime      O:  Vital Signs Last 24 Hrs  T(C): 36.8 (06 Jan 2024 04:39), Max: 36.8 (05 Jan 2024 21:25)  T(F): 98.2 (06 Jan 2024 04:39), Max: 98.2 (05 Jan 2024 21:25)  HR: 86 (06 Jan 2024 04:39) (78 - 88)  BP: 113/74 (06 Jan 2024 04:39) (95/59 - 146/65)  BP(mean): --  RR: 17 (06 Jan 2024 04:39) (17 - 19)  SpO2: 90% (06 Jan 2024 04:39) (89% - 95%)    Parameters below as of 06 Jan 2024 04:39  Patient On (Oxygen Delivery Method): room air        I&O SUMMARY:    01-05-24 @ 07:01  -  01-06-24 @ 07:00  --------------------------------------------------------  IN: 1949 mL / OUT: 1300 mL / NET: 649 mL        PHYSICAL EXAM:  Lungs: CTA bilat without W/R/R  Card: S1S2  Abd: Soft, NT, ND.  +BS x 4.  No rebound/guarding.    Gen: NAD, lying in bed  Ext: BLE warm to touch. LLE DP palpable. L foot dorsal aspect with necrotic eschar, wrapped with Kelix.        LABS:                        10.9   10.41 )-----------( 237      ( 06 Jan 2024 09:15 )             32.6     01-05    135  |  107  |  67<H>  ----------------------------<  143<H>  3.9   |  24  |  2.40<H>    Ca    8.4<L>      05 Jan 2024 05:52    TPro  6.2  /  Alb  2.2<L>  /  TBili  0.5  /  DBili  x   /  AST  21  /  ALT  19  /  AlkPhos  54  01-05    PT/INR - ( 05 Jan 2024 02:05 )   PT: 22.8 sec;   INR: 1.99 ratio         PTT - ( 05 Jan 2024 09:25 )  PTT:74.1 sec          Assessment/Plan:   89yoM with PMHx HTN, HLD, LBBB, paroxismal Atrial Fibrillation, thoracic aortic aneurysm, s/p mechanical AVR, transferred to [facility] from [facility], pending LLE angio tuesday 1/9 with Dr. Dale.    May transition to Coumadin and stop Heparin gtt  Local wound care by podiatry  Abx as per ID

## 2024-01-06 NOTE — PROGRESS NOTE ADULT - PROBLEM SELECTOR PLAN 3
Pt with history of afib on warfarin at home, presented to [facility] originally with supratherapeutic INR  - pt NSR on exam  - INR subtherapeutic at 1.63; cont with heparin gtt, will dose Coumadin 2.5mg tonight as angioplasty is now on hold  - f/u AM coags

## 2024-01-06 NOTE — PROGRESS NOTE ADULT - SUBJECTIVE AND OBJECTIVE BOX
Resting    Vital Signs Last 24 Hrs  T(C): 36.6 (01-06-24 @ 20:17), Max: 36.8 (01-06-24 @ 04:39)  T(F): 97.8 (01-06-24 @ 20:17), Max: 98.2 (01-06-24 @ 04:39)  HR: 98 (01-06-24 @ 20:17) (86 - 98)  BP: 130/60 (01-06-24 @ 20:17) (113/74 - 130/60)  RR: 19 (01-06-24 @ 20:17) (17 - 19)  SpO2: 91% (01-06-24 @ 20:17) (90% - 92%)    I&O's Detail    05 Jan 2024 07:01  -  06 Jan 2024 07:00  --------------------------------------------------------  IN:    Heparin Infusion: 264 mL    Lactated Ringers: 260 mL    sodium chloride 0.45%: 1425 mL  Total IN: 1949 mL    OUT:    Indwelling Catheter - Urethral (mL): 1300 mL  Total OUT: 1300 mL    06 Jan 2024 07:01  -  06 Jan 2024 22:21  --------------------------------------------------------  OUT:    Indwelling Catheter - Urethral (mL): 1000 mL  Total OUT: 1000 mL    Respiratory: b/l air entry  Cardiovascular: S1 S2  Gastrointestinal: soft, ND  Extremities: sm edema                        10.9   10.41 )-----------( 237      ( 06 Jan 2024 09:15 )             32.6     06 Jan 2024 09:15    136    |  104    |  44     ----------------------------<  175    3.8     |  26     |  1.80     Ca    8.1        06 Jan 2024 09:15    TPro  6.5    /  Alb  2.1    /  TBili  0.6    /  DBili  x      /  AST  21     /  ALT  19     /  AlkPhos  59     06 Jan 2024 09:15    LIVER FUNCTIONS - ( 06 Jan 2024 09:15 )  Alb: 2.1 g/dL / Pro: 6.5 g/dL / ALK PHOS: 59 U/L / ALT: 19 U/L / AST: 21 U/L / GGT: x           PT/INR - ( 06 Jan 2024 09:15 )   PT: 18.8 sec;   INR: 1.63 ratio      Culture - Urine (collected 05 Jan 2024 13:10)  Source: Catheterized Catheterized  Final Report (06 Jan 2024 14:20):    No growth    atorvastatin 10 milliGRAM(s) Oral at bedtime  cefepime   IVPB 1000 milliGRAM(s) IV Intermittent daily  dextrose 5%. 1000 milliLiter(s) IV Continuous <Continuous>  dextrose 5%. 1000 milliLiter(s) IV Continuous <Continuous>  dextrose 50% Injectable 25 Gram(s) IV Push once  dextrose 50% Injectable 12.5 Gram(s) IV Push once  dextrose 50% Injectable 25 Gram(s) IV Push once  dextrose Oral Gel 15 Gram(s) Oral once PRN  glucagon  Injectable 1 milliGRAM(s) IntraMuscular once  heparin   Injectable 2500 Unit(s) IV Push every 6 hours PRN  heparin   Injectable 5000 Unit(s) IV Push every 6 hours PRN  heparin  Infusion.  Unit(s)/Hr IV Continuous <Continuous>  insulin lispro (ADMELOG) corrective regimen sliding scale   SubCutaneous at bedtime  insulin lispro (ADMELOG) corrective regimen sliding scale   SubCutaneous three times a day before meals  nadolol 20 milliGRAM(s) Oral daily  sodium chloride 0.45%. 1000 milliLiter(s) IV Continuous <Continuous>  tamsulosin 0.4 milliGRAM(s) Oral at bedtime  warfarin 2.5 milliGRAM(s) Oral once    Assessment and Recommendation:   	  DM, LLE wound  Vascular, podiatry following  Hemodynamic BETINA/CKD 3  UA bland   Improving  Will follow off IVF  Will f/u Renal SONO  Avoid nephrotoxins  F/u KASSIE SHEPHERD    [phone] Resting    Vital Signs Last 24 Hrs  T(C): 36.6 (01-06-24 @ 20:17), Max: 36.8 (01-06-24 @ 04:39)  T(F): 97.8 (01-06-24 @ 20:17), Max: 98.2 (01-06-24 @ 04:39)  HR: 98 (01-06-24 @ 20:17) (86 - 98)  BP: 130/60 (01-06-24 @ 20:17) (113/74 - 130/60)  RR: 19 (01-06-24 @ 20:17) (17 - 19)  SpO2: 91% (01-06-24 @ 20:17) (90% - 92%)    I&O's Detail    05 Jan 2024 07:01  -  06 Jan 2024 07:00  --------------------------------------------------------  IN:    Heparin Infusion: 264 mL    Lactated Ringers: 260 mL    sodium chloride 0.45%: 1425 mL  Total IN: 1949 mL    OUT:    Indwelling Catheter - Urethral (mL): 1300 mL  Total OUT: 1300 mL    06 Jan 2024 07:01  -  06 Jan 2024 22:21  --------------------------------------------------------  OUT:    Indwelling Catheter - Urethral (mL): 1000 mL  Total OUT: 1000 mL    Respiratory: b/l air entry  Cardiovascular: S1 S2  Gastrointestinal: soft, ND  Extremities: sm edema                        10.9   10.41 )-----------( 237      ( 06 Jan 2024 09:15 )             32.6     06 Jan 2024 09:15    136    |  104    |  44     ----------------------------<  175    3.8     |  26     |  1.80     Ca    8.1        06 Jan 2024 09:15    TPro  6.5    /  Alb  2.1    /  TBili  0.6    /  DBili  x      /  AST  21     /  ALT  19     /  AlkPhos  59     06 Jan 2024 09:15    LIVER FUNCTIONS - ( 06 Jan 2024 09:15 )  Alb: 2.1 g/dL / Pro: 6.5 g/dL / ALK PHOS: 59 U/L / ALT: 19 U/L / AST: 21 U/L / GGT: x           PT/INR - ( 06 Jan 2024 09:15 )   PT: 18.8 sec;   INR: 1.63 ratio      Culture - Urine (collected 05 Jan 2024 13:10)  Source: Catheterized Catheterized  Final Report (06 Jan 2024 14:20):    No growth    atorvastatin 10 milliGRAM(s) Oral at bedtime  cefepime   IVPB 1000 milliGRAM(s) IV Intermittent daily  dextrose 5%. 1000 milliLiter(s) IV Continuous <Continuous>  dextrose 5%. 1000 milliLiter(s) IV Continuous <Continuous>  dextrose 50% Injectable 25 Gram(s) IV Push once  dextrose 50% Injectable 12.5 Gram(s) IV Push once  dextrose 50% Injectable 25 Gram(s) IV Push once  dextrose Oral Gel 15 Gram(s) Oral once PRN  glucagon  Injectable 1 milliGRAM(s) IntraMuscular once  heparin   Injectable 2500 Unit(s) IV Push every 6 hours PRN  heparin   Injectable 5000 Unit(s) IV Push every 6 hours PRN  heparin  Infusion.  Unit(s)/Hr IV Continuous <Continuous>  insulin lispro (ADMELOG) corrective regimen sliding scale   SubCutaneous at bedtime  insulin lispro (ADMELOG) corrective regimen sliding scale   SubCutaneous three times a day before meals  nadolol 20 milliGRAM(s) Oral daily  sodium chloride 0.45%. 1000 milliLiter(s) IV Continuous <Continuous>  tamsulosin 0.4 milliGRAM(s) Oral at bedtime  warfarin 2.5 milliGRAM(s) Oral once    Assessment and Recommendation:   	  DM, LLE wound  Vascular, podiatry following  Hemodynamic BETINA/CKD 3  UA bland   Improving  Will follow off IVF  Will f/u Renal SONO  Avoid nephrotoxins  F/u KASSIE SHEPHERD    [phone]

## 2024-01-06 NOTE — DISCHARGE NOTE PROVIDER - DETAILS OF MALNUTRITION DIAGNOSIS/DIAGNOSES
This patient has been assessed with a concern for Malnutrition and was treated during this hospitalization for the following Nutrition diagnosis/diagnoses:     -  01/12/2024: Severe protein-calorie malnutrition

## 2024-01-06 NOTE — PROGRESS NOTE ADULT - ASSESSMENT
89 year old male with PMH HTN, HLD, LBBB, paroxysmal Atrial Fibrillation, thoracic aortic aneurysm, s/p mechanical AVR presented transferred from  for angioplasty of his L foot.  Cardiology consultation now being obtained.     Cardiac Optimization/LBBB/PaFib  - Vascular recs noted.  for possible LLE angiogram on 1/9.  No objection from cv standpoint to proceed with low risk procedure  - Per Vascular, can transition to Coumadin.  Can continue Heparin gtt and D/C when deemed appropriate prior to angiogram  - EKG SR with old LBBB   - Has known history of PAfib and mechanical AVR    - BP mostly stable with one episode of hypotension at systolic 90's, though. likely, an outlier  - Holding home ace for BETINA  - Continue home Nadolol with parameters   - Continue home statin     - No sign volume overload, non-orthopneic on RA  - Can obtain routine TTE    - Monitor and replete electrolytes. Keep K>4.0 and Mg>2.0.  - Will continue to follow    Vijaya Randhawa DNP, NP-C, AGACNP-C  Cardiology   Call TEAMS        89 year old male with PMH HTN, HLD, LBBB, paroxysmal Atrial Fibrillation, thoracic aortic aneurysm, s/p mechanical AVR presented transferred from  for angioplasty of his L foot.  Cardiology consultation now being obtained.     Cardiac Optimization/LBBB/PaFib  - Vascular recs noted.  for possible LLE angiogram on 1/9.  No objection from cv standpoint to proceed with low risk procedure  - Per Vascular, can transition to Coumadin.  Can continue Heparin gtt and D/C when deemed appropriate prior to angiogram  - EKG SR with old LBBB   - Has known history of PAfib and mechanical AVR    - BP mostly stable with one episode of hypotension at systolic 90's, though. likely, an outlier  - Holding home ace for BETINA  - Continue home Nadolol with parameters   - Continue home statin     - No sign volume overload, non-orthopneic on RA  - Can obtain routine TTE    - Monitor and replete electrolytes. Keep K>4.0 and Mg>2.0.  - Will continue to follow    Vijaya Randahwa DNP, NP-C, AGACNP-C  Cardiology   Call TEAMS

## 2024-01-06 NOTE — DISCHARGE NOTE PROVIDER - ATTENDING DISCHARGE PHYSICAL EXAMINATION:
PHYSICAL EXAM ON DAY OF DISCHARGE:    Vital Signs (24 Hrs):  T(C): 37 (01-13-24 @ 09:52), Max: 37 (01-13-24 @ 09:52)  HR: 78 (01-13-24 @ 09:52) (73 - 78)  BP: 113/87 (01-13-24 @ 09:52) (94/54 - 123/63)  RR: 18 (01-13-24 @ 09:52) (17 - 20)  SpO2: 95% (01-13-24 @ 09:52) (93% - 96%)      GENERAL: NAD, sitting upright in bed  HEENT:  anicteric, moist mucous membranes  CHEST/LUNG:  CTA b/l, no rales, wheezes, or rhonchi  HEART:  RRR, S1, S2, +Murmur  ABDOMEN:  BS+, soft, nontender, nondistended  EXTREMITIES: +L foot eschar on dorsum of foot, no edema, cyanosis, or calf tenderness  NERVOUS SYSTEM: answers questions and follows commands appropriately  : castañeda catheter in place draining yellow colored urine

## 2024-01-06 NOTE — CHART NOTE - NSCHARTNOTEFT_GEN_A_CORE
Called and updated son Bogdan - reports that patient has metal in his arm from a surgery many years ago. Unsure if he can get MRI. Podiatry follow up - may need bone scan. Son was at work, but is off tomorrow and will be at the hospital.

## 2024-01-06 NOTE — PROGRESS NOTE ADULT - ASSESSMENT
89 yo M with PMH HTN, HLD, LBBB, paroxismal Atrial Fibrillation, thoracic aortic aneurysm, s/p mechanical AVR, who was transferred from  for angioplasty of his L foot. He has a left foot eschar, suspect ischemic in etiology. Can continue empiric antibiotics to treat for potential soft tissue infection, or even underlying osteomyelitis. Has has no fevers or leukocytosis. CRP 57. MRSA nasal PCR negative. Blood cultures from 12/31 remain no growth.    #Left foot eschar  #? L foot osteomyelitis    -continue cefepime 1g IV q24h (renally dosed)  -plan for bone scan if MRI cannot be done    Yvette Loredo MD  Division of Infectious Diseases   Cell [phone] between 8am and 6pm   After 6pm and weekends please call ID service at [phone].     35 minutes spent on total encounter assessing patient, examination, chart review, counseling and coordinating care by the attending physician/nurse/care manager.      89 yo M with PMH HTN, HLD, LBBB, paroxismal Atrial Fibrillation, thoracic aortic aneurysm, s/p mechanical AVR, who was transferred from  for angioplasty of his L foot. He has a left foot eschar, suspect ischemic in etiology. Can continue empiric antibiotics to treat for potential soft tissue infection, or even underlying osteomyelitis. Has has no fevers or leukocytosis. CRP 57. MRSA nasal PCR negative. Blood cultures from 12/31 remain no growth.    #Left foot eschar  #? L foot osteomyelitis    -continue cefepime 1g IV q24h (renally dosed)  -plan for bone scan if MRI cannot be done    Yvette Loredo MD  Division of Infectious Diseases   Cell [phone] between 8am and 6pm   After 6pm and weekends please call ID service at [phone].     35 minutes spent on total encounter assessing patient, examination, chart review, counseling and coordinating care by the attending physician/nurse/care manager.

## 2024-01-06 NOTE — PROGRESS NOTE ADULT - SUBJECTIVE AND OBJECTIVE BOX
[facility] -- An Barajas, Gildardo Marrero Savella, , Royce Whatley  Office # [phone]    Follow Up:      Subjective/Observations:     REVIEW OF SYSTEMS: All other review of systems is negative unless indicated above  PAST MEDICAL & SURGICAL HISTORY:  HTN (hypertension)      HLD (hyperlipidemia)      Paroxysmal atrial fibrillation      H/O aortic valve repair      H/O thoracic aortic aneurysm repair        MEDICATIONS  (STANDING):  atorvastatin 10 milliGRAM(s) Oral at bedtime  cefepime   IVPB 1000 milliGRAM(s) IV Intermittent daily  dextrose 5%. 1000 milliLiter(s) (100 mL/Hr) IV Continuous <Continuous>  dextrose 5%. 1000 milliLiter(s) (50 mL/Hr) IV Continuous <Continuous>  dextrose 50% Injectable 25 Gram(s) IV Push once  dextrose 50% Injectable 12.5 Gram(s) IV Push once  dextrose 50% Injectable 25 Gram(s) IV Push once  glucagon  Injectable 1 milliGRAM(s) IntraMuscular once  heparin  Infusion.  Unit(s)/Hr (11 mL/Hr) IV Continuous <Continuous>  insulin lispro (ADMELOG) corrective regimen sliding scale   SubCutaneous three times a day before meals  insulin lispro (ADMELOG) corrective regimen sliding scale   SubCutaneous at bedtime  nadolol 20 milliGRAM(s) Oral daily  sodium chloride 0.45%. 1000 milliLiter(s) (75 mL/Hr) IV Continuous <Continuous>  tamsulosin 0.4 milliGRAM(s) Oral at bedtime    MEDICATIONS  (PRN):  dextrose Oral Gel 15 Gram(s) Oral once PRN Blood Glucose LESS THAN 70 milliGRAM(s)/deciliter  heparin   Injectable 2500 Unit(s) IV Push every 6 hours PRN For aPTT between 40 - 57  heparin   Injectable 5000 Unit(s) IV Push every 6 hours PRN For aPTT less than 40    Allergies    No Known Allergies    Intolerances      Vital Signs Last 24 Hrs  T(C): 36.8 (06 Jan 2024 04:39), Max: 36.8 (05 Jan 2024 21:25)  T(F): 98.2 (06 Jan 2024 04:39), Max: 98.2 (05 Jan 2024 21:25)  HR: 86 (06 Jan 2024 04:39) (78 - 88)  BP: 113/74 (06 Jan 2024 04:39) (95/59 - 146/65)  BP(mean): --  RR: 17 (06 Jan 2024 04:39) (17 - 19)  SpO2: 90% (06 Jan 2024 04:39) (89% - 95%)    Parameters below as of 06 Jan 2024 04:39  Patient On (Oxygen Delivery Method): room air      I&O's Summary    05 Jan 2024 07:01  -  06 Jan 2024 07:00  --------------------------------------------------------  IN: 1949 mL / OUT: 1300 mL / NET: 649 mL        PHYSICAL EXAM:  TELE:   Constitutional: NAD, awake and alert, well-developed  HEENT: Moist Mucous Membranes, Anicteric  Pulmonary: Non-labored, breath sounds are clear bilaterally, No wheezing, rales or rhonchi  Cardiovascular: Regular, S1 and S2, No murmurs, rubs, gallops or clicks  Gastrointestinal: Bowel Sounds present, soft, nontender.   Lymph: No peripheral edema. No lymphadenopathy.  Skin: No visible rashes or ulcers.  Psych:  Mood & affect appropriate  LABS: All Labs Reviewed:                        10.7   x     )-----------( x        ( 05 Jan 2024 18:39 )             31.3                         10.3   11.97 )-----------( 243      ( 05 Jan 2024 09:25 )             30.7                         10.2   11.52 )-----------( 226      ( 05 Jan 2024 05:52 )             29.6     05 Jan 2024 05:52    135    |  107    |  67     ----------------------------<  143    3.9     |  24     |  2.40   04 Jan 2024 07:35    139    |  103    |  51     ----------------------------<  177    3.8     |  25     |  2.22     Ca    8.4        05 Jan 2024 05:52  Ca    9.2        04 Jan 2024 07:35    TPro  6.2    /  Alb  2.2    /  TBili  0.5    /  DBili  x      /  AST  21     /  ALT  19     /  AlkPhos  54     05 Jan 2024 05:52  TPro  7.6    /  Alb  2.6    /  TBili  0.8    /  DBili  x      /  AST  25     /  ALT  25     /  AlkPhos  68     04 Jan 2024 07:35    PT/INR - ( 05 Jan 2024 02:05 )   PT: 22.8 sec;   INR: 1.99 ratio         PTT - ( 05 Jan 2024 09:25 )  PTT:74.1 sec            [facility] -- An Barajas, Gildardo Marrero Savella, , Royce Whatley  Office # [phone]    Follow Up:      Subjective/Observations:     REVIEW OF SYSTEMS: All other review of systems is negative unless indicated above  PAST MEDICAL & SURGICAL HISTORY:  HTN (hypertension)      HLD (hyperlipidemia)      Paroxysmal atrial fibrillation      H/O aortic valve repair      H/O thoracic aortic aneurysm repair        MEDICATIONS  (STANDING):  atorvastatin 10 milliGRAM(s) Oral at bedtime  cefepime   IVPB 1000 milliGRAM(s) IV Intermittent daily  dextrose 5%. 1000 milliLiter(s) (100 mL/Hr) IV Continuous <Continuous>  dextrose 5%. 1000 milliLiter(s) (50 mL/Hr) IV Continuous <Continuous>  dextrose 50% Injectable 25 Gram(s) IV Push once  dextrose 50% Injectable 12.5 Gram(s) IV Push once  dextrose 50% Injectable 25 Gram(s) IV Push once  glucagon  Injectable 1 milliGRAM(s) IntraMuscular once  heparin  Infusion.  Unit(s)/Hr (11 mL/Hr) IV Continuous <Continuous>  insulin lispro (ADMELOG) corrective regimen sliding scale   SubCutaneous three times a day before meals  insulin lispro (ADMELOG) corrective regimen sliding scale   SubCutaneous at bedtime  nadolol 20 milliGRAM(s) Oral daily  sodium chloride 0.45%. 1000 milliLiter(s) (75 mL/Hr) IV Continuous <Continuous>  tamsulosin 0.4 milliGRAM(s) Oral at bedtime    MEDICATIONS  (PRN):  dextrose Oral Gel 15 Gram(s) Oral once PRN Blood Glucose LESS THAN 70 milliGRAM(s)/deciliter  heparin   Injectable 2500 Unit(s) IV Push every 6 hours PRN For aPTT between 40 - 57  heparin   Injectable 5000 Unit(s) IV Push every 6 hours PRN For aPTT less than 40    Allergies    No Known Allergies    Intolerances      Vital Signs Last 24 Hrs  T(C): 36.8 (06 Jan 2024 04:39), Max: 36.8 (05 Jan 2024 21:25)  T(F): 98.2 (06 Jan 2024 04:39), Max: 98.2 (05 Jan 2024 21:25)  HR: 86 (06 Jan 2024 04:39) (78 - 88)  BP: 113/74 (06 Jan 2024 04:39) (95/59 - 146/65)  BP(mean): --  RR: 17 (06 Jan 2024 04:39) (17 - 19)  SpO2: 90% (06 Jan 2024 04:39) (89% - 95%)    Parameters below as of 06 Jan 2024 04:39  Patient On (Oxygen Delivery Method): room air      I&O's Summary    05 Jan 2024 07:01  -  06 Jan 2024 07:00  --------------------------------------------------------  IN: 1949 mL / OUT: 1300 mL / NET: 649 mL        PHYSICAL EXAM:  TELE:   Constitutional: NAD, awake and alert, well-developed  HEENT: Moist Mucous Membranes, Anicteric  Pulmonary: Non-labored, breath sounds are clear bilaterally, No wheezing, rales or rhonchi  Cardiovascular: Regular, S1 and S2, No murmurs, rubs, gallops or clicks  Gastrointestinal: Bowel Sounds present, soft, nontender.   Lymph: No peripheral edema. No lymphadenopathy.  Skin: No visible rashes or ulcers.  Psych:  Mood & affect appropriate  LABS: All Labs Reviewed:                        10.7   x     )-----------( x        ( 05 Jan 2024 18:39 )             31.3                         10.3   11.97 )-----------( 243      ( 05 Jan 2024 09:25 )             30.7                         10.2   11.52 )-----------( 226      ( 05 Jan 2024 05:52 )             29.6     05 Jan 2024 05:52    135    |  107    |  67     ----------------------------<  143    3.9     |  24     |  2.40   04 Jan 2024 07:35    139    |  103    |  51     ----------------------------<  177    3.8     |  25     |  2.22     Ca    8.4        05 Jan 2024 05:52  Ca    9.2        04 Jan 2024 07:35    TPro  6.2    /  Alb  2.2    /  TBili  0.5    /  DBili  x      /  AST  21     /  ALT  19     /  AlkPhos  54     05 Jan 2024 05:52  TPro  7.6    /  Alb  2.6    /  TBili  0.8    /  DBili  x      /  AST  25     /  ALT  25     /  AlkPhos  68     04 Jan 2024 07:35    PT/INR - ( 05 Jan 2024 02:05 )   PT: 22.8 sec;   INR: 1.99 ratio         PTT - ( 05 Jan 2024 09:25 )  PTT:74.1 sec            [facility] -- An Barajas, Gildardo Marrero Savella, , Royce Whatley  Office # [phone]    Follow Up:  Afib, Cardiac Optimization    Subjective/Observations: Awake but confused and disoriented.  Unable to provide answers to simple questions.  Comfortable on RA.  Not in any form of distress    REVIEW OF SYSTEMS: All other review of systems is negative unless indicated above  PAST MEDICAL & SURGICAL HISTORY:  HTN (hypertension)  HLD (hyperlipidemia)  Paroxysmal atrial fibrillation  H/O aortic valve repair  H/O thoracic aortic aneurysm repair    MEDICATIONS  (STANDING):  atorvastatin 10 milliGRAM(s) Oral at bedtime  cefepime   IVPB 1000 milliGRAM(s) IV Intermittent daily  dextrose 5%. 1000 milliLiter(s) (100 mL/Hr) IV Continuous <Continuous>  dextrose 5%. 1000 milliLiter(s) (50 mL/Hr) IV Continuous <Continuous>  dextrose 50% Injectable 25 Gram(s) IV Push once  dextrose 50% Injectable 12.5 Gram(s) IV Push once  dextrose 50% Injectable 25 Gram(s) IV Push once  glucagon  Injectable 1 milliGRAM(s) IntraMuscular once  heparin  Infusion.  Unit(s)/Hr (11 mL/Hr) IV Continuous <Continuous>  insulin lispro (ADMELOG) corrective regimen sliding scale   SubCutaneous three times a day before meals  insulin lispro (ADMELOG) corrective regimen sliding scale   SubCutaneous at bedtime  nadolol 20 milliGRAM(s) Oral daily  sodium chloride 0.45%. 1000 milliLiter(s) (75 mL/Hr) IV Continuous <Continuous>  tamsulosin 0.4 milliGRAM(s) Oral at bedtime    MEDICATIONS  (PRN):  dextrose Oral Gel 15 Gram(s) Oral once PRN Blood Glucose LESS THAN 70 milliGRAM(s)/deciliter  heparin   Injectable 2500 Unit(s) IV Push every 6 hours PRN For aPTT between 40 - 57  heparin   Injectable 5000 Unit(s) IV Push every 6 hours PRN For aPTT less than 40    Allergies    No Known Allergies    Intolerances    Vital Signs Last 24 Hrs  T(C): 36.8 (06 Jan 2024 04:39), Max: 36.8 (05 Jan 2024 21:25)  T(F): 98.2 (06 Jan 2024 04:39), Max: 98.2 (05 Jan 2024 21:25)  HR: 86 (06 Jan 2024 04:39) (78 - 88)  BP: 113/74 (06 Jan 2024 04:39) (95/59 - 146/65)  BP(mean): --  RR: 17 (06 Jan 2024 04:39) (17 - 19)  SpO2: 90% (06 Jan 2024 04:39) (89% - 95%)    Parameters below as of 06 Jan 2024 04:39  Patient On (Oxygen Delivery Method): room air      I&O's Summary    05 Jan 2024 07:01  -  06 Jan 2024 07:00  --------------------------------------------------------  IN: 1949 mL / OUT: 1300 mL / NET: 649 mL      PHYSICAL EXAM:  TELE: Not on tele  Constitutional: NAD, confused and disoriented, well-developed  HEENT: Moist Mucous Membranes, Anicteric  Pulmonary: Non-labored, breath sounds are clear bilaterally, No wheezing, rales or rhonchi  Cardiovascular: Regular, S1 and S2, No murmurs, rubs, gallops or clicks  Gastrointestinal: Bowel Sounds present, soft, nontender.   Lymph: No peripheral edema. No lymphadenopathy.  Skin: No visible rashes.  Left heel ulcer  Psych:  Mood & affect: Flat  LABS: All Labs Reviewed:                        10.7   x     )-----------( x        ( 05 Jan 2024 18:39 )             31.3                         10.3   11.97 )-----------( 243      ( 05 Jan 2024 09:25 )             30.7                         10.2   11.52 )-----------( 226      ( 05 Jan 2024 05:52 )             29.6     05 Jan 2024 05:52    135    |  107    |  67     ----------------------------<  143    3.9     |  24     |  2.40   04 Jan 2024 07:35    139    |  103    |  51     ----------------------------<  177    3.8     |  25     |  2.22     Ca    8.4        05 Jan 2024 05:52  Ca    9.2        04 Jan 2024 07:35    TPro  6.2    /  Alb  2.2    /  TBili  0.5    /  DBili  x      /  AST  21     /  ALT  19     /  AlkPhos  54     05 Jan 2024 05:52  TPro  7.6    /  Alb  2.6    /  TBili  0.8    /  DBili  x      /  AST  25     /  ALT  25     /  AlkPhos  68     04 Jan 2024 07:35    PT/INR - ( 05 Jan 2024 02:05 )   PT: 22.8 sec;   INR: 1.99 ratio         PTT - ( 05 Jan 2024 09:25 )  PTT:74.1 sec            [facility] -- An Barajas, Gildardo Marrero Savella, , Royce Whatley  Office # [phone]    Follow Up:  Afib, Cardiac Optimization    Subjective/Observations: Awake but confused and disoriented.  Unable to provide answers to simple questions.  Comfortable on RA.  Not in any form of distress    REVIEW OF SYSTEMS: All other review of systems is negative unless indicated above  PAST MEDICAL & SURGICAL HISTORY:  HTN (hypertension)  HLD (hyperlipidemia)  Paroxysmal atrial fibrillation  H/O aortic valve repair  H/O thoracic aortic aneurysm repair    MEDICATIONS  (STANDING):  atorvastatin 10 milliGRAM(s) Oral at bedtime  cefepime   IVPB 1000 milliGRAM(s) IV Intermittent daily  dextrose 5%. 1000 milliLiter(s) (100 mL/Hr) IV Continuous <Continuous>  dextrose 5%. 1000 milliLiter(s) (50 mL/Hr) IV Continuous <Continuous>  dextrose 50% Injectable 25 Gram(s) IV Push once  dextrose 50% Injectable 12.5 Gram(s) IV Push once  dextrose 50% Injectable 25 Gram(s) IV Push once  glucagon  Injectable 1 milliGRAM(s) IntraMuscular once  heparin  Infusion.  Unit(s)/Hr (11 mL/Hr) IV Continuous <Continuous>  insulin lispro (ADMELOG) corrective regimen sliding scale   SubCutaneous three times a day before meals  insulin lispro (ADMELOG) corrective regimen sliding scale   SubCutaneous at bedtime  nadolol 20 milliGRAM(s) Oral daily  sodium chloride 0.45%. 1000 milliLiter(s) (75 mL/Hr) IV Continuous <Continuous>  tamsulosin 0.4 milliGRAM(s) Oral at bedtime    MEDICATIONS  (PRN):  dextrose Oral Gel 15 Gram(s) Oral once PRN Blood Glucose LESS THAN 70 milliGRAM(s)/deciliter  heparin   Injectable 2500 Unit(s) IV Push every 6 hours PRN For aPTT between 40 - 57  heparin   Injectable 5000 Unit(s) IV Push every 6 hours PRN For aPTT less than 40    Allergies    No Known Allergies    Intolerances    Vital Signs Last 24 Hrs  T(C): 36.8 (06 Jan 2024 04:39), Max: 36.8 (05 Jan 2024 21:25)  T(F): 98.2 (06 Jan 2024 04:39), Max: 98.2 (05 Jan 2024 21:25)  HR: 86 (06 Jan 2024 04:39) (78 - 88)  BP: 113/74 (06 Jan 2024 04:39) (95/59 - 146/65)  BP(mean): --  RR: 17 (06 Jan 2024 04:39) (17 - 19)  SpO2: 90% (06 Jan 2024 04:39) (89% - 95%)    Parameters below as of 06 Jan 2024 04:39  Patient On (Oxygen Delivery Method): room air      I&O's Summary    05 Jan 2024 07:01  -  06 Jan 2024 07:00  --------------------------------------------------------  IN: 1949 mL / OUT: 1300 mL / NET: 649 mL      PHYSICAL EXAM:  TELE: Not on tele  Constitutional: NAD, confused and disoriented, well-developed  HEENT: Moist Mucous Membranes, Anicteric  Pulmonary: Non-labored, breath sounds are clear bilaterally, No wheezing, rales or rhonchi  Cardiovascular: Regular, S1 and S2, No murmurs, rubs, gallops or clicks  Gastrointestinal: Bowel Sounds present, soft, nontender.   Lymph: No peripheral edema. No lymphadenopathy.  Skin: No visible rashes.  Left heel ulcer  Psych:  Mood & affect: Flat  LABS: All Labs Reviewed:                        10.7   x     )-----------( x        ( 05 Jan 2024 18:39 )             31.3                         10.3   11.97 )-----------( 243      ( 05 Jan 2024 09:25 )             30.7                         10.2   11.52 )-----------( 226      ( 05 Jan 2024 05:52 )             29.6     05 Jan 2024 05:52    135    |  107    |  67     ----------------------------<  143    3.9     |  24     |  2.40   04 Jan 2024 07:35    139    |  103    |  51     ----------------------------<  177    3.8     |  25     |  2.22     Ca    8.4        05 Jan 2024 05:52  Ca    9.2        04 Jan 2024 07:35    TPro  6.2    /  Alb  2.2    /  TBili  0.5    /  DBili  x      /  AST  21     /  ALT  19     /  AlkPhos  54     05 Jan 2024 05:52  TPro  7.6    /  Alb  2.6    /  TBili  0.8    /  DBili  x      /  AST  25     /  ALT  25     /  AlkPhos  68     04 Jan 2024 07:35    PT/INR - ( 05 Jan 2024 02:05 )   PT: 22.8 sec;   INR: 1.99 ratio         PTT - ( 05 Jan 2024 09:25 )  PTT:74.1 sec

## 2024-01-06 NOTE — DISCHARGE NOTE PROVIDER - NSDCMRMEDTOKEN_GEN_ALL_CORE_FT
atorvastatin 10 mg oral tablet: 1 tab(s) orally once a day  nadolol 20 mg oral tablet: 1 tab(s) orally once a day  ramipril 10 mg oral capsule: 1 cap(s) orally once a day  Rapaflo 8 mg oral capsule: 1 cap(s) orally once a day  warfarin 2.5 mg oral tablet: 1 tab(s) orally once a day   atorvastatin 10 mg oral tablet: 1 tab(s) orally once a day  tamsulosin 0.4 mg oral capsule: 1 cap(s) orally once a day (at bedtime)  warfarin 2.5 mg oral tablet: 1 tab(s) orally once a day   atorvastatin 10 mg oral tablet: 1 tab(s) orally once a day  warfarin 2.5 mg oral tablet: 1 tab(s) orally once a day

## 2024-01-06 NOTE — PROGRESS NOTE ADULT - ATTENDING COMMENTS
89 yo M with PMH HTN, HLD, LBBB, paroxismal Atrial Fibrillation, thoracic aortic aneurysm, s/p mechanical AVR presented transferred from  for angioplasty of his L foot - found to have palpable pulses and procedure cancelled. To continued management of BETINA and cellulitis. Continue IV cefepime. Bridge to coumadin as no procedure planned. Podiatry/ID follow up. Discussed with patient at bedside. 89 yo M with PMH HTN, HLD, LBBB, paroxismal Atrial Fibrillation, thoracic aortic aneurysm, s/p mechanical AVR presented transferred from  for angioplasty of his L foot - found to have palpable pulses and procedure cancelled. To continued management of BETINA and cellulitis. Continue IV cefepime. Bridge to coumadin as no procedure planned. Podiatry/ID follow up. Discussed with patient at bedside.

## 2024-01-06 NOTE — PROGRESS NOTE ADULT - PROBLEM SELECTOR PLAN 8
Heparin gtt with bridge to Coumadin    Per GC chart, GOC discussion started with son Bogdan Jefferson who appears hard to reach by phone  - currently no advanced directives in place  - palliative consulted, to follow up per note

## 2024-01-06 NOTE — PROGRESS NOTE ADULT - SUBJECTIVE AND OBJECTIVE BOX
Coverage for Dr. Garcia    INTERVAL Hx:  s/p castañeda placement for retention; staff had trouble with castañeda placement due to phimosis.  Castañeda draining slightly blood-tinged urine.  Pt denies pain.  Improving renal indices.    MEDICATIONS  (STANDING):  atorvastatin 10 milliGRAM(s) Oral at bedtime  cefepime   IVPB 1000 milliGRAM(s) IV Intermittent daily  dextrose 5%. 1000 milliLiter(s) (100 mL/Hr) IV Continuous <Continuous>  dextrose 5%. 1000 milliLiter(s) (50 mL/Hr) IV Continuous <Continuous>  dextrose 50% Injectable 25 Gram(s) IV Push once  dextrose 50% Injectable 12.5 Gram(s) IV Push once  dextrose 50% Injectable 25 Gram(s) IV Push once  glucagon  Injectable 1 milliGRAM(s) IntraMuscular once  heparin  Infusion.  Unit(s)/Hr (11 mL/Hr) IV Continuous <Continuous>  insulin lispro (ADMELOG) corrective regimen sliding scale   SubCutaneous at bedtime  insulin lispro (ADMELOG) corrective regimen sliding scale   SubCutaneous three times a day before meals  nadolol 20 milliGRAM(s) Oral daily  sodium chloride 0.45%. 1000 milliLiter(s) (75 mL/Hr) IV Continuous <Continuous>  tamsulosin 0.4 milliGRAM(s) Oral at bedtime  warfarin 2.5 milliGRAM(s) Oral once    MEDICATIONS  (PRN):  dextrose Oral Gel 15 Gram(s) Oral once PRN Blood Glucose LESS THAN 70 milliGRAM(s)/deciliter  heparin   Injectable 2500 Unit(s) IV Push every 6 hours PRN For aPTT between 40 - 57  heparin   Injectable 5000 Unit(s) IV Push every 6 hours PRN For aPTT less than 40        Vital Signs Last 24 Hrs  T(C): 36.8 (06 Jan 2024 04:39), Max: 36.8 (05 Jan 2024 21:25)  T(F): 98.2 (06 Jan 2024 04:39), Max: 98.2 (05 Jan 2024 21:25)  HR: 86 (06 Jan 2024 04:39) (78 - 88)  BP: 113/74 (06 Jan 2024 04:39) (95/59 - 146/65)  BP(mean): --  RR: 17 (06 Jan 2024 04:39) (17 - 19)  SpO2: 90% (06 Jan 2024 04:39) (89% - 95%)    Parameters below as of 06 Jan 2024 04:39  Patient On (Oxygen Delivery Method): room air        PHYSICAL EXAM:    ABDOMEN: soft, NT    Castañeda: draining blood tinged urine    LABS:                        10.9   10.41 )-----------( 237      ( 06 Jan 2024 09:15 )             32.6     01-06    136  |  104  |  44<H>  ----------------------------<  175<H>  3.8   |  26  |  1.80<H>    Ca    8.1<L>      06 Jan 2024 09:15    TPro  6.5  /  Alb  2.1<L>  /  TBili  0.6  /  DBili  x   /  AST  21  /  ALT  19  /  AlkPhos  59  01-06

## 2024-01-06 NOTE — PROGRESS NOTE ADULT - PROBLEM SELECTOR PLAN 1
Pt with original presentation to [facility] with L foot wound and cellulitis, transferred to \[facility]\"" for L angioplasty  - doppler with occlusion of the popliteal artery on the left - however patient has strong DP pulse, US likely not accurate per vascular - angioplasty now on hold  - s/p vanc/zosyn -> vanc/cefepime -> cefepime  - WBC trending downwards, now WNL on 1/6/24  - afebrile, VS stable  - cont cefepime 1g q24 renal dosing  - will hold off on ordering Bcx as pt has completed several days of abx tx; consider Bcx if pt has new fever  - cont heparin gtt as INR subtherapeutic, will dose Coumadin tonight  - cont minced and moist diet per  chart review  - ID consulted Dr. Orosco, recs appreciated  - Vascular consulted recs appreciated  - Cardio consulted for cardiac clearance recs appreciated Pt with original presentation to [facility] with L foot wound and cellulitis, transferred to [facility] for L angioplasty  - doppler with occlusion of the popliteal artery on the left - however patient has strong DP pulse, US likely not accurate per vascular - angioplasty now on hold  - s/p vanc/zosyn -> vanc/cefepime -> cefepime  - WBC trending downwards, now WNL on 1/6/24  - afebrile, VS stable  - cont cefepime 1g q24 renal dosing  - will hold off on ordering Bcx as pt has completed several days of abx tx; consider Bcx if pt has new fever  - cont heparin gtt as INR subtherapeutic, will dose Coumadin tonight  - cont minced and moist diet per  chart review  - ID consulted Dr. Orosco, recs appreciated  - Vascular consulted recs appreciated  - Cardio consulted for cardiac clearance recs appreciated

## 2024-01-06 NOTE — CHART NOTE - NSCHARTNOTEFT_GEN_A_CORE
Patient evaluated at the bedside. L dorsum of foot eschar. Strong palpable DP pulse.   He was transferred from  for vascular evaluation in the setting of abnormal arterial US. Particularly on AT artery feeding wound bed. At this point, given strong palpable pulse right above wound I have to cancel angiogram. US was not likely accurate.   Transition back to Coumadin. Podiatry to follow up.

## 2024-01-06 NOTE — DISCHARGE NOTE PROVIDER - CARE PROVIDERS DIRECT ADDRESSES
,DirectAddress_Unknown,[email or web address] ,[email or web address],[email or web address]

## 2024-01-06 NOTE — DISCHARGE NOTE PROVIDER - CARE PROVIDER_API CALL
Valeriano Evans-Michael  Foot Surgery  [address] [place] [address]  Phone: [phone]  Fax: [phone]  Follow Up Time:     Sekou Escobar  Urology  [address], [address]  [place] [address]  Phone: [phone]  Fax: [phone]  Follow Up Time:     Cleveland Linares  Cardiology  [address], [address]  [place] [address]  Phone: [phone]  Fax: [phone]  Follow Up Time:    Valeriano Evans-Michael  Foot Surgery  [address] [place] [address]  Phone: [phone]  Fax: [phone]  Follow Up Time:     Sekou Escobar  Urology  [address], [address]  [place] [address]  Phone: [phone]  Fax: [phone]  Follow Up Time:     Cleveland Linares  Cardiology  [address], [address]  [place] [address]  Phone: [phone]  Fax: [phone]  Follow Up Time:

## 2024-01-06 NOTE — DISCHARGE NOTE PROVIDER - PROVIDER TOKENS
PROVIDER:[TOKEN:[[id number]]],PROVIDER:[TOKEN:[[id number]]],PROVIDER:[TOKEN:[[id number]]] PROVIDER:[TOKEN:[[id number]]],PROVIDER:[TOKEN:[[id number]]],PROVIDER:[TOKEN:[[id number]]]

## 2024-01-06 NOTE — PROGRESS NOTE ADULT - ASSESSMENT
89 yo M with PMH HTN, HLD, LBBB, paroxismal Atrial Fibrillation, thoracic aortic aneurysm, s/p mechanical AVR presented transferred from  for angioplasty of his L foot. 89 yo M with PMH HTN, HLD, LBBB, paroxismal Atrial Fibrillation, thoracic aortic aneurysm, s/p mechanical AVR presented transferred from  for angioplasty of his L foot - found to have palpable pulses and procedure cancelled. To continued management of BETINA and cellulitis. 89 yo M with PMH HTN, HLD, LBBB, paroxismal Atrial Fibrillation, thoracic aortic aneurysm, s/p mechanical AVR presented transferred from  for angioplasty of his L foot - found to have palpable pulses and procedure cancelled. To continued management of BETINA and cellulitis.

## 2024-01-06 NOTE — DISCHARGE NOTE PROVIDER - HOSPITAL COURSE
ADMISSION DATE:  01-05-24    ---  FROM ADMISSION H+P:   HPI:  89 yo M with PMH HTN, HLD, LBBB, paroxismal Atrial Fibrillation, thoracic aortic aneurysm, s/p mechanical AVR presented transferred from  for angioplasty of his L foot. Pt originally presented to  on 12/31 after experiencing left foot pain which upon removal of his sock revealed a black eschar on the dorsum of his foot. He had an xray done which showed no bone destruction and he was started on IV abx for cellulitis, currently on cefepime only. 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 Pt was unable to have an MRI performed as they were unable to complete the MRI safety forms due to pts cognitive impairment and inability to reach family. Pt reports currently feeling well and denies any complaints. Pt [place].       Vitals: BP: 111/72, HR: 89, Temp: 98.6F, RR: 19, SpO2: 92% on RA   AM Labs:  WBC 13.40, BUN/Cr 51/2.22   (05 Jan 2024 03:24)      ---  HOSPITAL COURSE/PERTINENT LABS/PROCEDURES PERFORMED/PENDING TESTS:    Pt was [facility] to [facility] [place] from  with left foot necrotic wound and left lower extremity angio with vascular. Home coumadin held for possible angiogram, pt placed on heparin gtt. Upon vascular assessment, pt has palpable left DP pulse above wound, therefore, no angiogram needed. Pt bridged back to home coumadin. ID and podiatry consulted for left foot wound. Pt completed course of antibiotics. Podiatry recommended MRI vs. bone scan to evaluate for OM. Not a candidate for MRI due to metal in arm. NM bone scan of left foot showed ______      The patient was seen by physical therapy who recommended ***. The patient was seen and examined on the day of discharge. The patient is medically optimized for discharge to ***.    ---  PATIENT CONDITION:  - stable    ---  PHYSICAL EXAM ON DAY OF DISCHARGE:    ---  CONSULTANTS:     ---  ADVANCED CARE PLANNING:  - Code status:      - MOLST completed:      [  ] NO     [  ] YES    ---  TIME SPENT:  I, the attending physician, was physically present for the key portions of the evaluation and management (E/M) service provided. The total amount of time spent reviewing the hospital notes, laboratory values, imaging findings, assessing/counseling the patient, discussing with consultant physicians, social work, nursing staff was -- minutes ADMISSION DATE:  01-05-24    ---  FROM ADMISSION H+P:   HPI:  89 yo M with PMH HTN, HLD, LBBB, paroxismal Atrial Fibrillation, thoracic aortic aneurysm, s/p mechanical AVR presented transferred from  for angioplasty of his L foot. Pt originally presented to  on 12/31 after experiencing left foot pain which upon removal of his sock revealed a black eschar on the dorsum of his foot. He had an xray done which showed no bone destruction and he was started on IV abx for cellulitis, currently on cefepime only. 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 Pt was unable to have an MRI performed as they were unable to complete the MRI safety forms due to pts cognitive impairment and inability to reach family. Pt reports currently feeling well and denies any complaints. Pt [place].       Vitals: BP: 111/72, HR: 89, Temp: 98.6F, RR: 19, SpO2: 92% on RA   AM Labs:  WBC 13.40, BUN/Cr 51/2.22   (05 Jan 2024 03:24)      ---  HOSPITAL COURSE/PERTINENT LABS/PROCEDURES PERFORMED/PENDING TESTS:    Pt was transferred to [facility] from  with left foot necrotic wound and left lower extremity angio with vascular. Home coumadin held for possible angiogram, pt placed on heparin gtt. Upon vascular assessment, pt has palpable left DP pulse above wound, therefore, no angiogram needed. Pt bridged back to home coumadin. ID and podiatry consulted for left foot wound. Pt completed course of antibiotics. Podiatry recommended MRI vs. bone scan to evaluate for OM. Not a candidate for MRI due to metal in arm. NM bone scan of left foot showed ______      The patient was seen by physical therapy who recommended ***. The patient was seen and examined on the day of discharge. The patient is medically optimized for discharge to ***.    ---  PATIENT CONDITION:  - stable    ---  PHYSICAL EXAM ON DAY OF DISCHARGE:    ---  CONSULTANTS:     ---  ADVANCED CARE PLANNING:  - Code status:      - MOLST completed:      [  ] NO     [  ] YES    ---  TIME SPENT:  I, the attending physician, was physically present for the key portions of the evaluation and management (E/M) service provided. The total amount of time spent reviewing the hospital notes, laboratory values, imaging findings, assessing/counseling the patient, discussing with consultant physicians, social work, nursing staff was -- minutes ADMISSION DATE:  01-05-24    ---  FROM ADMISSION H+P:   HPI:  89 yo M with PMH HTN, HLD, LBBB, paroxismal Atrial Fibrillation, thoracic aortic aneurysm, s/p mechanical AVR presented transferred from  for angioplasty of his L foot. Pt originally presented to  on 12/31 after experiencing left foot pain which upon removal of his sock revealed a black eschar on the dorsum of his foot. He had an xray done which showed no bone destruction and he was started on IV abx for cellulitis, currently on cefepime only. 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 Pt was unable to have an MRI performed as they were unable to complete the MRI safety forms due to pts cognitive impairment and inability to reach family. Pt reports currently feeling well and denies any complaints. Pt [place].       Vitals: BP: 111/72, HR: 89, Temp: 98.6F, RR: 19, SpO2: 92% on RA   AM Labs:  WBC 13.40, BUN/Cr 51/2.22   (05 Jan 2024 03:24)      ---  HOSPITAL COURSE/PERTINENT LABS/PROCEDURES PERFORMED/PENDING TESTS:    Pt was transferred to \[facility]\"" from  with left foot necrotic wound and left lower extremity angio with vascular. Home coumadin held for possible angiogram, pt placed on heparin gtt. Upon vascular assessment, pt has palpable left DP pulse above wound, therefore, no angiogram needed. Pt bridged back to home coumadin. ID and podiatry consulted for left foot wound. Pt completed course of antibiotics. Podiatry recommended MRI vs. bone scan to evaluate for OM. Not a candidate for MRI due to metal in arm. NM bone scan of left foot showed ______      The patient was seen by physical therapy who recommended [facility]. The patient was seen and examined on the day of discharge. The patient is medically optimized for discharge to [facility].    ---  PATIENT CONDITION:  - stable    ---  PHYSICAL EXAM ON DAY OF DISCHARGE:    ---  CONSULTANTS:     Vascular - Dr. Alvarez  Urology - Chet  ID - Dr. Loredo  Cardio - Dr. Ewing  Nephro - Dr. Ordaz    ---  ADVANCED CARE PLANNING:  - Code status:      - [facility]:      [  ] NO     [  ] YES    ---  TIME SPENT:  I, the attending physician, was physically present for the key portions of the evaluation and management (E/M) service provided. The total amount of time spent reviewing the hospital notes, laboratory values, imaging findings, assessing/counseling the patient, discussing with consultant physicians, social work, nursing staff was -- minutes ADMISSION DATE:  01-05-24    ---  FROM ADMISSION H+P:   HPI:  89 yo M with PMH HTN, HLD, LBBB, paroxismal Atrial Fibrillation, thoracic aortic aneurysm, s/p mechanical AVR presented transferred from  for angioplasty of his L foot. Pt originally presented to  on 12/31 after experiencing left foot pain which upon removal of his sock revealed a black eschar on the dorsum of his foot. He had an xray done which showed no bone destruction and he was started on IV abx for cellulitis, currently on cefepime only. 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 Pt was unable to have an MRI performed as they were unable to complete the MRI safety forms due to pts cognitive impairment and inability to reach family. Pt reports currently feeling well and denies any complaints. Pt [place].       Vitals: BP: 111/72, HR: 89, Temp: 98.6F, RR: 19, SpO2: 92% on RA   AM Labs:  WBC 13.40, BUN/Cr 51/2.22   (05 Jan 2024 03:24)      ---  HOSPITAL COURSE/PERTINENT LABS/PROCEDURES PERFORMED/PENDING TESTS:    Pt was transferred to [facility] [place] from  with left foot necrotic wound and left lower extremity angio with vascular. Home coumadin held for possible angiogram, pt placed on heparin gtt. Upon vascular assessment, pt has palpable left DP pulse above wound, therefore, no angiogram needed. Pt bridged back to home coumadin. ID and podiatry consulted for left foot wound. Pt completed course of antibiotics. Podiatry recommended MRI vs. bone scan to evaluate for OM. Not a candidate for MRI due to metal in arm. NM bone scan of left foot showed ______      The patient was seen by physical therapy who recommended [facility]. The patient was seen and examined on the day of discharge. The patient is medically optimized for discharge to [facility].    ---  PATIENT CONDITION:  - stable    ---  PHYSICAL EXAM ON DAY OF DISCHARGE:    ---  CONSULTANTS:     Vascular - Dr. Alvarez  Urology - Chet  ID - Dr. Loredo  Cardio - Dr. Ewing  Nephro - Dr. Ordaz    ---  ADVANCED CARE PLANNING:  - Code status:      - [facility]:      [  ] NO     [  ] YES    ---  TIME SPENT:  I, the attending physician, was physically present for the key portions of the evaluation and management (E/M) service provided. The total amount of time spent reviewing the hospital notes, laboratory values, imaging findings, assessing/counseling the patient, discussing with consultant physicians, social work, nursing staff was -- minutes ADMISSION DATE:  01-05-24    ---  FROM ADMISSION H+P:   HPI:  89 yo M with PMH HTN, HLD, LBBB, paroxismal Atrial Fibrillation, thoracic aortic aneurysm, s/p mechanical AVR presented transferred from  for angioplasty of his L foot. Pt originally presented to  on 12/31 after experiencing left foot pain which upon removal of his sock revealed a black eschar on the dorsum of his foot. He had an xray done which showed no bone destruction and he was started on IV abx for cellulitis, currently on cefepime only. 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 Pt was unable to have an MRI performed as they were unable to complete the MRI safety forms due to pts cognitive impairment and inability to reach family. Pt reports currently feeling well and denies any complaints. Pt [place].       Vitals: BP: 111/72, HR: 89, Temp: 98.6F, RR: 19, SpO2: 92% on RA   AM Labs:  WBC 13.40, BUN/Cr 51/2.22   (05 Jan 2024 03:24)      ---  HOSPITAL COURSE/PERTINENT LABS/PROCEDURES PERFORMED/PENDING TESTS:    Pt was transferred to [facility] from  with left foot necrotic wound and left lower extremity angio with vascular. 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 Home coumadin held for possible angiogram, pt placed on heparin gtt. Upon vascular assessment, pt has palpable left DP pulse above wound, therefore, no angiogram needed. Pt bridged back to home coumadin. ID and podiatry consulted for left foot wound. Pt completed course of antibiotics. Podiatry recommended MRI vs. bone scan to evaluate for OM. Not a candidate for MRI due to metal in arm. NM bone scan of left foot showed ______      The patient was seen by physical therapy who recommended [facility]. The patient was seen and examined on the day of discharge. The patient is medically optimized for discharge to [facility].    ---  PATIENT CONDITION:  - stable    ---  PHYSICAL EXAM ON DAY OF DISCHARGE:    ---  CONSULTANTS:     Vascular - Dr. Alvarez  Urology - Chet POLO - Dr. Loredo  Cardio - Dr. Ewing  Nephro - Dr. Ordaz    ---  ADVANCED CARE PLANNING:  - Code status:      - MOLST completed:      [  ] NO     [  ] YES    ---  TIME SPENT:  I, the attending physician, was physically present for the key portions of the evaluation and management (E/M) service provided. The total amount of time spent reviewing the hospital notes, laboratory values, imaging findings, assessing/counseling the patient, discussing with consultant physicians, social work, nursing staff was -- minutes ADMISSION DATE:  01-05-24    ---  FROM ADMISSION H+P:   HPI:  89 yo M with PMH HTN, HLD, LBBB, paroxismal Atrial Fibrillation, thoracic aortic aneurysm, s/p mechanical AVR presented transferred from  for angioplasty of his L foot. Pt originally presented to  on 12/31 after experiencing left foot pain which upon removal of his sock revealed a black eschar on the dorsum of his foot. He had an xray done which showed no bone destruction and he was started on IV abx for cellulitis, currently on cefepime only. 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 Pt was unable to have an MRI performed as they were unable to complete the MRI safety forms due to pts cognitive impairment and inability to reach family. Pt reports currently feeling well and denies any complaints. Pt [place].       Vitals: BP: 111/72, HR: 89, Temp: 98.6F, RR: 19, SpO2: 92% on RA   AM Labs:  WBC 13.40, BUN/Cr 51/2.22   (05 Jan 2024 03:24)      ---  HOSPITAL COURSE/PERTINENT LABS/PROCEDURES PERFORMED/PENDING TESTS:    Pt was transferred to [facility] from  with left foot necrotic wound and left lower extremity angio with vascular. 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 Home coumadin held for possible angiogram, pt placed on heparin gtt. Upon vascular assessment, pt has palpable left DP pulse above wound, therefore, no angiogram needed. Pt bridged back to home coumadin. ID and podiatry consulted for left foot wound. Pt completed course of antibiotics. Podiatry recommended MRI vs. bone scan to evaluate for OM. Not a candidate for MRI due to metal in arm. NM bone scan of left foot showed ______      The patient was seen by physical therapy who recommended [facility]. The patient was seen and examined on the day of discharge. The patient is medically optimized for discharge to [facility].    ---  PATIENT CONDITION:  - stable    ---  PHYSICAL EXAM ON DAY OF DISCHARGE:    ---  CONSULTANTS:     Vascular - Dr. Alvarez  Urology - [place]t [place] - Dr. Loredo  Cardio - Dr. Eiwng  Nephro - Dr. Ordaz    ---  ADVANCED CARE PLANNING:  - Code status:      - MOLST completed:      [  ] NO     [  ] YES    ---  TIME SPENT:  I, the attending physician, was physically present for the key portions of the evaluation and management (E/M) service provided. The total amount of time spent reviewing the hospital notes, laboratory values, imaging findings, assessing/counseling the patient, discussing with consultant physicians, social work, nursing staff was -- minutes ADMISSION DATE:  01-05-24    ---  FROM ADMISSION H+P:   HPI:  89 yo M with PMH HTN, HLD, LBBB, paroxismal Atrial Fibrillation, thoracic aortic aneurysm, s/p mechanical AVR presented transferred from  for angioplasty of his L foot. Pt originally presented to  on 12/31 after experiencing left foot pain which upon removal of his sock revealed a black eschar on the dorsum of his foot. He had an xray done which showed no bone destruction and he was started on IV abx for cellulitis, currently on cefepime only. 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 Pt was unable to have an MRI performed as they were unable to complete the MRI safety forms due to pts cognitive impairment and inability to reach family. Pt reports currently feeling well and denies any complaints. Pt [place].       Vitals: BP: 111/72, HR: 89, Temp: 98.6F, RR: 19, SpO2: 92% on RA   AM Labs:  WBC 13.40, BUN/Cr 51/2.22   (05 Jan 2024 03:24)      ---  HOSPITAL COURSE/PERTINENT LABS/PROCEDURES PERFORMED/PENDING TESTS:    Pt was transferred to [facility] from  with left foot necrotic wound and left lower extremity angio with vascular. 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 Home coumadin held for possible angiogram, pt placed on heparin gtt. Upon vascular assessment, pt has palpable left DP pulse above wound, therefore, no angiogram needed. Pt bridged back to home coumadin. ID and podiatry consulted for left foot wound. Pt completed course of antibiotics. Blood cultures negative. Podiatry recommended MRI vs. bone scan to evaluate for OM. Not a candidate for MRI due to metal in arm. NM positive in left midfoot but no active cellulitis or draining wound -- as per ID recs -- no long term abx recommended at this time. Outpatient wound care follow up needed. Pt with chronic urinary retention -- required castañeda placement. Urology consulted -- continued castañeda with outpatient follow up needed.      The patient was seen by physical therapy who recommended [facility]. The patient was seen and examined on the day of discharge. The patient is medically optimized for discharge to [facility].    ---  PATIENT CONDITION:  - stable    ---  PHYSICAL EXAM ON DAY OF DISCHARGE:    Vital Signs (24 Hrs):  T(C): 37 (01-13-24 @ 09:52), Max: 37 (01-13-24 @ 09:52)  HR: 78 (01-13-24 @ 09:52) (73 - 78)  BP: 113/87 (01-13-24 @ 09:52) (94/54 - 123/63)  RR: 18 (01-13-24 @ 09:52) (17 - 20)  SpO2: 95% (01-13-24 @ 09:52) (93% - 96%)      GENERAL: NAD, sitting upright in bed  HEENT:  anicteric, moist mucous membranes  CHEST/LUNG:  CTA b/l, no rales, wheezes, or rhonchi  HEART:  RRR, S1, S2, +Murmur  ABDOMEN:  BS+, soft, nontender, nondistended  EXTREMITIES: +L foot eschar on dorsum of foot, no edema, cyanosis, or calf tenderness  NERVOUS SYSTEM: answers questions and follows commands appropriately  : castañeda catheter in place draining yellow colored urine    ---  CONSULTANTS:     Vascular - Dr. Alvarez  Urology - Chet  ID - Dr. Loredo  Cardio - Dr. Ewing  Nephro - Dr. Ordaz    ---  ADVANCED CARE PLANNING:  - Code status:      - MOLST completed:      [  ] NO     [  ] YES    ---  TIME SPENT:  I, the attending physician, was physically present for the key portions of the evaluation and management (E/M) service provided. The total amount of time spent reviewing the hospital notes, laboratory values, imaging findings, assessing/counseling the patient, discussing with consultant physicians, social work, nursing staff was -- minutes ADMISSION DATE:  01-05-24    ---  FROM ADMISSION H+P:   HPI:  89 yo M with PMH HTN, HLD, LBBB, paroxismal Atrial Fibrillation, thoracic aortic aneurysm, s/p mechanical AVR presented transferred from  for angioplasty of his L foot. Pt originally presented to  on 12/31 after experiencing left foot pain which upon removal of his sock revealed a black eschar on the dorsum of his foot. He had an xray done which showed no bone destruction and he was started on IV abx for cellulitis, currently on cefepime only. 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 Pt was unable to have an MRI performed as they were unable to complete the MRI safety forms due to pts cognitive impairment and inability to reach family. Pt reports currently feeling well and denies any complaints. Pt [place].       Vitals: BP: 111/72, HR: 89, Temp: 98.6F, RR: 19, SpO2: 92% on RA   AM Labs:  WBC 13.40, BUN/Cr 51/2.22   (05 Jan 2024 03:24)      ---  HOSPITAL COURSE/PERTINENT LABS/PROCEDURES PERFORMED/PENDING TESTS:    [facility]  with left foot necrotic wound and left lower extremity angio with vascular. 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 Home coumadin held for possible angiogram, pt placed on heparin gtt. Upon vascular assessment, pt has palpable left DP pulse above wound, therefore, no angiogram needed. Pt bridged back to home coumadin. ID and podiatry consulted for left foot wound. Pt completed course of antibiotics. Blood cultures negative. Podiatry recommended MRI vs. bone scan to evaluate for OM. Not a candidate for MRI due to metal in arm. NM positive in left midfoot but no active cellulitis or draining wound -- as per ID recs -- no long term abx recommended at this time. Outpatient wound care follow up needed. Pt with chronic urinary retention -- required castañeda placement. Urology consulted -- continued castañeda with outpatient follow up needed.      The patient was seen by physical therapy who recommended [facility]. The patient was seen and examined on the day of discharge. The patient is medically optimized for discharge to [facility].    ---  PATIENT CONDITION:  - stable    ---  PHYSICAL EXAM ON DAY OF DISCHARGE:    Vital Signs (24 Hrs):  T(C): 37 (01-13-24 @ 09:52), Max: 37 (01-13-24 @ 09:52)  HR: 78 (01-13-24 @ 09:52) (73 - 78)  BP: 113/87 (01-13-24 @ 09:52) (94/54 - 123/63)  RR: 18 (01-13-24 @ 09:52) (17 - 20)  SpO2: 95% (01-13-24 @ 09:52) (93% - 96%)      GENERAL: NAD, sitting upright in bed  HEENT:  anicteric, moist mucous membranes  CHEST/LUNG:  CTA b/l, no rales, wheezes, or rhonchi  HEART:  RRR, S1, S2, +Murmur  ABDOMEN:  BS+, soft, nontender, nondistended  EXTREMITIES: +L foot eschar on dorsum of foot, no edema, cyanosis, or calf tenderness  NERVOUS SYSTEM: answers questions and follows commands appropriately  : castañeda catheter in place draining yellow colored urine    ---  CONSULTANTS:     Vascular - Dr. Alvarez  Urology - Chet  ID - Dr. Loredo  Cardio - Dr. Ewing  Nephro - Dr. Ordaz    ---  ADVANCED CARE PLANNING:  - Code status:      - MOLST completed:      [  ] NO     [  ] YES    ---  TIME SPENT:  I, the attending physician, was physically present for the key portions of the evaluation and management (E/M) service provided. The total amount of time spent reviewing the hospital notes, laboratory values, imaging findings, assessing/counseling the patient, discussing with consultant physicians, social work, nursing staff was -- minutes ADMISSION DATE:  01-05-24    ---  FROM ADMISSION H+P:   HPI:  89 yo M with PMH HTN, HLD, LBBB, paroxismal Atrial Fibrillation, thoracic aortic aneurysm, s/p mechanical AVR presented transferred from  for angioplasty of his L foot. Pt originally presented to  on 12/31 after experiencing left foot pain which upon removal of his sock revealed a black eschar on the dorsum of his foot. He had an xray done which showed no bone destruction and he was started on IV abx for cellulitis, currently on cefepime only. 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 Pt was unable to have an MRI performed as they were unable to complete the MRI safety forms due to pts cognitive impairment and inability to reach family. Pt reports currently feeling well and denies any complaints. Pt [place].       Vitals: BP: 111/72, HR: 89, Temp: 98.6F, RR: 19, SpO2: 92% on RA   AM Labs:  WBC 13.40, BUN/Cr 51/2.22   (05 Jan 2024 03:24)      ---  HOSPITAL COURSE/PERTINENT LABS/PROCEDURES PERFORMED/PENDING TESTS:    Pt was transferred to [facility] from  with left foot necrotic wound and left lower extremity angio with vascular. 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 Home coumadin held for possible angiogram, pt placed on heparin gtt. Upon vascular assessment, pt has palpable left DP pulse above wound, therefore, no angiogram needed. Pt bridged back to home coumadin. ID and podiatry consulted for left foot wound. Pt completed course of antibiotics. Blood cultures negative. Podiatry recommended MRI vs. bone scan to evaluate for OM. Not a candidate for MRI due to metal in arm. NM positive in left midfoot but no active cellulitis or draining wound -- as per ID recs -- no long term abx recommended at this time. Outpatient wound care follow up needed. Pt with chronic urinary retention -- required castañeda placement. Urology consulted -- continued castañeda with outpatient follow up needed.      The patient was seen by physical therapy who recommended [facility]. The patient was seen and examined on the day of discharge. The patient is medically optimized for discharge to [facility].    ---  PATIENT CONDITION:  - stable    ---  PHYSICAL EXAM ON DAY OF DISCHARGE:    Vital Signs (24 Hrs):  T(C): 37 (01-13-24 @ 09:52), Max: 37 (01-13-24 @ 09:52)  HR: 78 (01-13-24 @ 09:52) (73 - 78)  BP: 113/87 (01-13-24 @ 09:52) (94/54 - 123/63)  RR: 18 (01-13-24 @ 09:52) (17 - 20)  SpO2: 95% (01-13-24 @ 09:52) (93% - 96%)      GENERAL: NAD, sitting upright in bed  HEENT:  anicteric, moist mucous membranes  CHEST/LUNG:  CTA b/l, no rales, wheezes, or rhonchi  HEART:  RRR, S1, S2, +Murmur  ABDOMEN:  BS+, soft, nontender, nondistended  EXTREMITIES: +L foot eschar on dorsum of foot, no edema, cyanosis, or calf tenderness  NERVOUS SYSTEM: answers questions and follows commands appropriately  : castañeda catheter in place draining yellow colored urine    ---  CONSULTANTS:     Vascular - Dr. Alvarez  Urology - Chet  ID - Dr. Loredo  Cardio - Dr. Ewing  Nephro - Dr. Ordaz  Podiatry - Dr. Nathan Murphy - Dr. Carcamo    ---  ADVANCED CARE PLANNING:  - Code status:      - MOLST completed:      [  ] NO     [  ] YES    ---  TIME SPENT:  I, the attending physician, was physically present for the key portions of the evaluation and management (E/M) service provided. The total amount of time spent reviewing the hospital notes, laboratory values, imaging findings, assessing/counseling the patient, discussing with consultant physicians, social work, nursing staff was -- minutes ADMISSION DATE:  01-05-24    ---  FROM ADMISSION H+P:   HPI:  89 yo M with PMH HTN, HLD, LBBB, paroxismal Atrial Fibrillation, thoracic aortic aneurysm, s/p mechanical AVR presented transferred from  for angioplasty of his L foot. Pt originally presented to  on 12/31 after experiencing left foot pain which upon removal of his sock revealed a black eschar on the dorsum of his foot. He had an xray done which showed no bone destruction and he was started on IV abx for cellulitis, currently on cefepime only. 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 Pt was unable to have an MRI performed as they were unable to complete the MRI safety forms due to pts cognitive impairment and inability to reach family. Pt reports currently feeling well and denies any complaints. Pt [place].       Vitals: BP: 111/72, HR: 89, Temp: 98.6F, RR: 19, SpO2: 92% on RA   AM Labs:  WBC 13.40, BUN/Cr 51/2.22   (05 Jan 2024 03:24)      ---  HOSPITAL COURSE/PERTINENT LABS/PROCEDURES PERFORMED/PENDING TESTS:    Pt was transferred to hospitals from  with left foot necrotic wound and left lower extremity angio with vascular. 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 Home coumadin held for possible angiogram, pt placed on heparin gtt. Upon vascular assessment, pt has palpable left DP pulse above wound, therefore, no angiogram needed. Pt bridged back to home coumadin. ID and podiatry consulted for left foot wound. Pt completed course of antibiotics. Blood cultures negative. Podiatry recommended MRI vs. bone scan to evaluate for OM. Not a candidate for MRI due to metal in arm. NM positive in left midfoot but no active cellulitis or draining wound -- as per ID recs -- no long term abx recommended at this time. Outpatient wound care follow up needed. Pt with chronic urinary retention -- required castañeda placement. Urology consulted -- continued castañeda with outpatient follow up needed.      The patient was seen by physical therapy who recommended [facility]. The patient was seen and examined on the day of discharge. The patient is medically optimized for discharge to [facility].    ---  PATIENT CONDITION:  - stable    ---  PHYSICAL EXAM ON DAY OF DISCHARGE:    Vital Signs (24 Hrs):  T(C): 37 (01-13-24 @ 09:52), Max: 37 (01-13-24 @ 09:52)  HR: 78 (01-13-24 @ 09:52) (73 - 78)  BP: 113/87 (01-13-24 @ 09:52) (94/54 - 123/63)  RR: 18 (01-13-24 @ 09:52) (17 - 20)  SpO2: 95% (01-13-24 @ 09:52) (93% - 96%)      GENERAL: NAD, sitting upright in bed  HEENT:  anicteric, moist mucous membranes  CHEST/LUNG:  CTA b/l, no rales, wheezes, or rhonchi  HEART:  RRR, S1, S2, +Murmur  ABDOMEN:  BS+, soft, nontender, nondistended  EXTREMITIES: +L foot eschar on dorsum of foot, no edema, cyanosis, or calf tenderness  NERVOUS SYSTEM: answers questions and follows commands appropriately  : castañeda catheter in place draining yellow colored urine    ---  CONSULTANTS:     Vascular - Dr. Alvarez  Urology - Chet  ID - Dr. Loredo  Cardio - Dr. Ewing  Nephro - Dr. Ordaz  Podiatry - Dr. Nathan Murphy - Dr. Carcamo    ---  ADVANCED CARE PLANNING:  - Code status:      - MOLST completed:      [  ] NO     [  ] YES    ---  TIME SPENT:  I, the attending physician, was physically present for the key portions of the evaluation and management (E/M) service provided. The total amount of time spent reviewing the hospital notes, laboratory values, imaging findings, assessing/counseling the patient, discussing with consultant physicians, social work, nursing staff was -- minutes

## 2024-01-06 NOTE — DISCHARGE NOTE PROVIDER - NSDCCPCAREPLAN_GEN_ALL_CORE_FT
PRINCIPAL DISCHARGE DIAGNOSIS  Diagnosis: Cellulitis of left foot  Assessment and Plan of Treatment: You were found to have a wound and infection of your left foot. You were treated with a course of antibiotics and improved. However, you still have the left foot wound and require outpatient follow up at our wound care clinic.   Please follow up with wound care (Dr. Evans) within one week of discharge.      SECONDARY DISCHARGE DIAGNOSES  Diagnosis: Urinary retention  Assessment and Plan of Treatment: You have an enlarged prostate which can commonly cause urine obstruction and urinary retention. You were found to be retaining urine and a castañeda was place by urology. You failed a trail of void and a castañeda was re-inserted. We held your home medication silodosin and started you on Flomax.  Please continue to take Flomax 0.4mg once daily.  Please follow up with your urologist with one week of discharge for further castañeda management.    Diagnosis: HTN (hypertension)  Assessment and Plan of Treatment: You have a history of hypertension however, your blood pressure was low during your hospital stay. You also became orthostatic. Some of your medications were held.  Please stop taking Ramipril and Nadolol.  Please follow up with your cardiologist/ PCP within 1 week of discharge to discuss medication managment.    Diagnosis: Atrial fibrillation  Assessment and Plan of Treatment: You have a history of afib. Your nadolol was stopped in the setting of hypotension. Please follow up with your cardiologist regarding restarted any rate control medications.   Please continue taking your Warfarin 2.5mg once daily.    Diagnosis: BETINA (acute kidney injury)  Assessment and Plan of Treatment: You were found to have acute kidney injury on admission your creatinine was 2.40. Your Ramipril was held in the setting of kidney injury. You were given IV fluids and your creatinine improved to 1.70 on discharge.  Please follow up with your PCP within 1 week of discharge.

## 2024-01-07 NOTE — PROGRESS NOTE ADULT - ATTENDING COMMENTS
89 yo M with PMH HTN, HLD, LBBB, paroxysmal Atrial Fibrillation, thoracic aortic aneurysm, s/p mechanical AVR presented transferred from  for angioplasty of his L foot - found to have palpable pulses and procedure cancelled. To continued management of BETINA and cellulitis. Discussed with podiatry Dr. Wooten - as per discussion with son, pt has metal in his arm from a surgery many years so unable to get MRI safely - podiatry recommending bone scan to exclude OM. OK to continue coumadin in interim per podiatry. Continue IV cefepime follow up ID recs. Cr improving at 1.6 today - outpatient records reviewed and noted Cr from 10/2023 was 1.16. Monitor off fluids per nephrology consultant. Discussed with patient at bedside. To f/u with son when he comes in today. 89 yo M with PMH HTN, HLD, LBBB, paroxysmal Atrial Fibrillation, thoracic aortic aneurysm, s/p mechanical AVR presented transferred from  for angioplasty of his L foot - found to have palpable pulses and procedure cancelled. To continued management of BETINA and cellulitis. Discussed with podiatry Dr. Wooten - as per discussion with son, pt has metal in his arm from a surgery many years so unable to get MRI safely - podiatry recommending bone scan to exclude OM. OK to continue coumadin in interim per podiatry. Continue IV cefepime follow up ID recs. Cr improving at 1.6 today - outpatient records reviewed and noted Cr from 10/2023 was 1.16. Monitor off fluids per nephrology consultant. Discussed with patient at bedside. To f/u with son when he comes in today.

## 2024-01-07 NOTE — PROGRESS NOTE ADULT - PROBLEM SELECTOR PLAN 1
Pt with original presentation to [facility] with L foot wound and cellulitis, transferred to \[facility]"" for L angioplasty  - doppler with occlusion of the popliteal artery on the left - however patient has strong DP pulse, US likely not accurate per vascular - angioplasty now on hold  - L foot NM bone scan ordered to eval for OM, unable to obtain MRI due to metal in arm as per son  - s/p vanc/zosyn -> vanc/cefepime -> cefepime  - WBC trending downwards, now WNL on 1/6/24  - afebrile, VS stable  - cont cefepime 1g q24 renal dosing  - will hold off on ordering Bcx as pt has completed several days of abx tx; consider Bcx if pt has new fever  - cont heparin gtt as INR subtherapeutic, will dose Coumadin tonight  - cont minced and moist diet per  chart review  - ID consulted Dr. Orosco, recs appreciated  - Vascular consulted recs appreciated  - Cardio consulted for cardiac clearance recs appreciated Pt with original presentation to [facility] with L foot wound and cellulitis, transferred to [facility] for L angioplasty  - doppler with occlusion of the popliteal artery on the left - however patient has strong DP pulse, US likely not accurate per vascular - angioplasty now on hold  - L foot NM bone scan ordered to eval for OM, unable to obtain MRI due to metal in arm as per son  - s/p vanc/zosyn -> vanc/cefepime -> cefepime  - WBC trending downwards, now WNL on 1/6/24  - afebrile, VS stable  - cont cefepime 1g q24 renal dosing  - will hold off on ordering Bcx as pt has completed several days of abx tx; consider Bcx if pt has new fever  - cont heparin gtt as INR subtherapeutic, will dose Coumadin tonight  - cont minced and moist diet per  chart review  - ID consulted Dr. Orosco, recs appreciated  - Vascular consulted recs appreciated  - Cardio consulted for cardiac clearance recs appreciated

## 2024-01-07 NOTE — PROGRESS NOTE ADULT - PROBLEM SELECTOR PLAN 8
Heparin gtt with bridge to Coumadin    Per [facility] chart, GOC discussion started with son Bogdan Jefferson who appears hard to reach by phone  - currently no advanced directives in place  - palliative consulted, to follow up per note Heparin gtt with bridge to Coumadin    Per [facility] chart, GOC discussion started with son Bogdan Jefferson who appears hard to reach by phone - son will come in today per discussion on 1/6  - currently no advanced directives in place  - palliative consulted, to follow up per note

## 2024-01-07 NOTE — PROGRESS NOTE ADULT - SUBJECTIVE AND OBJECTIVE BOX
Resting, on RA    Vital Signs Last 24 Hrs  T(C): 37.3 (01-07-24 @ 12:25), Max: 37.3 (01-07-24 @ 12:25)  T(F): 99.1 (01-07-24 @ 12:25), Max: 99.1 (01-07-24 @ 12:25)  HR: 80 (01-07-24 @ 12:25) (80 - 98)  BP: 100/50 (01-07-24 @ 12:25) (100/50 - 130/60)  RR: 20 (01-07-24 @ 12:25) (18 - 20)  SpO2: 91% (01-07-24 @ 12:25) (91% - 91%)    I&O's Detail    06 Jan 2024 07:01  -  07 Jan 2024 07:00  --------------------------------------------------------  OUT:    Indwelling Catheter - Urethral (mL): 1000 mL  Total OUT: 1000 mL    Respiratory: b/l air entry  Cardiovascular: S1 S2  Gastrointestinal: soft, ND  Extremities: no edema                              11.3   9.66  )-----------( 243      ( 07 Jan 2024 09:01 )             33.4     07 Jan 2024 09:01    133    |  103    |  37     ----------------------------<  151    3.8     |  24     |  1.60     Ca    8.4        07 Jan 2024 09:01    TPro  6.5    /  Alb  2.1    /  TBili  0.6    /  DBili  x      /  AST  21     /  ALT  19     /  AlkPhos  59     06 Jan 2024 09:15    LIVER FUNCTIONS - ( 06 Jan 2024 09:15 )  Alb: 2.1 g/dL / Pro: 6.5 g/dL / ALK PHOS: 59 U/L / ALT: 19 U/L / AST: 21 U/L / GGT: x           PT/INR - ( 07 Jan 2024 09:01 )   PT: 18.4 sec;   INR: 1.59 ratio      Culture - Urine (collected 05 Jan 2024 13:10)  Source: Catheterized Catheterized  Final Report (06 Jan 2024 14:20):    No growth    atorvastatin 10 milliGRAM(s) Oral at bedtime  cefepime   IVPB 1000 milliGRAM(s) IV Intermittent daily  dextrose 5%. 1000 milliLiter(s) IV Continuous <Continuous>  dextrose 5%. 1000 milliLiter(s) IV Continuous <Continuous>  dextrose 50% Injectable 25 Gram(s) IV Push once  dextrose 50% Injectable 12.5 Gram(s) IV Push once  dextrose 50% Injectable 25 Gram(s) IV Push once  dextrose Oral Gel 15 Gram(s) Oral once PRN  glucagon  Injectable 1 milliGRAM(s) IntraMuscular once  heparin   Injectable 5000 Unit(s) IV Push every 6 hours PRN  heparin   Injectable 2500 Unit(s) IV Push every 6 hours PRN  heparin  Infusion.  Unit(s)/Hr IV Continuous <Continuous>  insulin lispro (ADMELOG) corrective regimen sliding scale   SubCutaneous three times a day before meals  insulin lispro (ADMELOG) corrective regimen sliding scale   SubCutaneous at bedtime  nadolol 20 milliGRAM(s) Oral daily  tamsulosin 0.4 milliGRAM(s) Oral at bedtime  warfarin 2.5 milliGRAM(s) Oral once    Assessment and Recommendation:   	  DM, LLE wound  Vascular, podiatry following  Hemodynamic BETINA/CKD 3  Improving  UA bland   Renal SONO w/o acute findings   Avoid nephrotoxins  F/u KASSIE SHEPHERD    [phone] Resting, on RA    Vital Signs Last 24 Hrs  T(C): 37.3 (01-07-24 @ 12:25), Max: 37.3 (01-07-24 @ 12:25)  T(F): 99.1 (01-07-24 @ 12:25), Max: 99.1 (01-07-24 @ 12:25)  HR: 80 (01-07-24 @ 12:25) (80 - 98)  BP: 100/50 (01-07-24 @ 12:25) (100/50 - 130/60)  RR: 20 (01-07-24 @ 12:25) (18 - 20)  SpO2: 91% (01-07-24 @ 12:25) (91% - 91%)    I&O's Detail    06 Jan 2024 07:01  -  07 Jan 2024 07:00  --------------------------------------------------------  OUT:    Indwelling Catheter - Urethral (mL): 1000 mL  Total OUT: 1000 mL    Respiratory: b/l air entry  Cardiovascular: S1 S2  Gastrointestinal: soft, ND  Extremities: no edema                              11.3   9.66  )-----------( 243      ( 07 Jan 2024 09:01 )             33.4     07 Jan 2024 09:01    133    |  103    |  37     ----------------------------<  151    3.8     |  24     |  1.60     Ca    8.4        07 Jan 2024 09:01    TPro  6.5    /  Alb  2.1    /  TBili  0.6    /  DBili  x      /  AST  21     /  ALT  19     /  AlkPhos  59     06 Jan 2024 09:15    LIVER FUNCTIONS - ( 06 Jan 2024 09:15 )  Alb: 2.1 g/dL / Pro: 6.5 g/dL / ALK PHOS: 59 U/L / ALT: 19 U/L / AST: 21 U/L / GGT: x           PT/INR - ( 07 Jan 2024 09:01 )   PT: 18.4 sec;   INR: 1.59 ratio      Culture - Urine (collected 05 Jan 2024 13:10)  Source: Catheterized Catheterized  Final Report (06 Jan 2024 14:20):    No growth    atorvastatin 10 milliGRAM(s) Oral at bedtime  cefepime   IVPB 1000 milliGRAM(s) IV Intermittent daily  dextrose 5%. 1000 milliLiter(s) IV Continuous <Continuous>  dextrose 5%. 1000 milliLiter(s) IV Continuous <Continuous>  dextrose 50% Injectable 25 Gram(s) IV Push once  dextrose 50% Injectable 12.5 Gram(s) IV Push once  dextrose 50% Injectable 25 Gram(s) IV Push once  dextrose Oral Gel 15 Gram(s) Oral once PRN  glucagon  Injectable 1 milliGRAM(s) IntraMuscular once  heparin   Injectable 5000 Unit(s) IV Push every 6 hours PRN  heparin   Injectable 2500 Unit(s) IV Push every 6 hours PRN  heparin  Infusion.  Unit(s)/Hr IV Continuous <Continuous>  insulin lispro (ADMELOG) corrective regimen sliding scale   SubCutaneous three times a day before meals  insulin lispro (ADMELOG) corrective regimen sliding scale   SubCutaneous at bedtime  nadolol 20 milliGRAM(s) Oral daily  tamsulosin 0.4 milliGRAM(s) Oral at bedtime  warfarin 2.5 milliGRAM(s) Oral once    Assessment and Recommendation:   	  DM, LLE wound  Vascular, podiatry following  Hemodynamic BETINA/CKD 3  Improving  UA bland   Renal SONO w/o acute findings   Avoid nephrotoxins  F/u KASSIE SHEPHERD    [phone]

## 2024-01-07 NOTE — PROGRESS NOTE ADULT - PROBLEM SELECTOR PLAN 3
Pt with history of afib on warfarin at home, presented to [facility] originally with supratherapeutic INR  - pt NSR on exam  - INR subtherapeutic at 1.63; cont with heparin gtt, will c/w dose Coumadin 2.5mg tonight as angioplasty is now on hold  - f/u AM coags

## 2024-01-07 NOTE — PROGRESS NOTE ADULT - ASSESSMENT
89 yo M with PMH HTN, HLD, LBBB, paroxismal Atrial Fibrillation, thoracic aortic aneurysm, s/p mechanical AVR presented transferred from  for angioplasty of his L foot - found to have palpable pulses and procedure cancelled. To continued management of BETINA and cellulitis. 89 yo M with PMH HTN, HLD, LBBB, paroxismal Atrial Fibrillation, thoracic aortic aneurysm, s/p mechanical AVR presented transferred from  for angioplasty of his L foot - found to have palpable pulses and procedure cancelled. To continued management of BETINA and cellulitis. 89 yo M with PMH HTN, HLD, LBBB, paroxysmal Atrial Fibrillation, thoracic aortic aneurysm, s/p mechanical AVR presented transferred from  for angioplasty of his L foot - found to have palpable pulses and procedure cancelled. To continued management of BETINA and cellulitis.

## 2024-01-07 NOTE — PROGRESS NOTE ADULT - SUBJECTIVE AND OBJECTIVE BOX
[facility] -- An Barajas, Gildardo Marrero Savella, , Royce Whatley  Office # [phone]    Follow Up:  Afib, MechAVR, Cardiac Optimization    Subjective/Observations:     REVIEW OF SYSTEMS: All other review of systems is negative unless indicated above  PAST MEDICAL & SURGICAL HISTORY:  HTN (hypertension)      HLD (hyperlipidemia)      Paroxysmal atrial fibrillation      H/O aortic valve repair      H/O thoracic aortic aneurysm repair        MEDICATIONS  (STANDING):  atorvastatin 10 milliGRAM(s) Oral at bedtime  cefepime   IVPB 1000 milliGRAM(s) IV Intermittent daily  dextrose 5%. 1000 milliLiter(s) (50 mL/Hr) IV Continuous <Continuous>  dextrose 5%. 1000 milliLiter(s) (100 mL/Hr) IV Continuous <Continuous>  dextrose 50% Injectable 25 Gram(s) IV Push once  dextrose 50% Injectable 12.5 Gram(s) IV Push once  dextrose 50% Injectable 25 Gram(s) IV Push once  glucagon  Injectable 1 milliGRAM(s) IntraMuscular once  heparin  Infusion.  Unit(s)/Hr (11 mL/Hr) IV Continuous <Continuous>  insulin lispro (ADMELOG) corrective regimen sliding scale   SubCutaneous at bedtime  insulin lispro (ADMELOG) corrective regimen sliding scale   SubCutaneous three times a day before meals  nadolol 20 milliGRAM(s) Oral daily  tamsulosin 0.4 milliGRAM(s) Oral at bedtime    MEDICATIONS  (PRN):  dextrose Oral Gel 15 Gram(s) Oral once PRN Blood Glucose LESS THAN 70 milliGRAM(s)/deciliter  heparin   Injectable 2500 Unit(s) IV Push every 6 hours PRN For aPTT between 40 - 57  heparin   Injectable 5000 Unit(s) IV Push every 6 hours PRN For aPTT less than 40    Allergies    No Known Allergies    Intolerances      Vital Signs Last 24 Hrs  T(C): 36.7 (07 Jan 2024 04:40), Max: 36.7 (07 Jan 2024 04:40)  T(F): 98.1 (07 Jan 2024 04:40), Max: 98.1 (07 Jan 2024 04:40)  HR: 80 (07 Jan 2024 04:40) (80 - 98)  BP: 119/81 (07 Jan 2024 04:40) (119/81 - 130/60)  BP(mean): --  RR: 18 (07 Jan 2024 04:40) (18 - 19)  SpO2: 91% (07 Jan 2024 04:40) (91% - 92%)    Parameters below as of 07 Jan 2024 04:40  Patient On (Oxygen Delivery Method): room air      I&O's Summary    06 Jan 2024 07:01  -  07 Jan 2024 07:00  --------------------------------------------------------  IN: 0 mL / OUT: 1000 mL / NET: -1000 mL        PHYSICAL EXAM:  TELE:   Constitutional: NAD, awake and alert, well-developed  HEENT: Moist Mucous Membranes, Anicteric  Pulmonary: Non-labored, breath sounds are clear bilaterally, No wheezing, rales or rhonchi  Cardiovascular: Regular, S1 and S2, No murmurs, rubs, gallops or clicks  Gastrointestinal: Bowel Sounds present, soft, nontender.   Lymph: No peripheral edema. No lymphadenopathy.  Skin: No visible rashes or ulcers.  Psych:  Mood & affect appropriate  LABS: All Labs Reviewed:                        10.9   10.41 )-----------( 237      ( 06 Jan 2024 09:15 )             32.6                         10.7   x     )-----------( x        ( 05 Jan 2024 18:39 )             31.3                         10.3   11.97 )-----------( 243      ( 05 Jan 2024 09:25 )             30.7     06 Jan 2024 09:15    136    |  104    |  44     ----------------------------<  175    3.8     |  26     |  1.80   05 Jan 2024 05:52    135    |  107    |  67     ----------------------------<  143    3.9     |  24     |  2.40   04 Jan 2024 07:35    139    |  103    |  51     ----------------------------<  177    3.8     |  25     |  2.22     Ca    8.1        06 Jan 2024 09:15  Ca    8.4        05 Jan 2024 05:52  Ca    9.2        04 Jan 2024 07:35    TPro  6.5    /  Alb  2.1    /  TBili  0.6    /  DBili  x      /  AST  21     /  ALT  19     /  AlkPhos  59     06 Jan 2024 09:15  TPro  6.2    /  Alb  2.2    /  TBili  0.5    /  DBili  x      /  AST  21     /  ALT  19     /  AlkPhos  54     05 Jan 2024 05:52  TPro  7.6    /  Alb  2.6    /  TBili  0.8    /  DBili  x      /  AST  25     /  ALT  25     /  AlkPhos  68     04 Jan 2024 07:35    PT/INR - ( 06 Jan 2024 09:15 )   PT: 18.8 sec;   INR: 1.63 ratio         PTT - ( 06 Jan 2024 09:15 )  PTT:74.6 sec            [facility] -- An Barajas, Gildardo Marrero Savella, , Royce Whatley  Office # [phone]    Follow Up:  Afib, MechAVR, Cardiac Optimization    Subjective/Observations:     REVIEW OF SYSTEMS: All other review of systems is negative unless indicated above  PAST MEDICAL & SURGICAL HISTORY:  HTN (hypertension)      HLD (hyperlipidemia)      Paroxysmal atrial fibrillation      H/O aortic valve repair      H/O thoracic aortic aneurysm repair        MEDICATIONS  (STANDING):  atorvastatin 10 milliGRAM(s) Oral at bedtime  cefepime   IVPB 1000 milliGRAM(s) IV Intermittent daily  dextrose 5%. 1000 milliLiter(s) (50 mL/Hr) IV Continuous <Continuous>  dextrose 5%. 1000 milliLiter(s) (100 mL/Hr) IV Continuous <Continuous>  dextrose 50% Injectable 25 Gram(s) IV Push once  dextrose 50% Injectable 12.5 Gram(s) IV Push once  dextrose 50% Injectable 25 Gram(s) IV Push once  glucagon  Injectable 1 milliGRAM(s) IntraMuscular once  heparin  Infusion.  Unit(s)/Hr (11 mL/Hr) IV Continuous <Continuous>  insulin lispro (ADMELOG) corrective regimen sliding scale   SubCutaneous at bedtime  insulin lispro (ADMELOG) corrective regimen sliding scale   SubCutaneous three times a day before meals  nadolol 20 milliGRAM(s) Oral daily  tamsulosin 0.4 milliGRAM(s) Oral at bedtime    MEDICATIONS  (PRN):  dextrose Oral Gel 15 Gram(s) Oral once PRN Blood Glucose LESS THAN 70 milliGRAM(s)/deciliter  heparin   Injectable 2500 Unit(s) IV Push every 6 hours PRN For aPTT between 40 - 57  heparin   Injectable 5000 Unit(s) IV Push every 6 hours PRN For aPTT less than 40    Allergies    No Known Allergies    Intolerances      Vital Signs Last 24 Hrs  T(C): 36.7 (07 Jan 2024 04:40), Max: 36.7 (07 Jan 2024 04:40)  T(F): 98.1 (07 Jan 2024 04:40), Max: 98.1 (07 Jan 2024 04:40)  HR: 80 (07 Jan 2024 04:40) (80 - 98)  BP: 119/81 (07 Jan 2024 04:40) (119/81 - 130/60)  BP(mean): --  RR: 18 (07 Jan 2024 04:40) (18 - 19)  SpO2: 91% (07 Jan 2024 04:40) (91% - 92%)    Parameters below as of 07 Jan 2024 04:40  Patient On (Oxygen Delivery Method): room air      I&O's Summary    06 Jan 2024 07:01  -  07 Jan 2024 07:00  --------------------------------------------------------  IN: 0 mL / OUT: 1000 mL / NET: -1000 mL        PHYSICAL EXAM:  TELE:   Constitutional: NAD, awake and alert, well-developed  HEENT: Moist Mucous Membranes, Anicteric  Pulmonary: Non-labored, breath sounds are clear bilaterally, No wheezing, rales or rhonchi  Cardiovascular: Regular, S1 and S2, No murmurs, rubs, gallops or clicks  Gastrointestinal: Bowel Sounds present, soft, nontender.   Lymph: No peripheral edema. No lymphadenopathy.  Skin: No visible rashes or ulcers.  Psych:  Mood & affect appropriate  LABS: All Labs Reviewed:                        10.9   10.41 )-----------( 237      ( 06 Jan 2024 09:15 )             32.6                         10.7   x     )-----------( x        ( 05 Jan 2024 18:39 )             31.3                         10.3   11.97 )-----------( 243      ( 05 Jan 2024 09:25 )             30.7     06 Jan 2024 09:15    136    |  104    |  44     ----------------------------<  175    3.8     |  26     |  1.80   05 Jan 2024 05:52    135    |  107    |  67     ----------------------------<  143    3.9     |  24     |  2.40   04 Jan 2024 07:35    139    |  103    |  51     ----------------------------<  177    3.8     |  25     |  2.22     Ca    8.1        06 Jan 2024 09:15  Ca    8.4        05 Jan 2024 05:52  Ca    9.2        04 Jan 2024 07:35    TPro  6.5    /  Alb  2.1    /  TBili  0.6    /  DBili  x      /  AST  21     /  ALT  19     /  AlkPhos  59     06 Jan 2024 09:15  TPro  6.2    /  Alb  2.2    /  TBili  0.5    /  DBili  x      /  AST  21     /  ALT  19     /  AlkPhos  54     05 Jan 2024 05:52  TPro  7.6    /  Alb  2.6    /  TBili  0.8    /  DBili  x      /  AST  25     /  ALT  25     /  AlkPhos  68     04 Jan 2024 07:35    PT/INR - ( 06 Jan 2024 09:15 )   PT: 18.8 sec;   INR: 1.63 ratio         PTT - ( 06 Jan 2024 09:15 )  PTT:74.6 sec            [facility] -- An Barajas, Gildardo Marrero Savella, , Royce Whatley  Office # [phone]    Follow Up:  Afib, MechAVR, Cardiac Optimization    Subjective/Observations: Awake but confused, not following commands.  Comfortable on RA.  Not in any form of respiratory discomfort.  No overnight events    REVIEW OF SYSTEMS: All other review of systems is negative unless indicated above  PAST MEDICAL & SURGICAL HISTORY:  HTN (hypertension)      HLD (hyperlipidemia)      Paroxysmal atrial fibrillation      H/O aortic valve repair      H/O thoracic aortic aneurysm repair    MEDICATIONS  (STANDING):  atorvastatin 10 milliGRAM(s) Oral at bedtime  cefepime   IVPB 1000 milliGRAM(s) IV Intermittent daily  dextrose 5%. 1000 milliLiter(s) (50 mL/Hr) IV Continuous <Continuous>  dextrose 5%. 1000 milliLiter(s) (100 mL/Hr) IV Continuous <Continuous>  dextrose 50% Injectable 25 Gram(s) IV Push once  dextrose 50% Injectable 12.5 Gram(s) IV Push once  dextrose 50% Injectable 25 Gram(s) IV Push once  glucagon  Injectable 1 milliGRAM(s) IntraMuscular once  heparin  Infusion.  Unit(s)/Hr (11 mL/Hr) IV Continuous <Continuous>  insulin lispro (ADMELOG) corrective regimen sliding scale   SubCutaneous at bedtime  insulin lispro (ADMELOG) corrective regimen sliding scale   SubCutaneous three times a day before meals  nadolol 20 milliGRAM(s) Oral daily  tamsulosin 0.4 milliGRAM(s) Oral at bedtime    MEDICATIONS  (PRN):  dextrose Oral Gel 15 Gram(s) Oral once PRN Blood Glucose LESS THAN 70 milliGRAM(s)/deciliter  heparin   Injectable 2500 Unit(s) IV Push every 6 hours PRN For aPTT between 40 - 57  heparin   Injectable 5000 Unit(s) IV Push every 6 hours PRN For aPTT less than 40    Allergies    No Known Allergies    Intolerances    Vital Signs Last 24 Hrs  T(C): 36.7 (07 Jan 2024 04:40), Max: 36.7 (07 Jan 2024 04:40)  T(F): 98.1 (07 Jan 2024 04:40), Max: 98.1 (07 Jan 2024 04:40)  HR: 80 (07 Jan 2024 04:40) (80 - 98)  BP: 119/81 (07 Jan 2024 04:40) (119/81 - 130/60)  BP(mean): --  RR: 18 (07 Jan 2024 04:40) (18 - 19)  SpO2: 91% (07 Jan 2024 04:40) (91% - 92%)    Parameters below as of 07 Jan 2024 04:40  Patient On (Oxygen Delivery Method): room air      I&O's Summary    06 Jan 2024 07:01  -  07 Jan 2024 07:00  --------------------------------------------------------  IN: 0 mL / OUT: 1000 mL / NET: -1000 mL    PHYSICAL EXAM:  TELE: Not on tele  Constitutional: NAD, confused and disoriented, well-developed  HEENT: Moist Mucous Membranes, Anicteric  Pulmonary: Non-labored, breath sounds are clear bilaterally, No wheezing, rales or rhonchi  Cardiovascular: Regular, S1 and S2, No murmurs, rubs, gallops or clicks  Gastrointestinal: Bowel Sounds present, soft, nontender.   : Turcios in situ with slight hematuria, no clots  Lymph: No peripheral edema. No lymphadenopathy.  Skin: No visible rashes.  Left heel ulcer  Psych:  Mood & affect: Flat    LABS: All Labs Reviewed:                        10.9   10.41 )-----------( 237      ( 06 Jan 2024 09:15 )             32.6                         10.7   x     )-----------( x        ( 05 Jan 2024 18:39 )             31.3                         10.3   11.97 )-----------( 243      ( 05 Jan 2024 09:25 )             30.7     06 Jan 2024 09:15    136    |  104    |  44     ----------------------------<  175    3.8     |  26     |  1.80   05 Jan 2024 05:52    135    |  107    |  67     ----------------------------<  143    3.9     |  24     |  2.40   04 Jan 2024 07:35    139    |  103    |  51     ----------------------------<  177    3.8     |  25     |  2.22     Ca    8.1        06 Jan 2024 09:15  Ca    8.4        05 Jan 2024 05:52  Ca    9.2        04 Jan 2024 07:35    TPro  6.5    /  Alb  2.1    /  TBili  0.6    /  DBili  x      /  AST  21     /  ALT  19     /  AlkPhos  59     06 Jan 2024 09:15  TPro  6.2    /  Alb  2.2    /  TBili  0.5    /  DBili  x      /  AST  21     /  ALT  19     /  AlkPhos  54     05 Jan 2024 05:52  TPro  7.6    /  Alb  2.6    /  TBili  0.8    /  DBili  x      /  AST  25     /  ALT  25     /  AlkPhos  68     04 Jan 2024 07:35    PT/INR - ( 06 Jan 2024 09:15 )   PT: 18.8 sec;   INR: 1.63 ratio         PTT - ( 06 Jan 2024 09:15 )  PTT:74.6 sec            [facility] -- An Barajas, Gildardo Marrero Savella, , Royce Whatley  Office # [phone]    Follow Up:  Afib, MechAVR, Cardiac Optimization    Subjective/Observations: Awake but confused, not following commands.  Comfortable on RA.  Not in any form of respiratory discomfort.  No overnight events    REVIEW OF SYSTEMS: All other review of systems is negative unless indicated above  PAST MEDICAL & SURGICAL HISTORY:  HTN (hypertension)      HLD (hyperlipidemia)      Paroxysmal atrial fibrillation      H/O aortic valve repair      H/O thoracic aortic aneurysm repair    MEDICATIONS  (STANDING):  atorvastatin 10 milliGRAM(s) Oral at bedtime  cefepime   IVPB 1000 milliGRAM(s) IV Intermittent daily  dextrose 5%. 1000 milliLiter(s) (50 mL/Hr) IV Continuous <Continuous>  dextrose 5%. 1000 milliLiter(s) (100 mL/Hr) IV Continuous <Continuous>  dextrose 50% Injectable 25 Gram(s) IV Push once  dextrose 50% Injectable 12.5 Gram(s) IV Push once  dextrose 50% Injectable 25 Gram(s) IV Push once  glucagon  Injectable 1 milliGRAM(s) IntraMuscular once  heparin  Infusion.  Unit(s)/Hr (11 mL/Hr) IV Continuous <Continuous>  insulin lispro (ADMELOG) corrective regimen sliding scale   SubCutaneous at bedtime  insulin lispro (ADMELOG) corrective regimen sliding scale   SubCutaneous three times a day before meals  nadolol 20 milliGRAM(s) Oral daily  tamsulosin 0.4 milliGRAM(s) Oral at bedtime    MEDICATIONS  (PRN):  dextrose Oral Gel 15 Gram(s) Oral once PRN Blood Glucose LESS THAN 70 milliGRAM(s)/deciliter  heparin   Injectable 2500 Unit(s) IV Push every 6 hours PRN For aPTT between 40 - 57  heparin   Injectable 5000 Unit(s) IV Push every 6 hours PRN For aPTT less than 40    Allergies    No Known Allergies    Intolerances    Vital Signs Last 24 Hrs  T(C): 36.7 (07 Jan 2024 04:40), Max: 36.7 (07 Jan 2024 04:40)  T(F): 98.1 (07 Jan 2024 04:40), Max: 98.1 (07 Jan 2024 04:40)  HR: 80 (07 Jan 2024 04:40) (80 - 98)  BP: 119/81 (07 Jan 2024 04:40) (119/81 - 130/60)  BP(mean): --  RR: 18 (07 Jan 2024 04:40) (18 - 19)  SpO2: 91% (07 Jan 2024 04:40) (91% - 92%)    Parameters below as of 07 Jan 2024 04:40  Patient On (Oxygen Delivery Method): room air      I&O's Summary    06 Jan 2024 07:01  -  07 Jan 2024 07:00  --------------------------------------------------------  IN: 0 mL / OUT: 1000 mL / NET: -1000 mL    PHYSICAL EXAM:  TELE: Not on tele  Constitutional: NAD, confused and disoriented, well-developed  HEENT: Moist Mucous Membranes, Anicteric  Pulmonary: Non-labored, breath sounds are clear bilaterally, No wheezing, rales or rhonchi  Cardiovascular: Regular, S1 and S2, No murmurs, rubs, gallops or clicks  Gastrointestinal: Bowel Sounds present, soft, nontender.   : Turcios in situ with slight hematuria, no clots  Lymph: No peripheral edema. No lymphadenopathy.  Skin: No visible rashes.  Left heel ulcer  Psych:  Mood & affect: Flat    LABS: All Labs Reviewed:                        10.9   10.41 )-----------( 237      ( 06 Jan 2024 09:15 )             32.6                         10.7   x     )-----------( x        ( 05 Jan 2024 18:39 )             31.3                         10.3   11.97 )-----------( 243      ( 05 Jan 2024 09:25 )             30.7     06 Jan 2024 09:15    136    |  104    |  44     ----------------------------<  175    3.8     |  26     |  1.80   05 Jan 2024 05:52    135    |  107    |  67     ----------------------------<  143    3.9     |  24     |  2.40   04 Jan 2024 07:35    139    |  103    |  51     ----------------------------<  177    3.8     |  25     |  2.22     Ca    8.1        06 Jan 2024 09:15  Ca    8.4        05 Jan 2024 05:52  Ca    9.2        04 Jan 2024 07:35    TPro  6.5    /  Alb  2.1    /  TBili  0.6    /  DBili  x      /  AST  21     /  ALT  19     /  AlkPhos  59     06 Jan 2024 09:15  TPro  6.2    /  Alb  2.2    /  TBili  0.5    /  DBili  x      /  AST  21     /  ALT  19     /  AlkPhos  54     05 Jan 2024 05:52  TPro  7.6    /  Alb  2.6    /  TBili  0.8    /  DBili  x      /  AST  25     /  ALT  25     /  AlkPhos  68     04 Jan 2024 07:35    PT/INR - ( 06 Jan 2024 09:15 )   PT: 18.8 sec;   INR: 1.63 ratio         PTT - ( 06 Jan 2024 09:15 )  PTT:74.6 sec

## 2024-01-07 NOTE — PROGRESS NOTE ADULT - SUBJECTIVE AND OBJECTIVE BOX
SURGERY PA NOTE ON BEHALF OF DR. Ventura / Vascular SURGERY:    S: Patient seen and examined at bedside.     No acute complaints.   Denies fever, chills, chest pain, SOB.      MEDICATIONS:  atorvastatin 10 milliGRAM(s) Oral at bedtime  cefepime   IVPB 1000 milliGRAM(s) IV Intermittent daily  dextrose 5%. 1000 milliLiter(s) IV Continuous <Continuous>  dextrose 5%. 1000 milliLiter(s) IV Continuous <Continuous>  dextrose 50% Injectable 25 Gram(s) IV Push once  dextrose 50% Injectable 12.5 Gram(s) IV Push once  dextrose 50% Injectable 25 Gram(s) IV Push once  dextrose Oral Gel 15 Gram(s) Oral once PRN  glucagon  Injectable 1 milliGRAM(s) IntraMuscular once  heparin   Injectable 2500 Unit(s) IV Push every 6 hours PRN  heparin   Injectable 5000 Unit(s) IV Push every 6 hours PRN  heparin  Infusion.  Unit(s)/Hr IV Continuous <Continuous>  insulin lispro (ADMELOG) corrective regimen sliding scale   SubCutaneous three times a day before meals  insulin lispro (ADMELOG) corrective regimen sliding scale   SubCutaneous at bedtime  nadolol 20 milliGRAM(s) Oral daily  tamsulosin 0.4 milliGRAM(s) Oral at bedtime      O:  Vital Signs Last 24 Hrs  T(C): 36.7 (07 Jan 2024 04:40), Max: 36.7 (07 Jan 2024 04:40)  T(F): 98.1 (07 Jan 2024 04:40), Max: 98.1 (07 Jan 2024 04:40)  HR: 80 (07 Jan 2024 04:40) (80 - 98)  BP: 119/81 (07 Jan 2024 04:40) (119/81 - 130/60)  BP(mean): --  RR: 18 (07 Jan 2024 04:40) (18 - 19)  SpO2: 91% (07 Jan 2024 04:40) (91% - 92%)    Parameters below as of 07 Jan 2024 04:40  Patient On (Oxygen Delivery Method): room air        I&O SUMMARY:    01-06-24 @ 07:01  -  01-07-24 @ 07:00  --------------------------------------------------------  IN: 0 mL / OUT: 1000 mL / NET: -1000 mL        PHYSICAL EXAM:  Lungs: CTA bilat without W/R/R  Card: S1S2  Abd: Soft, NT, ND.  +BS x 4.  No rebound/guarding.    Gen: NAD, lying in bed  Ext: BLE warm to touch. LLE DP palpable. L foot dorsal aspect with necrotic eschar, wrapped with Kelix.  Erythematous and edematous around eschar,  warm and tender to palpation.       LABS:                        10.9   10.41 )-----------( 237      ( 06 Jan 2024 09:15 )             32.6     01-06    136  |  104  |  44<H>  ----------------------------<  175<H>  3.8   |  26  |  1.80<H>    Ca    8.1<L>      06 Jan 2024 09:15    TPro  6.5  /  Alb  2.1<L>  /  TBili  0.6  /  DBili  x   /  AST  21  /  ALT  19  /  AlkPhos  59  01-06    PT/INR - ( 06 Jan 2024 09:15 )   PT: 18.8 sec;   INR: 1.63 ratio         PTT - ( 06 Jan 2024 09:15 )  PTT:74.6 sec      Assessment/Plan:  89yoM with PMHx HTN, HLD, LBBB, Paroxismal Atrial Fibrillation, thoracic aortic aneurysm, s/p mechanical AVR, transferred to [facility] from [facility].   Upon assessment, strong palpable pulse above wound. No angiogram needed.     May bridge to Coumadin from Heparin gtt  Local wound care by podiatry  Abx as per ID  Vascular surgery signing off, reconsult prn.            SURGERY PA NOTE ON BEHALF OF DR. Ventura / Vascular SURGERY:    S: Patient seen and examined at bedside.     No acute complaints.   Denies fever, chills, chest pain, SOB.      MEDICATIONS:  atorvastatin 10 milliGRAM(s) Oral at bedtime  cefepime   IVPB 1000 milliGRAM(s) IV Intermittent daily  dextrose 5%. 1000 milliLiter(s) IV Continuous <Continuous>  dextrose 5%. 1000 milliLiter(s) IV Continuous <Continuous>  dextrose 50% Injectable 25 Gram(s) IV Push once  dextrose 50% Injectable 12.5 Gram(s) IV Push once  dextrose 50% Injectable 25 Gram(s) IV Push once  dextrose Oral Gel 15 Gram(s) Oral once PRN  glucagon  Injectable 1 milliGRAM(s) IntraMuscular once  heparin   Injectable 2500 Unit(s) IV Push every 6 hours PRN  heparin   Injectable 5000 Unit(s) IV Push every 6 hours PRN  heparin  Infusion.  Unit(s)/Hr IV Continuous <Continuous>  insulin lispro (ADMELOG) corrective regimen sliding scale   SubCutaneous three times a day before meals  insulin lispro (ADMELOG) corrective regimen sliding scale   SubCutaneous at bedtime  nadolol 20 milliGRAM(s) Oral daily  tamsulosin 0.4 milliGRAM(s) Oral at bedtime      O:  Vital Signs Last 24 Hrs  T(C): 36.7 (07 Jan 2024 04:40), Max: 36.7 (07 Jan 2024 04:40)  T(F): 98.1 (07 Jan 2024 04:40), Max: 98.1 (07 Jan 2024 04:40)  HR: 80 (07 Jan 2024 04:40) (80 - 98)  BP: 119/81 (07 Jan 2024 04:40) (119/81 - 130/60)  BP(mean): --  RR: 18 (07 Jan 2024 04:40) (18 - 19)  SpO2: 91% (07 Jan 2024 04:40) (91% - 92%)    Parameters below as of 07 Jan 2024 04:40  Patient On (Oxygen Delivery Method): room air        I&O SUMMARY:    01-06-24 @ 07:01  -  01-07-24 @ 07:00  --------------------------------------------------------  IN: 0 mL / OUT: 1000 mL / NET: -1000 mL        PHYSICAL EXAM:  Lungs: CTA bilat without W/R/R  Card: S1S2  Abd: Soft, NT, ND.  +BS x 4.  No rebound/guarding.    Gen: NAD, lying in bed  Ext: BLE warm to touch. LLE DP palpable. L foot dorsal aspect with necrotic eschar, wrapped with Kelix.  Erythematous and edematous around eschar,  warm and tender to palpation.       LABS:                        10.9   10.41 )-----------( 237      ( 06 Jan 2024 09:15 )             32.6     01-06    136  |  104  |  44<H>  ----------------------------<  175<H>  3.8   |  26  |  1.80<H>    Ca    8.1<L>      06 Jan 2024 09:15    TPro  6.5  /  Alb  2.1<L>  /  TBili  0.6  /  DBili  x   /  AST  21  /  ALT  19  /  AlkPhos  59  01-06    PT/INR - ( 06 Jan 2024 09:15 )   PT: 18.8 sec;   INR: 1.63 ratio         PTT - ( 06 Jan 2024 09:15 )  PTT:74.6 sec      Assessment/Plan:  89yoM with PMHx HTN, HLD, LBBB, Paroxismal Atrial Fibrillation, thoracic aortic aneurysm, s/p mechanical AVR, transferred to [facility] from [facility].   Upon assessment, strong palpable pulse above wound. No angiogram needed.     May bridge to Coumadin from Heparin gtt  Local wound care by podiatry  Abx as per ID  Vascular surgery signing off, reconsult prn.

## 2024-01-07 NOTE — PROGRESS NOTE ADULT - PROBLEM SELECTOR PLAN 2
Pt with BETINA, unknown baseline - improving with fluids  - BUN/Cr 46/1.6 on admission, 67/2.4 on 1/5  - likely 2/2 abx vs urinary retention - vanco discontinued and castañeda placed with >1L output  - hold lisinopril in setting of BETINA  - hold silodosin in setting of dec Cr clearance  - cont light mIVF LR @ 75cc/hr  - replace electrolytes as needed  - f/u AM BMP  - nephro consulted, recs appreciated Pt with BETINA, unknown baseline (outpt note with Cr 1.16 10/2023) - IMPROVING  - BUN/Cr 46/1.6 on [facility] admission, Cr went up to 2.4 on 1/5 and now back to 1.6 on 1/7  - likely 2/2 abx vs urinary retention - vanco discontinued and castañeda placed with >1L output  - hold lisinopril in setting of BETINA  - hold silodosin in setting of dec Cr clearance  - now off IV fluids  - replace electrolytes as needed  - f/u AM BMP  - nephro consulted, recs appreciated

## 2024-01-07 NOTE — PROGRESS NOTE ADULT - ASSESSMENT
89 year old male with PMH HTN, HLD, LBBB, paroxysmal Atrial Fibrillation, thoracic aortic aneurysm, s/p mechanical AVR presented transferred from  for angioplasty of his L foot.  Cardiology consultation now being obtained.     Cardiac Optimization/LBBB/PaFib  - Vascular recs noted.  No plans for LE angiogram at this point  - Can continue transition to Coumadin.  Dose daily to keep INR~3   - EKG SR with old LBBB   - Has known history of PAfib and mechanical AVR    - BP now stable and controlled  - Continue to hold home ACEI for BETINA, though, improving  - Continue home Nadolol with parameters   - Continue home statin     - No sign volume overload, non-orthopneic on RA  - Can obtain routine TTE    - Monitor and replete electrolytes. Keep K>4.0 and Mg>2.0.  - Will continue to follow    Vijaya Randhawa DNP, NP-C, AGACNP-C  Cardiology   Call TEAMS

## 2024-01-07 NOTE — PROGRESS NOTE ADULT - SUBJECTIVE AND OBJECTIVE BOX
Patient is a 89y old  Male who presents with a chief complaint of left foot wound (07 Jan 2024 09:57)        INTERVAL HPI/OVERNIGHT EVENTS: No acute overnight events. Pt seen and examined at the bedside this AM. Pt feels well, denies any pain and offers no complaints. Turcios bag still contains blood tinged urine, but improving.      MEDICATIONS  (STANDING):  atorvastatin 10 milliGRAM(s) Oral at bedtime  cefepime   IVPB 1000 milliGRAM(s) IV Intermittent daily  dextrose 5%. 1000 milliLiter(s) (100 mL/Hr) IV Continuous <Continuous>  dextrose 5%. 1000 milliLiter(s) (50 mL/Hr) IV Continuous <Continuous>  dextrose 50% Injectable 25 Gram(s) IV Push once  dextrose 50% Injectable 12.5 Gram(s) IV Push once  dextrose 50% Injectable 25 Gram(s) IV Push once  glucagon  Injectable 1 milliGRAM(s) IntraMuscular once  heparin  Infusion.  Unit(s)/Hr (11 mL/Hr) IV Continuous <Continuous>  insulin lispro (ADMELOG) corrective regimen sliding scale   SubCutaneous at bedtime  insulin lispro (ADMELOG) corrective regimen sliding scale   SubCutaneous three times a day before meals  nadolol 20 milliGRAM(s) Oral daily  tamsulosin 0.4 milliGRAM(s) Oral at bedtime  warfarin 2.5 milliGRAM(s) Oral once    MEDICATIONS  (PRN):  dextrose Oral Gel 15 Gram(s) Oral once PRN Blood Glucose LESS THAN 70 milliGRAM(s)/deciliter  heparin   Injectable 2500 Unit(s) IV Push every 6 hours PRN For aPTT between 40 - 57  heparin   Injectable 5000 Unit(s) IV Push every 6 hours PRN For aPTT less than 40      Allergies    No Known Allergies    Intolerances        REVIEW OF SYSTEMS:  CONSTITUTIONAL: No fever or chills  HEENT:  No headache, no sore throat  RESPIRATORY: No cough, wheezing, or shortness of breath  CARDIOVASCULAR: No chest pain, palpitations  GASTROINTESTINAL: No abd pain, nausea, vomiting, or diarrhea  GENITOURINARY: No dysuria, frequency, or hematuria  NEUROLOGICAL: no focal weakness or dizziness  MUSCULOSKELETAL: no myalgias     Vital Signs Last 24 Hrs  T(C): 37.3 (07 Jan 2024 12:25), Max: 37.3 (07 Jan 2024 12:25)  T(F): 99.1 (07 Jan 2024 12:25), Max: 99.1 (07 Jan 2024 12:25)  HR: 80 (07 Jan 2024 12:25) (80 - 98)  BP: 100/50 (07 Jan 2024 12:25) (100/50 - 130/60)  BP(mean): --  RR: 20 (07 Jan 2024 12:25) (18 - 20)  SpO2: 91% (07 Jan 2024 12:25) (91% - 92%)    Parameters below as of 07 Jan 2024 12:25  Patient On (Oxygen Delivery Method): room air        PHYSICAL EXAM:  GENERAL: NAD, A&Ox3 but confused  HEENT:  anicteric, moist mucous membranes  CHEST/LUNG:  CTA b/l, no rales, wheezes, or rhonchi  HEART:  RRR, S1, S2  ABDOMEN:  BS+, soft, nontender, nondistended  EXTREMITIES: no edema, cyanosis, or calf tenderness; black eschar on dorsum of L foot; pedal pulses present bilaterally  NERVOUS SYSTEM: answers questions and follows commands appropriately    LABS:                        11.3   9.66  )-----------( 243      ( 07 Jan 2024 09:01 )             33.4     CBC Full  -  ( 07 Jan 2024 09:01 )  WBC Count : 9.66 K/uL  Hemoglobin : 11.3 g/dL  Hematocrit : 33.4 %  Platelet Count - Automated : 243 K/uL  Mean Cell Volume : 88.1 fl  Mean Cell Hemoglobin : 29.8 pg  Mean Cell Hemoglobin Concentration : 33.8 gm/dL  Auto Neutrophil # : 7.21 K/uL  Auto Lymphocyte # : 1.11 K/uL  Auto Monocyte # : 0.94 K/uL  Auto Eosinophil # : 0.20 K/uL  Auto Basophil # : 0.04 K/uL  Auto Neutrophil % : 74.6 %  Auto Lymphocyte % : 11.5 %  Auto Monocyte % : 9.7 %  Auto Eosinophil % : 2.1 %  Auto Basophil % : 0.4 %    07 Jan 2024 09:01    133    |  103    |  37     ----------------------------<  151    3.8     |  24     |  1.60     Ca    8.4        07 Jan 2024 09:01      PT/INR - ( 07 Jan 2024 09:01 )   PT: 18.4 sec;   INR: 1.59 ratio         PTT - ( 07 Jan 2024 09:01 )  PTT:68.8 sec  Urinalysis Basic - ( 07 Jan 2024 09:01 )    Color: x / Appearance: x / SG: x / pH: x  Gluc: 151 mg/dL / Ketone: x  / Bili: x / Urobili: x   Blood: x / Protein: x / Nitrite: x   Leuk Esterase: x / RBC: x / WBC x   Sq Epi: x / Non Sq Epi: x / Bacteria: x      CAPILLARY BLOOD GLUCOSE      POCT Blood Glucose.: 172 mg/dL (07 Jan 2024 12:11)  POCT Blood Glucose.: 201 mg/dL (07 Jan 2024 09:17)  POCT Blood Glucose.: 134 mg/dL (07 Jan 2024 01:07)  POCT Blood Glucose.: 137 mg/dL (06 Jan 2024 17:30)        Culture - Urine (collected 01-05-24 @ 13:10)  Source: Catheterized Catheterized  Final Report (01-06-24 @ 14:20):    No growth    Culture - Urine (collected 12-31-23 @ 13:38)  Source: Clean Catch Clean Catch (Midstream)  Final Report (01-01-24 @ 19:45):    <10,000 CFU/mL Normal Urogenital Christy    Culture - Blood (collected 12-31-23 @ 13:05)  Source: .Blood Blood-Peripheral  Final Report (01-05-24 @ 22:02):    No growth at 5 days    Culture - Blood (collected 12-31-23 @ 13:00)  Source: .Blood Blood-Peripheral  Final Report (01-05-24 @ 22:02):    No growth at 5 days        RADIOLOGY & ADDITIONAL TESTS:  Personally reviewed.     Consultant(s) Notes Reviewed:  [x] YES  [ ] NO Patient is a 89y old  Male who presents with a chief complaint of left foot wound (07 Jan 2024 09:57)        INTERVAL HPI/OVERNIGHT EVENTS: No acute overnight events. Pt seen and examined at the bedside this AM. Pt feels well, denies any pain and offers no complaints. Turcios bag still contains blood tinged urine, but improving. Pleasantly confused.      MEDICATIONS  (STANDING):  atorvastatin 10 milliGRAM(s) Oral at bedtime  cefepime   IVPB 1000 milliGRAM(s) IV Intermittent daily  dextrose 5%. 1000 milliLiter(s) (100 mL/Hr) IV Continuous <Continuous>  dextrose 5%. 1000 milliLiter(s) (50 mL/Hr) IV Continuous <Continuous>  dextrose 50% Injectable 25 Gram(s) IV Push once  dextrose 50% Injectable 12.5 Gram(s) IV Push once  dextrose 50% Injectable 25 Gram(s) IV Push once  glucagon  Injectable 1 milliGRAM(s) IntraMuscular once  heparin  Infusion.  Unit(s)/Hr (11 mL/Hr) IV Continuous <Continuous>  insulin lispro (ADMELOG) corrective regimen sliding scale   SubCutaneous at bedtime  insulin lispro (ADMELOG) corrective regimen sliding scale   SubCutaneous three times a day before meals  nadolol 20 milliGRAM(s) Oral daily  tamsulosin 0.4 milliGRAM(s) Oral at bedtime  warfarin 2.5 milliGRAM(s) Oral once    MEDICATIONS  (PRN):  dextrose Oral Gel 15 Gram(s) Oral once PRN Blood Glucose LESS THAN 70 milliGRAM(s)/deciliter  heparin   Injectable 2500 Unit(s) IV Push every 6 hours PRN For aPTT between 40 - 57  heparin   Injectable 5000 Unit(s) IV Push every 6 hours PRN For aPTT less than 40      Allergies    No Known Allergies    Intolerances        REVIEW OF SYSTEMS:  CONSTITUTIONAL: No fever or chills  HEENT:  No headache, no sore throat  RESPIRATORY: No cough, wheezing, or shortness of breath  CARDIOVASCULAR: No chest pain, palpitations  GASTROINTESTINAL: No abd pain, nausea, vomiting, or diarrhea  GENITOURINARY: No dysuria, frequency, or hematuria  NEUROLOGICAL: no focal weakness or dizziness  MUSCULOSKELETAL: no myalgias     Vital Signs Last 24 Hrs  T(C): 37.3 (07 Jan 2024 12:25), Max: 37.3 (07 Jan 2024 12:25)  T(F): 99.1 (07 Jan 2024 12:25), Max: 99.1 (07 Jan 2024 12:25)  HR: 80 (07 Jan 2024 12:25) (80 - 98)  BP: 100/50 (07 Jan 2024 12:25) (100/50 - 130/60)  RR: 20 (07 Jan 2024 12:25) (18 - 20)  SpO2: 91% (07 Jan 2024 12:25) (91% - 92%)    Parameters below as of 07 Jan 2024 12:25  Patient On (Oxygen Delivery Method): room air        PHYSICAL EXAM:  GENERAL: NAD, A&Ox3 but confused  HEENT:  anicteric, moist mucous membranes  CHEST/LUNG:  CTA b/l, no rales, wheezes, or rhonchi  HEART:  RRR, S1, S2  ABDOMEN:  BS+, soft, nontender, nondistended  EXTREMITIES: no edema, cyanosis, or calf tenderness; black eschar on dorsum of L foot; pedal pulses present bilaterally  NERVOUS SYSTEM: answers questions and follows commands appropriately    LABS:                        11.3   9.66  )-----------( 243      ( 07 Jan 2024 09:01 )             33.4     CBC Full  -  ( 07 Jan 2024 09:01 )  WBC Count : 9.66 K/uL  Hemoglobin : 11.3 g/dL  Hematocrit : 33.4 %  Platelet Count - Automated : 243 K/uL  Mean Cell Volume : 88.1 fl  Mean Cell Hemoglobin : 29.8 pg  Mean Cell Hemoglobin Concentration : 33.8 gm/dL  Auto Neutrophil # : 7.21 K/uL  Auto Lymphocyte # : 1.11 K/uL  Auto Monocyte # : 0.94 K/uL  Auto Eosinophil # : 0.20 K/uL  Auto Basophil # : 0.04 K/uL  Auto Neutrophil % : 74.6 %  Auto Lymphocyte % : 11.5 %  Auto Monocyte % : 9.7 %  Auto Eosinophil % : 2.1 %  Auto Basophil % : 0.4 %    07 Jan 2024 09:01    133    |  103    |  37     ----------------------------<  151    3.8     |  24     |  1.60     Ca    8.4        07 Jan 2024 09:01      PT/INR - ( 07 Jan 2024 09:01 )   PT: 18.4 sec;   INR: 1.59 ratio         PTT - ( 07 Jan 2024 09:01 )  PTT:68.8 sec  Urinalysis Basic - ( 07 Jan 2024 09:01 )    Color: x / Appearance: x / SG: x / pH: x  Gluc: 151 mg/dL / Ketone: x  / Bili: x / Urobili: x   Blood: x / Protein: x / Nitrite: x   Leuk Esterase: x / RBC: x / WBC x   Sq Epi: x / Non Sq Epi: x / Bacteria: x      CAPILLARY BLOOD GLUCOSE      POCT Blood Glucose.: 172 mg/dL (07 Jan 2024 12:11)  POCT Blood Glucose.: 201 mg/dL (07 Jan 2024 09:17)  POCT Blood Glucose.: 134 mg/dL (07 Jan 2024 01:07)  POCT Blood Glucose.: 137 mg/dL (06 Jan 2024 17:30)        Culture - Urine (collected 01-05-24 @ 13:10)  Source: Catheterized Catheterized  Final Report (01-06-24 @ 14:20):    No growth    Culture - Urine (collected 12-31-23 @ 13:38)  Source: Clean Catch Clean Catch (Midstream)  Final Report (01-01-24 @ 19:45):    <10,000 CFU/mL Normal Urogenital Christy    Culture - Blood (collected 12-31-23 @ 13:05)  Source: .Blood Blood-Peripheral  Final Report (01-05-24 @ 22:02):    No growth at 5 days    Culture - Blood (collected 12-31-23 @ 13:00)  Source: .Blood Blood-Peripheral  Final Report (01-05-24 @ 22:02):    No growth at 5 days        RADIOLOGY & ADDITIONAL TESTS:  Personally reviewed.     Consultant(s) Notes Reviewed:  [x] YES  [ ] NO

## 2024-01-08 NOTE — PROGRESS NOTE ADULT - PROBLEM SELECTOR PLAN 2
Pt with BETINA, unknown baseline (outpt note with Cr 1.16 10/2023) - IMPROVING  - BUN/Cr 46/1.6 on [facility] admission, Cr went up to 2.4 on 1/5 and now back to 1.6 on 1/8  - likely 2/2 abx vs urinary retention - vanco discontinued and castañeda placed with >1L output  - hold lisinopril in setting of BETINA  - hold silodosin in setting of dec Cr clearance  - now off IV fluids  - replace electrolytes as needed  - f/u AM BMP  - nephro consulted, recs appreciated Pt with BETINA, unknown baseline (outpt note with Cr 1.16 10/2023) - IMPROVING  - BUN/Cr 46/1.6 on [facility] admission, Cr went up to 2.4 on 1/5 and now back to 1.6 on 1/8  - likely 2/2 abx vs urinary retention - vanco discontinued and castañeda placed with >1L output  - hold lisinopril in setting of BETINA  - hold silodosin in setting of dec Cr clearance  - will resume IV fluids given significant orthostasis and Cr plateau off fluids  - replace electrolytes as needed  - f/u AM BMP  - nephro consulted, recs appreciated

## 2024-01-08 NOTE — PROGRESS NOTE ADULT - SUBJECTIVE AND OBJECTIVE BOX
PROGRESS NOTE   Patient is a 89y old  Male who presents with a chief complaint of left foot wound (08 Jan 2024 16:25)      HPI:  89 yo M with PMH HTN, HLD, LBBB, paroxismal Atrial Fibrillation, thoracic aortic aneurysm, s/p mechanical AVR presented transferred from  for angioplasty of his L foot. Pt originally presented to  on 12/31 after experiencing left foot pain which upon removal of his sock revealed a black eschar on the dorsum of his foot. He had an xray done which showed no bone destruction and he was started on IV abx for cellulitis, currently on cefepime only. 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 Pt was unable to have an MRI performed as they were unable to complete the MRI safety forms due to pts cognitive impairment and inability to reach family. Pt reports currently feeling well and denies any complaints. Pt [place].       Vitals: BP: 111/72, HR: 89, Temp: 98.6F, RR: 19, SpO2: 92% on RA   AM Labs:  WBC 13.40, BUN/Cr 51/2.22   (05 Jan 2024 03:24)      Vital Signs Last 24 Hrs  T(C): 36.3 (08 Jan 2024 16:52), Max: 36.8 (07 Jan 2024 20:13)  T(F): 97.4 (08 Jan 2024 16:52), Max: 98.2 (07 Jan 2024 20:13)  HR: 83 (08 Jan 2024 16:52) (77 - 84)  BP: 122/69 (08 Jan 2024 16:52) (66/48 - 122/69)  BP(mean): --  RR: 18 (08 Jan 2024 16:52) (17 - 20)  SpO2: 91% (08 Jan 2024 16:52) (90% - 93%)    Parameters below as of 08 Jan 2024 16:52  Patient On (Oxygen Delivery Method): room air                              10.6   7.72  )-----------( 220      ( 08 Jan 2024 07:27 )             30.4               01-08    135  |  104  |  39<H>  ----------------------------<  150<H>  3.7   |  27  |  1.60<H>    Ca    8.4<L>      08 Jan 2024 07:27  Mg     1.5     01-08    TPro  6.2  /  Alb  2.0<L>  /  TBili  0.5  /  DBili  x   /  AST  24  /  ALT  21  /  AlkPhos  54  01-08      PHYSICAL EXAM  General: Pleasant  male NAD   Integument:  Skin warm, dry and supple bilateral.    Left dorsum foot unstageable ulcer, + hyperkeratotic border, wound base Necrotic eschar, - edema, + anjel-wound erythema, + calor, - purulence, - fluctuance, - tracking/tunneling, - probe to bone.   Consistent with cellulitis but OM cannot be excluded  Cellulitic changes noted  Vascular: Dorsalis Pedis and Posterior Tibial pulses not palpable.  Capillary re-fill time less then 3 seconds digits 1-5 bilateral.    Neuro: Unable to perform  MSK: Unable to perform     PROGRESS NOTE   Patient is a 89y old  Male who presents with a chief complaint of left foot wound (08 Jan 2024 16:25)      HPI:  89 yo M with PMH HTN, HLD, LBBB, paroxismal Atrial Fibrillation, thoracic aortic aneurysm, s/p mechanical AVR presented transferred from  for angioplasty of his L foot. Pt originally presented to  on 12/31 after experiencing left foot pain which upon removal of his sock revealed a black eschar on the dorsum of his foot. He had an xray done which showed no bone destruction and he was started on IV abx for cellulitis, currently on cefepime only. 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 Pt was unable to have an MRI performed as they were unable to complete the MRI safety forms due to pts cognitive impairment and inability to reach family. Pt reports currently feeling well and denies any complaints. Pt [place].       Vitals: BP: 111/72, HR: 89, Temp: 98.6F, RR: 19, SpO2: 92% on RA   AM Labs:  WBC 13.40, BUN/Cr 51/2.22   (05 Jan 2024 03:24)      Vital Signs Last 24 Hrs  T(C): 36.3 (08 Jan 2024 16:52), Max: 36.8 (07 Jan 2024 20:13)  T(F): 97.4 (08 Jan 2024 16:52), Max: 98.2 (07 Jan 2024 20:13)  HR: 83 (08 Jan 2024 16:52) (77 - 84)  BP: 122/69 (08 Jan 2024 16:52) (66/48 - 122/69)  BP(mean): --  RR: 18 (08 Jan 2024 16:52) (17 - 20)  SpO2: 91% (08 Jan 2024 16:52) (90% - 93%)    Parameters below as of 08 Jan 2024 16:52  Patient On (Oxygen Delivery Method): room air                              10.6   7.72  )-----------( 220      ( 08 Jan 2024 07:27 )             30.4               01-08    135  |  104  |  39<H>  ----------------------------<  150<H>  3.7   |  27  |  1.60<H>    Ca    8.4<L>      08 Jan 2024 07:27  Mg     1.5     01-08    TPro  6.2  /  Alb  2.0<L>  /  TBili  0.5  /  DBili  x   /  AST  24  /  ALT  21  /  AlkPhos  54  01-08      PHYSICAL EXAM  General: Pleasant  male NAD   Integument:  Skin warm, dry and supple bilateral.    Left dorsum foot unstageable ulcer, + hyperkeratotic border, wound base Necrotic eschar, - edema, + anjel-wound erythema, + calor, - purulence, - fluctuance, - tracking/tunneling, - probe to bone.   Consistent with cellulitis but OM cannot be excluded  Cellulitic changes noted  Vascular: Dorsalis Pedis and Posterior Tibial pulses not palpable.  Capillary re-fill time less then 3 seconds digits 1-5 bilateral.    Neuro: Unable to perform  MSK: Unable to perform

## 2024-01-08 NOTE — PROGRESS NOTE ADULT - ASSESSMENT
UR s/p difficult Turcios - stable  CKD    continue Turcios, hydrate, no  intervention planned at present

## 2024-01-08 NOTE — PROGRESS NOTE ADULT - ATTENDING COMMENTS
89 yo M with PMH HTN, HLD, LBBB, paroxysmal Atrial Fibrillation, thoracic aortic aneurysm, s/p mechanical AVR presented transferred from  for angioplasty of his L foot - found to have palpable pulses and procedure cancelled. To continue management of BETINA and cellulitis/suspect OM. Continue empiric cefepime. Pending bone scan to eval for OM as unable to get MRI. Discussed with ID and podiatry consultants - ID suggested CT if no plans for intervention. Podiatry hoping for MRI but given metal in arm - agrees with bone scan. Will get NM bone scan for further recs. BETINA improving and Cr Stable at 1.6 - baseline is 1.16 as of 10/2023. Will resume IV fluids given significant orthostasis this AM. Multiple providers unable to reach son - I spoke to him on 1/6 - he does get out of work late - did not see him at bedside yesterday.

## 2024-01-08 NOTE — CHART NOTE - NSCHARTNOTESELECT_GEN_ALL_CORE
Event Note
Update/Event Note
follow up/Event Note
Event Note
Vascular Surgery/Event Note

## 2024-01-08 NOTE — PROGRESS NOTE ADULT - ASSESSMENT
89 year old male with PMH HTN, HLD, LBBB, paroxysmal Atrial Fibrillation, thoracic aortic aneurysm, s/p mechanical AVR presented transferred from  for angioplasty of his L foot.  Cardiology consultation now being obtained.     Cardiac Optimization/LBBB/PaFib  - Vascular recs noted.  No plans for LE angiogram at this point  - Can continue transition to Coumadin.  Dose daily to keep INR~3   - EKG SR with old LBBB   - Has known history of PAfib and mechanical AVR  -hematuria noted follow up primary recs - hgb stable     - BP  112/ 50   - Continue to hold home ACEI for cristhian, soft bp   - Continue home Nadolol with parameters   - Continue home statin     - No sign volume overload, non-orthopneic on RA  - Can obtain routine TTE    - Monitor and replete electrolytes. Keep K>4.0 and Mg>2.0.  - Will continue to follow

## 2024-01-08 NOTE — PROGRESS NOTE ADULT - SUBJECTIVE AND OBJECTIVE BOX
Gu Progress Note:     Urinary Retention managed with  Turcios, no issues with Turcios    PAST MEDICAL & SURGICAL HISTORY:  HTN (hypertension)      HLD (hyperlipidemia)      Paroxysmal atrial fibrillation      H/O aortic valve repair      H/O thoracic aortic aneurysm repair            MEDICATIONS  (STANDING):  atorvastatin 10 milliGRAM(s) Oral at bedtime  cefepime   IVPB 1000 milliGRAM(s) IV Intermittent daily  dextrose 5%. 1000 milliLiter(s) (100 mL/Hr) IV Continuous <Continuous>  dextrose 5%. 1000 milliLiter(s) (50 mL/Hr) IV Continuous <Continuous>  dextrose 50% Injectable 25 Gram(s) IV Push once  dextrose 50% Injectable 12.5 Gram(s) IV Push once  dextrose 50% Injectable 25 Gram(s) IV Push once  glucagon  Injectable 1 milliGRAM(s) IntraMuscular once  heparin  Infusion.  Unit(s)/Hr (11 mL/Hr) IV Continuous <Continuous>  insulin lispro (ADMELOG) corrective regimen sliding scale   SubCutaneous at bedtime  insulin lispro (ADMELOG) corrective regimen sliding scale   SubCutaneous three times a day before meals  nadolol 20 milliGRAM(s) Oral daily  sodium chloride 0.45%. 1000 milliLiter(s) (75 mL/Hr) IV Continuous <Continuous>  tamsulosin 0.4 milliGRAM(s) Oral at bedtime    MEDICATIONS  (PRN):  dextrose Oral Gel 15 Gram(s) Oral once PRN Blood Glucose LESS THAN 70 milliGRAM(s)/deciliter  heparin   Injectable 2500 Unit(s) IV Push every 6 hours PRN For aPTT between 40 - 57  heparin   Injectable 5000 Unit(s) IV Push every 6 hours PRN For aPTT less than 40      Allergies    No Known Allergies    Intolerances            FAMILY HISTORY:      Vital Signs Last 24 Hrs  T(C): 36.5 (08 Jan 2024 11:55), Max: 36.8 (07 Jan 2024 20:13)  T(F): 97.7 (08 Jan 2024 11:55), Max: 98.2 (07 Jan 2024 20:13)  HR: 79 (08 Jan 2024 11:55) (77 - 84)  BP: 111/63 (08 Jan 2024 11:55) (66/48 - 112/50)  BP(mean): --  RR: 18 (08 Jan 2024 11:55) (17 - 20)  SpO2: 93% (08 Jan 2024 11:55) (90% - 93%)    Parameters below as of 08 Jan 2024 11:55  Patient On (Oxygen Delivery Method): room air        PHYSICAL EXAM:    Constitutional: NAD,asthenic  Asymptomatic,Abd: BS+, soft, NT/ND, No CVAT  : Phimosis of phallus, patent  meatus, bilateral descended testes, no masses Turcios with grossly light pink tinge to urine  Extremities: left foot ulcer    LABS:                        10.6   7.72  )-----------( 220      ( 08 Jan 2024 07:27 )             30.4     01-08    135  |  104  |  39<H>  ----------------------------<  150<H>  3.7   |  27  |  1.60<H>    Ca    8.4<L>      08 Jan 2024 07:27  Mg     1.5     01-08    TPro  6.2  /  Alb  2.0<L>  /  TBili  0.5  /  DBili  x   /  AST  24  /  ALT  21  /  AlkPhos  54  01-08    PT/INR - ( 08 Jan 2024 07:27 )   PT: 23.0 sec;   INR: 2.01 ratio         PTT - ( 08 Jan 2024 07:27 )  PTT:70.7 sec  Urinalysis Basic - ( 08 Jan 2024 07:27 )    Color: x / Appearance: x / SG: x / pH: x  Gluc: 150 mg/dL / Ketone: x  / Bili: x / Urobili: x   Blood: x / Protein: x / Nitrite: x   Leuk Esterase: x / RBC: x / WBC x   Sq Epi: x / Non Sq Epi: x / Bacteria: x      Urine Culture:   Hemoglobin: 10.6 g/dL (01-08 @ 07:27)  Hematocrit: 30.4 % (01-08 @ 07:27)  Hemoglobin: 11.3 g/dL (01-07 @ 09:01)  Hematocrit: 33.4 % (01-07 @ 09:01)      RADIOLOGY & ADDITIONAL STUDIES:

## 2024-01-08 NOTE — PROGRESS NOTE ADULT - PROBLEM SELECTOR PLAN 3
Pt with history of afib on warfarin at home, presented to [facility] originally with supratherapeutic INR  - pt NSR on exam  - INR subtherapeutic at 2; cont with heparin gtt, will c/w dose Coumadin 2.5mg tonight as angioplasty is now on hold  - f/u TTE ordered

## 2024-01-08 NOTE — PROGRESS NOTE ADULT - SUBJECTIVE AND OBJECTIVE BOX
[facility] -- An Barajas Pannella, Patel, Savella Goodger, Cohen  Office # [phone]      Follow Up:    Af , mechanical avr, cardiac optimization    Subjective/Observations:     Seen bedside, eyes open but unable to provide any meaningful information   castañeda noted with hematuria   remains on room air     REVIEW OF SYSTEMS: All other review of systems is negative unless indicated above    PAST MEDICAL & SURGICAL HISTORY:  HTN (hypertension)      HLD (hyperlipidemia)      Paroxysmal atrial fibrillation      H/O aortic valve repair      H/O thoracic aortic aneurysm repair          MEDICATIONS  (STANDING):  atorvastatin 10 milliGRAM(s) Oral at bedtime  cefepime   IVPB 1000 milliGRAM(s) IV Intermittent daily  dextrose 5%. 1000 milliLiter(s) (100 mL/Hr) IV Continuous <Continuous>  dextrose 5%. 1000 milliLiter(s) (50 mL/Hr) IV Continuous <Continuous>  dextrose 50% Injectable 25 Gram(s) IV Push once  dextrose 50% Injectable 12.5 Gram(s) IV Push once  dextrose 50% Injectable 25 Gram(s) IV Push once  glucagon  Injectable 1 milliGRAM(s) IntraMuscular once  heparin  Infusion.  Unit(s)/Hr (11 mL/Hr) IV Continuous <Continuous>  insulin lispro (ADMELOG) corrective regimen sliding scale   SubCutaneous at bedtime  insulin lispro (ADMELOG) corrective regimen sliding scale   SubCutaneous three times a day before meals  nadolol 20 milliGRAM(s) Oral daily  tamsulosin 0.4 milliGRAM(s) Oral at bedtime    MEDICATIONS  (PRN):  dextrose Oral Gel 15 Gram(s) Oral once PRN Blood Glucose LESS THAN 70 milliGRAM(s)/deciliter  heparin   Injectable 2500 Unit(s) IV Push every 6 hours PRN For aPTT between 40 - 57  heparin   Injectable 5000 Unit(s) IV Push every 6 hours PRN For aPTT less than 40      Allergies    No Known Allergies    Intolerances        Vital Signs Last 24 Hrs  T(C): 36.6 (2024 09:30), Max: 37.3 (2024 12:25)  T(F): 97.9 (2024 09:30), Max: 99.1 (2024 12:25)  HR: 82 (2024 09:35) (77 - 84)  BP: 112/50 (2024 09:35) (66/48 - 112/50)  BP(mean): --  RR: 18 (2024 09:35) (17 - 20)  SpO2: 93% (2024 09:35) (90% - 93%)    Parameters below as of 2024 09:35  Patient On (Oxygen Delivery Method): room air        I&O's Summary    2024 07:01  -  2024 07:00  --------------------------------------------------------  IN: 88 mL / OUT: 0 mL / NET: 88 mL    2024 07:01  -  2024 10:48  --------------------------------------------------------  IN: 480 mL / OUT: 1000 mL / NET: -520 mL          PHYSICAL EXAM:  TELE: not on tele   Constitutional: NAD, frail   HEENT: Moist Mucous Membranes, Anicteric  Pulmonary: Non-labored, breath sounds are clear bilaterally, No wheezing, crackles or rhonchi  Cardiovascular: Regular, S1 and S2 nl, No murmurs, rubs, gallops or clicks  Gastrointestinal: Bowel Sounds present, soft, nontender. castañeda with hematuria noted   Lymph: No lymphadenopathy. No peripheral edema.  Skin: No visible rashes or ulcers.  Psych:  flat     LABS: All Labs Reviewed:                        10.6   7.72  )-----------( 220      ( 2024 07:27 )             30.4                         11.3   9.66  )-----------( 243      ( 2024 09:01 )             33.4                         10.9   10.41 )-----------( 237      ( 2024 09:15 )             32.6     2024 07:27    135    |  104    |  39     ----------------------------<  150    3.7     |  27     |  1.60   2024 09:01    133    |  103    |  37     ----------------------------<  151    3.8     |  24     |  1.60   2024 09:15    136    |  104    |  44     ----------------------------<  175    3.8     |  26     |  1.80     Ca    8.4        2024 07:27  Ca    8.4        2024 09:01  Ca    8.1        2024 09:15  Mg     1.5       2024 07:27    TPro  6.2    /  Alb  2.0    /  TBili  0.5    /  DBili  x      /  AST  24     /  ALT  21     /  AlkPhos  54     2024 07:27  TPro  6.5    /  Alb  2.1    /  TBili  0.6    /  DBili  x      /  AST  21     /  ALT  19     /  AlkPhos  59     2024 09:15    PT/INR - ( 2024 07:27 )   PT: 23.0 sec;   INR: 2.01 ratio         PTT - ( 2024 07:27 )  PTT:70.7 sec         EC Lead ECG:   Ventricular Rate 80 BPM    Atrial Rate 80 BPM    P-R Interval 200 ms    QRS Duration 140 ms    Q-T Interval 408 ms    QTC Calculation(Bazett) 470 ms    P Axis 81 degrees    R Axis -51 degrees    T Axis 88 degrees    Diagnosis Line Normal sinus rhythm  Left axis deviation  Left bundle branch block  Left anterior fascicular block  Abnormal ECG  When compared with ECG of 07-SEP-2021 17:07,  T wave inversion less evident in Lateral leads    Confirmed by JOHNATHAN GOMEZ MD ([id number]) on 2024 8:42:58 AM (23 @ 12:32)        Radiology:         [facility] -- An Barajas Pannella, Patel, Savella Goodger, Cohen  Office # [phone]      Follow Up:    Af , mechanical avr, cardiac optimization    Subjective/Observations:     Seen bedside, eyes open but unable to provide any meaningful information   castañeda noted with hematuria   remains on room air     REVIEW OF SYSTEMS: All other review of systems is negative unless indicated above    PAST MEDICAL & SURGICAL HISTORY:  HTN (hypertension)      HLD (hyperlipidemia)      Paroxysmal atrial fibrillation      H/O aortic valve repair      H/O thoracic aortic aneurysm repair          MEDICATIONS  (STANDING):  atorvastatin 10 milliGRAM(s) Oral at bedtime  cefepime   IVPB 1000 milliGRAM(s) IV Intermittent daily  dextrose 5%. 1000 milliLiter(s) (100 mL/Hr) IV Continuous <Continuous>  dextrose 5%. 1000 milliLiter(s) (50 mL/Hr) IV Continuous <Continuous>  dextrose 50% Injectable 25 Gram(s) IV Push once  dextrose 50% Injectable 12.5 Gram(s) IV Push once  dextrose 50% Injectable 25 Gram(s) IV Push once  glucagon  Injectable 1 milliGRAM(s) IntraMuscular once  heparin  Infusion.  Unit(s)/Hr (11 mL/Hr) IV Continuous <Continuous>  insulin lispro (ADMELOG) corrective regimen sliding scale   SubCutaneous at bedtime  insulin lispro (ADMELOG) corrective regimen sliding scale   SubCutaneous three times a day before meals  nadolol 20 milliGRAM(s) Oral daily  tamsulosin 0.4 milliGRAM(s) Oral at bedtime    MEDICATIONS  (PRN):  dextrose Oral Gel 15 Gram(s) Oral once PRN Blood Glucose LESS THAN 70 milliGRAM(s)/deciliter  heparin   Injectable 2500 Unit(s) IV Push every 6 hours PRN For aPTT between 40 - 57  heparin   Injectable 5000 Unit(s) IV Push every 6 hours PRN For aPTT less than 40      Allergies    No Known Allergies    Intolerances        Vital Signs Last 24 Hrs  T(C): 36.6 (2024 09:30), Max: 37.3 (2024 12:25)  T(F): 97.9 (2024 09:30), Max: 99.1 (2024 12:25)  HR: 82 (2024 09:35) (77 - 84)  BP: 112/50 (2024 09:35) (66/48 - 112/50)  BP(mean): --  RR: 18 (2024 09:35) (17 - 20)  SpO2: 93% (2024 09:35) (90% - 93%)    Parameters below as of 2024 09:35  Patient On (Oxygen Delivery Method): room air        I&O's Summary    2024 07:01  -  2024 07:00  --------------------------------------------------------  IN: 88 mL / OUT: 0 mL / NET: 88 mL    2024 07:01  -  2024 10:48  --------------------------------------------------------  IN: 480 mL / OUT: 1000 mL / NET: -520 mL          PHYSICAL EXAM:  TELE: not on tele   Constitutional: NAD, frail   HEENT: Moist Mucous Membranes, Anicteric  Pulmonary: Non-labored, breath sounds are clear bilaterally, No wheezing, crackles or rhonchi  Cardiovascular: Regular, S1 and S2 nl, No murmurs, rubs, gallops or clicks  Gastrointestinal: Bowel Sounds present, soft, nontender. castañeda with hematuria noted   Lymph: No lymphadenopathy. No peripheral edema.  Skin: No visible rashes or ulcers.  Psych:  flat     LABS: All Labs Reviewed:                        10.6   7.72  )-----------( 220      ( 2024 07:27 )             30.4                         11.3   9.66  )-----------( 243      ( 2024 09:01 )             33.4                         10.9   10.41 )-----------( 237      ( 2024 09:15 )             32.6     2024 07:27    135    |  104    |  39     ----------------------------<  150    3.7     |  27     |  1.60   2024 09:01    133    |  103    |  37     ----------------------------<  151    3.8     |  24     |  1.60   2024 09:15    136    |  104    |  44     ----------------------------<  175    3.8     |  26     |  1.80     Ca    8.4        2024 07:27  Ca    8.4        2024 09:01  Ca    8.1        2024 09:15  Mg     1.5       2024 07:27    TPro  6.2    /  Alb  2.0    /  TBili  0.5    /  DBili  x      /  AST  24     /  ALT  21     /  AlkPhos  54     2024 07:27  TPro  6.5    /  Alb  2.1    /  TBili  0.6    /  DBili  x      /  AST  21     /  ALT  19     /  AlkPhos  59     2024 09:15    PT/INR - ( 2024 07:27 )   PT: 23.0 sec;   INR: 2.01 ratio         PTT - ( 2024 07:27 )  PTT:70.7 sec         EC Lead ECG:   Ventricular Rate 80 BPM    Atrial Rate 80 BPM    P-R Interval 200 ms    QRS Duration 140 ms    Q-T Interval 408 ms    QTC Calculation(Bazett) 470 ms    P Axis 81 degrees    R Axis -51 degrees    T Axis 88 degrees    Diagnosis Line Normal sinus rhythm  Left axis deviation  Left bundle branch block  Left anterior fascicular block  Abnormal ECG  When compared with ECG of 07-SEP-2021 17:07,  T wave inversion less evident in Lateral leads    Confirmed by JOHNATHAN GOMEZ MD ([id number]) on 2024 8:42:58 AM (23 @ 12:32)        Radiology:

## 2024-01-08 NOTE — PROGRESS NOTE ADULT - PROBLEM SELECTOR PLAN 1
Pt with original presentation to [facility] with L foot wound and cellulitis, transferred to [facility] for L angioplasty  - doppler with occlusion of the popliteal artery on the left - however patient has strong DP pulse, US likely not accurate per vascular - angioplasty now on hold  - L foot NM bone scan ordered to eval for OM, unable to obtain MRI due to metal in arm as per son  - s/p vanc/zosyn -> vanc/cefepime -> cefepime  - WBC trending downwards, now WNL on 1/6/24  - afebrile, VS stable  - cont cefepime 1g q24 renal dosing  - will hold off on ordering Bcx as pt has completed several days of abx tx; consider Bcx if pt has new fever  - cont heparin gtt as INR subtherapeutic and pt with mechanical AVR, goal INR ~3  - will cont to dose Coumadin tonight  - cont minced and moist diet per  chart review  - ID consulted Dr. Orosco, recs appreciated  - Vascular consulted recs appreciated  - Cardio consulted for cardiac clearance recs appreciated Pt with original presentation to [facility] with L foot wound and cellulitis, transferred to [facility] for L angioplasty  - doppler with occlusion of the popliteal artery on the left - however patient has strong DP pulse, US likely not accurate per vascular - angioplasty now on hold  - L foot NM bone scan ordered to eval for OM, unable to obtain MRI due to metal in arm as per son  - s/p vanc/zosyn -> vanc/cefepime -> cefepime  - WBC trending downwards, now WNL on 1/6/24  - afebrile, VS stable  - cont cefepime 1g q24 renal dosing  - will hold off on ordering Bcx as pt has completed several days of abx tx; consider Bcx if pt has new fever  - cont heparin gtt as INR subtherapeutic and pt with mechanical AVR, goal INR ~3  - will cont to dose Coumadin tonight  - cont minced and moist diet per  chart review  - ID consulted Dr. Orosco, recs appreciated  - Vascular consulted recs appreciated  - Cardio consulted for cardiac clearance recs appreciated Pt with original presentation to [facility] with L foot wound and cellulitis, transferred to [facility] for L angioplasty  - doppler with occlusion of the popliteal artery on the left - however patient has strong DP pulse, US likely not accurate per vascular - angioplasty cancelled  - L foot NM bone scan ordered to eval for OM, unable to obtain MRI due to metal in arm as per son  - s/p vanc/zosyn -> vanc/cefepime -> cefepime  - WBC trending downwards, now WNL  - afebrile, VS stable  - cont cefepime 1g q24 renal dosing  - will hold off on ordering Bcx as pt has completed several days of abx tx; consider Bcx if pt has new fever  - cont heparin gtt as INR subtherapeutic and pt with mechanical AVR, goal INR ~3  - will cont to dose Coumadin tonight  - cont minced and moist diet per  chart review  - ID consulted Dr. Orosco, recs appreciated  - Vascular consulted recs appreciated  - Cardio consulted for cardiac clearance recs appreciated Pt with original presentation to [facility] with L foot wound and cellulitis, transferred to [facility] for L angioplasty  - doppler with occlusion of the popliteal artery on the left - however patient has strong DP pulse, US likely not accurate per vascular - angioplasty cancelled  - L foot NM bone scan ordered to eval for OM, unable to obtain MRI due to metal in arm as per son  - s/p vanc/zosyn -> vanc/cefepime -> cefepime  - WBC trending downwards, now WNL  - afebrile, VS stable  - cont cefepime 1g q24 renal dosing  - will hold off on ordering Bcx as pt has completed several days of abx tx; consider Bcx if pt has new fever  - cont heparin gtt as INR subtherapeutic and pt with mechanical AVR, goal INR ~3  - will cont to dose Coumadin tonight  - cont minced and moist diet per  chart review  - ID consulted Dr. Orosco, recs appreciated  - Vascular consulted recs appreciated  - Cardio consulted for cardiac clearance recs appreciated

## 2024-01-08 NOTE — PHARMACOTHERAPY INTERVENTION NOTE - COMMENTS
Patient is a 89 yo M ordered for cefepime 1000 mg daily for L foot wound. Discussed with ID Dr. Loredo and reccomended adjusting dose to 1000 mg q12h based on patient's renal function (CrCl=26 mL/min). Recommendation accepted and order entered.

## 2024-01-08 NOTE — SOCIAL WORK PROGRESS NOTE - NSSWPROGRESSNOTE_GEN_ALL_CORE
Per PT, dc recommendations are for [facility] at this time. SW met with pt at bedside to discuss recommendation. Pt in agreement with [facility] recomm; wishes for pt son Bogdan to be involved with ME[facility]NA choices. SW spoke with pt son Bogdan via telephone, discussed MEGHNA/choices. Pt son Bogdan is unsure of which [facility] to choose; he reports not having an email and is not coming to the hospital today. SW suggested sending referrals to [facility] in [facility], then we can discuss which facility pt/pt son wants to choose. Pt estella Mcfadden was in agreement with MEGHNA's being sent to [facility] and can discuss feedback after. MICHELLE requested. SW will continue to follow.  Per PT, dc recommendations are for MEGHNA at this time. SW met with pt at bedside to discuss recommendation. Pt in agreement with MEGHNA recomm; wishes for pt son Bogdan to be involved with ME[facility]NA choices. SW spoke with pt son Bogdan via telephone, discussed MEGHNA/choices. Pt son Bogdan is unsure of which ME[facility]NA to choose; he reports not having an email and is not coming to the hospital today. SW suggested sending referrals to [facility] in [facility], then we can discuss which facility pt/pt son wants to choose. Pt estella Mcfadden was in agreement with MEGHNA's being sent to [facility] and can discuss feedback after. MICHELLE requested. SW will continue to follow.

## 2024-01-08 NOTE — PROGRESS NOTE ADULT - ASSESSMENT
89 yo M with PMH HTN, HLD, LBBB, paroxismal Atrial Fibrillation, thoracic aortic aneurysm, s/p mechanical AVR, who was transferred from  for angioplasty of his L foot. He has a left foot eschar, suspect ischemic in etiology. Can continue empiric antibiotics to treat for potential soft tissue infection, or even underlying osteomyelitis. Has has no fevers or leukocytosis. CRP 57. MRSA nasal PCR negative. Blood cultures from 12/31 remain no growth.    #Left foot eschar  #? L foot osteomyelitis    -continue cefepime (renally dosed)--duration to be determined pending bone scan  -plan for bone scan if MRI cannot be done  -discussed with Dr. Marc Loredo MD  Division of Infectious Diseases   Cell [phone] between 8am and 6pm   After 6pm and weekends please call ID service at [phone].     35 minutes spent on total encounter assessing patient, examination, chart review, counseling and coordinating care by the attending physician/nurse/care manager.      89 yo M with PMH HTN, HLD, LBBB, paroxismal Atrial Fibrillation, thoracic aortic aneurysm, s/p mechanical AVR, who was transferred from  for angioplasty of his L foot. He has a left foot eschar, suspect ischemic in etiology. Can continue empiric antibiotics to treat for potential soft tissue infection, or even underlying osteomyelitis. Has has no fevers or leukocytosis. CRP 57. MRSA nasal PCR negative. Blood cultures from 12/31 remain no growth.    #Left foot eschar  #? L foot osteomyelitis    -continue cefepime (renally dosed)--duration to be determined pending bone scan  -plan for bone scan if MRI cannot be done  -discussed with Dr. Marc Loredo MD  Division of Infectious Diseases   Cell [phone] between 8am and 6pm   After 6pm and weekends please call ID service at [phone].     35 minutes spent on total encounter assessing patient, examination, chart review, counseling and coordinating care by the attending physician/nurse/care manager.      CHEST PAIN

## 2024-01-08 NOTE — GOALS OF CARE CONVERSATION - ADVANCED CARE PLANNING - CONVERSATION DETAILS
Palliative care SW spoke with patient's son Bogdan by phone. Reviewed patient's medical and social history as well as events leading to patient's hospitalization. Writer discussed patient's current diagnosis (Cellulitis of L Foot, BETINA, Afib, T2DM, HTN, HLD,  LBBB, thoracic aortic aneurysm, s/p mechanical AVR ), medical condition and management. Son states that he is unsure if patient has any advanced directives in place. Inquired about patient's wishes regarding extent of medical care to be provided including thoughts regarding cardiopulmonary resuscitation and mechanical ventilation/intubation. Son  showed insight into medical condition. He states that given patient's age he would not want patient to undergo CPR or be intubated and states he knows his father would not want this as well. Writer recommended completion of MOLST. MOLST completed with DNR/DNI orders and placed on patient's chart. Son states that patient lives alone with minimal assistance (cleaning services). Plan at this time is for patient to be discharged to [facility]. All questions answered. Psychosocial support provided. Palliative care SW spoke with patient's son Bogdan by phone. Reviewed patient's medical and social history as well as events leading to patient's hospitalization. Writer discussed patient's current diagnosis (Cellulitis of L Foot, BETINA, Afib, T2DM, HTN, HLD,  LBBB, thoracic aortic aneurysm, s/p mechanical AVR ), medical condition and management. Son states that he is unsure if patient has any advanced directives in place. Inquired about patient's wishes regarding extent of medical care to be provided including thoughts regarding cardiopulmonary resuscitation and mechanical ventilation/intubation. Son  showed insight into medical condition. He states that given patient's age he would not want patient to undergo CPR or be intubated and states he knows his father would not want this as well. Writer recommended completion of MOLST. MOLST completed with DNR/DNI orders and placed on patient's chart. Son states that patient lives alone with minimal assistance (cleaning services). Plan at this time is for patient to be discharged to [facility]. All questions answered. Psychosocial support provided.

## 2024-01-08 NOTE — PROGRESS NOTE ADULT - PROBLEM SELECTOR PLAN 4
Pt with A1c 6.7%, not on home medications and does not follow with PCP  - start CASS with FS  - consistent cho diet minced and moist  - hypoglycemia protocol Pt with A1c 6.7%, not on home medications and does not follow with PCP  - start ACSS with FS  - consistent cho diet minced and moist  - hypoglycemia protocol

## 2024-01-08 NOTE — PROGRESS NOTE ADULT - SUBJECTIVE AND OBJECTIVE BOX
[facility]  INFECTIOUS DISEASES - Herlinda Orosco, [place] [address]  Tel: [phone]     Fax: [phone]  =======================================================    POPPY MERCADO [id number]    Follow up: No fevers.    Allergies:  No Known Allergies      Antibiotics:  atorvastatin 10 milliGRAM(s) Oral at bedtime  cefepime   IVPB 1000 milliGRAM(s) IV Intermittent every 12 hours  dextrose 5%. 1000 milliLiter(s) IV Continuous <Continuous>  dextrose 5%. 1000 milliLiter(s) IV Continuous <Continuous>  dextrose 50% Injectable 25 Gram(s) IV Push once  dextrose 50% Injectable 12.5 Gram(s) IV Push once  dextrose 50% Injectable 25 Gram(s) IV Push once  dextrose Oral Gel 15 Gram(s) Oral once PRN  glucagon  Injectable 1 milliGRAM(s) IntraMuscular once  heparin   Injectable 5000 Unit(s) IV Push every 6 hours PRN  heparin   Injectable 2500 Unit(s) IV Push every 6 hours PRN  heparin  Infusion.  Unit(s)/Hr IV Continuous <Continuous>  insulin lispro (ADMELOG) corrective regimen sliding scale   SubCutaneous at bedtime  insulin lispro (ADMELOG) corrective regimen sliding scale   SubCutaneous three times a day before meals  nadolol 20 milliGRAM(s) Oral daily  sodium chloride 0.45%. 1000 milliLiter(s) IV Continuous <Continuous>  tamsulosin 0.4 milliGRAM(s) Oral at bedtime       REVIEW OF SYSTEMS:  unable to obtain, very hard of hearing     Physical Exam:  ICU Vital Signs Last 24 Hrs  T(C): 36.5 (08 Jan 2024 11:55), Max: 36.8 (07 Jan 2024 20:13)  T(F): 97.7 (08 Jan 2024 11:55), Max: 98.2 (07 Jan 2024 20:13)  HR: 79 (08 Jan 2024 11:55) (77 - 84)  BP: 111/63 (08 Jan 2024 11:55) (66/48 - 112/50)  BP(mean): --  ABP: --  ABP(mean): --  RR: 18 (08 Jan 2024 11:55) (17 - 20)  SpO2: 93% (08 Jan 2024 11:55) (90% - 93%)    O2 Parameters below as of 08 Jan 2024 11:55  Patient On (Oxygen Delivery Method): room air      GEN: NAD  HEENT: normocephalic and atraumatic.   NECK: Supple.   LUNGS: normal respiratory effort  HEART: Regular rate and rhythm   ABDOMEN: Soft, nontender, and nondistended.    EXTREMITIES: No leg edema.  SKIN: (+) L foot eschar  NEUROLOGIC: Difficult to assess, very hard of hearing    Labs:  01-08    135  |  104  |  39<H>  ----------------------------<  150<H>  3.7   |  27  |  1.60<H>    Ca    8.4<L>      08 Jan 2024 07:27  Mg     1.5     01-08    TPro  6.2  /  Alb  2.0<L>  /  TBili  0.5  /  DBili  x   /  AST  24  /  ALT  21  /  AlkPhos  54  01-08                          10.6   7.72  )-----------( 220      ( 08 Jan 2024 07:27 )             30.4     PT/INR - ( 08 Jan 2024 07:27 )   PT: 23.0 sec;   INR: 2.01 ratio         PTT - ( 08 Jan 2024 07:27 )  PTT:70.7 sec  Urinalysis Basic - ( 08 Jan 2024 07:27 )    Color: x / Appearance: x / SG: x / pH: x  Gluc: 150 mg/dL / Ketone: x  / Bili: x / Urobili: x   Blood: x / Protein: x / Nitrite: x   Leuk Esterase: x / RBC: x / WBC x   Sq Epi: x / Non Sq Epi: x / Bacteria: x      LIVER FUNCTIONS - ( 08 Jan 2024 07:27 )  Alb: 2.0 g/dL / Pro: 6.2 g/dL / ALK PHOS: 54 U/L / ALT: 21 U/L / AST: 24 U/L / GGT: x             RECENT CULTURES:  01-05 @ 13:10 Catheterized Catheterized     No growth        12-31 @ 13:38 Clean Catch Clean Catch (Midstream)     <10,000 CFU/mL Normal Urogenital Christy        12-31 @ 13:05 .Blood Blood-Peripheral     No growth at 5 days        12-31 @ 13:00 .Blood Blood-Peripheral     No growth at 5 days      NotDetec        All imaging and data are reviewed.    [facility]  INFECTIOUS DISEASES - Herlinda Orosco, [place] [address]  Tel: [phone]     Fax: [phone]  =======================================================    POPPY MERCADO [id number]    Follow up: No fevers.    Allergies:  No Known Allergies      Antibiotics:  atorvastatin 10 milliGRAM(s) Oral at bedtime  cefepime   IVPB 1000 milliGRAM(s) IV Intermittent every 12 hours  dextrose 5%. 1000 milliLiter(s) IV Continuous <Continuous>  dextrose 5%. 1000 milliLiter(s) IV Continuous <Continuous>  dextrose 50% Injectable 25 Gram(s) IV Push once  dextrose 50% Injectable 12.5 Gram(s) IV Push once  dextrose 50% Injectable 25 Gram(s) IV Push once  dextrose Oral Gel 15 Gram(s) Oral once PRN  glucagon  Injectable 1 milliGRAM(s) IntraMuscular once  heparin   Injectable 5000 Unit(s) IV Push every 6 hours PRN  heparin   Injectable 2500 Unit(s) IV Push every 6 hours PRN  heparin  Infusion.  Unit(s)/Hr IV Continuous <Continuous>  insulin lispro (ADMELOG) corrective regimen sliding scale   SubCutaneous at bedtime  insulin lispro (ADMELOG) corrective regimen sliding scale   SubCutaneous three times a day before meals  nadolol 20 milliGRAM(s) Oral daily  sodium chloride 0.45%. 1000 milliLiter(s) IV Continuous <Continuous>  tamsulosin 0.4 milliGRAM(s) Oral at bedtime       REVIEW OF SYSTEMS:  unable to obtain, very hard of hearing     Physical Exam:  ICU Vital Signs Last 24 Hrs  T(C): 36.5 (08 Jan 2024 11:55), Max: 36.8 (07 Jan 2024 20:13)  T(F): 97.7 (08 Jan 2024 11:55), Max: 98.2 (07 Jan 2024 20:13)  HR: 79 (08 Jan 2024 11:55) (77 - 84)  BP: 111/63 (08 Jan 2024 11:55) (66/48 - 112/50)  BP(mean): --  ABP: --  ABP(mean): --  RR: 18 (08 Jan 2024 11:55) (17 - 20)  SpO2: 93% (08 Jan 2024 11:55) (90% - 93%)    O2 Parameters below as of 08 Jan 2024 11:55  Patient On (Oxygen Delivery Method): room air      GEN: NAD  HEENT: normocephalic and atraumatic.   NECK: Supple.   LUNGS: normal respiratory effort  HEART: Regular rate and rhythm   ABDOMEN: Soft, nontender, and nondistended.    EXTREMITIES: No leg edema.  SKIN: (+) L foot eschar  NEUROLOGIC: Difficult to assess, very hard of hearing    Labs:  01-08    135  |  104  |  39<H>  ----------------------------<  150<H>  3.7   |  27  |  1.60<H>    Ca    8.4<L>      08 Jan 2024 07:27  Mg     1.5     01-08    TPro  6.2  /  Alb  2.0<L>  /  TBili  0.5  /  DBili  x   /  AST  24  /  ALT  21  /  AlkPhos  54  01-08                          10.6   7.72  )-----------( 220      ( 08 Jan 2024 07:27 )             30.4     PT/INR - ( 08 Jan 2024 07:27 )   PT: 23.0 sec;   INR: 2.01 ratio         PTT - ( 08 Jan 2024 07:27 )  PTT:70.7 sec  Urinalysis Basic - ( 08 Jan 2024 07:27 )    Color: x / Appearance: x / SG: x / pH: x  Gluc: 150 mg/dL / Ketone: x  / Bili: x / Urobili: x   Blood: x / Protein: x / Nitrite: x   Leuk Esterase: x / RBC: x / WBC x   Sq Epi: x / Non Sq Epi: x / Bacteria: x      LIVER FUNCTIONS - ( 08 Jan 2024 07:27 )  Alb: 2.0 g/dL / Pro: 6.2 g/dL / ALK PHOS: 54 U/L / ALT: 21 U/L / AST: 24 U/L / GGT: x             RECENT CULTURES:  01-05 @ 13:10 Catheterized Catheterized     No growth        12-31 @ 13:38 Clean Catch Clean Catch (Midstream)     <10,000 CFU/mL Normal Urogenital Christy        12-31 @ 13:05 .Blood Blood-Peripheral     No growth at 5 days        12-31 @ 13:00 .Blood Blood-Peripheral     No growth at 5 days      NotDetec        All imaging and data are reviewed.

## 2024-01-08 NOTE — CHART NOTE - NSCHARTNOTEFT_GEN_A_CORE
Called by RN for red-tinged urine   - Pt is currently on heparin gtt   - Pt evaluated at bedside, resting comfortably with no acute complaints   - No gross blood in the castañeda tube/bag but maroon color urine with several clots noted  - Will hold heparin gtt at this time  - RN to call with any changes Called by RN for red-tinged urine   - Pt is currently on heparin gtt   - Pt evaluated at bedside, resting comfortably with no acute complaints   - No gross blood in the castañeda tube/bag but maroon color urine with several clots noted  - Will hold heparin gtt at this time  - RN to call with any changes      ***ADDENDUM***  - Ordered for STAT CBC and coags   - Will restart hep gtt if h/h stable, as hematuria was persistent throughout the day  - RN to call with any changes Called by RN for red-tinged urine   - Pt is currently on heparin gtt   - Pt evaluated at bedside, resting comfortably with no acute complaints   - No gross blood in the castañeda tube/bag but maroon color urine with several clots noted  - Will hold heparin gtt at this time  - RN to call with any changes      ***ADDENDUM***  - Ordered for STAT CBC and coags   - Will restart hep gtt if h/h stable, as hematuria was persistent throughout the day  - RN to call with any changes      ***ADDENDUM***  - STAT CBC and coags resulted, stable   - To restart heparin gtt at this time  - Repeat CBC ordered for 1/9/23 at 0200  - RN to call with any changes Called by RN for red-tinged urine   - Pt is currently on heparin gtt   - Pt evaluated at bedside, resting comfortably with no acute complaints   - No gross blood in the castañeda tube/bag but maroon color urine with several clots noted  - Will hold heparin gtt at this time  - RN to call with any changes      ***ADDENDUM***  - Ordered for STAT CBC and coags   - Will restart hep gtt if h/h stable, as hematuria was persistent throughout the day  - RN to call with any changes      ***ADDENDUM***  - STAT CBC and coags resulted, stable   - To restart heparin gtt at this time  - Repeat CBC ordered for 1/9/23 at 0200  - RN to call with any changes    ***ADDENDUM***  - Repeat CBC at 0200 continues to be stable  - Per RN, urine continues to appear red-tinged but with some improvement in redness  - Will continue heparin gtt at this time  - Day team to f/u AM labs   - RN to call with any changes

## 2024-01-08 NOTE — PROGRESS NOTE ADULT - SUBJECTIVE AND OBJECTIVE BOX
Patient is a 89y old  Male who presents with a chief complaint of left foot wound (08 Jan 2024 10:47)         INTERVAL HPI/OVERNIGHT EVENTS: No acute overnight events. Pt seen and examined at the bedside this AM. Pt with increased hematuria in castañeda bag this AM. Will continue to monitor. Pt offers no complaints, pleasantly confused.        MEDICATIONS  (STANDING):  atorvastatin 10 milliGRAM(s) Oral at bedtime  cefepime   IVPB 1000 milliGRAM(s) IV Intermittent daily  dextrose 5%. 1000 milliLiter(s) (50 mL/Hr) IV Continuous <Continuous>  dextrose 5%. 1000 milliLiter(s) (100 mL/Hr) IV Continuous <Continuous>  dextrose 50% Injectable 25 Gram(s) IV Push once  dextrose 50% Injectable 12.5 Gram(s) IV Push once  dextrose 50% Injectable 25 Gram(s) IV Push once  glucagon  Injectable 1 milliGRAM(s) IntraMuscular once  heparin  Infusion.  Unit(s)/Hr (11 mL/Hr) IV Continuous <Continuous>  insulin lispro (ADMELOG) corrective regimen sliding scale   SubCutaneous at bedtime  insulin lispro (ADMELOG) corrective regimen sliding scale   SubCutaneous three times a day before meals  nadolol 20 milliGRAM(s) Oral daily  tamsulosin 0.4 milliGRAM(s) Oral at bedtime  warfarin 2.5 milliGRAM(s) Oral once    MEDICATIONS  (PRN):  dextrose Oral Gel 15 Gram(s) Oral once PRN Blood Glucose LESS THAN 70 milliGRAM(s)/deciliter  heparin   Injectable 2500 Unit(s) IV Push every 6 hours PRN For aPTT between 40 - 57  heparin   Injectable 5000 Unit(s) IV Push every 6 hours PRN For aPTT less than 40      Allergies    No Known Allergies    Intolerances        REVIEW OF SYSTEMS:  CONSTITUTIONAL: No fever or chills  HEENT:  No headache, no sore throat  RESPIRATORY: No cough, wheezing, or shortness of breath  CARDIOVASCULAR: No chest pain, palpitations  GASTROINTESTINAL: No abd pain, nausea, vomiting, or diarrhea  GENITOURINARY: +hematuria, No dysuria, frequency  NEUROLOGICAL: no focal weakness or dizziness  MUSCULOSKELETAL: no myalgias     Vital Signs Last 24 Hrs  T(C): 36.5 (08 Jan 2024 11:55), Max: 37.3 (07 Jan 2024 12:25)  T(F): 97.7 (08 Jan 2024 11:55), Max: 99.1 (07 Jan 2024 12:25)  HR: 79 (08 Jan 2024 11:55) (77 - 84)  BP: 111/63 (08 Jan 2024 11:55) (66/48 - 112/50)  BP(mean): --  RR: 18 (08 Jan 2024 11:55) (17 - 20)  SpO2: 93% (08 Jan 2024 11:55) (90% - 93%)    Parameters below as of 08 Jan 2024 11:55  Patient On (Oxygen Delivery Method): room air        PHYSICAL EXAM:  GENERAL: NAD, A&Ox3 but confused  HEENT:  anicteric, moist mucous membranes  CHEST/LUNG:  CTA b/l, no rales, wheezes, or rhonchi  HEART:  RRR, S1, S2  ABDOMEN:  BS+, soft, nontender, nondistended  EXTREMITIES: no edema, cyanosis, or calf tenderness; black eschar on dorsum of L foot; pedal pulses present bilaterally  NERVOUS SYSTEM: answers questions and follows commands appropriately    LABS:                        10.6   7.72  )-----------( 220      ( 08 Jan 2024 07:27 )             30.4     CBC Full  -  ( 08 Jan 2024 07:27 )  WBC Count : 7.72 K/uL  Hemoglobin : 10.6 g/dL  Hematocrit : 30.4 %  Platelet Count - Automated : 220 K/uL  Mean Cell Volume : 86.9 fl  Mean Cell Hemoglobin : 30.3 pg  Mean Cell Hemoglobin Concentration : 34.9 gm/dL  Auto Neutrophil # : 5.64 K/uL  Auto Lymphocyte # : 1.08 K/uL  Auto Monocyte # : 0.85 K/uL  Auto Eosinophil # : 0.15 K/uL  Auto Basophil # : 0.00 K/uL  Auto Neutrophil % : 73.0 %  Auto Lymphocyte % : 14.0 %  Auto Monocyte % : 11.0 %  Auto Eosinophil % : 2.0 %  Auto Basophil % : 0.0 %    08 Jan 2024 07:27    135    |  104    |  39     ----------------------------<  150    3.7     |  27     |  1.60     Ca    8.4        08 Jan 2024 07:27  Mg     1.5       08 Jan 2024 07:27    TPro  6.2    /  Alb  2.0    /  TBili  0.5    /  DBili  x      /  AST  24     /  ALT  21     /  AlkPhos  54     08 Jan 2024 07:27    PT/INR - ( 08 Jan 2024 07:27 )   PT: 23.0 sec;   INR: 2.01 ratio         PTT - ( 08 Jan 2024 07:27 )  PTT:70.7 sec  Urinalysis Basic - ( 08 Jan 2024 07:27 )    Color: x / Appearance: x / SG: x / pH: x  Gluc: 150 mg/dL / Ketone: x  / Bili: x / Urobili: x   Blood: x / Protein: x / Nitrite: x   Leuk Esterase: x / RBC: x / WBC x   Sq Epi: x / Non Sq Epi: x / Bacteria: x      CAPILLARY BLOOD GLUCOSE      POCT Blood Glucose.: 184 mg/dL (08 Jan 2024 12:01)  POCT Blood Glucose.: 181 mg/dL (08 Jan 2024 08:07)  POCT Blood Glucose.: 188 mg/dL (07 Jan 2024 21:14)  POCT Blood Glucose.: 123 mg/dL (07 Jan 2024 17:02)        Culture - Urine (collected 01-05-24 @ 13:10)  Source: Catheterized Catheterized  Final Report (01-06-24 @ 14:20):    No growth        RADIOLOGY & ADDITIONAL TESTS:  Personally reviewed.     Consultant(s) Notes Reviewed:  [x] YES  [ ] NO Patient is a 89y old  Male who presents with a chief complaint of left foot wound (08 Jan 2024 10:47)         INTERVAL HPI/OVERNIGHT EVENTS: No acute overnight events. Pt seen and examined at the bedside this AM. Pt with increased hematuria in castañeda bag this AM. Will continue to monitor. Pt offers no complaints, pleasantly confused. Orthostatic this AM.        MEDICATIONS  (STANDING):  atorvastatin 10 milliGRAM(s) Oral at bedtime  cefepime   IVPB 1000 milliGRAM(s) IV Intermittent daily  dextrose 5%. 1000 milliLiter(s) (50 mL/Hr) IV Continuous <Continuous>  dextrose 5%. 1000 milliLiter(s) (100 mL/Hr) IV Continuous <Continuous>  dextrose 50% Injectable 25 Gram(s) IV Push once  dextrose 50% Injectable 12.5 Gram(s) IV Push once  dextrose 50% Injectable 25 Gram(s) IV Push once  glucagon  Injectable 1 milliGRAM(s) IntraMuscular once  heparin  Infusion.  Unit(s)/Hr (11 mL/Hr) IV Continuous <Continuous>  insulin lispro (ADMELOG) corrective regimen sliding scale   SubCutaneous at bedtime  insulin lispro (ADMELOG) corrective regimen sliding scale   SubCutaneous three times a day before meals  nadolol 20 milliGRAM(s) Oral daily  tamsulosin 0.4 milliGRAM(s) Oral at bedtime  warfarin 2.5 milliGRAM(s) Oral once    MEDICATIONS  (PRN):  dextrose Oral Gel 15 Gram(s) Oral once PRN Blood Glucose LESS THAN 70 milliGRAM(s)/deciliter  heparin   Injectable 2500 Unit(s) IV Push every 6 hours PRN For aPTT between 40 - 57  heparin   Injectable 5000 Unit(s) IV Push every 6 hours PRN For aPTT less than 40      Allergies    No Known Allergies    Intolerances        REVIEW OF SYSTEMS:  CONSTITUTIONAL: No fever or chills  HEENT:  No headache, no sore throat  RESPIRATORY: No cough, wheezing, or shortness of breath  CARDIOVASCULAR: No chest pain, palpitations  GASTROINTESTINAL: No abd pain, nausea, vomiting, or diarrhea  GENITOURINARY: +hematuria, No dysuria, frequency  NEUROLOGICAL: no focal weakness or dizziness  MUSCULOSKELETAL: no myalgias     Vital Signs Last 24 Hrs  T(C): 36.5 (08 Jan 2024 11:55), Max: 37.3 (07 Jan 2024 12:25)  T(F): 97.7 (08 Jan 2024 11:55), Max: 99.1 (07 Jan 2024 12:25)  HR: 79 (08 Jan 2024 11:55) (77 - 84)  BP: 111/63 (08 Jan 2024 11:55) (66/48 - 112/50)  BP(mean): --  RR: 18 (08 Jan 2024 11:55) (17 - 20)  SpO2: 93% (08 Jan 2024 11:55) (90% - 93%)    Parameters below as of 08 Jan 2024 11:55  Patient On (Oxygen Delivery Method): room air        PHYSICAL EXAM:  GENERAL: NAD, A&Ox3 but confused  HEENT:  anicteric, moist mucous membranes  CHEST/LUNG:  CTA b/l, no rales, wheezes, or rhonchi  HEART:  RRR, S1, S2  ABDOMEN:  BS+, soft, nontender, nondistended  EXTREMITIES: no edema, cyanosis, or calf tenderness; black eschar on dorsum of L foot; pedal pulses present bilaterally  NERVOUS SYSTEM: answers questions and follows commands appropriately    LABS:                        10.6   7.72  )-----------( 220      ( 08 Jan 2024 07:27 )             30.4     CBC Full  -  ( 08 Jan 2024 07:27 )  WBC Count : 7.72 K/uL  Hemoglobin : 10.6 g/dL  Hematocrit : 30.4 %  Platelet Count - Automated : 220 K/uL  Mean Cell Volume : 86.9 fl  Mean Cell Hemoglobin : 30.3 pg  Mean Cell Hemoglobin Concentration : 34.9 gm/dL  Auto Neutrophil # : 5.64 K/uL  Auto Lymphocyte # : 1.08 K/uL  Auto Monocyte # : 0.85 K/uL  Auto Eosinophil # : 0.15 K/uL  Auto Basophil # : 0.00 K/uL  Auto Neutrophil % : 73.0 %  Auto Lymphocyte % : 14.0 %  Auto Monocyte % : 11.0 %  Auto Eosinophil % : 2.0 %  Auto Basophil % : 0.0 %    08 Jan 2024 07:27    135    |  104    |  39     ----------------------------<  150    3.7     |  27     |  1.60     Ca    8.4        08 Jan 2024 07:27  Mg     1.5       08 Jan 2024 07:27    TPro  6.2    /  Alb  2.0    /  TBili  0.5    /  DBili  x      /  AST  24     /  ALT  21     /  AlkPhos  54     08 Jan 2024 07:27    PT/INR - ( 08 Jan 2024 07:27 )   PT: 23.0 sec;   INR: 2.01 ratio         PTT - ( 08 Jan 2024 07:27 )  PTT:70.7 sec  Urinalysis Basic - ( 08 Jan 2024 07:27 )    Color: x / Appearance: x / SG: x / pH: x  Gluc: 150 mg/dL / Ketone: x  / Bili: x / Urobili: x   Blood: x / Protein: x / Nitrite: x   Leuk Esterase: x / RBC: x / WBC x   Sq Epi: x / Non Sq Epi: x / Bacteria: x      CAPILLARY BLOOD GLUCOSE      POCT Blood Glucose.: 184 mg/dL (08 Jan 2024 12:01)  POCT Blood Glucose.: 181 mg/dL (08 Jan 2024 08:07)  POCT Blood Glucose.: 188 mg/dL (07 Jan 2024 21:14)  POCT Blood Glucose.: 123 mg/dL (07 Jan 2024 17:02)        Culture - Urine (collected 01-05-24 @ 13:10)  Source: Catheterized Catheterized  Final Report (01-06-24 @ 14:20):    No growth        RADIOLOGY & ADDITIONAL TESTS:  Personally reviewed.     Consultant(s) Notes Reviewed:  [x] YES  [ ] NO Patient is a 89y old  Male who presents with a chief complaint of left foot wound (08 Jan 2024 10:47)     INTERVAL HPI/OVERNIGHT EVENTS: No acute overnight events. Pt seen and examined at the bedside this AM. Pt with increased hematuria in castañeda bag this AM. Will continue to monitor. Pt offers no complaints, pleasantly confused. Orthostatic this AM.    MEDICATIONS  (STANDING):  atorvastatin 10 milliGRAM(s) Oral at bedtime  cefepime   IVPB 1000 milliGRAM(s) IV Intermittent daily  dextrose 5%. 1000 milliLiter(s) (50 mL/Hr) IV Continuous <Continuous>  dextrose 5%. 1000 milliLiter(s) (100 mL/Hr) IV Continuous <Continuous>  dextrose 50% Injectable 25 Gram(s) IV Push once  dextrose 50% Injectable 12.5 Gram(s) IV Push once  dextrose 50% Injectable 25 Gram(s) IV Push once  glucagon  Injectable 1 milliGRAM(s) IntraMuscular once  heparin  Infusion.  Unit(s)/Hr (11 mL/Hr) IV Continuous <Continuous>  insulin lispro (ADMELOG) corrective regimen sliding scale   SubCutaneous at bedtime  insulin lispro (ADMELOG) corrective regimen sliding scale   SubCutaneous three times a day before meals  nadolol 20 milliGRAM(s) Oral daily  tamsulosin 0.4 milliGRAM(s) Oral at bedtime  warfarin 2.5 milliGRAM(s) Oral once    MEDICATIONS  (PRN):  dextrose Oral Gel 15 Gram(s) Oral once PRN Blood Glucose LESS THAN 70 milliGRAM(s)/deciliter  heparin   Injectable 2500 Unit(s) IV Push every 6 hours PRN For aPTT between 40 - 57  heparin   Injectable 5000 Unit(s) IV Push every 6 hours PRN For aPTT less than 40      Allergies    No Known Allergies    Intolerances        REVIEW OF SYSTEMS:  CONSTITUTIONAL: No fever or chills  HEENT:  No headache, no sore throat  RESPIRATORY: No cough, wheezing, or shortness of breath  CARDIOVASCULAR: No chest pain, palpitations  GASTROINTESTINAL: No abd pain, nausea, vomiting, or diarrhea  GENITOURINARY: +hematuria, No dysuria, frequency  NEUROLOGICAL: no focal weakness or dizziness  MUSCULOSKELETAL: no myalgias     Vital Signs Last 24 Hrs  T(C): 36.5 (08 Jan 2024 11:55), Max: 37.3 (07 Jan 2024 12:25)  T(F): 97.7 (08 Jan 2024 11:55), Max: 99.1 (07 Jan 2024 12:25)  HR: 79 (08 Jan 2024 11:55) (77 - 84)  BP: 111/63 (08 Jan 2024 11:55) (66/48 - 112/50)  BP(mean): --  RR: 18 (08 Jan 2024 11:55) (17 - 20)  SpO2: 93% (08 Jan 2024 11:55) (90% - 93%)    Parameters below as of 08 Jan 2024 11:55  Patient On (Oxygen Delivery Method): room air        PHYSICAL EXAM:  GENERAL: NAD, A&Ox3 but confused  HEENT:  anicteric, moist mucous membranes  CHEST/LUNG:  CTA b/l, no rales, wheezes, or rhonchi  HEART:  RRR, S1, S2  ABDOMEN:  BS+, soft, nontender, nondistended  EXTREMITIES: no edema, cyanosis, or calf tenderness; black eschar on dorsum of L foot; pedal pulses present bilaterally  NERVOUS SYSTEM: answers questions and follows commands appropriately    LABS:                        10.6   7.72  )-----------( 220      ( 08 Jan 2024 07:27 )             30.4     CBC Full  -  ( 08 Jan 2024 07:27 )  WBC Count : 7.72 K/uL  Hemoglobin : 10.6 g/dL  Hematocrit : 30.4 %  Platelet Count - Automated : 220 K/uL  Mean Cell Volume : 86.9 fl  Mean Cell Hemoglobin : 30.3 pg  Mean Cell Hemoglobin Concentration : 34.9 gm/dL  Auto Neutrophil # : 5.64 K/uL  Auto Lymphocyte # : 1.08 K/uL  Auto Monocyte # : 0.85 K/uL  Auto Eosinophil # : 0.15 K/uL  Auto Basophil # : 0.00 K/uL  Auto Neutrophil % : 73.0 %  Auto Lymphocyte % : 14.0 %  Auto Monocyte % : 11.0 %  Auto Eosinophil % : 2.0 %  Auto Basophil % : 0.0 %    08 Jan 2024 07:27    135    |  104    |  39     ----------------------------<  150    3.7     |  27     |  1.60     Ca    8.4        08 Jan 2024 07:27  Mg     1.5       08 Jan 2024 07:27    TPro  6.2    /  Alb  2.0    /  TBili  0.5    /  DBili  x      /  AST  24     /  ALT  21     /  AlkPhos  54     08 Jan 2024 07:27    PT/INR - ( 08 Jan 2024 07:27 )   PT: 23.0 sec;   INR: 2.01 ratio         PTT - ( 08 Jan 2024 07:27 )  PTT:70.7 sec  Urinalysis Basic - ( 08 Jan 2024 07:27 )    Color: x / Appearance: x / SG: x / pH: x  Gluc: 150 mg/dL / Ketone: x  / Bili: x / Urobili: x   Blood: x / Protein: x / Nitrite: x   Leuk Esterase: x / RBC: x / WBC x   Sq Epi: x / Non Sq Epi: x / Bacteria: x      CAPILLARY BLOOD GLUCOSE      POCT Blood Glucose.: 184 mg/dL (08 Jan 2024 12:01)  POCT Blood Glucose.: 181 mg/dL (08 Jan 2024 08:07)  POCT Blood Glucose.: 188 mg/dL (07 Jan 2024 21:14)  POCT Blood Glucose.: 123 mg/dL (07 Jan 2024 17:02)        Culture - Urine (collected 01-05-24 @ 13:10)  Source: Catheterized Catheterized  Final Report (01-06-24 @ 14:20):    No growth        RADIOLOGY & ADDITIONAL TESTS:  Personally reviewed.     Consultant(s) Notes Reviewed:  [x] YES  [ ] NO

## 2024-01-08 NOTE — PROGRESS NOTE ADULT - PROBLEM SELECTOR PLAN 1
Chart reviewed and Patient evaluated  Discussed diagnosis and treatment with patient  Applied dry sterile dressing  X-rays reviewed : Shows moderate hallux valgus with some degeneration and there is mild first IP degeneration. No bone destruction or fracture is evident. Arterial calcifications noted.  Rec IV antibiotics As Per ID  Rec vascular recommendations  Spoke with son on phon, MRI contraindications confirmed by son, pt unable to obtain MRI  Bone scan pending  Discussed with son over phone potential treatment options and goals of care.  son wishes to avoid surgical intervention, and only proceed if needed.  Son wishes to continue conservative care, but understands infection cannot be ruled out without biopsy.    wbc 7.72  WB with assistance  Podiatry will follow while in house.   will discuss care plan  with all  Attendings.

## 2024-01-08 NOTE — PROGRESS NOTE ADULT - PROBLEM SELECTOR PLAN 8
Heparin gtt with bridge to Coumadin    Per GC chart, GOC discussion started with son Bogdan Jefferson who appears hard to reach by phone - son will come in today per discussion on 1/6  - currently no advanced directives in place  - palliative consulted, to follow up per note Heparin gtt with bridge to Coumadin    Per GC chart, GOC discussion started with son Bogdan Jefferson who appears hard to reach by phone  - currently no advanced directives in place  - palliative consulted, to follow up per note    Last spoke to son 1/6 - unable to reach Heparin gtt with bridge to Coumadin    Per GC chart, GOC discussion started with son Bogdan Jefferson who appears hard to reach by phone  - currently no advanced directives in place  - palliative consulted, to follow up per note    Spoke to son 1/8 and updated him on plan - wants eventual MEGHNA - concerned about surgery due to anesthesia - states ~5PM is the best time to call and this is the only number to call. Works during the day. Saw his dad yesterday at some point and thought he was much better/more coherent than he was at . Podiatry notified so that they will be able to talk to son today as well.

## 2024-01-08 NOTE — PROGRESS NOTE ADULT - ASSESSMENT
89 yo M with PMH HTN, HLD, LBBB, paroxysmal Atrial Fibrillation, thoracic aortic aneurysm, s/p mechanical AVR presented transferred from  for angioplasty of his L foot - found to have palpable pulses and procedure cancelled. To continued management of BETINA and cellulitis. 89 yo M with PMH HTN, HLD, LBBB, paroxysmal Atrial Fibrillation, thoracic aortic aneurysm, s/p mechanical AVR presented transferred from  for angioplasty of his L foot - found to have palpable pulses and procedure cancelled. To continued management of BETINA and cellulitis. 89 yo M with PMH HTN, HLD, LBBB, paroxysmal Atrial Fibrillation, thoracic aortic aneurysm, s/p mechanical AVR presented transferred from  for angioplasty of his L foot - found to have palpable pulses and procedure cancelled. To continue management of BETINA and cellulitis/suspect OM.

## 2024-01-09 NOTE — PROGRESS NOTE ADULT - PROBLEM SELECTOR PLAN 3
Pt with history of afib on warfarin at home, presented to GC originally with supratherapeutic INR  - pt NSR on exam  - INR subtherapeutic at 2.3; cont with heparin gtt, will c/w dose Coumadin 2.5mg tonight 1/8  - f/u TTE ordered Pt with history of afib on warfarin at home, presented to [facility] originally with supratherapeutic INR  - pt NSR on exam  - INR subtherapeutic at 2.3; cont with heparin gtt, will c/w dose Coumadin 2.5mg tonight 1/8  - f/u TTE ordered per cardiology recs - routine and just as baseline - would not impede any potential procedures (although son has stated that he would not really want a procedure unless absolutely needed)

## 2024-01-09 NOTE — PROGRESS NOTE ADULT - SUBJECTIVE AND OBJECTIVE BOX
[facility] -- Jenn Goldstein, An Gordon, Gildardo Marrero Savella  Office # [phone]      Follow Up:  AVR    Subjective/Observations: Patient seen and examined. Events noted. Resting comfortably in bed. Unable to provide meaningful information. reported with hematuria      REVIEW OF SYSTEMS: All other review of systems is negative unless indicated above    PAST MEDICAL & SURGICAL HISTORY:  HTN (hypertension)      HLD (hyperlipidemia)      Paroxysmal atrial fibrillation      H/O aortic valve repair      H/O thoracic aortic aneurysm repair          MEDICATIONS  (STANDING):  atorvastatin 10 milliGRAM(s) Oral at bedtime  cefepime   IVPB 1000 milliGRAM(s) IV Intermittent every 12 hours  dextrose 5%. 1000 milliLiter(s) (50 mL/Hr) IV Continuous <Continuous>  dextrose 5%. 1000 milliLiter(s) (100 mL/Hr) IV Continuous <Continuous>  dextrose 50% Injectable 25 Gram(s) IV Push once  dextrose 50% Injectable 12.5 Gram(s) IV Push once  dextrose 50% Injectable 25 Gram(s) IV Push once  glucagon  Injectable 1 milliGRAM(s) IntraMuscular once  insulin lispro (ADMELOG) corrective regimen sliding scale   SubCutaneous three times a day before meals  insulin lispro (ADMELOG) corrective regimen sliding scale   SubCutaneous at bedtime  nadolol 20 milliGRAM(s) Oral daily  sodium chloride 0.45%. 1000 milliLiter(s) (75 mL/Hr) IV Continuous <Continuous>  tamsulosin 0.4 milliGRAM(s) Oral at bedtime  warfarin 2.5 milliGRAM(s) Oral once    MEDICATIONS  (PRN):  dextrose Oral Gel 15 Gram(s) Oral once PRN Blood Glucose LESS THAN 70 milliGRAM(s)/deciliter      Allergies    No Known Allergies    Intolerances            Vital Signs Last 24 Hrs  T(C): 36.6 (09 Jan 2024 12:21), Max: 37 (09 Jan 2024 04:26)  T(F): 97.9 (09 Jan 2024 12:21), Max: 98.6 (09 Jan 2024 04:26)  HR: 72 (09 Jan 2024 12:21) (72 - 83)  BP: 105/62 (09 Jan 2024 12:21) (98/52 - 122/69)  BP(mean): --  RR: 20 (09 Jan 2024 12:21) (17 - 20)  SpO2: 91% (09 Jan 2024 12:21) (90% - 91%)    Parameters below as of 09 Jan 2024 12:21  Patient On (Oxygen Delivery Method): room air        I&O's Summary    08 Jan 2024 07:01  -  09 Jan 2024 07:00  --------------------------------------------------------  IN: 1051 mL / OUT: 2175 mL / NET: -1124 mL    09 Jan 2024 07:01  -  09 Jan 2024 14:32  --------------------------------------------------------  IN: 600 mL / OUT: 600 mL / NET: 0 mL          PHYSICAL EXAM:  TELE: off   Constitutional: NAD, awake   HEENT: Moist Mucous Membranes, Anicteric  Pulmonary: Decreased breath sounds b/l. No rales, crackles or wheeze appreciated.   Cardiovascular: Regular, S1 and S2, + SM   Gastrointestinal: Bowel Sounds present, soft, nontender.   Lymph: No peripheral edema. No lymphadenopathy.  Skin: No visible rashes or ulcers.  Psych:  uanble to assess     LABS: All Labs Reviewed:                        9.7    8.32  )-----------( 208      ( 09 Jan 2024 05:40 )             27.7                         10.1   9.18  )-----------( 202      ( 09 Jan 2024 02:00 )             29.0                         10.3   9.06  )-----------( 207      ( 08 Jan 2024 20:20 )             29.8     09 Jan 2024 05:40    133    |  104    |  34     ----------------------------<  159    3.7     |  25     |  1.40   08 Jan 2024 07:27    135    |  104    |  39     ----------------------------<  150    3.7     |  27     |  1.60   07 Jan 2024 09:01    133    |  103    |  37     ----------------------------<  151    3.8     |  24     |  1.60     Ca    7.8        09 Jan 2024 05:40  Ca    8.4        08 Jan 2024 07:27  Ca    8.4        07 Jan 2024 09:01  Mg     1.7       09 Jan 2024 05:40  Mg     1.5       08 Jan 2024 07:27    TPro  5.7    /  Alb  1.9    /  TBili  0.4    /  DBili  x      /  AST  28     /  ALT  23     /  AlkPhos  52     09 Jan 2024 05:40  TPro  6.2    /  Alb  2.0    /  TBili  0.5    /  DBili  x      /  AST  24     /  ALT  21     /  AlkPhos  54     08 Jan 2024 07:27    PT/INR - ( 09 Jan 2024 05:40 )   PT: 26.3 sec;   INR: 2.30 ratio         PTT - ( 09 Jan 2024 05:40 )  PTT:>200 sec    - Troponin:               [facility] -- Jenn Goldstein, An Gordon, Gildardo Marrero Savella  Office # [phone]      Follow Up:  AVR    Subjective/Observations: Patient seen and examined. Events noted. Resting comfortably in bed. Unable to provide meaningful information. reported with hematuria      REVIEW OF SYSTEMS: All other review of systems is negative unless indicated above    PAST MEDICAL & SURGICAL HISTORY:  HTN (hypertension)      HLD (hyperlipidemia)      Paroxysmal atrial fibrillation      H/O aortic valve repair      H/O thoracic aortic aneurysm repair          MEDICATIONS  (STANDING):  atorvastatin 10 milliGRAM(s) Oral at bedtime  cefepime   IVPB 1000 milliGRAM(s) IV Intermittent every 12 hours  dextrose 5%. 1000 milliLiter(s) (50 mL/Hr) IV Continuous <Continuous>  dextrose 5%. 1000 milliLiter(s) (100 mL/Hr) IV Continuous <Continuous>  dextrose 50% Injectable 25 Gram(s) IV Push once  dextrose 50% Injectable 12.5 Gram(s) IV Push once  dextrose 50% Injectable 25 Gram(s) IV Push once  glucagon  Injectable 1 milliGRAM(s) IntraMuscular once  insulin lispro (ADMELOG) corrective regimen sliding scale   SubCutaneous three times a day before meals  insulin lispro (ADMELOG) corrective regimen sliding scale   SubCutaneous at bedtime  nadolol 20 milliGRAM(s) Oral daily  sodium chloride 0.45%. 1000 milliLiter(s) (75 mL/Hr) IV Continuous <Continuous>  tamsulosin 0.4 milliGRAM(s) Oral at bedtime  warfarin 2.5 milliGRAM(s) Oral once    MEDICATIONS  (PRN):  dextrose Oral Gel 15 Gram(s) Oral once PRN Blood Glucose LESS THAN 70 milliGRAM(s)/deciliter      Allergies    No Known Allergies    Intolerances            Vital Signs Last 24 Hrs  T(C): 36.6 (09 Jan 2024 12:21), Max: 37 (09 Jan 2024 04:26)  T(F): 97.9 (09 Jan 2024 12:21), Max: 98.6 (09 Jan 2024 04:26)  HR: 72 (09 Jan 2024 12:21) (72 - 83)  BP: 105/62 (09 Jan 2024 12:21) (98/52 - 122/69)  BP(mean): --  RR: 20 (09 Jan 2024 12:21) (17 - 20)  SpO2: 91% (09 Jan 2024 12:21) (90% - 91%)    Parameters below as of 09 Jan 2024 12:21  Patient On (Oxygen Delivery Method): room air        I&O's Summary    08 Jan 2024 07:01  -  09 Jan 2024 07:00  --------------------------------------------------------  IN: 1051 mL / OUT: 2175 mL / NET: -1124 mL    09 Jan 2024 07:01  -  09 Jan 2024 14:32  --------------------------------------------------------  IN: 600 mL / OUT: 600 mL / NET: 0 mL          PHYSICAL EXAM:  TELE: off   Constitutional: NAD, awake   HEENT: Moist Mucous Membranes, Anicteric  Pulmonary: Decreased breath sounds b/l. No rales, crackles or wheeze appreciated.   Cardiovascular: Regular, S1 and S2, + SM   Gastrointestinal: Bowel Sounds present, soft, nontender.   Lymph: No peripheral edema. No lymphadenopathy.  Skin: No visible rashes or ulcers.  Psych:  uanble to assess     LABS: All Labs Reviewed:                        9.7    8.32  )-----------( 208      ( 09 Jan 2024 05:40 )             27.7                         10.1   9.18  )-----------( 202      ( 09 Jan 2024 02:00 )             29.0                         10.3   9.06  )-----------( 207      ( 08 Jan 2024 20:20 )             29.8     09 Jan 2024 05:40    133    |  104    |  34     ----------------------------<  159    3.7     |  25     |  1.40   08 Jan 2024 07:27    135    |  104    |  39     ----------------------------<  150    3.7     |  27     |  1.60   07 Jan 2024 09:01    133    |  103    |  37     ----------------------------<  151    3.8     |  24     |  1.60     Ca    7.8        09 Jan 2024 05:40  Ca    8.4        08 Jan 2024 07:27  Ca    8.4        07 Jan 2024 09:01  Mg     1.7       09 Jan 2024 05:40  Mg     1.5       08 Jan 2024 07:27    TPro  5.7    /  Alb  1.9    /  TBili  0.4    /  DBili  x      /  AST  28     /  ALT  23     /  AlkPhos  52     09 Jan 2024 05:40  TPro  6.2    /  Alb  2.0    /  TBili  0.5    /  DBili  x      /  AST  24     /  ALT  21     /  AlkPhos  54     08 Jan 2024 07:27    PT/INR - ( 09 Jan 2024 05:40 )   PT: 26.3 sec;   INR: 2.30 ratio         PTT - ( 09 Jan 2024 05:40 )  PTT:>200 sec    - Troponin:

## 2024-01-09 NOTE — PROGRESS NOTE ADULT - PROBLEM SELECTOR PLAN 4
Pt with A1c 6.7%, not on home medications and does not follow with PCP  - start CASS with FS  - consistent cho diet minced and moist  - hypoglycemia protocol Pt with A1c 6.7%, not on home medications and does not follow with PCP  - continue CASS with FS  - consistent cho diet minced and moist  - hypoglycemia protocol

## 2024-01-09 NOTE — PROGRESS NOTE ADULT - PROBLEM SELECTOR PLAN 2
Pt with BETINA, unknown baseline (outpt note with Cr 1.16 10/2023) - IMPROVING  - BUN/Cr 46/1.6 on GC admission, Cr went up to 2.4 on 1/5 and now back to 1.6 on 1/8  - likely 2/2 abx vs urinary retention - vanco discontinued and castañeda placed with >1L output  - hold lisinopril in setting of BETINA  - hold silodosin in setting of dec Cr clearance  - will resume IV fluids given significant orthostasis and Cr plateau off fluids  - replace electrolytes as needed  - [facility] 1/8 - Cr 1.40, improving  - nephro consulted, recs appreciated Pt with BETINA, unknown baseline (outpt note with Cr 1.16 10/2023) - IMPROVING  - BUN/Cr 46/1.6 on GC admission, Cr went up to 2.4 on 1/5 and now back to 1.6 on 1/8  - likely 2/2 abx vs urinary retention - vanco discontinued and castañeda placed with >1L output  - hold lisinopril in setting of BETINA  - hold silodosin in setting of dec Cr clearance  - will resume IV fluids given significant orthostasis and Cr plateau off fluids  - replace electrolytes as needed  - AMP [facility] 1/8 - Cr 1.40, improving  - nephro consulted, recs appreciated Pt with BETINA, unknown baseline (outpt note with Cr 1.16 10/2023) - IMPROVING  - BUN/Cr 46/1.6 on GC admission, Cr went up to 2.4 on 1/5 and now down to 1.4 on 1/9  - likely 2/2 abx vs urinary retention - vanco discontinued and castañeda placed with >1L output  - hold lisinopril in setting of BETINA  - hold silodosin in setting of dec Cr clearance  - now s/p another 24h of IV fluids given orthostasis - with improved Cr - monitor off fluids  - replace electrolytes as needed  - AMP [facility] 1/8 - Cr 1.40, improving  - nephro consulted, recs appreciated

## 2024-01-09 NOTE — PROGRESS NOTE ADULT - SUBJECTIVE AND OBJECTIVE BOX
Patient is a 89y old  Male who presents with a chief complaint of left foot wound (08 Jan 2024 17:36)      INTERVAL HPI/OVERNIGHT EVENTS: Overnight, Turcios bag with hematuria and clots; hemoglobin remained stable; heparin gtt continued. This morning, Turcios bag with clear urine. Patient has no concerns, denies any pain. Blood pressure stable.    MEDICATIONS  (STANDING):  atorvastatin 10 milliGRAM(s) Oral at bedtime  cefepime   IVPB 1000 milliGRAM(s) IV Intermittent every 12 hours  dextrose 5%. 1000 milliLiter(s) (100 mL/Hr) IV Continuous <Continuous>  dextrose 5%. 1000 milliLiter(s) (50 mL/Hr) IV Continuous <Continuous>  dextrose 50% Injectable 25 Gram(s) IV Push once  dextrose 50% Injectable 12.5 Gram(s) IV Push once  dextrose 50% Injectable 25 Gram(s) IV Push once  glucagon  Injectable 1 milliGRAM(s) IntraMuscular once  heparin  Infusion. 900 Unit(s)/Hr (9 mL/Hr) IV Continuous <Continuous>  insulin lispro (ADMELOG) corrective regimen sliding scale   SubCutaneous at bedtime  insulin lispro (ADMELOG) corrective regimen sliding scale   SubCutaneous three times a day before meals  nadolol 20 milliGRAM(s) Oral daily  sodium chloride 0.45%. 1000 milliLiter(s) (75 mL/Hr) IV Continuous <Continuous>  tamsulosin 0.4 milliGRAM(s) Oral at bedtime    MEDICATIONS  (PRN):  dextrose Oral Gel 15 Gram(s) Oral once PRN Blood Glucose LESS THAN 70 milliGRAM(s)/deciliter  heparin   Injectable 5000 Unit(s) IV Push every 6 hours PRN For aPTT less than 40  heparin   Injectable 2500 Unit(s) IV Push every 6 hours PRN For aPTT between 40 - 57      Allergies    No Known Allergies    Intolerances        REVIEW OF SYSTEMS:  CONSTITUTIONAL: No fever or chills  HEENT:  No headache, no sore throat  RESPIRATORY: No cough, wheezing, or shortness of breath  CARDIOVASCULAR: No chest pain, palpitations  GASTROINTESTINAL: No abd pain, nausea, vomiting, or diarrhea  GENITOURINARY: No dysuria, frequency, or hematuria  NEUROLOGICAL: no focal weakness or dizziness  MUSCULOSKELETAL: no myalgias     Vital Signs Last 24 Hrs  T(C): 37 (09 Jan 2024 04:26), Max: 37 (09 Jan 2024 04:26)  T(F): 98.6 (09 Jan 2024 04:26), Max: 98.6 (09 Jan 2024 04:26)  HR: 78 (09 Jan 2024 04:26) (78 - 83)  BP: 104/61 (09 Jan 2024 04:26) (98/52 - 122/69)  BP(mean): --  RR: 17 (09 Jan 2024 04:26) (17 - 18)  SpO2: 90% (09 Jan 2024 04:26) (90% - 93%)    Parameters below as of 09 Jan 2024 04:26  Patient On (Oxygen Delivery Method): room air        PHYSICAL EXAM:  GENERAL: NAD, A&Ox3 but confused  HEENT:  anicteric, moist mucous membranes  CHEST/LUNG:  CTA b/l, no rales, wheezes, or rhonchi  HEART:  RRR, S1, S2  ABDOMEN:  BS+, soft, nontender, nondistended  EXTREMITIES: no edema, cyanosis, or calf tenderness; black eschar on dorsum of L foot; pedal pulses present bilaterally  NERVOUS SYSTEM: answers questions and follows commands appropriately    LABS:                        9.7    8.32  )-----------( 208      ( 09 Jan 2024 05:40 )             27.7     CBC Full  -  ( 09 Jan 2024 05:40 )  WBC Count : 8.32 K/uL  Hemoglobin : 9.7 g/dL  Hematocrit : 27.7 %  Platelet Count - Automated : 208 K/uL  Mean Cell Volume : 87.7 fl  Mean Cell Hemoglobin : 30.7 pg  Mean Cell Hemoglobin Concentration : 35.0 gm/dL  Auto Neutrophil # : x  Auto Lymphocyte # : x  Auto Monocyte # : x  Auto Eosinophil # : x  Auto Basophil # : x  Auto Neutrophil % : x  Auto Lymphocyte % : x  Auto Monocyte % : x  Auto Eosinophil % : x  Auto Basophil % : x    09 Jan 2024 05:40    133    |  104    |  34     ----------------------------<  159    3.7     |  25     |  1.40     Ca    7.8        09 Jan 2024 05:40  Mg     1.7       09 Jan 2024 05:40    TPro  5.7    /  Alb  1.9    /  TBili  0.4    /  DBili  x      /  AST  28     /  ALT  23     /  AlkPhos  52     09 Jan 2024 05:40    PT/INR - ( 09 Jan 2024 05:40 )   PT: 26.3 sec;   INR: 2.30 ratio         PTT - ( 09 Jan 2024 05:40 )  PTT:>200 sec  Urinalysis Basic - ( 09 Jan 2024 05:40 )    Color: x / Appearance: x / SG: x / pH: x  Gluc: 159 mg/dL / Ketone: x  / Bili: x / Urobili: x   Blood: x / Protein: x / Nitrite: x   Leuk Esterase: x / RBC: x / WBC x   Sq Epi: x / Non Sq Epi: x / Bacteria: x      CAPILLARY BLOOD GLUCOSE      POCT Blood Glucose.: 153 mg/dL (09 Jan 2024 08:16)  POCT Blood Glucose.: 144 mg/dL (08 Jan 2024 21:06)  POCT Blood Glucose.: 120 mg/dL (08 Jan 2024 16:57)  POCT Blood Glucose.: 184 mg/dL (08 Jan 2024 12:01)        Culture - Urine (collected 01-05-24 @ 13:10)  Source: Catheterized Catheterized  Final Report (01-06-24 @ 14:20):    No growth        RADIOLOGY & ADDITIONAL TESTS:    Personally reviewed.     Consultant(s) Notes Reviewed:  [x] YES  [ ] NO     Patient is a 89y old  Male who presents with a chief complaint of left foot wound (08 Jan 2024 17:36)      INTERVAL HPI/OVERNIGHT EVENTS: Overnight, Turcios bag with hematuria and clots; hemoglobin remained stable; heparin gtt continued. This morning, Turcios bag with clear urine. Patient has no concerns, denies any pain. Blood pressure stable. Feels well. Pleasant and interactive. He is awake and alert, just hard of hearing and speaks somewhat softly.    MEDICATIONS  (STANDING):  atorvastatin 10 milliGRAM(s) Oral at bedtime  cefepime   IVPB 1000 milliGRAM(s) IV Intermittent every 12 hours  dextrose 5%. 1000 milliLiter(s) (100 mL/Hr) IV Continuous <Continuous>  dextrose 5%. 1000 milliLiter(s) (50 mL/Hr) IV Continuous <Continuous>  dextrose 50% Injectable 25 Gram(s) IV Push once  dextrose 50% Injectable 12.5 Gram(s) IV Push once  dextrose 50% Injectable 25 Gram(s) IV Push once  glucagon  Injectable 1 milliGRAM(s) IntraMuscular once  heparin  Infusion. 900 Unit(s)/Hr (9 mL/Hr) IV Continuous <Continuous>  insulin lispro (ADMELOG) corrective regimen sliding scale   SubCutaneous at bedtime  insulin lispro (ADMELOG) corrective regimen sliding scale   SubCutaneous three times a day before meals  nadolol 20 milliGRAM(s) Oral daily  sodium chloride 0.45%. 1000 milliLiter(s) (75 mL/Hr) IV Continuous <Continuous>  tamsulosin 0.4 milliGRAM(s) Oral at bedtime    MEDICATIONS  (PRN):  dextrose Oral Gel 15 Gram(s) Oral once PRN Blood Glucose LESS THAN 70 milliGRAM(s)/deciliter  heparin   Injectable 5000 Unit(s) IV Push every 6 hours PRN For aPTT less than 40  heparin   Injectable 2500 Unit(s) IV Push every 6 hours PRN For aPTT between 40 - 57      Allergies    No Known Allergies    Intolerances        REVIEW OF SYSTEMS:  CONSTITUTIONAL: No fever or chills  HEENT:  No headache, no sore throat  RESPIRATORY: No cough, wheezing, or shortness of breath  CARDIOVASCULAR: No chest pain, palpitations  GASTROINTESTINAL: No abd pain, nausea, vomiting, or diarrhea  GENITOURINARY: No dysuria, frequency, or hematuria  NEUROLOGICAL: no focal weakness or dizziness  MUSCULOSKELETAL: no myalgias     Vital Signs Last 24 Hrs  T(C): 37 (09 Jan 2024 04:26), Max: 37 (09 Jan 2024 04:26)  T(F): 98.6 (09 Jan 2024 04:26), Max: 98.6 (09 Jan 2024 04:26)  HR: 78 (09 Jan 2024 04:26) (78 - 83)  BP: 104/61 (09 Jan 2024 04:26) (98/52 - 122/69)  RR: 17 (09 Jan 2024 04:26) (17 - 18)  SpO2: 90% (09 Jan 2024 04:26) (90% - 93%)  Parameters below as of 09 Jan 2024 04:26  Patient On (Oxygen Delivery Method): room air      PHYSICAL EXAM:  GENERAL: NAD, A&Ox3 but confused  HEENT:  anicteric, moist mucous membranes  CHEST/LUNG:  CTA b/l, no rales, wheezes, or rhonchi  HEART:  RRR, S1, S2  ABDOMEN:  BS+, soft, nontender, nondistended  EXTREMITIES: no edema, cyanosis, or calf tenderness; black eschar on dorsum of L foot; pedal pulses present bilaterally  NERVOUS SYSTEM: answers questions and follows commands appropriately  PSYCH: normal affect    LABS:                        9.7    8.32  )-----------( 208      ( 09 Jan 2024 05:40 )             27.7     CBC Full  -  ( 09 Jan 2024 05:40 )  WBC Count : 8.32 K/uL  Hemoglobin : 9.7 g/dL  Hematocrit : 27.7 %  Platelet Count - Automated : 208 K/uL  Mean Cell Volume : 87.7 fl  Mean Cell Hemoglobin : 30.7 pg  Mean Cell Hemoglobin Concentration : 35.0 gm/dL  Auto Neutrophil # : x  Auto Lymphocyte # : x  Auto Monocyte # : x  Auto Eosinophil # : x  Auto Basophil # : x  Auto Neutrophil % : x  Auto Lymphocyte % : x  Auto Monocyte % : x  Auto Eosinophil % : x  Auto Basophil % : x    09 Jan 2024 05:40    133    |  104    |  34     ----------------------------<  159    3.7     |  25     |  1.40     Ca    7.8        09 Jan 2024 05:40  Mg     1.7       09 Jan 2024 05:40    TPro  5.7    /  Alb  1.9    /  TBili  0.4    /  DBili  x      /  AST  28     /  ALT  23     /  AlkPhos  52     09 Jan 2024 05:40    PT/INR - ( 09 Jan 2024 05:40 )   PT: 26.3 sec;   INR: 2.30 ratio         PTT - ( 09 Jan 2024 05:40 )  PTT:>200 sec  Urinalysis Basic - ( 09 Jan 2024 05:40 )    Color: x / Appearance: x / SG: x / pH: x  Gluc: 159 mg/dL / Ketone: x  / Bili: x / Urobili: x   Blood: x / Protein: x / Nitrite: x   Leuk Esterase: x / RBC: x / WBC x   Sq Epi: x / Non Sq Epi: x / Bacteria: x      CAPILLARY BLOOD GLUCOSE      POCT Blood Glucose.: 153 mg/dL (09 Jan 2024 08:16)  POCT Blood Glucose.: 144 mg/dL (08 Jan 2024 21:06)  POCT Blood Glucose.: 120 mg/dL (08 Jan 2024 16:57)  POCT Blood Glucose.: 184 mg/dL (08 Jan 2024 12:01)        Culture - Urine (collected 01-05-24 @ 13:10)  Source: Catheterized Catheterized  Final Report (01-06-24 @ 14:20):    No growth        RADIOLOGY & ADDITIONAL TESTS:    Personally reviewed.     Consultant(s) Notes Reviewed:  [x] YES  [ ] NO

## 2024-01-09 NOTE — PROGRESS NOTE ADULT - ASSESSMENT
89 year old male with PMH HTN, HLD, LBBB, paroxysmal Atrial Fibrillation, thoracic aortic aneurysm, s/p mechanical AVR presented transferred from  for angioplasty of his L foot.  Cardiology consultation now being obtained.     Cardiac Optimization/LBBB/PaFib  - Vascular recs noted.  No plans for LE angiogram at this point. fu pod recs   -  continue to dose  Coumadin.  Dose daily to keep INR~3   - EKG SR with old LBBB   - Has known history of PAfib and mechanical AVR  -hematuria noted follow up primary recs - hgb stable. stop hep gtt today as inr 2.3    - BP  112/ 50   - Continue to hold home ACEI for cristhian, soft bp   - Continue home Nadolol with parameters   - Continue home statin     - No sign volume overload, non-orthopneic on RA  - Can obtain routine TTE    - Monitor and replete electrolytes. Keep K>4.0 and Mg>2.0.  - Will continue to follow

## 2024-01-09 NOTE — PROGRESS NOTE ADULT - ASSESSMENT
89 yo M with PMH HTN, HLD, LBBB, paroxysmal Atrial Fibrillation, thoracic aortic aneurysm, s/p mechanical AVR presented transferred from  for angioplasty of his L foot - found to have palpable pulses and procedure cancelled. To continue management of BETINA and cellulitis/suspect OM. 89 yo M with PMH HTN, HLD, LBBB, paroxysmal Atrial Fibrillation, thoracic aortic aneurysm, s/p mechanical AVR presented transferred from  for angioplasty of his L foot - found to have palpable pulses and procedure cancelled. To continue management of BETINA and cellulitis/suspected osteomyelitis.

## 2024-01-09 NOTE — SOCIAL WORK PROGRESS NOTE - NSSWPROGRESSNOTE_GEN_ALL_CORE
SW spoke pt's son Bogdan to discuss the [facility] that accepted pt for rehab. SW informed pt son Bogdan that [facility] accepted pt for [facility]. Pt son Bogdan wishes for pt to be dc to [facility]. NAIF will continue to follow for medical clearance.

## 2024-01-09 NOTE — PROGRESS NOTE ADULT - ATTENDING COMMENTS
89 yo M with PMH HTN, HLD, LBBB, paroxysmal Atrial Fibrillation, thoracic aortic aneurysm, s/p mechanical AVR presented transferred from  for angioplasty of his L foot - found to have palpable pulses and procedure cancelled. To continue management of BETINA and cellulitis/suspected osteomyelitis. Continue IV antibiotics. Follow up bone scan for duration/potential podiatry intervention. Stop fluids. Stop heparin gtt. Continue coumadin and monitor INR. Discussed with ID Dr. Loredo re: plan for antibiotics. D/w cardiology Dr. wEing re: heparin gtt and hematuria. Urology following. Eventual [facility]. Discussed with patient at bedside. DNR/DNI. 89 yo M with PMH HTN, HLD, LBBB, paroxysmal Atrial Fibrillation, thoracic aortic aneurysm, s/p mechanical AVR presented transferred from  for angioplasty of his L foot - found to have palpable pulses and procedure cancelled. To continue management of BETINA and cellulitis/suspected osteomyelitis. Continue IV antibiotics. Follow up bone scan for duration/potential podiatry intervention. Stop fluids. Stop heparin gtt. Continue coumadin and monitor INR. Discussed with ID Dr. Loredo re: plan for antibiotics. D/w cardiology Dr. Ewing re: heparin gtt and hematuria. Urology following. Eventual [facility]. Discussed with patient at bedside. DNR/DNI.

## 2024-01-09 NOTE — PROGRESS NOTE ADULT - PROBLEM SELECTOR PLAN 8
Heparin gtt with bridge to Coumadin    Per GC chart, GOC discussion started with son Bogdan Jefferson who appears hard to reach by phone  - currently no advanced directives in place  - palliative consulted, to follow up per note    Spoke to son 1/8 and updated him on plan - wants [facility] - concerned about surgery due to anesthesia - states ~5PM is the best time to call and this is the only number to call. Works during the day. Saw his dad yesterday at some point and thought he was much better/more coherent than he was at . Podiatry notified so that they will be able to talk to son today as well. Coumadin; INR 2.30, will dose 2.5 1/9    Per  chart, GOC discussion started with son Bogdan Jefferson who appears hard to reach by phone  - currently no advanced directives in place  - palliative consulted, to follow up per note    Spoke to son 1/8 and updated him on plan - wants eventual [facility] - concerned about surgery due to anesthesia - states ~5PM is the best time to call and this is the only number to call. Works during the day. Saw his dad yesterday at some point and thought he was much better/more coherent than he was at . Podiatry notified so that they will be able to talk to son today as well. Coumadin; INR 2.30, will dose 2.5 1/9    Per  chart, GOC discussion started with son Bogdan Jefferson who appears hard to reach by phone  - currently no advanced directives in place  - palliative consulted, to follow up per note    Spoke to son 1/8 and updated him on plan - wants [facility] - concerned about surgery due to anesthesia - states ~5PM is the best time to call and this is the only number to call. Works during the day. Saw his dad yesterday at some point and thought he was much better/more coherent than he was at . Podiatry notified and spoke to son as well. Will attempt to reach son later tonight, as he has said he is unavailable during the day.

## 2024-01-09 NOTE — PROGRESS NOTE ADULT - PROBLEM SELECTOR PLAN 5
Pt with hx HTN  - takes ramipril, nadolol at home  - hold ACE in setting of BETINA  - cont nadolol 20mg with hold parameters in place Pt with hx HTN  - takes ramipril, nadolol at home  - hold ACE in setting of BETINA and stable BP with orthostasis  - cont nadolol 20mg with hold parameters in place - would consider stopping as this is a nonselective BB and not an ideal choice for rate control for his afib anyway - will defer to cardiology

## 2024-01-10 NOTE — SOCIAL WORK PROGRESS NOTE - NSSWPROGRESSNOTE_GEN_ALL_CORE
Per medical team, pt to possibly be dc tomorrow to [facility]. SW will continue to follow. Per medical team, pt to possibly be dc tomorrow to [facility]. SW will continue to follow.

## 2024-01-10 NOTE — CASE MANAGEMENT PROGRESS NOTE - NSCMPROGRESSNOTE_GEN_ALL_CORE
Discussed pt in rounds, Pt remains acute, receiving IV antibiotics, WBC scan to be completed today. Heparin drip discontinued, to be started on coumadin tonight.

## 2024-01-10 NOTE — PROGRESS NOTE ADULT - ATTENDING COMMENTS
89 yo M with PMH HTN, HLD, LBBB, paroxysmal Atrial Fibrillation, thoracic aortic aneurysm, s/p mechanical AVR presented transferred from  for angioplasty of his L foot - found to have palpable pulses and procedure cancelled. To continue management of BETINA and cellulitis/suspected osteomyelitis. Follow up NM bone scan. Patient is stable. Leukocytosis is worsening. No other signs of profound sepsis and his exam is stable. Will check blood cultures. Defer additional imaging for now - discussed with ID consultant Dr. Loredo. As per discussion with cardiology consultant, as hematuria cleared - recommends resuming heparin gtt until INR >3. This is fair and hgb is stable, so will resume - anticipate INR to be near therapeutic shortly. Continue IV cefepime. Eventual [facility] once duration of antibiotics is determined. 89 yo M with PMH HTN, HLD, LBBB, paroxysmal Atrial Fibrillation, thoracic aortic aneurysm, s/p mechanical AVR presented transferred from  for angioplasty of his L foot - found to have palpable pulses and procedure cancelled. To continue management of BETINA and cellulitis/suspected osteomyelitis. Follow up NM bone scan. Patient is stable. Leukocytosis is worsening. No other signs of profound sepsis and his exam is stable. Will check blood cultures. Defer additional imaging for now - discussed with ID consultant Dr. Loredo. As per discussion with cardiology consultant, as hematuria cleared - recommends resuming heparin gtt until INR >3. This is fair and hgb is stable, so will resume - anticipate INR to be near therapeutic shortly. Continue IV cefepime. Eventual [facility] once duration of antibiotics is determined.

## 2024-01-10 NOTE — PROGRESS NOTE ADULT - SUBJECTIVE AND OBJECTIVE BOX
Gu Progress Note:    UR, cath in BPH, no interval changes    PAST MEDICAL & SURGICAL HISTORY:  HTN (hypertension)      HLD (hyperlipidemia)      Paroxysmal atrial fibrillation      H/O aortic valve repair      H/O thoracic aortic aneurysm repair            MEDICATIONS  (STANDING):  atorvastatin 10 milliGRAM(s) Oral at bedtime  cefepime   IVPB 1000 milliGRAM(s) IV Intermittent every 12 hours  dextrose 5%. 1000 milliLiter(s) (100 mL/Hr) IV Continuous <Continuous>  dextrose 5%. 1000 milliLiter(s) (50 mL/Hr) IV Continuous <Continuous>  dextrose 50% Injectable 25 Gram(s) IV Push once  dextrose 50% Injectable 12.5 Gram(s) IV Push once  dextrose 50% Injectable 25 Gram(s) IV Push once  glucagon  Injectable 1 milliGRAM(s) IntraMuscular once  insulin lispro (ADMELOG) corrective regimen sliding scale   SubCutaneous three times a day before meals  insulin lispro (ADMELOG) corrective regimen sliding scale   SubCutaneous at bedtime  nadolol 20 milliGRAM(s) Oral daily  tamsulosin 0.4 milliGRAM(s) Oral at bedtime  warfarin 2.5 milliGRAM(s) Oral once    MEDICATIONS  (PRN):  dextrose Oral Gel 15 Gram(s) Oral once PRN Blood Glucose LESS THAN 70 milliGRAM(s)/deciliter      Allergies    No Known Allergies    Intolerances            FAMILY HISTORY:      Vital Signs Last 24 Hrs  T(C): 37 (10 Wilman 2024 05:11), Max: 37 (09 Jan 2024 20:30)  T(F): 98.6 (10 Wilman 2024 05:11), Max: 98.6 (09 Jan 2024 20:30)  HR: 75 (10 Wilman 2024 05:11) (72 - 75)  BP: 109/65 (10 Wilman 2024 05:11) (105/62 - 136/52)  BP(mean): --  RR: 18 (10 Wilman 2024 05:11) (18 - 20)  SpO2: 90% (10 Wilman 2024 05:11) (90% - 91%)    Parameters below as of 10 Wilman 2024 05:11  Patient On (Oxygen Delivery Method): room air        PHYSICAL EXAM:    Constitutional: NAD, asthenic  OAbd: BS+, soft, NT/ND, No CVAT  : Phimosis, bilateral descended testes, no masses Turcios with grossly clear urine  Extremities: No peripheral edema, leftg foot ulcer    LABS:                        11.5   19.13 )-----------( 268      ( 10 Wilman 2024 09:08 )             33.4     01-10    135  |  103  |  32<H>  ----------------------------<  152<H>  4.3   |  29  |  1.50<H>    Ca    8.7      10 Wilman 2024 07:30  Mg     1.7     01-09    TPro  5.7<L>  /  Alb  1.9<L>  /  TBili  0.4  /  DBili  x   /  AST  28  /  ALT  23  /  AlkPhos  52  01-09    PT/INR - ( 10 Wilman 2024 07:30 )   PT: 27.5 sec;   INR: 2.41 ratio         PTT - ( 09 Jan 2024 05:40 )  PTT:>200 sec  Urinalysis Basic - ( 10 Wilman 2024 07:30 )    Color: x / Appearance: x / SG: x / pH: x  Gluc: 152 mg/dL / Ketone: x  / Bili: x / Urobili: x   Blood: x / Protein: x / Nitrite: x   Leuk Esterase: x / RBC: x / WBC x   Sq Epi: x / Non Sq Epi: x / Bacteria: x      Urine Culture:   Hemoglobin: 11.5 g/dL (01-10 @ 09:08)  Hematocrit: 33.4 % (01-10 @ 09:08)  Hemoglobin: 10.4 g/dL (01-10 @ 07:30)  Hematocrit: 30.7 % (01-10 @ 07:30)  Hemoglobin: 9.7 g/dL (01-09 @ 05:40)  Hematocrit: 27.7 % (01-09 @ 05:40)  Hemoglobin: 10.1 g/dL (01-09 @ 02:00)  Hematocrit: 29.0 % (01-09 @ 02:00)  Hemoglobin: 10.3 g/dL (01-08 @ 20:20)  Hematocrit: 29.8 % (01-08 @ 20:20)      RADIOLOGY & ADDITIONAL STUDIES:

## 2024-01-10 NOTE — PROGRESS NOTE ADULT - PROBLEM SELECTOR PLAN 1
Pt with original presentation to [facility] with L foot wound and cellulitis, transferred to [facility] for L angioplasty  - doppler with occlusion of the popliteal artery on the left - however patient has strong DP pulse, US likely not accurate per vascular - angioplasty cancelled  - L foot NM bone scan ordered to eval for OM, unable to obtain MRI due to metal in arm as per son  - s/p vanc/zosyn -> vanc/cefepime -> cefepime q12  - WBC now uptrending 8 -> 15 -->19  - afebrile, VS stable  - CXR ordered to r/o aspiration   - cont cefepime 1g q12 renal dosing  - will hold off on ordering Bcx as pt has completed several days of abx tx; consider Bcx if pt has new fever  - s/p heparin gtt   - INR 2.41 -- will cont to dose Coumadin tonight  - cont minced and moist diet per  chart review  - ID consulted Dr. Orosco, recs appreciated  - Vascular consulted recs appreciated  - Cardio consulted for cardiac clearance recs appreciated Pt with original presentation to [facility] with L foot wound and cellulitis, transferred to [facility] for L angioplasty  - doppler with occlusion of the popliteal artery on the left - however patient has strong DP pulse, US likely not accurate per vascular - angioplasty cancelled  - L foot NM bone scan ordered to eval for OM, unable to obtain MRI due to metal in arm as per son  - s/p vanc/zosyn -> vanc/cefepime -> cefepime q12  - WBC now uptrending 8 -> 15 -->19  - afebrile, VS stable  - CXR ordered to r/o aspiration   - cont cefepime 1g q12 renal dosing  - will hold off on ordering Bcx as pt has completed several days of abx tx; consider Bcx if pt has new fever  - s/p heparin gtt   - INR 2.41 -- will cont to dose Coumadin tonight  - cont minced and moist diet per  chart review  - ID consulted Dr. Orosco, recs appreciated  - Vascular consulted recs appreciated  - Cardio consulted for cardiac clearance recs appreciated Pt with original presentation to [facility] with L foot wound and cellulitis, transferred to [facility] for L angioplasty  - doppler with occlusion of the popliteal artery on the left - however patient has strong DP pulse, US likely not accurate per vascular - angioplasty cancelled  - L foot NM bone scan ordered to eval for OM, unable to obtain MRI due to metal in arm as per son  - s/p vanc/zosyn -> vanc/cefepime -> cefepime q12  - WBC now uptrending 8 -> 15 -->19  - afebrile, VS stable  - CXR ordered to r/o aspiration   - cont cefepime 1g q12 renal dosing  - will check blood cultures given worsening leukocytosis - stable exam so does not warrant CT imaging - discussed with ID Dr. Loredo  - will resume heparin gtt as hematuria cleared up as per cardiology NP Davidson   - INR 2.41 -- will cont to dose Coumadin tonight  - cont minced and moist diet per  chart review  - ID consulted Dr. Orosco, recs appreciated  - Vascular consulted recs appreciated  - Cardio consulted for cardiac clearance recs appreciated Pt with original presentation to [facility] with L foot wound and cellulitis, transferred [facility] for L angioplasty  - doppler with occlusion of the popliteal artery on the left - however patient has strong DP pulse, US likely not accurate per vascular - angioplasty cancelled  - L foot NM bone scan ordered to eval for OM, unable to obtain MRI due to metal in arm as per son  - s/p vanc/zosyn -> vanc/cefepime -> cefepime q12  - WBC now uptrending 8 -> 15 -->19  - afebrile, VS stable  - CXR ordered to r/o aspiration   - cont cefepime 1g q12 renal dosing  - will check blood cultures given worsening leukocytosis - stable exam so does not warrant CT imaging - discussed with ID Dr. Loredo  - will resume heparin gtt as hematuria cleared up as per cardiology NP Marilla   - INR 2.41 -- will cont to dose Coumadin tonight  - cont minced and moist diet per  chart review  - ID consulted Dr. Orosco, recs appreciated  - Vascular consulted recs appreciated  - Cardio consulted for cardiac clearance recs appreciated

## 2024-01-10 NOTE — PROGRESS NOTE ADULT - ASSESSMENT
89 year old male with PMH HTN, HLD, LBBB, paroxysmal Atrial Fibrillation, thoracic aortic aneurysm, s/p mechanical AVR presented transferred from  for angioplasty of his L foot.  Cardiology consultation now being obtained.     Cardiac Optimization/LBBB/PaFib  - Vascular recs noted.  No plans for LE angiogram at this point. fu pod recs   - Resume Heparin gtt to transition to Coumadin.  Dose daily to keep INR~3   - Heparin gtt stopped, 1/9, in the setting of hematuria which is now resolved.  At this point, risk vs benefits.  - At this point, Hgb remains stable  - EKG SR with old LBBB   - Has known history of PAfib and mechanical AVR    - BP remains stable and controlled  - Continue to hold home ACEI for BETINA, though, stable at 1.5  - Continue home Nadolol with parameters   - Continue home statin     - No sign volume overload, non-orthopneic on RA  - Can obtain routine TTE    - Monitor and replete electrolytes. Keep K>4.0 and Mg>2.0.  - Will continue to follow    Vijaya Randhawa DNP, NP-C, AGACNP-C  Cardiology   Call TEAMS

## 2024-01-10 NOTE — PROGRESS NOTE ADULT - SUBJECTIVE AND OBJECTIVE BOX
[facility]  INFECTIOUS DISEASES - Herlinda Orosco, [place] [address]  Tel: [phone]     Fax: [phone]  =======================================================    POPPY MERCADO [id number]    Follow up: No fevers. Reports feeling fine. Denies any pain. No reported diarrhea.    Allergies:  No Known Allergies      Antibiotics:  atorvastatin 10 milliGRAM(s) Oral at bedtime  cefepime   IVPB 1000 milliGRAM(s) IV Intermittent every 12 hours  dextrose 5%. 1000 milliLiter(s) IV Continuous <Continuous>  dextrose 5%. 1000 milliLiter(s) IV Continuous <Continuous>  dextrose 50% Injectable 25 Gram(s) IV Push once  dextrose 50% Injectable 12.5 Gram(s) IV Push once  dextrose 50% Injectable 25 Gram(s) IV Push once  dextrose Oral Gel 15 Gram(s) Oral once PRN  glucagon  Injectable 1 milliGRAM(s) IntraMuscular once  insulin lispro (ADMELOG) corrective regimen sliding scale   SubCutaneous three times a day before meals  insulin lispro (ADMELOG) corrective regimen sliding scale   SubCutaneous at bedtime  nadolol 20 milliGRAM(s) Oral daily  tamsulosin 0.4 milliGRAM(s) Oral at bedtime  warfarin 2.5 milliGRAM(s) Oral once       REVIEW OF SYSTEMS:  limited 2/2 mental status, very hard of hearing     Physical Exam:  ICU Vital Signs Last 24 Hrs  T(C): 37.6 (10 Wilman 2024 11:39), Max: 37.6 (10 Wilman 2024 11:39)  T(F): 99.6 (10 Wilman 2024 11:39), Max: 99.6 (10 Wilman 2024 11:39)  HR: 82 (10 Wilman 2024 11:39) (72 - 82)  BP: 103/59 (10 Wilman 2024 11:39) (103/59 - 136/52)  BP(mean): --  ABP: --  ABP(mean): --  RR: 21 (10 Wilman 2024 11:39) (18 - 21)  SpO2: 93% (10 Wilman 2024 11:39) (90% - 93%)    O2 Parameters below as of 10 Wilman 2024 11:39  Patient On (Oxygen Delivery Method): room air        GEN: NAD  HEENT: normocephalic and atraumatic.   NECK: Supple.   LUNGS: normal respiratory effort  HEART: Regular rate and rhythm   ABDOMEN: Soft, nontender, and nondistended.    EXTREMITIES: No leg edema.  SKIN: (+) L foot eschar  NEUROLOGIC: Difficult to assess, very hard of hearing      Labs:  01-10    135  |  103  |  32<H>  ----------------------------<  152<H>  4.3   |  29  |  1.50<H>    Ca    8.7      10 Wilman 2024 07:30  Mg     1.7     01-09    TPro  5.7<L>  /  Alb  1.9<L>  /  TBili  0.4  /  DBili  x   /  AST  28  /  ALT  23  /  AlkPhos  52  01-09                          11.5   19.13 )-----------( 268      ( 10 Wilman 2024 09:08 )             33.4     PT/INR - ( 10 Wilman 2024 07:30 )   PT: 27.5 sec;   INR: 2.41 ratio         PTT - ( 09 Jan 2024 05:40 )  PTT:>200 sec  Urinalysis Basic - ( 10 Wilman 2024 07:30 )    Color: x / Appearance: x / SG: x / pH: x  Gluc: 152 mg/dL / Ketone: x  / Bili: x / Urobili: x   Blood: x / Protein: x / Nitrite: x   Leuk Esterase: x / RBC: x / WBC x   Sq Epi: x / Non Sq Epi: x / Bacteria: x      LIVER FUNCTIONS - ( 09 Jan 2024 05:40 )  Alb: 1.9 g/dL / Pro: 5.7 g/dL / ALK PHOS: 52 U/L / ALT: 23 U/L / AST: 28 U/L / GGT: x             RECENT CULTURES:  01-05 @ 13:10 Catheterized Catheterized     No growth        12-31 @ 13:38 Clean Catch Clean Catch (Midstream)     <10,000 CFU/mL Normal Urogenital Christy        12-31 @ 13:05 .Blood Blood-Peripheral     No growth at 5 days        12-31 @ 13:00 .Blood Blood-Peripheral     No growth at 5 days      NotDete        All imaging and data are reviewed.    [facility]  INFECTIOUS DISEASES - Herlinda Orosco, [place] [address]  Tel: [phone]     Fax: [phone]  =======================================================    POPPY MERCADO [id number]    Follow up: No fevers. Reports feeling fine. Denies any pain. No reported diarrhea.    Allergies:  No Known Allergies      Antibiotics:  atorvastatin 10 milliGRAM(s) Oral at bedtime  cefepime   IVPB 1000 milliGRAM(s) IV Intermittent every 12 hours  dextrose 5%. 1000 milliLiter(s) IV Continuous <Continuous>  dextrose 5%. 1000 milliLiter(s) IV Continuous <Continuous>  dextrose 50% Injectable 25 Gram(s) IV Push once  dextrose 50% Injectable 12.5 Gram(s) IV Push once  dextrose 50% Injectable 25 Gram(s) IV Push once  dextrose Oral Gel 15 Gram(s) Oral once PRN  glucagon  Injectable 1 milliGRAM(s) IntraMuscular once  insulin lispro (ADMELOG) corrective regimen sliding scale   SubCutaneous three times a day before meals  insulin lispro (ADMELOG) corrective regimen sliding scale   SubCutaneous at bedtime  nadolol 20 milliGRAM(s) Oral daily  tamsulosin 0.4 milliGRAM(s) Oral at bedtime  warfarin 2.5 milliGRAM(s) Oral once       REVIEW OF SYSTEMS:  limited 2/2 mental status, very hard of hearing     Physical Exam:  ICU Vital Signs Last 24 Hrs  T(C): 37.6 (10 Wilman 2024 11:39), Max: 37.6 (10 Wilman 2024 11:39)  T(F): 99.6 (10 Wilman 2024 11:39), Max: 99.6 (10 Wilman 2024 11:39)  HR: 82 (10 Wilman 2024 11:39) (72 - 82)  BP: 103/59 (10 Wilman 2024 11:39) (103/59 - 136/52)  BP(mean): --  ABP: --  ABP(mean): --  RR: 21 (10 Wilman 2024 11:39) (18 - 21)  SpO2: 93% (10 Wilman 2024 11:39) (90% - 93%)    O2 Parameters below as of 10 Wilman 2024 11:39  Patient On (Oxygen Delivery Method): room air        GEN: NAD  HEENT: normocephalic and atraumatic.   NECK: Supple.   LUNGS: normal respiratory effort  HEART: Regular rate and rhythm   ABDOMEN: Soft, nontender, and nondistended.    EXTREMITIES: No leg edema.  SKIN: (+) L foot eschar  NEUROLOGIC: Difficult to assess, very hard of hearing      Labs:  01-10    135  |  103  |  32<H>  ----------------------------<  152<H>  4.3   |  29  |  1.50<H>    Ca    8.7      10 Wilman 2024 07:30  Mg     1.7     01-09    TPro  5.7<L>  /  Alb  1.9<L>  /  TBili  0.4  /  DBili  x   /  AST  28  /  ALT  23  /  AlkPhos  52  01-09                          11.5   19.13 )-----------( 268      ( 10 Wilman 2024 09:08 )             33.4     PT/INR - ( 10 Wilman 2024 07:30 )   PT: 27.5 sec;   INR: 2.41 ratio         PTT - ( 09 Jan 2024 05:40 )  PTT:>200 sec  Urinalysis Basic - ( 10 Wilman 2024 07:30 )    Color: x / Appearance: x / SG: x / pH: x  Gluc: 152 mg/dL / Ketone: x  / Bili: x / Urobili: x   Blood: x / Protein: x / Nitrite: x   Leuk Esterase: x / RBC: x / WBC x   Sq Epi: x / Non Sq Epi: x / Bacteria: x      LIVER FUNCTIONS - ( 09 Jan 2024 05:40 )  Alb: 1.9 g/dL / Pro: 5.7 g/dL / ALK PHOS: 52 U/L / ALT: 23 U/L / AST: 28 U/L / GGT: x             RECENT CULTURES:  01-05 @ 13:10 Catheterized Catheterized     No growth        12-31 @ 13:38 Clean Catch Clean Catch (Midstream)     <10,000 CFU/mL Normal Urogenital Christy        12-31 @ 13:05 .Blood Blood-Peripheral     No growth at 5 days        12-31 @ 13:00 .Blood Blood-Peripheral     No growth at 5 days      NotDete        All imaging and data are reviewed.

## 2024-01-10 NOTE — PROGRESS NOTE ADULT - PROBLEM SELECTOR PLAN 4
Pt with A1c 6.7%, not on home medications and does not follow with PCP  - continue CASS with FS  - consistent cho diet minced and moist  - hypoglycemia protocol

## 2024-01-10 NOTE — PROGRESS NOTE ADULT - PROBLEM SELECTOR PLAN 2
Pt with BETINA, unknown baseline (outpt note with Cr 1.16 10/2023) - IMPROVING  - BUN/Cr 46/1.6 on GC admission  - likely 2/2 abx vs urinary retention - vanco discontinued and catsañeda placed with >1L output  - hold lisinopril in setting of BETINA  - hold silodosin in setting of dec Cr clearance  - now s/p another 24h of IV fluids given orthostasis - with improved Cr - monitor off fluids  - replace electrolytes as needed  - nephro consulted, recs appreciated Pt with BETINA, unknown baseline (outpt note with Cr 1.16 10/2023) - IMPROVING  - BUN/Cr 46/1.6 on GC admission  - likely 2/2 abx vs urinary retention - vanco discontinued and castañeda placed with >1L output  - hold lisinopril in setting of BETINA  - hold silodosin in setting of dec Cr clearance  - now s/p another 24h of IV fluids given orthostasis - with improved Cr - monitor off fluids  - replace electrolytes as needed  - nephro consulted, recs appreciated

## 2024-01-10 NOTE — PROGRESS NOTE ADULT - SUBJECTIVE AND OBJECTIVE BOX
[facility] -- An Barajas, Gildardo Marrero Savella, , Royce Whatley  Office # [phone]    Follow Up:  Mechanical AVR, Cardiac Optimization    Subjective/Observations: Seen and evaluated.  Asleep, difficult to awaken.  Comfortable on RA.  Not in any from of distress    REVIEW OF SYSTEMS: All other review of systems is negative unless indicated above  PAST MEDICAL & SURGICAL HISTORY:  HTN (hypertension)      HLD (hyperlipidemia)      Paroxysmal atrial fibrillation      H/O aortic valve repair      H/O thoracic aortic aneurysm repair    MEDICATIONS  (STANDING):  atorvastatin 10 milliGRAM(s) Oral at bedtime  cefepime   IVPB 1000 milliGRAM(s) IV Intermittent every 12 hours  dextrose 5%. 1000 milliLiter(s) (50 mL/Hr) IV Continuous <Continuous>  dextrose 5%. 1000 milliLiter(s) (100 mL/Hr) IV Continuous <Continuous>  dextrose 50% Injectable 25 Gram(s) IV Push once  dextrose 50% Injectable 12.5 Gram(s) IV Push once  dextrose 50% Injectable 25 Gram(s) IV Push once  glucagon  Injectable 1 milliGRAM(s) IntraMuscular once  insulin lispro (ADMELOG) corrective regimen sliding scale   SubCutaneous at bedtime  insulin lispro (ADMELOG) corrective regimen sliding scale   SubCutaneous three times a day before meals  nadolol 20 milliGRAM(s) Oral daily  tamsulosin 0.4 milliGRAM(s) Oral at bedtime    MEDICATIONS  (PRN):  dextrose Oral Gel 15 Gram(s) Oral once PRN Blood Glucose LESS THAN 70 milliGRAM(s)/deciliter    Allergies    No Known Allergies    Intolerances    Vital Signs Last 24 Hrs  T(C): 37 (10 Wilman 2024 05:11), Max: 37 (09 Jan 2024 20:30)  T(F): 98.6 (10 Wilman 2024 05:11), Max: 98.6 (09 Jan 2024 20:30)  HR: 75 (10 Wilman 2024 05:11) (72 - 75)  BP: 109/65 (10 Wilman 2024 05:11) (105/62 - 136/52)  BP(mean): --  RR: 18 (10 Wilman 2024 05:11) (18 - 20)  SpO2: 90% (10 Wilman 2024 05:11) (90% - 91%)    Parameters below as of 10 Wilman 2024 05:11  Patient On (Oxygen Delivery Method): room air    I&O's Summary    09 Jan 2024 07:01  -  10 Wilman 2024 07:00  --------------------------------------------------------  IN: 1420 mL / OUT: 1150 mL / NET: 270 mL      PHYSICAL EXAM:  TELE: Not on tele  Constitutional: NAD, asleep, well-developed  HEENT: Moist Mucous Membranes, Anicteric  Pulmonary: Non-labored, breath sounds are clear bilaterally, No wheezing, rales or rhonchi  Cardiovascular: Regular, S1 and S2, +murmurs, no rubs, gallops +clicks  Gastrointestinal: Bowel Sounds present, soft, nontender.   Lymph: No peripheral edema. No lymphadenopathy.  Skin: No visible rashes.  Left heel ulcers with dressing dry and intact  Psych:  Mood & affect: Unable to assess  LABS: All Labs Reviewed:                        11.5   19.13 )-----------( 268      ( 10 Wilman 2024 09:08 )             33.4                         10.4   15.76 )-----------( 229      ( 10 Wilman 2024 07:30 )             30.7                         9.7    8.32  )-----------( 208      ( 09 Jan 2024 05:40 )             27.7     10 Wilman 2024 07:30    135    |  103    |  32     ----------------------------<  152    4.3     |  29     |  1.50   09 Jan 2024 05:40    133    |  104    |  34     ----------------------------<  159    3.7     |  25     |  1.40   08 Jan 2024 07:27    135    |  104    |  39     ----------------------------<  150    3.7     |  27     |  1.60     Ca    8.7        10 Wilman 2024 07:30  Ca    7.8        09 Jan 2024 05:40  Ca    8.4        08 Jan 2024 07:27  Mg     1.7       09 Jan 2024 05:40  Mg     1.5       08 Jan 2024 07:27    TPro  5.7    /  Alb  1.9    /  TBili  0.4    /  DBili  x      /  AST  28     /  ALT  23     /  AlkPhos  52     09 Jan 2024 05:40  TPro  6.2    /  Alb  2.0    /  TBili  0.5    /  DBili  x      /  AST  24     /  ALT  21     /  AlkPhos  54     08 Jan 2024 07:27    PT/INR - ( 10 Wilman 2024 07:30 )   PT: 27.5 sec;   INR: 2.41 ratio    PTT - ( 09 Jan 2024 05:40 )  PTT:>200 sec            [facility] -- An Barajas, Gildardo Marrero Savella, , Royce Whatley  Office # [phone]    Follow Up:  Mechanical AVR, Cardiac Optimization    Subjective/Observations: Seen and evaluated.  Asleep, difficult to awaken.  Comfortable on RA.  Not in any from of distress    REVIEW OF SYSTEMS: All other review of systems is negative unless indicated above  PAST MEDICAL & SURGICAL HISTORY:  HTN (hypertension)      HLD (hyperlipidemia)      Paroxysmal atrial fibrillation      H/O aortic valve repair      H/O thoracic aortic aneurysm repair    MEDICATIONS  (STANDING):  atorvastatin 10 milliGRAM(s) Oral at bedtime  cefepime   IVPB 1000 milliGRAM(s) IV Intermittent every 12 hours  dextrose 5%. 1000 milliLiter(s) (50 mL/Hr) IV Continuous <Continuous>  dextrose 5%. 1000 milliLiter(s) (100 mL/Hr) IV Continuous <Continuous>  dextrose 50% Injectable 25 Gram(s) IV Push once  dextrose 50% Injectable 12.5 Gram(s) IV Push once  dextrose 50% Injectable 25 Gram(s) IV Push once  glucagon  Injectable 1 milliGRAM(s) IntraMuscular once  insulin lispro (ADMELOG) corrective regimen sliding scale   SubCutaneous at bedtime  insulin lispro (ADMELOG) corrective regimen sliding scale   SubCutaneous three times a day before meals  nadolol 20 milliGRAM(s) Oral daily  tamsulosin 0.4 milliGRAM(s) Oral at bedtime    MEDICATIONS  (PRN):  dextrose Oral Gel 15 Gram(s) Oral once PRN Blood Glucose LESS THAN 70 milliGRAM(s)/deciliter    Allergies    No Known Allergies    Intolerances    Vital Signs Last 24 Hrs  T(C): 37 (10 Wilman 2024 05:11), Max: 37 (09 Jan 2024 20:30)  T(F): 98.6 (10 Wilman 2024 05:11), Max: 98.6 (09 Jan 2024 20:30)  HR: 75 (10 Wilman 2024 05:11) (72 - 75)  BP: 109/65 (10 Wilman 2024 05:11) (105/62 - 136/52)  BP(mean): --  RR: 18 (10 Wilman 2024 05:11) (18 - 20)  SpO2: 90% (10 Wilman 2024 05:11) (90% - 91%)    Parameters below as of 10 Wilman 2024 05:11  Patient On (Oxygen Delivery Method): room air    I&O's Summary    09 Jan 2024 07:01  -  10 Wilman 2024 07:00  --------------------------------------------------------  IN: 1420 mL / OUT: 1150 mL / NET: 270 mL      PHYSICAL EXAM:  TELE: Not on tele  Constitutional: NAD, asleep, well-developed  HEENT: Moist Mucous Membranes, Anicteric  Pulmonary: Non-labored, breath sounds are clear bilaterally, No wheezing, rales or rhonchi  Cardiovascular: Regular, S1 and S2, +murmurs, no rubs, gallops +clicks  Gastrointestinal: Bowel Sounds present, soft, nontender.   Lymph: No peripheral edema. No lymphadenopathy.  Skin: No visible rashes.  Left heel ulcers with dressing dry and intact  Psych:  Mood & affect: Unable to assess  LABS: All Labs Reviewed:                        11.5   19.13 )-----------( 268      ( 10 Wilman 2024 09:08 )             33.4                         10.4   15.76 )-----------( 229      ( 10 Wilman 2024 07:30 )             30.7                         9.7    8.32  )-----------( 208      ( 09 Jan 2024 05:40 )             27.7     10 Wilman 2024 07:30    135    |  103    |  32     ----------------------------<  152    4.3     |  29     |  1.50   09 Jan 2024 05:40    133    |  104    |  34     ----------------------------<  159    3.7     |  25     |  1.40   08 Jan 2024 07:27    135    |  104    |  39     ----------------------------<  150    3.7     |  27     |  1.60     Ca    8.7        10 Wilman 2024 07:30  Ca    7.8        09 Jan 2024 05:40  Ca    8.4        08 Jan 2024 07:27  Mg     1.7       09 Jan 2024 05:40  Mg     1.5       08 Jan 2024 07:27    TPro  5.7    /  Alb  1.9    /  TBili  0.4    /  DBili  x      /  AST  28     /  ALT  23     /  AlkPhos  52     09 Jan 2024 05:40  TPro  6.2    /  Alb  2.0    /  TBili  0.5    /  DBili  x      /  AST  24     /  ALT  21     /  AlkPhos  54     08 Jan 2024 07:27    PT/INR - ( 10 Wilman 2024 07:30 )   PT: 27.5 sec;   INR: 2.41 ratio    PTT - ( 09 Jan 2024 05:40 )  PTT:>200 sec

## 2024-01-10 NOTE — PROGRESS NOTE ADULT - PROBLEM SELECTOR PLAN 5
Pt with hx HTN  - takes ramipril, nadolol at home  - hold ACE in setting of BETINA and stable BP with orthostasis  - cont nadolol 20mg with hold parameters in place - would consider stopping as this is a nonselective BB and not an ideal choice for rate control for his afib anyway - will defer to cardiology

## 2024-01-10 NOTE — PROGRESS NOTE ADULT - ASSESSMENT
89 yo M with PMH HTN, HLD, LBBB, paroxismal Atrial Fibrillation, thoracic aortic aneurysm, s/p mechanical AVR, who was transferred from  for angioplasty of his L foot. He has a left foot eschar, on empiric antibiotics to treat for potential soft tissue infection or even underlying osteomyelitis. Bone scan pending to evaluate for osteomyelitis as he cannot get an MRI.    Noted to have acute leukocytosis of 19 today. Unclear etiology. No fever and patient without obvious new complaints. Will repeat cultures and monitor for now. MRSA nasal PCR negative. Recent blood cultures from 12/31 no growth.    #Left foot eschar  #? L foot osteomyelitis  #Leukocytosis    -continue cefepime (renally dosed)--duration to be determined pending bone scan  -suggest repeat blood cultures  -monitor WBC  -follow up bone scan  -discussed with Dr. Marc Loredo MD  Division of Infectious Diseases   Cell [phone] between 8am and 6pm   After 6pm and weekends please call ID service at [phone].     35 minutes spent on total encounter assessing patient, examination, chart review, counseling and coordinating care by the attending physician/nurse/care manager.    89 yo M with PMH HTN, HLD, LBBB, paroxismal Atrial Fibrillation, thoracic aortic aneurysm, s/p mechanical AVR, who was transferred from  for angioplasty of his L foot. He has a left foot eschar, on empiric antibiotics to treat for potential soft tissue infection or even underlying osteomyelitis. Bone scan pending to evaluate for osteomyelitis as he cannot get an MRI.    Noted to have acute leukocytosis of 19 today. Unclear etiology. No fever and patient without obvious new complaints. Will repeat cultures and monitor for now. MRSA nasal PCR negative. Recent blood cultures from 12/31 no growth.    #Left foot eschar  #? L foot osteomyelitis  #Leukocytosis    -continue cefepime (renally dosed)--duration to be determined pending bone scan  -suggest repeat blood cultures  -monitor WBC  -follow up bone scan  -discussed with Dr. Macr Loredo MD  Division of Infectious Diseases   Cell [phone] between 8am and 6pm   After 6pm and weekends please call ID service at [phone].     35 minutes spent on total encounter assessing patient, examination, chart review, counseling and coordinating care by the attending physician/nurse/care manager.

## 2024-01-10 NOTE — PROGRESS NOTE ADULT - PROBLEM SELECTOR PLAN 8
Coumadin; INR 2.40, will dose 2.5 1/9    Per  chart, GO discussion started with son Bogdan Jefferson who appears hard to reach by phone  - palliative consulted -- pt now DNR/DNI    Spoke to son 1/8 and updated him on plan - wants eventual MEGHNA - concerned about surgery due to anesthesia - states ~5PM is the best time to call and this is the only number to call. Works during the day. Saw his dad yesterday at some point and thought he was much better/more coherent than he was at . Podiatry notified and spoke to son as well. Will attempt to reach son later tonight, as he has said he is unavailable during the day. Coumadin; INR 2.40, will dose 2.5 1/9    Per GC chart, GOC discussion started with son Bogdan Jefferson who appears hard to reach by phone  - palliative consulted -- pt now DNR/DNI    Spoke to son 1/8 and updated him on plan - wants eventual MEGHNA - concerned about surgery due to anesthesia - states ~5PM is the best time to call and this is the only number to call.

## 2024-01-10 NOTE — PROGRESS NOTE ADULT - SUBJECTIVE AND OBJECTIVE BOX
Patient is a 89y old  Male who presents with a chief complaint of left foot wound (10 Wilman 2024 11:51)        INTERVAL HPI/OVERNIGHT EVENTS: No acute overnight events. Pt seen and examined at the bedside this AM. Pt sleeping, difficult to wake up. Appears to be resting comfortably. Castañeda in place - draining yellow urine -- hematuria appears to be resolved.      MEDICATIONS  (STANDING):  atorvastatin 10 milliGRAM(s) Oral at bedtime  cefepime   IVPB 1000 milliGRAM(s) IV Intermittent every 12 hours  dextrose 5%. 1000 milliLiter(s) (100 mL/Hr) IV Continuous <Continuous>  dextrose 5%. 1000 milliLiter(s) (50 mL/Hr) IV Continuous <Continuous>  dextrose 50% Injectable 25 Gram(s) IV Push once  dextrose 50% Injectable 12.5 Gram(s) IV Push once  dextrose 50% Injectable 25 Gram(s) IV Push once  glucagon  Injectable 1 milliGRAM(s) IntraMuscular once  insulin lispro (ADMELOG) corrective regimen sliding scale   SubCutaneous at bedtime  insulin lispro (ADMELOG) corrective regimen sliding scale   SubCutaneous three times a day before meals  nadolol 20 milliGRAM(s) Oral daily  tamsulosin 0.4 milliGRAM(s) Oral at bedtime  warfarin 2.5 milliGRAM(s) Oral once    MEDICATIONS  (PRN):  dextrose Oral Gel 15 Gram(s) Oral once PRN Blood Glucose LESS THAN 70 milliGRAM(s)/deciliter      Allergies    No Known Allergies    Intolerances        REVIEW OF SYSTEMS: unable to assess since pt was sleeping and difficult to wake up    Vital Signs Last 24 Hrs  T(C): 37 (10 Wilman 2024 05:11), Max: 37 (09 Jan 2024 20:30)  T(F): 98.6 (10 Wilman 2024 05:11), Max: 98.6 (09 Jan 2024 20:30)  HR: 75 (10 Wilman 2024 05:11) (72 - 75)  BP: 109/65 (10 Wilman 2024 05:11) (109/65 - 136/52)  BP(mean): --  RR: 18 (10 Wilman 2024 05:11) (18 - 18)  SpO2: 90% (10 Wilman 2024 05:11) (90% - 90%)    Parameters below as of 10 Wilman 2024 05:11  Patient On (Oxygen Delivery Method): room air        PHYSICAL EXAM:  GENERAL: NAD, sleeping comfortably   HEENT:  anicteric, moist mucous membranes  CHEST/LUNG:  CTA b/l, no rales, wheezes, or rhonchi  HEART:  RRR, S1, S2  ABDOMEN:  BS+, soft, nontender, nondistended  EXTREMITIES: no edema, cyanosis, or calf tenderness  NERVOUS SYSTEM: unable to assess today  : castañeda draining yellow colored urine    LABS:                        11.5   19.13 )-----------( 268      ( 10 Wilman 2024 09:08 )             33.4     CBC Full  -  ( 10 Wilman 2024 09:08 )  WBC Count : 19.13 K/uL  Hemoglobin : 11.5 g/dL  Hematocrit : 33.4 %  Platelet Count - Automated : 268 K/uL  Mean Cell Volume : 87.9 fl  Mean Cell Hemoglobin : 30.3 pg  Mean Cell Hemoglobin Concentration : 34.4 gm/dL  Auto Neutrophil # : 16.41 K/uL  Auto Lymphocyte # : 1.22 K/uL  Auto Monocyte # : 0.99 K/uL  Auto Eosinophil # : 0.25 K/uL  Auto Basophil # : 0.06 K/uL  Auto Neutrophil % : 85.8 %  Auto Lymphocyte % : 6.4 %  Auto Monocyte % : 5.2 %  Auto Eosinophil % : 1.3 %  Auto Basophil % : 0.3 %    10 Wilman 2024 07:30    135    |  103    |  32     ----------------------------<  152    4.3     |  29     |  1.50     Ca    8.7        10 Wilman 2024 07:30      PT/INR - ( 10 Wilman 2024 07:30 )   PT: 27.5 sec;   INR: 2.41 ratio         PTT - ( 09 Jan 2024 05:40 )  PTT:>200 sec  Urinalysis Basic - ( 10 Wilman 2024 07:30 )    Color: x / Appearance: x / SG: x / pH: x  Gluc: 152 mg/dL / Ketone: x  / Bili: x / Urobili: x   Blood: x / Protein: x / Nitrite: x   Leuk Esterase: x / RBC: x / WBC x   Sq Epi: x / Non Sq Epi: x / Bacteria: x      CAPILLARY BLOOD GLUCOSE      POCT Blood Glucose.: 167 mg/dL (10 Wilman 2024 08:10)  POCT Blood Glucose.: 134 mg/dL (09 Jan 2024 21:48)  POCT Blood Glucose.: 121 mg/dL (09 Jan 2024 17:21)        Culture - Urine (collected 01-05-24 @ 13:10)  Source: Catheterized Catheterized  Final Report (01-06-24 @ 14:20):    No growth        RADIOLOGY & ADDITIONAL TESTS:  Personally reviewed.     Consultant(s) Notes Reviewed:  [x] YES  [ ] NO Patient is a 89y old  Male who presents with a chief complaint of left foot wound (10 Wilman 2024 11:51)        INTERVAL HPI/OVERNIGHT EVENTS: No acute overnight events. Pt seen and examined at the bedside this AM. Pt sleeping. Appears to be resting comfortably. Castañeda in place - draining yellow urine -- hematuria appears to be resolved. Today's cardiology recs are to resume heparin gtt until therapeutic INR - especially as urine has cleared up. Hard of hearing and has no complaints.      MEDICATIONS  (STANDING):  atorvastatin 10 milliGRAM(s) Oral at bedtime  cefepime   IVPB 1000 milliGRAM(s) IV Intermittent every 12 hours  dextrose 5%. 1000 milliLiter(s) (100 mL/Hr) IV Continuous <Continuous>  dextrose 5%. 1000 milliLiter(s) (50 mL/Hr) IV Continuous <Continuous>  dextrose 50% Injectable 25 Gram(s) IV Push once  dextrose 50% Injectable 12.5 Gram(s) IV Push once  dextrose 50% Injectable 25 Gram(s) IV Push once  glucagon  Injectable 1 milliGRAM(s) IntraMuscular once  insulin lispro (ADMELOG) corrective regimen sliding scale   SubCutaneous at bedtime  insulin lispro (ADMELOG) corrective regimen sliding scale   SubCutaneous three times a day before meals  nadolol 20 milliGRAM(s) Oral daily  tamsulosin 0.4 milliGRAM(s) Oral at bedtime  warfarin 2.5 milliGRAM(s) Oral once    MEDICATIONS  (PRN):  dextrose Oral Gel 15 Gram(s) Oral once PRN Blood Glucose LESS THAN 70 milliGRAM(s)/deciliter      Allergies    No Known Allergies    Intolerances        REVIEW OF SYSTEMS: unable to assess since pt was sleeping and difficult to wake up    Vital Signs Last 24 Hrs  T(C): 37 (10 Wilman 2024 05:11), Max: 37 (09 Jan 2024 20:30)  T(F): 98.6 (10 Wilman 2024 05:11), Max: 98.6 (09 Jan 2024 20:30)  HR: 75 (10 Wilman 2024 05:11) (72 - 75)  BP: 109/65 (10 Wilman 2024 05:11) (109/65 - 136/52)  RR: 18 (10 Wilman 2024 05:11) (18 - 18)  SpO2: 90% (10 Wilman 2024 05:11) (90% - 90%)  Parameters below as of 10 Wilman 2024 05:11  Patient On (Oxygen Delivery Method): room air    PHYSICAL EXAM:  GENERAL: NAD, sleeping comfortably   HEENT:  anicteric, moist mucous membranes  CHEST/LUNG:  CTA b/l, no rales, wheezes, or rhonchi  HEART:  RRR, S1, S2  ABDOMEN:  BS+, soft, nontender, nondistended  EXTREMITIES: no edema, cyanosis, or calf tenderness  NERVOUS SYSTEM: arousable, follows commands, answers questions  : castañeda draining yellow colored urine    LABS:                        11.5   19.13 )-----------( 268      ( 10 Wilman 2024 09:08 )             33.4     CBC Full  -  ( 10 Wilman 2024 09:08 )  WBC Count : 19.13 K/uL  Hemoglobin : 11.5 g/dL  Hematocrit : 33.4 %  Platelet Count - Automated : 268 K/uL  Mean Cell Volume : 87.9 fl  Mean Cell Hemoglobin : 30.3 pg  Mean Cell Hemoglobin Concentration : 34.4 gm/dL  Auto Neutrophil # : 16.41 K/uL  Auto Lymphocyte # : 1.22 K/uL  Auto Monocyte # : 0.99 K/uL  Auto Eosinophil # : 0.25 K/uL  Auto Basophil # : 0.06 K/uL  Auto Neutrophil % : 85.8 %  Auto Lymphocyte % : 6.4 %  Auto Monocyte % : 5.2 %  Auto Eosinophil % : 1.3 %  Auto Basophil % : 0.3 %    10 Wilman 2024 07:30    135    |  103    |  32     ----------------------------<  152    4.3     |  29     |  1.50     Ca    8.7        10 Wilman 2024 07:30      PT/INR - ( 10 Wilman 2024 07:30 )   PT: 27.5 sec;   INR: 2.41 ratio         PTT - ( 09 Jan 2024 05:40 )  PTT:>200 sec  Urinalysis Basic - ( 10 Wilman 2024 07:30 )    Color: x / Appearance: x / SG: x / pH: x  Gluc: 152 mg/dL / Ketone: x  / Bili: x / Urobili: x   Blood: x / Protein: x / Nitrite: x   Leuk Esterase: x / RBC: x / WBC x   Sq Epi: x / Non Sq Epi: x / Bacteria: x      CAPILLARY BLOOD GLUCOSE      POCT Blood Glucose.: 167 mg/dL (10 Wilman 2024 08:10)  POCT Blood Glucose.: 134 mg/dL (09 Jan 2024 21:48)  POCT Blood Glucose.: 121 mg/dL (09 Jan 2024 17:21)        Culture - Urine (collected 01-05-24 @ 13:10)  Source: Catheterized Catheterized  Final Report (01-06-24 @ 14:20):    No growth        RADIOLOGY & ADDITIONAL TESTS:  Personally reviewed.     Consultant(s) Notes Reviewed:  [x] YES  [ ] NO Patient is a 89y old  Male who presents with a chief complaint of left foot wound (10 Wilman 2024 11:51)        INTERVAL HPI/OVERNIGHT EVENTS: No acute overnight events. Pt seen and examined at the bedside this AM. Pt sleeping. Appears to be resting comfortably. Castañeda in place - draining yellow urine -- hematuria appears to be resolved. Today's cardiology recs are to resume heparin gtt until therapeutic INR - especially as urine has cleared up. Hard of hearing and has no complaints.      MEDICATIONS  (STANDING):  atorvastatin 10 milliGRAM(s) Oral at bedtime  cefepime   IVPB 1000 milliGRAM(s) IV Intermittent every 12 hours  dextrose 5%. 1000 milliLiter(s) (100 mL/Hr) IV Continuous <Continuous>  dextrose 5%. 1000 milliLiter(s) (50 mL/Hr) IV Continuous <Continuous>  dextrose 50% Injectable 25 Gram(s) IV Push once  dextrose 50% Injectable 12.5 Gram(s) IV Push once  dextrose 50% Injectable 25 Gram(s) IV Push once  glucagon  Injectable 1 milliGRAM(s) IntraMuscular once  insulin lispro (ADMELOG) corrective regimen sliding scale   SubCutaneous at bedtime  insulin lispro (ADMELOG) corrective regimen sliding scale   SubCutaneous three times a day before meals  nadolol 20 milliGRAM(s) Oral daily  tamsulosin 0.4 milliGRAM(s) Oral at bedtime  warfarin 2.5 milliGRAM(s) Oral once    MEDICATIONS  (PRN):  dextrose Oral Gel 15 Gram(s) Oral once PRN Blood Glucose LESS THAN 70 milliGRAM(s)/deciliter      Allergies    No Known Allergies    Intolerances        REVIEW OF SYSTEMS: unable to assess since pt was sleeping and difficult to wake up    Vital Signs Last 24 Hrs  T(C): 37 (10 Wilman 2024 05:11), Max: 37 (09 Jan 2024 20:30)  T(F): 98.6 (10 Wilman 2024 05:11), Max: 98.6 (09 Jan 2024 20:30)  HR: 75 (10 Wilman 2024 05:11) (72 - 75)  BP: 109/65 (10 Wilman 2024 05:11) (109/65 - 136/52)  RR: 18 (10 Wilman 2024 05:11) (18 - 18)  SpO2: 90% (10 Wilman 2024 05:11) (90% - 90%)  Parameters below as of 10 Wilman 2024 05:11  Patient On (Oxygen Delivery Method): room air    PHYSICAL EXAM:  GENERAL: NAD, sleeping comfortably   HEENT:  anicteric, moist mucous membranes  CHEST/LUNG:  CTA b/l, no rales, wheezes, or rhonchi  HEART:  RRR, S1, S2  ABDOMEN:  BS+, soft, nontender, nondistended  EXTREMITIES: +L foot eschar, no edema, cyanosis, or calf tenderness  NERVOUS SYSTEM: arousable, follows commands, answers questions  : castañeda draining yellow colored urine    LABS:                        11.5   19.13 )-----------( 268      ( 10 Wilman 2024 09:08 )             33.4     CBC Full  -  ( 10 Wilman 2024 09:08 )  WBC Count : 19.13 K/uL  Hemoglobin : 11.5 g/dL  Hematocrit : 33.4 %  Platelet Count - Automated : 268 K/uL  Mean Cell Volume : 87.9 fl  Mean Cell Hemoglobin : 30.3 pg  Mean Cell Hemoglobin Concentration : 34.4 gm/dL  Auto Neutrophil # : 16.41 K/uL  Auto Lymphocyte # : 1.22 K/uL  Auto Monocyte # : 0.99 K/uL  Auto Eosinophil # : 0.25 K/uL  Auto Basophil # : 0.06 K/uL  Auto Neutrophil % : 85.8 %  Auto Lymphocyte % : 6.4 %  Auto Monocyte % : 5.2 %  Auto Eosinophil % : 1.3 %  Auto Basophil % : 0.3 %    10 Wilman 2024 07:30    135    |  103    |  32     ----------------------------<  152    4.3     |  29     |  1.50     Ca    8.7        10 Wilman 2024 07:30      PT/INR - ( 10 Wilman 2024 07:30 )   PT: 27.5 sec;   INR: 2.41 ratio         PTT - ( 09 Jan 2024 05:40 )  PTT:>200 sec  Urinalysis Basic - ( 10 Wilman 2024 07:30 )    Color: x / Appearance: x / SG: x / pH: x  Gluc: 152 mg/dL / Ketone: x  / Bili: x / Urobili: x   Blood: x / Protein: x / Nitrite: x   Leuk Esterase: x / RBC: x / WBC x   Sq Epi: x / Non Sq Epi: x / Bacteria: x      CAPILLARY BLOOD GLUCOSE      POCT Blood Glucose.: 167 mg/dL (10 Wilman 2024 08:10)  POCT Blood Glucose.: 134 mg/dL (09 Jan 2024 21:48)  POCT Blood Glucose.: 121 mg/dL (09 Jan 2024 17:21)        Culture - Urine (collected 01-05-24 @ 13:10)  Source: Catheterized Catheterized  Final Report (01-06-24 @ 14:20):    No growth        RADIOLOGY & ADDITIONAL TESTS:  Personally reviewed.     Consultant(s) Notes Reviewed:  [x] YES  [ ] NO

## 2024-01-10 NOTE — PROGRESS NOTE ADULT - PROBLEM SELECTOR PLAN 3
Pt with history of afib on warfarin at home, presented to [facility] originally with supratherapeutic INR  - pt NSR on exam  - INR subtherapeutic at 2.4; c/w dose Coumadin 2.5mg tonight  - f/u TTE ordered per cardiology recs - routine and just as baseline - would not impede any potential procedures (although son has stated that he would not really want a procedure unless absolutely needed)

## 2024-01-10 NOTE — PROGRESS NOTE ADULT - ASSESSMENT
89 yo M with PMH HTN, HLD, LBBB, paroxysmal Atrial Fibrillation, thoracic aortic aneurysm, s/p mechanical AVR presented transferred from  for angioplasty of his L foot - found to have palpable pulses and procedure cancelled. To continue management of BETINA and cellulitis/suspected osteomyelitis. 89 yo M with PMH HTN, HLD, LBBB, paroxysmal Atrial Fibrillation, thoracic aortic aneurysm, s/p mechanical AVR presented transferred from  for angioplasty of his L foot - found to have palpable pulses and procedure cancelled. To continue management of BETINA and cellulitis/suspected osteomyelitis.

## 2024-01-11 NOTE — PROGRESS NOTE ADULT - SUBJECTIVE AND OBJECTIVE BOX
[facility]  INFECTIOUS DISEASES - [address], [place] [address]  Tel: [phone]     Fax: [phone]  =======================================================    POPPY MERCADO [id number]    Follow up: Tmax of 99.5. Denies any pain or SOB. Had a loose BM this morning.    Allergies:  No Known Allergies      Antibiotics:  atorvastatin 10 milliGRAM(s) Oral at bedtime  cefepime   IVPB 1000 milliGRAM(s) IV Intermittent every 12 hours  dextrose 5%. 1000 milliLiter(s) IV Continuous <Continuous>  dextrose 5%. 1000 milliLiter(s) IV Continuous <Continuous>  dextrose 50% Injectable 25 Gram(s) IV Push once  dextrose 50% Injectable 12.5 Gram(s) IV Push once  dextrose 50% Injectable 25 Gram(s) IV Push once  dextrose Oral Gel 15 Gram(s) Oral once PRN  glucagon  Injectable 1 milliGRAM(s) IntraMuscular once  insulin lispro (ADMELOG) corrective regimen sliding scale   SubCutaneous at bedtime  insulin lispro (ADMELOG) corrective regimen sliding scale   SubCutaneous three times a day before meals  tamsulosin 0.4 milliGRAM(s) Oral at bedtime       REVIEW OF SYSTEMS:  limited 2/2 mental status, very hard of hearing     Physical Exam:  ICU Vital Signs Last 24 Hrs  T(C): 36.8 (11 Jan 2024 12:00), Max: 37.5 (10 Wilman 2024 20:49)  T(F): 98.3 (11 Jan 2024 12:00), Max: 99.5 (10 Wilman 2024 20:49)  HR: 68 (11 Jan 2024 12:00) (66 - 74)  BP: 90/56 (11 Jan 2024 12:00) (90/56 - 109/71)  BP(mean): --  ABP: --  ABP(mean): --  RR: 17 (11 Jan 2024 12:00) (17 - 20)  SpO2: 92% (11 Jan 2024 12:00) (90% - 93%)    O2 Parameters below as of 11 Jan 2024 12:00  Patient On (Oxygen Delivery Method): room air      GEN: NAD  HEENT: normocephalic and atraumatic.   NECK: Supple.   LUNGS: normal respiratory effort  HEART: Regular rate and rhythm   ABDOMEN: Soft, nontender, and nondistended.    EXTREMITIES: No leg edema.  SKIN: (+) L foot eschar, no odor or drainage  NEUROLOGIC: Difficult to assess, very hard of hearing      Labs:  01-11    137  |  105  |  30<H>  ----------------------------<  146<H>  3.6   |  27  |  1.50<H>    Ca    8.7      11 Jan 2024 05:57    TPro  6.4  /  Alb  2.3<L>  /  TBili  0.6  /  DBili  x   /  AST  27  /  ALT  29  /  AlkPhos  69  01-11                          11.0   22.37 )-----------( 231      ( 11 Jan 2024 05:57 )             31.8     PT/INR - ( 11 Jan 2024 05:57 )   PT: 41.2 sec;   INR: 3.66 ratio         PTT - ( 11 Jan 2024 14:50 )  PTT:42.7 sec  Urinalysis Basic - ( 11 Jan 2024 05:57 )    Color: x / Appearance: x / SG: x / pH: x  Gluc: 146 mg/dL / Ketone: x  / Bili: x / Urobili: x   Blood: x / Protein: x / Nitrite: x   Leuk Esterase: x / RBC: x / WBC x   Sq Epi: x / Non Sq Epi: x / Bacteria: x      LIVER FUNCTIONS - ( 11 Jan 2024 05:57 )  Alb: 2.3 g/dL / Pro: 6.4 g/dL / ALK PHOS: 69 U/L / ALT: 29 U/L / AST: 27 U/L / GGT: x             RECENT CULTURES:  01-05 @ 13:10 Catheterized Catheterized     No growth        12-31 @ 13:38 Clean Catch Clean Catch (Midstream)     <10,000 CFU/mL Normal Urogenital Christy        12-31 @ 13:05 .Blood Blood-Peripheral     No growth at 5 days        12-31 @ 13:00 .Blood Blood-Peripheral     No growth at 5 days      NotDetec        All imaging and data are reviewed.     < from: Xray Chest 1 View-PORTABLE IMMEDIATE (Xray Chest 1 View-PORTABLE IMMEDIATE .) (01.10.24 @ 11:51) >  Heart magnified by technique.    There is a small infiltrate developing left the left lower hilum compared   to December 31, 2023. Clips over the aortic arch area again noted.    IMPRESSION: Developing small left lower perihilar infiltrate.    < end of copied text >      < from: [facility] Bone Marrow Imaging Limited (01.10.24 @ 14:12) >  TECHNIQUE:  The patient was injected with the above dose of labeled   autologous leukocytes on 1/6/2024. Approximately 24 hours later, on   1/7/2024, static images of the feet were obtained. The patient then was   injected with Tc-99m sulfur colloid and the images were repeated in the   same projections using dual isotope acquisition mode after approximately   45 minutes. Due to decreased target to background ratio, the dual isotope   was then reacquired at 90 minutes    COMPARISON: No prior scintigraphy of the feet available.    FINDINGS: The proximal LEFT midfoot shows an area of increased white cell   accumulation. On the second set of delayed sulfur colloid images, there   is an area of increased confluent increased activity of the bone marrow,   with an area of photopenia within the marrow seen in the same region as   the white cell accumulation. Findings are suspicious for infection.    IMPRESSION:  Abnormal combined Indium-111 labeled leukocyte study and   marrow scan. Findings suspicious for infection.      < end of copied text >   [facility]  INFECTIOUS DISEASES - [address], [place] [address]  Tel: [phone]     Fax: [phone]  =======================================================    POPPY MERCADO [id number]    Follow up: Tmax of 99.5. Denies any pain or SOB. Had a loose BM this morning.    Allergies:  No Known Allergies      Antibiotics:  atorvastatin 10 milliGRAM(s) Oral at bedtime  cefepime   IVPB 1000 milliGRAM(s) IV Intermittent every 12 hours  dextrose 5%. 1000 milliLiter(s) IV Continuous <Continuous>  dextrose 5%. 1000 milliLiter(s) IV Continuous <Continuous>  dextrose 50% Injectable 25 Gram(s) IV Push once  dextrose 50% Injectable 12.5 Gram(s) IV Push once  dextrose 50% Injectable 25 Gram(s) IV Push once  dextrose Oral Gel 15 Gram(s) Oral once PRN  glucagon  Injectable 1 milliGRAM(s) IntraMuscular once  insulin lispro (ADMELOG) corrective regimen sliding scale   SubCutaneous at bedtime  insulin lispro (ADMELOG) corrective regimen sliding scale   SubCutaneous three times a day before meals  tamsulosin 0.4 milliGRAM(s) Oral at bedtime       REVIEW OF SYSTEMS:  limited 2/2 mental status, very hard of hearing     Physical Exam:  ICU Vital Signs Last 24 Hrs  T(C): 36.8 (11 Jan 2024 12:00), Max: 37.5 (10 Wilman 2024 20:49)  T(F): 98.3 (11 Jan 2024 12:00), Max: 99.5 (10 Wilman 2024 20:49)  HR: 68 (11 Jan 2024 12:00) (66 - 74)  BP: 90/56 (11 Jan 2024 12:00) (90/56 - 109/71)  BP(mean): --  ABP: --  ABP(mean): --  RR: 17 (11 Jan 2024 12:00) (17 - 20)  SpO2: 92% (11 Jan 2024 12:00) (90% - 93%)    O2 Parameters below as of 11 Jan 2024 12:00  Patient On (Oxygen Delivery Method): room air      GEN: NAD  HEENT: normocephalic and atraumatic.   NECK: Supple.   LUNGS: normal respiratory effort  HEART: Regular rate and rhythm   ABDOMEN: Soft, nontender, and nondistended.    EXTREMITIES: No leg edema.  SKIN: (+) L foot eschar, no odor or drainage  NEUROLOGIC: Difficult to assess, very hard of hearing      Labs:  01-11    137  |  105  |  30<H>  ----------------------------<  146<H>  3.6   |  27  |  1.50<H>    Ca    8.7      11 Jan 2024 05:57    TPro  6.4  /  Alb  2.3<L>  /  TBili  0.6  /  DBili  x   /  AST  27  /  ALT  29  /  AlkPhos  69  01-11                          11.0   22.37 )-----------( 231      ( 11 Jan 2024 05:57 )             31.8     PT/INR - ( 11 Jan 2024 05:57 )   PT: 41.2 sec;   INR: 3.66 ratio         PTT - ( 11 Jan 2024 14:50 )  PTT:42.7 sec  Urinalysis Basic - ( 11 Jan 2024 05:57 )    Color: x / Appearance: x / SG: x / pH: x  Gluc: 146 mg/dL / Ketone: x  / Bili: x / Urobili: x   Blood: x / Protein: x / Nitrite: x   Leuk Esterase: x / RBC: x / WBC x   Sq Epi: x / Non Sq Epi: x / Bacteria: x      LIVER FUNCTIONS - ( 11 Jan 2024 05:57 )  Alb: 2.3 g/dL / Pro: 6.4 g/dL / ALK PHOS: 69 U/L / ALT: 29 U/L / AST: 27 U/L / GGT: x             RECENT CULTURES:  01-05 @ 13:10 Catheterized Catheterized     No growth        12-31 @ 13:38 Clean Catch Clean Catch (Midstream)     <10,000 CFU/mL Normal Urogenital Christy        12-31 @ 13:05 .Blood Blood-Peripheral     No growth at 5 days        12-31 @ 13:00 .Blood Blood-Peripheral     No growth at 5 days      NotDetec        All imaging and data are reviewed.     < from: Xray Chest 1 View-PORTABLE IMMEDIATE (Xray Chest 1 View-PORTABLE IMMEDIATE .) (01.10.24 @ 11:51) >  Heart magnified by technique.    There is a small infiltrate developing left the left lower hilum compared   to December 31, 2023. Clips over the aortic arch area again noted.    IMPRESSION: Developing small left lower perihilar infiltrate.    < end of copied text >      < from: [facility] Bone Marrow Imaging Limited (01.10.24 @ 14:12) >  TECHNIQUE:  The patient was injected with the above dose of labeled   autologous leukocytes on 1/6/2024. Approximately 24 hours later, on   1/7/2024, static images of the feet were obtained. The patient then was   injected with Tc-99m sulfur colloid and the images were repeated in the   same projections using dual isotope acquisition mode after approximately   45 minutes. Due to decreased target to background ratio, the dual isotope   was then reacquired at 90 minutes    COMPARISON: No prior scintigraphy of the feet available.    FINDINGS: The proximal LEFT midfoot shows an area of increased white cell   accumulation. On the second set of delayed sulfur colloid images, there   is an area of increased confluent increased activity of the bone marrow,   with an area of photopenia within the marrow seen in the same region as   the white cell accumulation. Findings are suspicious for infection.    IMPRESSION:  Abnormal combined Indium-111 labeled leukocyte study and   marrow scan. Findings suspicious for infection.      < end of copied text >

## 2024-01-11 NOTE — PROGRESS NOTE ADULT - PROBLEM SELECTOR PLAN 3
Pt with history of afib on warfarin at home, presented to [facility] originally with supratherapeutic INR  - pt NSR on exam  - INR supratherapeutic at 3.66 today; will hold Coumadin tonight  - f/u TTE ordered per cardiology recs - routine and just as baseline - would not impede any potential procedures (although son has stated that he would not really want a procedure unless absolutely needed)

## 2024-01-11 NOTE — PROVIDER CONTACT NOTE (OTHER) - SITUATION
Pt admited with cellulitis left foot
pt. retaining 395ml of urine in the bladder at this time as per ultra sound of the bladder
Pt OOB to chair.  routine vs BP 66/ 48 .  Pt was admitted for cellulitis of his left foot.  Pt denied any c/o lightheadedness or dizzyness.  Denied any c/o chest pain or SOB.  heparin infusion in
bladder s noted to be distended, bladder scanned for > 1026 cc.  Dr. Chavez notified

## 2024-01-11 NOTE — PROGRESS NOTE ADULT - PROBLEM SELECTOR PLAN 1
Pt with original presentation to [facility] with L foot wound and cellulitis, transferred to [facility] for L angioplasty  - doppler with occlusion of the popliteal artery on the left - however patient has strong DP pulse, US likely not accurate per vascular - angioplasty cancelled  - L foot NM bone scan ordered to eval for OM, unable to obtain MRI due to metal in arm as per son  - bone scan suggesting suspicion for infection  - s/p vanc/zosyn -> vanc/cefepime -> cefepime q12  - WBC now uptrending  - afebrile, VS stable  - CXR ordered to r/o aspiration -- showing small left lower infiltrate   - speech and swallow reval, changed diet to soft and bite sized for aspiration precaution  - cont cefepime 1g q12 renal dosing  - will check blood cultures given worsening leukocytosis - stable exam so does not warrant CT imaging - discussed with ID Dr. Loredo  - will resume heparin gtt as hematuria cleared up as per cardiology NP [place]   - INR 2.41 --> 3.66 today -- will hold Coumadin tonight  - cont minced and moist diet per  chart review  - ID consulted Dr. Orosco, recs appreciated  - Vascular consulted recs appreciated  - Cardio consulted for cardiac clearance recs appreciated Pt with original presentation to [facility] with L foot wound and cellulitis, transferred to [facility] for L angioplasty  - doppler with occlusion of the popliteal artery on the left - however patient has strong DP pulse, US likely not accurate per vascular - angioplasty cancelled  - L foot NM bone scan ordered to eval for OM, unable to obtain MRI due to metal in arm as per son  - bone scan suggesting suspicion for infection  - s/p vanc/zosyn -> vanc/cefepime -> cefepime q12  - WBC now uptrending  - afebrile, VS stable  - CXR ordered to r/o aspiration -- showing small left lower infiltrate   - speech and swallow reval, changed diet to soft and bite sized for aspiration precaution  - cont cefepime 1g q12 renal dosing  - will check blood cultures given worsening leukocytosis - stable exam so does not warrant CT imaging - discussed with ID Dr. Loredo  - will resume heparin gtt as hematuria cleared up as per cardiology NP [place]   - INR 2.41 --> 3.66 today -- will hold Coumadin tonight  - cont minced and moist diet per  chart review  - ID consulted Dr. Orosco, recs appreciated  - Vascular consulted recs appreciated  - Cardio consulted for cardiac clearance recs appreciated

## 2024-01-11 NOTE — CONSULT NOTE ADULT - SUBJECTIVE AND OBJECTIVE BOX
Date/Time Patient Seen:  		  Referring MD:   Data Reviewed	       Patient is a 89y old  Male who presents with a chief complaint of left foot wound (11 Jan 2024 12:50)      Subjective/HPI   89 yo M with PMH HTN, HLD, LBBB, paroxismal Atrial Fibrillation, thoracic aortic aneurysm, s/p mechanical AVR presented transferred from  for angioplasty of his L foot. Pt originally presented to  on 12/31 after experiencing left foot pain which upon removal of his sock revealed a black eschar on the dorsum of his foot. He had an xray done which showed no bone destruction and he was started on IV abx for cellulitis, currently on cefepime only. 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 Pt was unable to have an MRI performed as they were unable to complete the MRI safety forms due to pts cognitive impairment and inability to reach family. Pt reports currently feeling well and denies any complaints. Pt [place].     PAST MEDICAL & SURGICAL HISTORY:  HTN (hypertension)    HLD (hyperlipidemia)    Paroxysmal atrial fibrillation    H/O aortic valve repair    H/O thoracic aortic aneurysm repair      PAST SURGICAL HISTORY:  H/O aortic valve repair     H/O thoracic aortic aneurysm repair.     Social History:  · Substance use	No  · Social History (marital status, living situation, occupation, and sexual history)	Lives: with roomate  Alcohol Use: denies  Tobacco Use: denies  Recreational Drug Use: denies     Tobacco Screening:  · Core Measure Site	Yes  · Has the patient used tobacco in the past 30 days?	No    Risk Assessment:    Present on Admission:  Deep Venous Thrombosis	no  Pulmonary Embolus	no        Medication list         MEDICATIONS  (STANDING):  atorvastatin 10 milliGRAM(s) Oral at bedtime  cefepime   IVPB 1000 milliGRAM(s) IV Intermittent every 12 hours  dextrose 5%. 1000 milliLiter(s) (100 mL/Hr) IV Continuous <Continuous>  dextrose 5%. 1000 milliLiter(s) (50 mL/Hr) IV Continuous <Continuous>  dextrose 50% Injectable 25 Gram(s) IV Push once  dextrose 50% Injectable 12.5 Gram(s) IV Push once  dextrose 50% Injectable 25 Gram(s) IV Push once  glucagon  Injectable 1 milliGRAM(s) IntraMuscular once  insulin lispro (ADMELOG) corrective regimen sliding scale   SubCutaneous at bedtime  insulin lispro (ADMELOG) corrective regimen sliding scale   SubCutaneous three times a day before meals  tamsulosin 0.4 milliGRAM(s) Oral at bedtime    MEDICATIONS  (PRN):  dextrose Oral Gel 15 Gram(s) Oral once PRN Blood Glucose LESS THAN 70 milliGRAM(s)/deciliter         Vitals log        ICU Vital Signs Last 24 Hrs  T(C): 36.8 (11 Jan 2024 12:00), Max: 37.5 (10 Wilman 2024 20:49)  T(F): 98.3 (11 Jan 2024 12:00), Max: 99.5 (10 Wilman 2024 20:49)  HR: 68 (11 Jan 2024 12:00) (66 - 74)  BP: 90/56 (11 Jan 2024 12:00) (90/56 - 109/71)  BP(mean): --  ABP: --  ABP(mean): --  RR: 17 (11 Jan 2024 12:00) (17 - 20)  SpO2: 92% (11 Jan 2024 12:00) (90% - 93%)    O2 Parameters below as of 11 Jan 2024 12:00  Patient On (Oxygen Delivery Method): room air                 Input and Output:  I&O's Detail    10 Wilman 2024 07:01  -  11 Jan 2024 07:00  --------------------------------------------------------  IN:    Heparin Infusion: 128 mL    IV PiggyBack: 50 mL    Oral Fluid: 320 mL  Total IN: 498 mL    OUT:    Indwelling Catheter - Urethral (mL): 1100 mL  Total OUT: 1100 mL    Total NET: -602 mL      11 Jan 2024 07:01  -  11 Jan 2024 14:00  --------------------------------------------------------  IN:  Total IN: 0 mL    OUT:    Indwelling Catheter - Urethral (mL): 500 mL  Total OUT: 500 mL    Total NET: -500 mL          Lab Data                        11.0   22.37 )-----------( 231      ( 11 Jan 2024 05:57 )             31.8     01-11    137  |  105  |  30<H>  ----------------------------<  146<H>  3.6   |  27  |  1.50<H>    Ca    8.7      11 Jan 2024 05:57    TPro  6.4  /  Alb  2.3<L>  /  TBili  0.6  /  DBili  x   /  AST  27  /  ALT  29  /  AlkPhos  69  01-11            Review of Systems	      Objective     Physical Examination        Pertinent Lab findings & Imaging      Turcios:  NO   Adequate UO     I&O's Detail    10 Wilman 2024 07:01  -  11 Jan 2024 07:00  --------------------------------------------------------  IN:    Heparin Infusion: 128 mL    IV PiggyBack: 50 mL    Oral Fluid: 320 mL  Total IN: 498 mL    OUT:    Indwelling Catheter - Urethral (mL): 1100 mL  Total OUT: 1100 mL    Total NET: -602 mL      11 Jan 2024 07:01  -  11 Jan 2024 14:00  --------------------------------------------------------  IN:  Total IN: 0 mL    OUT:    Indwelling Catheter - Urethral (mL): 500 mL  Total OUT: 500 mL    Total NET: -500 mL               Discussed with:     Cultures:	        Radiology      ACC: [id number] EXAM:  XR CHEST PORTABLE IMMED 1V   ORDERED BY: ANISH DILLON     PROCEDURE DATE:  01/10/2024          INTERPRETATION:  AP semierect chest on January 10, 2024 at 11:12 AM.   Patient has lethargy and increasing white count.    Heart magnified by technique.    There is a small infiltrate developing left the left lower hilum compared   to December 31, 2023. Clips over the aortic arch area again noted.    IMPRESSION: Developing small left lower perihilar infiltrate.    --- End of Report ---            NORI SULLIVAN MD; Attending Radiologist  This document has been electronically signed. Wilman 10 2024 12:29PM                         Date/Time Patient Seen:  		  Referring MD:   Data Reviewed	       Patient is a 89y old  Male who presents with a chief complaint of left foot wound (11 Jan 2024 12:50)      Subjective/HPI   89 yo M with PMH HTN, HLD, LBBB, paroxismal Atrial Fibrillation, thoracic aortic aneurysm, s/p mechanical AVR presented transferred from  for angioplasty of his L foot. Pt originally presented to  on 12/31 after experiencing left foot pain which upon removal of his sock revealed a black eschar on the dorsum of his foot. He had an xray done which showed no bone destruction and he was started on IV abx for cellulitis, currently on cefepime only. 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 Pt was unable to have an MRI performed as they were unable to complete the MRI safety forms due to pts cognitive impairment and inability to reach family. Pt reports currently feeling well and denies any complaints. Pt [place].     PAST MEDICAL & SURGICAL HISTORY:  HTN (hypertension)    HLD (hyperlipidemia)    Paroxysmal atrial fibrillation    H/O aortic valve repair    H/O thoracic aortic aneurysm repair      PAST SURGICAL HISTORY:  H/O aortic valve repair     H/O thoracic aortic aneurysm repair.     Social History:  · Substance use	No  · Social History (marital status, living situation, occupation, and sexual history)	Lives: with roomate  Alcohol Use: denies  Tobacco Use: denies  Recreational Drug Use: denies     Tobacco Screening:  · Core Measure Site	Yes  · Has the patient used tobacco in the past 30 days?	No    Risk Assessment:    Present on Admission:  Deep Venous Thrombosis	no  Pulmonary Embolus	no        Medication list         MEDICATIONS  (STANDING):  atorvastatin 10 milliGRAM(s) Oral at bedtime  cefepime   IVPB 1000 milliGRAM(s) IV Intermittent every 12 hours  dextrose 5%. 1000 milliLiter(s) (100 mL/Hr) IV Continuous <Continuous>  dextrose 5%. 1000 milliLiter(s) (50 mL/Hr) IV Continuous <Continuous>  dextrose 50% Injectable 25 Gram(s) IV Push once  dextrose 50% Injectable 12.5 Gram(s) IV Push once  dextrose 50% Injectable 25 Gram(s) IV Push once  glucagon  Injectable 1 milliGRAM(s) IntraMuscular once  insulin lispro (ADMELOG) corrective regimen sliding scale   SubCutaneous at bedtime  insulin lispro (ADMELOG) corrective regimen sliding scale   SubCutaneous three times a day before meals  tamsulosin 0.4 milliGRAM(s) Oral at bedtime    MEDICATIONS  (PRN):  dextrose Oral Gel 15 Gram(s) Oral once PRN Blood Glucose LESS THAN 70 milliGRAM(s)/deciliter         Vitals log        ICU Vital Signs Last 24 Hrs  T(C): 36.8 (11 Jan 2024 12:00), Max: 37.5 (10 Wilman 2024 20:49)  T(F): 98.3 (11 Jan 2024 12:00), Max: 99.5 (10 Wilman 2024 20:49)  HR: 68 (11 Jan 2024 12:00) (66 - 74)  BP: 90/56 (11 Jan 2024 12:00) (90/56 - 109/71)  BP(mean): --  ABP: --  ABP(mean): --  RR: 17 (11 Jan 2024 12:00) (17 - 20)  SpO2: 92% (11 Jan 2024 12:00) (90% - 93%)    O2 Parameters below as of 11 Jan 2024 12:00  Patient On (Oxygen Delivery Method): room air                 Input and Output:  I&O's Detail    10 Wilman 2024 07:01  -  11 Jan 2024 07:00  --------------------------------------------------------  IN:    Heparin Infusion: 128 mL    IV PiggyBack: 50 mL    Oral Fluid: 320 mL  Total IN: 498 mL    OUT:    Indwelling Catheter - Urethral (mL): 1100 mL  Total OUT: 1100 mL    Total NET: -602 mL      11 Jan 2024 07:01  -  11 Jan 2024 14:00  --------------------------------------------------------  IN:  Total IN: 0 mL    OUT:    Indwelling Catheter - Urethral (mL): 500 mL  Total OUT: 500 mL    Total NET: -500 mL          Lab Data                        11.0   22.37 )-----------( 231      ( 11 Jan 2024 05:57 )             31.8     01-11    137  |  105  |  30<H>  ----------------------------<  146<H>  3.6   |  27  |  1.50<H>    Ca    8.7      11 Jan 2024 05:57    TPro  6.4  /  Alb  2.3<L>  /  TBili  0.6  /  DBili  x   /  AST  27  /  ALT  29  /  AlkPhos  69  01-11            Review of Systems	      Objective     Physical Examination        Pertinent Lab findings & Imaging      Turcios:  NO   Adequate UO     I&O's Detail    10 Wilman 2024 07:01  -  11 Jan 2024 07:00  --------------------------------------------------------  IN:    Heparin Infusion: 128 mL    IV PiggyBack: 50 mL    Oral Fluid: 320 mL  Total IN: 498 mL    OUT:    Indwelling Catheter - Urethral (mL): 1100 mL  Total OUT: 1100 mL    Total NET: -602 mL      11 Jan 2024 07:01  -  11 Jan 2024 14:00  --------------------------------------------------------  IN:  Total IN: 0 mL    OUT:    Indwelling Catheter - Urethral (mL): 500 mL  Total OUT: 500 mL    Total NET: -500 mL               Discussed with:     Cultures:	        Radiology      ACC: [id number] EXAM:  XR CHEST PORTABLE IMMED 1V   ORDERED BY: ANISH DILLON     PROCEDURE DATE:  01/10/2024          INTERPRETATION:  AP semierect chest on January 10, 2024 at 11:12 AM.   Patient has lethargy and increasing white count.    Heart magnified by technique.    There is a small infiltrate developing left the left lower hilum compared   to December 31, 2023. Clips over the aortic arch area again noted.    IMPRESSION: Developing small left lower perihilar infiltrate.    --- End of Report ---            NORI SULLIVAN MD; Attending Radiologist  This document has been electronically signed. Wilman 10 2024 12:29PM                         Date/Time Patient Seen:  		  Referring MD:   Data Reviewed	       Patient is a 89y old  Male who presents with a chief complaint of left foot wound (11 Jan 2024 12:50)      Subjective/HPI   89 yo M with PMH HTN, HLD, LBBB, paroxismal Atrial Fibrillation, thoracic aortic aneurysm, s/p mechanical AVR presented transferred from  for angioplasty of his L foot. Pt originally presented to  on 12/31 after experiencing left foot pain which upon removal of his sock revealed a black eschar on the dorsum of his foot. He had an xray done which showed no bone destruction and he was started on IV abx for cellulitis, currently on cefepime only. 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 Pt was unable to have an MRI performed as they were unable to complete the MRI safety forms due to pts cognitive impairment and inability to reach family. Pt reports currently feeling well and denies any complaints. Pt [place].     PAST MEDICAL & SURGICAL HISTORY:  HTN (hypertension)    HLD (hyperlipidemia)    Paroxysmal atrial fibrillation    H/O aortic valve repair    H/O thoracic aortic aneurysm repair      PAST SURGICAL HISTORY:  H/O aortic valve repair     H/O thoracic aortic aneurysm repair.     Social History:  · Substance use	No  · Social History (marital status, living situation, occupation, and sexual history)	Lives: with roomate  Alcohol Use: denies  Tobacco Use: denies  Recreational Drug Use: denies     Tobacco Screening:  · Core Measure Site	Yes  · Has the patient used tobacco in the past 30 days?	No    Risk Assessment:    Present on Admission:  Deep Venous Thrombosis	no  Pulmonary Embolus	no        Medication list         MEDICATIONS  (STANDING):  atorvastatin 10 milliGRAM(s) Oral at bedtime  cefepime   IVPB 1000 milliGRAM(s) IV Intermittent every 12 hours  dextrose 5%. 1000 milliLiter(s) (100 mL/Hr) IV Continuous <Continuous>  dextrose 5%. 1000 milliLiter(s) (50 mL/Hr) IV Continuous <Continuous>  dextrose 50% Injectable 25 Gram(s) IV Push once  dextrose 50% Injectable 12.5 Gram(s) IV Push once  dextrose 50% Injectable 25 Gram(s) IV Push once  glucagon  Injectable 1 milliGRAM(s) IntraMuscular once  insulin lispro (ADMELOG) corrective regimen sliding scale   SubCutaneous at bedtime  insulin lispro (ADMELOG) corrective regimen sliding scale   SubCutaneous three times a day before meals  tamsulosin 0.4 milliGRAM(s) Oral at bedtime    MEDICATIONS  (PRN):  dextrose Oral Gel 15 Gram(s) Oral once PRN Blood Glucose LESS THAN 70 milliGRAM(s)/deciliter         Vitals log        ICU Vital Signs Last 24 Hrs  T(C): 36.8 (11 Jan 2024 12:00), Max: 37.5 (10 Wilman 2024 20:49)  T(F): 98.3 (11 Jan 2024 12:00), Max: 99.5 (10 Wilman 2024 20:49)  HR: 68 (11 Jan 2024 12:00) (66 - 74)  BP: 90/56 (11 Jan 2024 12:00) (90/56 - 109/71)  BP(mean): --  ABP: --  ABP(mean): --  RR: 17 (11 Jan 2024 12:00) (17 - 20)  SpO2: 92% (11 Jan 2024 12:00) (90% - 93%)    O2 Parameters below as of 11 Jan 2024 12:00  Patient On (Oxygen Delivery Method): room air                 Input and Output:  I&O's Detail    10 Wilman 2024 07:01  -  11 Jan 2024 07:00  --------------------------------------------------------  IN:    Heparin Infusion: 128 mL    IV PiggyBack: 50 mL    Oral Fluid: 320 mL  Total IN: 498 mL    OUT:    Indwelling Catheter - Urethral (mL): 1100 mL  Total OUT: 1100 mL    Total NET: -602 mL      11 Jan 2024 07:01  -  11 Jan 2024 14:00  --------------------------------------------------------  IN:  Total IN: 0 mL    OUT:    Indwelling Catheter - Urethral (mL): 500 mL  Total OUT: 500 mL    Total NET: -500 mL          Lab Data                        11.0   22.37 )-----------( 231      ( 11 Jan 2024 05:57 )             31.8     01-11    137  |  105  |  30<H>  ----------------------------<  146<H>  3.6   |  27  |  1.50<H>    Ca    8.7      11 Jan 2024 05:57    TPro  6.4  /  Alb  2.3<L>  /  TBili  0.6  /  DBili  x   /  AST  27  /  ALT  29  /  AlkPhos  69  01-11            Review of Systems	  weakness  frailty      Objective     Physical Examination    heart s1s2  lung dc BS  head nc  left foot wound  hard of hearing      Pertinent Lab findings & Imaging      Turcios:  NO   Adequate UO     I&O's Detail    10 Wilman 2024 07:01  -  11 Jan 2024 07:00  --------------------------------------------------------  IN:    Heparin Infusion: 128 mL    IV PiggyBack: 50 mL    Oral Fluid: 320 mL  Total IN: 498 mL    OUT:    Indwelling Catheter - Urethral (mL): 1100 mL  Total OUT: 1100 mL    Total NET: -602 mL      11 Jan 2024 07:01  -  11 Jan 2024 14:00  --------------------------------------------------------  IN:  Total IN: 0 mL    OUT:    Indwelling Catheter - Urethral (mL): 500 mL  Total OUT: 500 mL    Total NET: -500 mL               Discussed with:     Cultures:	        Radiology      ACC: [id number] EXAM:  XR CHEST PORTABLE IMMED 1V   ORDERED BY: ANISH DILLON     PROCEDURE DATE:  01/10/2024          INTERPRETATION:  AP semierect chest on January 10, 2024 at 11:12 AM.   Patient has lethargy and increasing white count.    Heart magnified by technique.    There is a small infiltrate developing left the left lower hilum compared   to December 31, 2023. Clips over the aortic arch area again noted.    IMPRESSION: Developing small left lower perihilar infiltrate.    --- End of Report ---            NORI SULLIVAN MD; Attending Radiologist  This document has been electronically signed. Wilman 10 2024 12:29PM                         Date/Time Patient Seen:  		  Referring MD:   Data Reviewed	       Patient is a 89y old  Male who presents with a chief complaint of left foot wound (11 Jan 2024 12:50)      Subjective/HPI   89 yo M with PMH HTN, HLD, LBBB, paroxismal Atrial Fibrillation, thoracic aortic aneurysm, s/p mechanical AVR presented transferred from  for angioplasty of his L foot. Pt originally presented to  on 12/31 after experiencing left foot pain which upon removal of his sock revealed a black eschar on the dorsum of his foot. He had an xray done which showed no bone destruction and he was started on IV abx for cellulitis, currently on cefepime only. 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 Pt was unable to have an MRI performed as they were unable to complete the MRI safety forms due to pts cognitive impairment and inability to reach family. Pt reports currently feeling well and denies any complaints. Pt [place].     PAST MEDICAL & SURGICAL HISTORY:  HTN (hypertension)    HLD (hyperlipidemia)    Paroxysmal atrial fibrillation    H/O aortic valve repair    H/O thoracic aortic aneurysm repair      PAST SURGICAL HISTORY:  H/O aortic valve repair     H/O thoracic aortic aneurysm repair.     Social History:  · Substance use	No  · Social History (marital status, living situation, occupation, and sexual history)	Lives: with roomate  Alcohol Use: denies  Tobacco Use: denies  Recreational Drug Use: denies     Tobacco Screening:  · Core Measure Site	Yes  · Has the patient used tobacco in the past 30 days?	No    Risk Assessment:    Present on Admission:  Deep Venous Thrombosis	no  Pulmonary Embolus	no        Medication list         MEDICATIONS  (STANDING):  atorvastatin 10 milliGRAM(s) Oral at bedtime  cefepime   IVPB 1000 milliGRAM(s) IV Intermittent every 12 hours  dextrose 5%. 1000 milliLiter(s) (100 mL/Hr) IV Continuous <Continuous>  dextrose 5%. 1000 milliLiter(s) (50 mL/Hr) IV Continuous <Continuous>  dextrose 50% Injectable 25 Gram(s) IV Push once  dextrose 50% Injectable 12.5 Gram(s) IV Push once  dextrose 50% Injectable 25 Gram(s) IV Push once  glucagon  Injectable 1 milliGRAM(s) IntraMuscular once  insulin lispro (ADMELOG) corrective regimen sliding scale   SubCutaneous at bedtime  insulin lispro (ADMELOG) corrective regimen sliding scale   SubCutaneous three times a day before meals  tamsulosin 0.4 milliGRAM(s) Oral at bedtime    MEDICATIONS  (PRN):  dextrose Oral Gel 15 Gram(s) Oral once PRN Blood Glucose LESS THAN 70 milliGRAM(s)/deciliter         Vitals log        ICU Vital Signs Last 24 Hrs  T(C): 36.8 (11 Jan 2024 12:00), Max: 37.5 (10 Wilman 2024 20:49)  T(F): 98.3 (11 Jan 2024 12:00), Max: 99.5 (10 Wilman 2024 20:49)  HR: 68 (11 Jan 2024 12:00) (66 - 74)  BP: 90/56 (11 Jan 2024 12:00) (90/56 - 109/71)  BP(mean): --  ABP: --  ABP(mean): --  RR: 17 (11 Jan 2024 12:00) (17 - 20)  SpO2: 92% (11 Jan 2024 12:00) (90% - 93%)    O2 Parameters below as of 11 Jan 2024 12:00  Patient On (Oxygen Delivery Method): room air                 Input and Output:  I&O's Detail    10 Wilman 2024 07:01  -  11 Jan 2024 07:00  --------------------------------------------------------  IN:    Heparin Infusion: 128 mL    IV PiggyBack: 50 mL    Oral Fluid: 320 mL  Total IN: 498 mL    OUT:    Indwelling Catheter - Urethral (mL): 1100 mL  Total OUT: 1100 mL    Total NET: -602 mL      11 Jan 2024 07:01  -  11 Jan 2024 14:00  --------------------------------------------------------  IN:  Total IN: 0 mL    OUT:    Indwelling Catheter - Urethral (mL): 500 mL  Total OUT: 500 mL    Total NET: -500 mL          Lab Data                        11.0   22.37 )-----------( 231      ( 11 Jan 2024 05:57 )             31.8     01-11    137  |  105  |  30<H>  ----------------------------<  146<H>  3.6   |  27  |  1.50<H>    Ca    8.7      11 Jan 2024 05:57    TPro  6.4  /  Alb  2.3<L>  /  TBili  0.6  /  DBili  x   /  AST  27  /  ALT  29  /  AlkPhos  69  01-11            Review of Systems	  weakness  frailty      Objective     Physical Examination    heart s1s2  lung dc BS  head nc  left foot wound  hard of hearing      Pertinent Lab findings & Imaging      Turcios:  NO   Adequate UO     I&O's Detail    10 Wilman 2024 07:01  -  11 Jan 2024 07:00  --------------------------------------------------------  IN:    Heparin Infusion: 128 mL    IV PiggyBack: 50 mL    Oral Fluid: 320 mL  Total IN: 498 mL    OUT:    Indwelling Catheter - Urethral (mL): 1100 mL  Total OUT: 1100 mL    Total NET: -602 mL      11 Jan 2024 07:01  -  11 Jan 2024 14:00  --------------------------------------------------------  IN:  Total IN: 0 mL    OUT:    Indwelling Catheter - Urethral (mL): 500 mL  Total OUT: 500 mL    Total NET: -500 mL               Discussed with:     Cultures:	        Radiology      ACC: [id number] EXAM:  XR CHEST PORTABLE IMMED 1V   ORDERED BY: ANISH DILLON     PROCEDURE DATE:  01/10/2024          INTERPRETATION:  AP semierect chest on January 10, 2024 at 11:12 AM.   Patient has lethargy and increasing white count.    Heart magnified by technique.    There is a small infiltrate developing left the left lower hilum compared   to December 31, 2023. Clips over the aortic arch area again noted.    IMPRESSION: Developing small left lower perihilar infiltrate.    --- End of Report ---            NORI SULLIVAN MD; Attending Radiologist  This document has been electronically signed. Wilman 10 2024 12:29PM

## 2024-01-11 NOTE — PROGRESS NOTE ADULT - ASSESSMENT
89 year old male with PMH HTN, HLD, LBBB, paroxysmal Atrial Fibrillation, thoracic aortic aneurysm, s/p mechanical AVR presented transferred from  for angioplasty of his L foot.  Cardiology consultation now being obtained.     Cardiac Optimization/LBBB/PaFib  - Vascular recs noted.  No plans for LE angiogram at this point.     - EKG SR with old LBBB   - Has known history of PAfib and mechanical AVR  - hematuria resolved  - Dose coumadin daily to keep INR~3   - Continue home statin     - No sign volume overload, non-orthopneic on RA  - Can obtain routine TTE    - BP remains stable and controlled  - Continue to hold home ACEI for BETINA, though, stable at 1.5  - Continue home Nadolol with parameters     - Monitor and replete lytes, keep K>4, Mg>2.  - Will continue to follow.    Swati Moise NP  Nurse Practitioner- Cardiology   Call TEAMS

## 2024-01-11 NOTE — PROVIDER CONTACT NOTE (OTHER) - BACKGROUND
progress as per nomogram Pt immediately placed back in bed and repeat /50.  HR 82 to 84.  Pt stated he felt better now that he was back in bed.
Pt admitted with cellulitis of left foot wound.
PMH :  mechanical AVR, HLD, DM, HTN, afib
pt. admitted for L foot cellulitis

## 2024-01-11 NOTE — CONSULT NOTE ADULT - ASSESSMENT
89 yo M with PMH HTN, HLD, LBBB, paroxismal Atrial Fibrillation, thoracic aortic aneurysm, s/p mechanical AVR presented transferred from  for angioplasty of his L foot. Pt originally presented to  on 12/31 after experiencing left foot pain which upon removal of his sock revealed a black eschar on the dorsum of his foot.    htn  hld  OP  OA  pvd  wound  LBBB  AF  valv heart disease  DM  BETINA     89 yo M with PMH HTN, HLD, LBBB, paroxismal Atrial Fibrillation, thoracic aortic aneurysm, s/p mechanical AVR presented transferred from  for angioplasty of his L foot. Pt originally presented to  on 12/31 after experiencing left foot pain which upon removal of his sock revealed a black eschar on the dorsum of his foot.    htn  hld  OP  OA  pvd  wound  LBBB  AF  valv heart disease  DM  BETINA     89 yo M with PMH HTN, HLD, LBBB, paroxismal Atrial Fibrillation, thoracic aortic aneurysm, s/p mechanical AVR presented transferred from  for angioplasty of his L foot. Pt originally presented to  on 12/31 after experiencing left foot pain which upon removal of his sock revealed a black eschar on the dorsum of his foot.    htn  hld  OP  OA  pvd  wound  LBBB  AF  valv heart disease  DM  BETINA    renal follow up noted - renal indices noted   I and O  serial labs  replete lytes  on emp ABX - [employer] - ID eval noted - cx - wound care - biomarkers -   Imaging reviewed - CXR - ? infiltrate - atelectasis  monitor VS and HD and Sat  goal sat > 88 pct  HOB elev  oral hygiene  skin care  cvs rx regimen - BP control - Cardio following - Extensive Card Hx -   Planned for TTE -   GOC - DNR DNI  prognosis guarded

## 2024-01-11 NOTE — PROVIDER CONTACT NOTE (OTHER) - ACTION/TREATMENT ORDERED:
Dr. Pena to discuss with team for further orders.
As per Dr. Garcia insert Turcios cath at this time

## 2024-01-11 NOTE — CONSULT NOTE ADULT - CONSULT REQUESTED DATE/TIME
05-Jan-2024 11:06
05-Jan-2024 09:25
05-Jan-2024 13:41
05-Jan-2024 18:58
05-Jan-2024
11-Jan-2024 14:00

## 2024-01-11 NOTE — PROGRESS NOTE ADULT - PROBLEM SELECTOR PLAN 5
Pt with hx HTN  - takes ramipril, nadolol at home  - hold ACE in setting of BETINA and stable BP with orthostasis  - will hold nadolol 20mg as has soft BPs and orthostasis -- also this is a nonselective BB and not an ideal choice for rate control for his afib anyway - f/u cardiology recs

## 2024-01-11 NOTE — CONSULT NOTE ADULT - CONSULT REASON
Urinary retention, unable to cath patient
cardiac optimization
wound  weakness  ataxic gait  atelectasis - infiltrates -
left foot wound
BETINA
LEft foot wound

## 2024-01-11 NOTE — PROGRESS NOTE ADULT - PROBLEM SELECTOR PLAN 8
Coumadin; INR 2.40, will dose 2.5 1/9    Per GC chart, GOC discussion started with son Bogdan Jefferson who appears hard to reach by phone  - palliative consulted -- pt now DNR/DNI    Spoke to son 1/8 and updated him on plan - wants eventual MEGHNA - concerned about surgery due to anesthesia - states ~5PM is the best time to call and this is the only number to call. Heparin gtt restarted.  Coumadin; INR 3.66, will hold dose tonight    Per GC chart, GOC discussion started with son Bogdan Jefferson who appears hard to reach by phone  - palliative consulted -- pt now DNR/DNI    Spoke to son 1/8 and updated him on plan - wants eventual MEGHNA - concerned about surgery due to anesthesia - states ~5PM is the best time to call and this is the only number to call.

## 2024-01-11 NOTE — PROGRESS NOTE ADULT - SUBJECTIVE AND OBJECTIVE BOX
Gu Progress Note:    Resting quiet, , cath draining grossly clear urine      PAST MEDICAL & SURGICAL HISTORY:  HTN (hypertension)      HLD (hyperlipidemia)      Paroxysmal atrial fibrillation      H/O aortic valve repair      H/O thoracic aortic aneurysm repair            MEDICATIONS  (STANDING):  atorvastatin 10 milliGRAM(s) Oral at bedtime  cefepime   IVPB 1000 milliGRAM(s) IV Intermittent every 12 hours  dextrose 5%. 1000 milliLiter(s) (100 mL/Hr) IV Continuous <Continuous>  dextrose 5%. 1000 milliLiter(s) (50 mL/Hr) IV Continuous <Continuous>  dextrose 50% Injectable 25 Gram(s) IV Push once  dextrose 50% Injectable 12.5 Gram(s) IV Push once  dextrose 50% Injectable 25 Gram(s) IV Push once  glucagon  Injectable 1 milliGRAM(s) IntraMuscular once  insulin lispro (ADMELOG) corrective regimen sliding scale   SubCutaneous at bedtime  insulin lispro (ADMELOG) corrective regimen sliding scale   SubCutaneous three times a day before meals  tamsulosin 0.4 milliGRAM(s) Oral at bedtime    MEDICATIONS  (PRN):  dextrose Oral Gel 15 Gram(s) Oral once PRN Blood Glucose LESS THAN 70 milliGRAM(s)/deciliter      Allergies    No Known Allergies    Intolerances            FAMILY HISTORY:      Vital Signs Last 24 Hrs  T(C): 36.8 (11 Jan 2024 12:00), Max: 37.5 (10 Wilman 2024 20:49)  T(F): 98.3 (11 Jan 2024 12:00), Max: 99.5 (10 Wilman 2024 20:49)  HR: 68 (11 Jan 2024 12:00) (66 - 74)  BP: 90/56 (11 Jan 2024 12:00) (90/56 - 109/71)  BP(mean): --  RR: 17 (11 Jan 2024 12:00) (17 - 20)  SpO2: 92% (11 Jan 2024 12:00) (90% - 93%)    Parameters below as of 11 Jan 2024 12:00  Patient On (Oxygen Delivery Method): room air        PHYSICAL EXAM:    Constitutional: NAD, asthenic  Abd: BS+, soft, NT/ND, No CVAT  : Normal   phallus, patent  meatus, bilateral descended testes, no masses castañeda with grossly clear urine  Extremities: Left foot ulcer    LABS:                        11.0   22.37 )-----------( 231      ( 11 Jan 2024 05:57 )             31.8     01-11    137  |  105  |  30<H>  ----------------------------<  146<H>  3.6   |  27  |  1.50<H>    Ca    8.7      11 Jan 2024 05:57    TPro  6.4  /  Alb  2.3<L>  /  TBili  0.6  /  DBili  x   /  AST  27  /  ALT  29  /  AlkPhos  69  01-11    PT/INR - ( 11 Jan 2024 05:57 )   PT: 41.2 sec;   INR: 3.66 ratio         PTT - ( 11 Jan 2024 05:57 )  PTT:80.1 sec  Urinalysis Basic - ( 11 Jan 2024 05:57 )    Color: x / Appearance: x / SG: x / pH: x  Gluc: 146 mg/dL / Ketone: x  / Bili: x / Urobili: x   Blood: x / Protein: x / Nitrite: x   Leuk Esterase: x / RBC: x / WBC x   Sq Epi: x / Non Sq Epi: x / Bacteria: x      Urine Culture:   Hemoglobin: 11.0 g/dL (01-11 @ 05:57)  Hematocrit: 31.8 % (01-11 @ 05:57)  Hemoglobin: 10.4 g/dL (01-10 @ 21:10)  Hematocrit: 30.2 % (01-10 @ 21:10)  Hemoglobin: 11.5 g/dL (01-10 @ 09:08)  Hematocrit: 33.4 % (01-10 @ 09:08)  Hemoglobin: 10.4 g/dL (01-10 @ 07:30)  Hematocrit: 30.7 % (01-10 @ 07:30)      RADIOLOGY & ADDITIONAL STUDIES:

## 2024-01-11 NOTE — PROVIDER CONTACT NOTE (OTHER) - ASSESSMENT
Medical team notified, Dr. Tran and Dr. Metcalf
Pt is awake and alert, hard of hearing.  dressing to left foot dry and intact.  Turcios cath drainig red tinged urine.  Dr. Edwards notified.  Dr. Pena in to evaluate pt.  H&H this am 10.6/30.4  platelet count 220, PTT this am 70.7.  Remains on heparin infusion at 11 cc/hr
Pt is alert and oriented, hard of hearing.  PT has been grossly inc with soaking wet diaper.  Scan repeated after soaking wet diaper for 1030cc on bladder scan.  Pt denies any discomfort, denies urge to void.
retaining urine Turcios inserted on 1/5, Turcios removed 00:00 on 1/11, pt. retaining at this time as per bladder scan

## 2024-01-11 NOTE — PROGRESS NOTE ADULT - PROBLEM SELECTOR PLAN 2
Pt with BETINA, unknown baseline (outpt note with Cr 1.16 10/2023) - stable  - BUN/Cr 46/1.6 on GC admission  - likely 2/2 abx vs urinary retention - vanco discontinued and castañeda placed with >1L output  - hold lisinopril in setting of BETINA  - hold silodosin in setting of dec Cr clearance  - now s/p another 24h of IV fluids given orthostasis - with improved Cr - monitor off fluids  - replace electrolytes as needed  - nephro consulted, recs appreciated Pt with BETINA, unknown baseline (outpt note with Cr 1.16 10/2023) - stable  - BUN/Cr 46/1.6 on GC admission  - likely 2/2 abx vs urinary retention - vanco discontinued and castañeda placed with >1L output  - hold lisinopril in setting of BETINA  - hold silodosin in setting of dec Cr clearance  - now s/p another 24h of IV fluids given orthostasis - with improved Cr - monitor off fluids  - replace electrolytes as needed  - nephro consulted, recs appreciated  - pt failed TOV today, castañeda replaced as per uro

## 2024-01-11 NOTE — PROVIDER CONTACT NOTE (OTHER) - RECOMMENDATIONS
RN questioning if Turcios catheter should be reinserted at this time
PT remains in bed, no further orders received at this time.  Pt resting comfortable in bed
Evaluated by medical team and continue heparin infusion as ordered
Orders received for castañeda cath.  I was unable to retract pts foreskin to access urethra.  Dr. Pena notified and we attempted together but unable to attempt castañeda insertion for same

## 2024-01-11 NOTE — PROGRESS NOTE ADULT - SUBJECTIVE AND OBJECTIVE BOX
Patient is a 89y old  Male who presents with a chief complaint of left foot wound (11 Jan 2024 12:21)          INTERVAL HPI/OVERNIGHT EVENTS: Overnight, castañeda removed for TOV as per uro. Pt has not voided, bladder scan this AM showed retention of 395cc. As per uro, castañeda re-inserted. Pt seen and examined at the bedside this AM. Pt tired, offers no complaints. Denies pain.        MEDICATIONS  (STANDING):  atorvastatin 10 milliGRAM(s) Oral at bedtime  cefepime   IVPB 1000 milliGRAM(s) IV Intermittent every 12 hours  dextrose 5%. 1000 milliLiter(s) (50 mL/Hr) IV Continuous <Continuous>  dextrose 5%. 1000 milliLiter(s) (100 mL/Hr) IV Continuous <Continuous>  dextrose 50% Injectable 25 Gram(s) IV Push once  dextrose 50% Injectable 25 Gram(s) IV Push once  dextrose 50% Injectable 12.5 Gram(s) IV Push once  glucagon  Injectable 1 milliGRAM(s) IntraMuscular once  insulin lispro (ADMELOG) corrective regimen sliding scale   SubCutaneous three times a day before meals  insulin lispro (ADMELOG) corrective regimen sliding scale   SubCutaneous at bedtime  tamsulosin 0.4 milliGRAM(s) Oral at bedtime    MEDICATIONS  (PRN):  dextrose Oral Gel 15 Gram(s) Oral once PRN Blood Glucose LESS THAN 70 milliGRAM(s)/deciliter      Allergies    No Known Allergies    Intolerances        REVIEW OF SYSTEMS:  CONSTITUTIONAL: No fever or chills  HEENT:  No headache, no sore throat  RESPIRATORY: No cough, wheezing, or shortness of breath  CARDIOVASCULAR: No chest pain, palpitations  GASTROINTESTINAL: No abd pain, nausea, vomiting, or diarrhea  GENITOURINARY: No dysuria, frequency, or hematuria  NEUROLOGICAL: no focal weakness or dizziness  MUSCULOSKELETAL: no myalgias     Vital Signs Last 24 Hrs  T(C): 36.7 (11 Jan 2024 05:22), Max: 37.5 (10 Wilman 2024 20:49)  T(F): 98 (11 Jan 2024 05:22), Max: 99.5 (10 Wilman 2024 20:49)  HR: 74 (11 Jan 2024 05:22) (66 - 74)  BP: 109/71 (11 Jan 2024 05:22) (95/59 - 109/71)  BP(mean): --  RR: 17 (11 Jan 2024 05:22) (17 - 20)  SpO2: 90% (11 Jan 2024 05:22) (90% - 93%)    Parameters below as of 11 Jan 2024 05:22  Patient On (Oxygen Delivery Method): room air        PHYSICAL EXAM:  GENERAL: NAD  HEENT:  anicteric, moist mucous membranes  CHEST/LUNG:  CTA b/l, no rales, wheezes, or rhonchi  HEART:  RRR, S1, S2  ABDOMEN:  BS+, soft, nontender, nondistended  EXTREMITIES: +L foot eschar on dorsum, no edema, cyanosis, or calf tenderness  NERVOUS SYSTEM: answers questions and follows commands appropriately    LABS:                        11.0   22.37 )-----------( 231      ( 11 Jan 2024 05:57 )             31.8     CBC Full  -  ( 11 Jan 2024 05:57 )  WBC Count : 22.37 K/uL  Hemoglobin : 11.0 g/dL  Hematocrit : 31.8 %  Platelet Count - Automated : 231 K/uL  Mean Cell Volume : 88.1 fl  Mean Cell Hemoglobin : 30.5 pg  Mean Cell Hemoglobin Concentration : 34.6 gm/dL  Auto Neutrophil # : 19.64 K/uL  Auto Lymphocyte # : 1.17 K/uL  Auto Monocyte # : 1.18 K/uL  Auto Eosinophil # : 0.10 K/uL  Auto Basophil # : 0.05 K/uL  Auto Neutrophil % : 87.9 %  Auto Lymphocyte % : 5.2 %  Auto Monocyte % : 5.3 %  Auto Eosinophil % : 0.4 %  Auto Basophil % : 0.2 %    11 Jan 2024 05:57    137    |  105    |  30     ----------------------------<  146    3.6     |  27     |  1.50     Ca    8.7        11 Jan 2024 05:57    TPro  6.4    /  Alb  2.3    /  TBili  0.6    /  DBili  x      /  AST  27     /  ALT  29     /  AlkPhos  69     11 Jan 2024 05:57    PT/INR - ( 11 Jan 2024 05:57 )   PT: 41.2 sec;   INR: 3.66 ratio         PTT - ( 11 Jan 2024 05:57 )  PTT:80.1 sec  Urinalysis Basic - ( 11 Jan 2024 05:57 )    Color: x / Appearance: x / SG: x / pH: x  Gluc: 146 mg/dL / Ketone: x  / Bili: x / Urobili: x   Blood: x / Protein: x / Nitrite: x   Leuk Esterase: x / RBC: x / WBC x   Sq Epi: x / Non Sq Epi: x / Bacteria: x      CAPILLARY BLOOD GLUCOSE      POCT Blood Glucose.: 141 mg/dL (11 Jan 2024 12:35)  POCT Blood Glucose.: 145 mg/dL (11 Jan 2024 08:16)  POCT Blood Glucose.: 164 mg/dL (10 Wilman 2024 20:47)  POCT Blood Glucose.: 140 mg/dL (10 Wilman 2024 17:40)  POCT Blood Glucose.: 124 mg/dL (10 Wilman 2024 14:32)        Culture - Urine (collected 01-05-24 @ 13:10)  Source: Catheterized Catheterized  Final Report (01-06-24 @ 14:20):    No growth        RADIOLOGY & ADDITIONAL TESTS:  Personally reviewed.     Consultant(s) Notes Reviewed:  [x] YES  [ ] NO

## 2024-01-11 NOTE — PROGRESS NOTE ADULT - ASSESSMENT
89 yo M with PMH HTN, HLD, LBBB, paroxismal Atrial Fibrillation, thoracic aortic aneurysm, s/p mechanical AVR, who was transferred from  for angioplasty of his L foot. He has a left foot eschar, on empiric antibiotics to treat for potential soft tissue infection or even underlying osteomyelitis. Bone scan shows evidence of L foot infection. MRSA nasal PCR negative.    Noted to have worsening leukocytosis, unclear etiology. CXR from 1/10 shows developing small left lower perihilar infiltrate, but on room air and no respiratory complaints. Also with ? diarrhea.    #Left foot eschar  #? L foot osteomyelitis  #Leukocytosis    -continue cefepime  -follow repeat blood cultures  -suggest urinalysis   -suggest CT A/P  -if loose stools persist consider sending for GI PCR and c diff  -monitor WBC  -aspiration precautions  -discussed with Dr. Mcdonough  -I will be covered by Dr. Orosco on 1/12/24    Yvette Loredo MD  Division of Infectious Diseases   Cell [phone] between 8am and 6pm   After 6pm and weekends please call ID service at [phone].     35 minutes spent on total encounter assessing patient, examination, chart review, counseling and coordinating care by the attending physician/nurse/care manager.      89 yo M with PMH HTN, HLD, LBBB, paroxismal Atrial Fibrillation, thoracic aortic aneurysm, s/p mechanical AVR, who was transferred from  for angioplasty of his L foot. He has a left foot eschar, on empiric antibiotics to treat for potential soft tissue infection or even underlying osteomyelitis. Bone scan shows evidence of L foot infection. MRSA nasal PCR negative.    Noted to have worsening leukocytosis, unclear etiology. CXR from 1/10 shows developing small left lower perihilar infiltrate, but on room air and no respiratory complaints. Also with ? diarrhea.    #Left foot eschar  #? L foot osteomyelitis  #Leukocytosis    -continue cefepime  -follow repeat blood cultures  -suggest urinalysis   -suggest CT A/P  -if loose stools persist consider sending for GI PCR and c diff  -monitor WBC  -aspiration precautions  -discussed with Dr. Mcdonough  -I will be covered by Dr. Orosco on 1/12/24    Yvette Loredo MD  Division of Infectious Diseases   Cell [phone] between 8am and 6pm   After 6pm and weekends please call ID service at [phone].     35 minutes spent on total encounter assessing patient, examination, chart review, counseling and coordinating care by the attending physician/nurse/care manager.

## 2024-01-11 NOTE — PROGRESS NOTE ADULT - ASSESSMENT
Urinary retention unc successful TOV, P{VR ~ 500 cc, leave Turcios Catheter indwelling, he is followed by his Urologist, Dr. RICK Mccarthy    No  Intervention planned at this time, will not follow actively, available as neceeede

## 2024-01-11 NOTE — PROGRESS NOTE ADULT - SUBJECTIVE AND OBJECTIVE BOX
[facility] -- Jenn Goldstein,  An, Gildardo Marrero Savella, Goodger, Cohen  Office # [phone]    Follow Up:  Mechanical AVR, Cardiac Optimization    Subjective/Observations: No events overnight resting comfortably in bed.  Unable to provide meaningful information. Doesn't appear to be in acute distress, on RA, laying flat.    REVIEW OF SYSTEMS: All other review of systems is negative unless indicated above  PAST MEDICAL & SURGICAL HISTORY:  HTN (hypertension)      HLD (hyperlipidemia)      Paroxysmal atrial fibrillation      H/O aortic valve repair      H/O thoracic aortic aneurysm repair        MEDICATIONS  (STANDING):  atorvastatin 10 milliGRAM(s) Oral at bedtime  cefepime   IVPB 1000 milliGRAM(s) IV Intermittent every 12 hours  dextrose 5%. 1000 milliLiter(s) (50 mL/Hr) IV Continuous <Continuous>  dextrose 5%. 1000 milliLiter(s) (100 mL/Hr) IV Continuous <Continuous>  dextrose 50% Injectable 25 Gram(s) IV Push once  dextrose 50% Injectable 12.5 Gram(s) IV Push once  dextrose 50% Injectable 25 Gram(s) IV Push once  glucagon  Injectable 1 milliGRAM(s) IntraMuscular once  insulin lispro (ADMELOG) corrective regimen sliding scale   SubCutaneous three times a day before meals  insulin lispro (ADMELOG) corrective regimen sliding scale   SubCutaneous at bedtime  tamsulosin 0.4 milliGRAM(s) Oral at bedtime    MEDICATIONS  (PRN):  dextrose Oral Gel 15 Gram(s) Oral once PRN Blood Glucose LESS THAN 70 milliGRAM(s)/deciliter    Allergies    No Known Allergies    Intolerances      Vital Signs Last 24 Hrs  T(C): 36.7 (11 Jan 2024 05:22), Max: 37.5 (10 Wilman 2024 20:49)  T(F): 98 (11 Jan 2024 05:22), Max: 99.5 (10 Wilman 2024 20:49)  HR: 74 (11 Jan 2024 05:22) (66 - 74)  BP: 109/71 (11 Jan 2024 05:22) (95/59 - 109/71)  BP(mean): --  RR: 17 (11 Jan 2024 05:22) (17 - 20)  SpO2: 90% (11 Jan 2024 05:22) (90% - 93%)    Parameters below as of 11 Jan 2024 05:22  Patient On (Oxygen Delivery Method): room air      I&O's Summary    10 Wilman 2024 07:01  -  11 Jan 2024 07:00  --------------------------------------------------------  IN: 498 mL / OUT: 1100 mL / NET: -602 mL    11 Jan 2024 07:01  -  11 Jan 2024 12:21  --------------------------------------------------------  IN: 0 mL / OUT: 500 mL / NET: -500 mL        TELE:off tele  PHYSICAL EXAM:  Constitutional: NAD, asleep  HEENT: Moist Mucous Membranes, Anicteric  Pulmonary: Non-labored, breath sounds are clear bilaterally, No wheezing, rales or rhonchi  Cardiovascular: Regular, S1 and S2, +murmurs, no rubs, gallops +clicks  Gastrointestinal: Bowel Sounds present, soft, nontender.   Lymph: No peripheral edema. No lymphadenopathy.  Skin: No visible rashes.  Left heel ulcers with dressing dry and intact  Psych:  Mood & affect: Unable to assess    LABS: All Labs Reviewed:                        11.0   22.37 )-----------( 231      ( 11 Jan 2024 05:57 )             31.8                         10.4   21.43 )-----------( 240      ( 10 Wilman 2024 21:10 )             30.2                         11.5   19.13 )-----------( 268      ( 10 Wilman 2024 09:08 )             33.4     11 Jan 2024 05:57    137    |  105    |  30     ----------------------------<  146    3.6     |  27     |  1.50   10 Wilman 2024 07:30    135    |  103    |  32     ----------------------------<  152    4.3     |  29     |  1.50   09 Jan 2024 05:40    133    |  104    |  34     ----------------------------<  159    3.7     |  25     |  1.40     Ca    8.7        11 Jan 2024 05:57  Ca    8.7        10 Wilman 2024 07:30  Ca    7.8        09 Jan 2024 05:40  Mg     1.7       09 Jan 2024 05:40    TPro  6.4    /  Alb  2.3    /  TBili  0.6    /  DBili  x      /  AST  27     /  ALT  29     /  AlkPhos  69     11 Jan 2024 05:57  TPro  5.7    /  Alb  1.9    /  TBili  0.4    /  DBili  x      /  AST  28     /  ALT  23     /  AlkPhos  52     09 Jan 2024 05:40    PT/INR - ( 11 Jan 2024 05:57 )   PT: 41.2 sec;   INR: 3.66 ratio         PTT - ( 11 Jan 2024 05:57 )  PTT:80.1 sec      12 Lead ECG:   Ventricular Rate 80 BPM    Atrial Rate 80 BPM    P-R Interval 200 ms    QRS Duration 140 ms    Q-T Interval 408 ms    QTC Calculation(Bazett) 470 ms    P Axis 81 degrees    R Axis -51 degrees    T Axis 88 degrees    Diagnosis Line Normal sinus rhythm  Left axis deviation  Left bundle branch block  Left anterior fascicular block  Abnormal ECG  When compared with ECG of 07-SEP-2021 17:07,  T wave inversion less evident in Lateral leads    Confirmed by JASON HERRERA, JOHNATHAN ([id number]) on 1/1/2024 8:42:58 AM (12-31-23 @ 12:32)        [facility] -- Jenn Goldstein,  An, Gildardo Marrero Savella, Goodger, Cohen  Office # [phone]    Follow Up:  Mechanical AVR, Cardiac Optimization    Subjective/Observations: No events overnight resting comfortably in bed.  Unable to provide meaningful information. Doesn't appear to be in acute distress, on RA, laying flat.    REVIEW OF SYSTEMS: All other review of systems is negative unless indicated above  PAST MEDICAL & SURGICAL HISTORY:  HTN (hypertension)      HLD (hyperlipidemia)      Paroxysmal atrial fibrillation      H/O aortic valve repair      H/O thoracic aortic aneurysm repair        MEDICATIONS  (STANDING):  atorvastatin 10 milliGRAM(s) Oral at bedtime  cefepime   IVPB 1000 milliGRAM(s) IV Intermittent every 12 hours  dextrose 5%. 1000 milliLiter(s) (50 mL/Hr) IV Continuous <Continuous>  dextrose 5%. 1000 milliLiter(s) (100 mL/Hr) IV Continuous <Continuous>  dextrose 50% Injectable 25 Gram(s) IV Push once  dextrose 50% Injectable 12.5 Gram(s) IV Push once  dextrose 50% Injectable 25 Gram(s) IV Push once  glucagon  Injectable 1 milliGRAM(s) IntraMuscular once  insulin lispro (ADMELOG) corrective regimen sliding scale   SubCutaneous three times a day before meals  insulin lispro (ADMELOG) corrective regimen sliding scale   SubCutaneous at bedtime  tamsulosin 0.4 milliGRAM(s) Oral at bedtime    MEDICATIONS  (PRN):  dextrose Oral Gel 15 Gram(s) Oral once PRN Blood Glucose LESS THAN 70 milliGRAM(s)/deciliter    Allergies    No Known Allergies    Intolerances      Vital Signs Last 24 Hrs  T(C): 36.7 (11 Jan 2024 05:22), Max: 37.5 (10 Wilman 2024 20:49)  T(F): 98 (11 Jan 2024 05:22), Max: 99.5 (10 Wilman 2024 20:49)  HR: 74 (11 Jan 2024 05:22) (66 - 74)  BP: 109/71 (11 Jan 2024 05:22) (95/59 - 109/71)  BP(mean): --  RR: 17 (11 Jan 2024 05:22) (17 - 20)  SpO2: 90% (11 Jan 2024 05:22) (90% - 93%)    Parameters below as of 11 Jan 2024 05:22  Patient On (Oxygen Delivery Method): room air      I&O's Summary    10 Wilman 2024 07:01  -  11 Jan 2024 07:00  --------------------------------------------------------  IN: 498 mL / OUT: 1100 mL / NET: -602 mL    11 Jan 2024 07:01  -  11 Jan 2024 12:21  --------------------------------------------------------  IN: 0 mL / OUT: 500 mL / NET: -500 mL        TELE:off tele  PHYSICAL EXAM:  Constitutional: NAD, asleep  HEENT: Moist Mucous Membranes, Anicteric  Pulmonary: Non-labored, breath sounds are clear bilaterally, No wheezing, rales or rhonchi  Cardiovascular: Regular, S1 and S2, +murmurs, no rubs, gallops +clicks  Gastrointestinal: Bowel Sounds present, soft, nontender.   Lymph: No peripheral edema. No lymphadenopathy.  Skin: No visible rashes.  Left heel ulcers with dressing dry and intact  Psych:  Mood & affect: Unable to assess    LABS: All Labs Reviewed:                        11.0   22.37 )-----------( 231      ( 11 Jan 2024 05:57 )             31.8                         10.4   21.43 )-----------( 240      ( 10 Wilman 2024 21:10 )             30.2                         11.5   19.13 )-----------( 268      ( 10 Wilman 2024 09:08 )             33.4     11 Jan 2024 05:57    137    |  105    |  30     ----------------------------<  146    3.6     |  27     |  1.50   10 Wilman 2024 07:30    135    |  103    |  32     ----------------------------<  152    4.3     |  29     |  1.50   09 Jan 2024 05:40    133    |  104    |  34     ----------------------------<  159    3.7     |  25     |  1.40     Ca    8.7        11 Jan 2024 05:57  Ca    8.7        10 Wilman 2024 07:30  Ca    7.8        09 Jan 2024 05:40  Mg     1.7       09 Jan 2024 05:40    TPro  6.4    /  Alb  2.3    /  TBili  0.6    /  DBili  x      /  AST  27     /  ALT  29     /  AlkPhos  69     11 Jan 2024 05:57  TPro  5.7    /  Alb  1.9    /  TBili  0.4    /  DBili  x      /  AST  28     /  ALT  23     /  AlkPhos  52     09 Jan 2024 05:40    PT/INR - ( 11 Jan 2024 05:57 )   PT: 41.2 sec;   INR: 3.66 ratio         PTT - ( 11 Jan 2024 05:57 )  PTT:80.1 sec      12 Lead ECG:   Ventricular Rate 80 BPM    Atrial Rate 80 BPM    P-R Interval 200 ms    QRS Duration 140 ms    Q-T Interval 408 ms    QTC Calculation(Bazett) 470 ms    P Axis 81 degrees    R Axis -51 degrees    T Axis 88 degrees    Diagnosis Line Normal sinus rhythm  Left axis deviation  Left bundle branch block  Left anterior fascicular block  Abnormal ECG  When compared with ECG of 07-SEP-2021 17:07,  T wave inversion less evident in Lateral leads    Confirmed by JASON HERRERA, JOHNATHAN ([id number]) on 1/1/2024 8:42:58 AM (12-31-23 @ 12:32)

## 2024-01-12 NOTE — DIETITIAN INITIAL EVALUATION ADULT - PERTINENT MEDS FT
MEDICATIONS  (STANDING):  atorvastatin 10 milliGRAM(s) Oral at bedtime  cefepime   IVPB 1000 milliGRAM(s) IV Intermittent every 12 hours  dextrose 5%. 1000 milliLiter(s) (50 mL/Hr) IV Continuous <Continuous>  dextrose 5%. 1000 milliLiter(s) (100 mL/Hr) IV Continuous <Continuous>  dextrose 50% Injectable 25 Gram(s) IV Push once  dextrose 50% Injectable 12.5 Gram(s) IV Push once  dextrose 50% Injectable 25 Gram(s) IV Push once  glucagon  Injectable 1 milliGRAM(s) IntraMuscular once  insulin lispro (ADMELOG) corrective regimen sliding scale   SubCutaneous at bedtime  insulin lispro (ADMELOG) corrective regimen sliding scale   SubCutaneous three times a day before meals  tamsulosin 0.4 milliGRAM(s) Oral at bedtime    MEDICATIONS  (PRN):  dextrose Oral Gel 15 Gram(s) Oral once PRN Blood Glucose LESS THAN 70 milliGRAM(s)/deciliter

## 2024-01-12 NOTE — SWALLOW BEDSIDE ASSESSMENT ADULT - SWALLOW EVAL: DIAGNOSIS
The pt presented with oral dysphagia and suspected pharyngeal dysphagia. Given thin liquids (x6oz): adequate oral acceptance/containment, timely oral transit, suspected timely pharyngeal swallow trigger, (+) hyolaryngeal excursion/elevation upon palpation, and no overt s/s of aspiration/penetration. Given soft and bite sized (x13): adequate oral acceptance/containment, mildly prolonged mastication (suspected to be 2/2 absent dentition), piecemeal deglutition, suspected timely pharyngeal swallow trigger, (+) hyolaryngeal excursion/elevation upon palpation, and no overt s/s of aspiration/penetration. Pt with reflexive coughing x2 with mixed consistency (soup with vegetables/chicken) suspected to be 2/2 reduced coordination of liquid/solid when combined. Adequate oral clearance appreciated following all trials administered.

## 2024-01-12 NOTE — DIETITIAN NUTRITION RISK NOTIFICATION - TREATMENT: THE FOLLOWING DIET HAS BEEN RECOMMENDED
Diet, Soft and Bite Sized:   Consistent Carbohydrate {Evening Snack}  DASH/TLC {Sodium & Cholesterol Restricted}  Free Water Flush Instructions:  OTHER ; nutrition assistants : MIGUEL SOUP ONLY !!!!!!!!!!!  Supplement Feeding Modality:  Oral  Glucerna Shake Cans or Servings Per Day:  1       Frequency:  Two Times a day (01-12-24 @ 13:43) [Pending Verification By Attending]  Diet, Soft and Bite Sized:   Consistent Carbohydrate {Evening Snack}  DASH/TLC {Sodium & Cholesterol Restricted} (01-11-24 @ 11:01) [Active]

## 2024-01-12 NOTE — DIETITIAN INITIAL EVALUATION ADULT - NS FNS DIET ORDER
Diet, Soft and Bite Sized:   Consistent Carbohydrate {Evening Snack}  DASH/TLC {Sodium & Cholesterol Restricted} (01-11-24 @ 11:01)

## 2024-01-12 NOTE — DIETITIAN INITIAL EVALUATION ADULT - ORAL INTAKE PTA/DIET HISTORY
pt unable to provide pt unable to provide  per MD note, was on minced moist at [place] pt unable to provide  per MD note, was on minced moist at [place]

## 2024-01-12 NOTE — PROGRESS NOTE ADULT - ASSESSMENT
89 yo M with PMH HTN, HLD, LBBB, paroxismal Atrial Fibrillation, thoracic aortic aneurysm, s/p mechanical AVR presented transferred from  for angioplasty of his L foot. Pt originally presented to  on 12/31 after experiencing left foot pain which upon removal of his sock revealed a black eschar on the dorsum of his foot.    htn  hld  OP  OA  pvd  wound  LBBB  AF  valv heart disease  DM  BETINA    renal follow up noted - renal indices noted   I and O  serial labs  replete lytes  on emp ABX - [employer] - ID eval noted - cx - wound care - biomarkers -   Imaging reviewed - CXR - ? infiltrate - atelectasis  monitor VS and HD and Sat  goal sat > 88 pct  HOB elev  oral hygiene  skin care  cvs rx regimen - BP control - Cardio following - Extensive Card Hx -   Planned for TTE -   GOC - DNR DNI  prognosis guarded 89 yo M with PMH HTN, HLD, LBBB, paroxismal Atrial Fibrillation, thoracic aortic aneurysm, s/p mechanical AVR presented transferred from  for angioplasty of his L foot. Pt originally presented to  on 12/31 after experiencing left foot pain which upon removal of his sock revealed a black eschar on the dorsum of his foot.    htn  hld  OP  OA  pvd  wound  LBBB  AF  valv heart disease  DM  BETINA    renal follow up noted - renal indices noted   I and O  serial labs  replete lytes  on emp ABX - [employer] - ID eval noted - cx - wound care - biomarkers -   Imaging reviewed - CXR - ? infiltrate - atelectasis  monitor VS and HD and Sat  goal sat > 88 pct  HOB elev  oral hygiene  skin care  cvs rx regimen - BP control - Cardio following - Extensive Card Hx -   Planned for TTE -   GOC - DNR DNI  prognosis guarded

## 2024-01-12 NOTE — SWALLOW BEDSIDE ASSESSMENT ADULT - PHARYNGEAL PHASE
Decreased laryngeal elevation Inconsistent coughing/Decreased laryngeal elevation/Cough post oral intake

## 2024-01-12 NOTE — DIETITIAN INITIAL EVALUATION ADULT - PROBLEM/PLAN-8
'I was changing a tire, splint my finger'. DISPLAY PLAN FREE TEXT 'I was changing a tire, split my finger'.

## 2024-01-12 NOTE — PROGRESS NOTE ADULT - SUBJECTIVE AND OBJECTIVE BOX
[facility]   INFECTIOUS DISEASES   [address] [place] [address]  Tel: [phone]     Fax: [phone]  =========================================================  MD Herlinda Soto Kaushal, MD Cho, Michelle, MD Sunjit, Jaspal, MD  =========================================================    POPPY MERCADO [id number]    Follow up: Lying in bed comfortable, no fever, no abdominal or flank pain.     Allergies:  No Known Allergies    atorvastatin 10 milliGRAM(s) Oral at bedtime  cefepime   IVPB 1000 milliGRAM(s) IV Intermittent every 12 hours  dextrose 5%. 1000 milliLiter(s) IV Continuous <Continuous>  dextrose 5%. 1000 milliLiter(s) IV Continuous <Continuous>  dextrose 50% Injectable 25 Gram(s) IV Push once  dextrose 50% Injectable 12.5 Gram(s) IV Push once  dextrose 50% Injectable 25 Gram(s) IV Push once  dextrose Oral Gel 15 Gram(s) Oral once PRN  glucagon  Injectable 1 milliGRAM(s) IntraMuscular once  insulin lispro (ADMELOG) corrective regimen sliding scale   SubCutaneous at bedtime  insulin lispro (ADMELOG) corrective regimen sliding scale   SubCutaneous three times a day before meals  potassium chloride    Tablet ER 40 milliEquivalent(s) Oral every 4 hours  tamsulosin 0.4 milliGRAM(s) Oral at bedtime     REVIEW OF SYSTEMS:  CONSTITUTIONAL:  No Fever or chills  HEENT:   No diplopia or blurred vision.  No earache, sore throat or runny nose.  CARDIOVASCULAR:  No chest pain or SOB  RESPIRATORY:  No cough, shortness of breath, PND or orthopnea.  GASTROINTESTINAL:  No nausea, vomiting or diarrhea.  GENITOURINARY:  No dysuria, frequency or urgency. No Blood in urine  MUSCULOSKELETAL:  no joint aches, no muscle pain  SKIN:  No change in skin, hair or nails.     Physical Exam:  ICU Vital Signs Last 24 Hrs  T(C): 36.8 (12 Jan 2024 04:35), Max: 36.9 (11 Jan 2024 21:23)  T(F): 98.3 (12 Jan 2024 04:35), Max: 98.5 (11 Jan 2024 21:23)  HR: 73 (12 Jan 2024 04:35) (73 - 79)  BP: 100/80 (12 Jan 2024 12:40) (90/53 - 100/80)  RR: 18 (12 Jan 2024 04:35) (18 - 18)  SpO2: 94% (12 Jan 2024 04:35) (92% - 94%)  O2 Parameters below as of 12 Jan 2024 04:35  Patient On (Oxygen Delivery Method): room air  GEN: NAD  HEENT: normocephalic and atraumatic. EOMI. LAZARO.    NECK: Supple.  No lymphadenopathy   LUNGS: Clear to auscultation.  HEART: Regular rate and rhythm without murmur.  ABDOMEN: Soft, nontender, and nondistended.  Positive bowel sounds.    : No CVA tenderness  EXTREMITIES: Without edema. left foot eschar   MSK: no joint swelling  PSYCHIATRIC: Appropriate affect .  SKIN: No rash     Labs:  01-12    136  |  106  |  39<H>  ----------------------------<  161<H>  3.3<L>   |  25  |  1.70<H>    Ca    8.7      12 Jan 2024 08:43  Mg     1.8     01-12    TPro  6.3  /  Alb  2.0<L>  /  TBili  0.4  /  DBili  x   /  AST  22  /  ALT  26  /  AlkPhos  66  01-12                        10.5   14.27 )-----------( 218      ( 12 Jan 2024 08:43 )             30.5     PT/INR - ( 12 Jan 2024 08:43 )   PT: 39.8 sec;   INR: 3.53 ratio    PTT - ( 12 Jan 2024 08:43 )  PTT:39.1 sec  Urinalysis Basic - ( 12 Jan 2024 08:43 )    Color: x / Appearance: x / SG: x / pH: x  Gluc: 161 mg/dL / Ketone: x  / Bili: x / Urobili: x   Blood: x / Protein: x / Nitrite: x   Leuk Esterase: x / RBC: x / WBC x   Sq Epi: x / Non Sq Epi: x / Bacteria: x    LIVER FUNCTIONS - ( 12 Jan 2024 08:43 )  Alb: 2.0 g/dL / Pro: 6.3 g/dL / ALK PHOS: 66 U/L / ALT: 26 U/L / AST: 22 U/L / GGT: x           RECENT CULTURES:  01-10 @ 19:00 .Blood Blood     No growth at 24 hours    01-10 @ 18:50 .Blood Blood     No growth at 24 hours    01-05 @ 13:10 Catheterized Catheterized     No growth    12-31 @ 13:38 Clean Catch Clean Catch (Midstream)     <10,000 CFU/mL Normal Urogenital Christy    12-31 @ 13:05 .Blood Blood-Peripheral     No growth at 5 days    12-31 @ 13:00 .Blood Blood-Peripheral     No growth at 5 days    NotDetec      All imaging and data are reviewed.   < from: Xray Chest 1 View-PORTABLE IMMEDIATE (Xray Chest 1 View-PORTABLE IMMEDIATE .) (01.10.24 @ 11:51) >  Heart magnified by technique.  There is a small infiltrate developing left the left lower hilum compared   to December 31, 2023. Clips over the aortic arch area again noted.  IMPRESSION: Developing small left lower perihilar infiltrate.    < from: NM Bone Marrow Imaging Limited (01.10.24 @ 14:12) >  TECHNIQUE:  The patient was injected with the above dose of labeled   autologous leukocytes on 1/6/2024. Approximately 24 hours later, on   1/7/2024, static images of the feet were obtained. The patient then was   injected with Tc-99m sulfur colloid and the images were repeated in the   same projections using dual isotope acquisition mode after approximately   45 minutes. Due to decreased target to background ratio, the dual isotope   was then reacquired at 90 minutes  COMPARISON: No prior scintigraphy of the feet available.  FINDINGS: The proximal LEFT midfoot shows an area of increased white cell   accumulation. On the second set of delayed sulfur colloid images, there   is an area of increased confluent increased activity of the bone marrow,   with an area of photopenia within the marrow seen in the same region as   the white cell accumulation. Findings are suspicious for infection.  IMPRESSION:  Abnormal combined Indium-111 labeled leukocyte study and   marrow scan. Findings suspicious for infection.    Assessment and Plan:   89 yo M with PMH HTN, HLD, LBBB, paroxismal Atrial Fibrillation, thoracic aortic aneurysm, s/p mechanical AVR, who was transferred from  for angioplasty of his L foot. He has a left foot eschar, on empiric antibiotics to treat for potential soft tissue infection or even underlying osteomyelitis. Bone scan shows evidence of L foot infection. MRSA nasal PCR negative.    Noted to have worsening leukocytosis, unclear etiology. CT chest and abdomen not concerning for any source of infection, UA with high WBC>50 but negative Nitrate, has a castañeda, but on room air and no respiratory complaints.   NM positive in left midfoot but no active cellulitis or draining wound, so I would watch it for now.   Would hold on OM treatment, since no active infection will not benefit from long term ABx, possibly in his age will have side effects.    #Left foot eschar and osteomyelitis  #Leukocytosis  # UTI vs developing pneumonia?     - Blood cultures negative   - Continue cefepime to complete 5days total for possible UTI/pneumonia?   - leukocytosis responding will follow trend   - Monitor BM if diarrhea inform MD   - Aspiration precautions  - Podiatry follow up    Will follow.  Dr. Arriaga is covering this weekend.     Aliya Orosco MD  Division of Infectious Diseases   Please call ID service at [phone] with any question.      50 minutes spent on total encounter assessing patient, examination, chart review, counseling and coordinating care by the attending physician/nurse/care manager.   [facility]   INFECTIOUS DISEASES   [address] [place] [address]  Tel: [phone]     Fax: [phone]  =========================================================  MD Herlinda Soto Kaushal, MD Cho, Michelle, MD Sunjit, Jaspal, MD  =========================================================    POPPY MERCADO [id number]    Follow up: Lying in bed comfortable, no fever, no abdominal or flank pain.     Allergies:  No Known Allergies    atorvastatin 10 milliGRAM(s) Oral at bedtime  cefepime   IVPB 1000 milliGRAM(s) IV Intermittent every 12 hours  dextrose 5%. 1000 milliLiter(s) IV Continuous <Continuous>  dextrose 5%. 1000 milliLiter(s) IV Continuous <Continuous>  dextrose 50% Injectable 25 Gram(s) IV Push once  dextrose 50% Injectable 12.5 Gram(s) IV Push once  dextrose 50% Injectable 25 Gram(s) IV Push once  dextrose Oral Gel 15 Gram(s) Oral once PRN  glucagon  Injectable 1 milliGRAM(s) IntraMuscular once  insulin lispro (ADMELOG) corrective regimen sliding scale   SubCutaneous at bedtime  insulin lispro (ADMELOG) corrective regimen sliding scale   SubCutaneous three times a day before meals  potassium chloride    Tablet ER 40 milliEquivalent(s) Oral every 4 hours  tamsulosin 0.4 milliGRAM(s) Oral at bedtime     REVIEW OF SYSTEMS:  CONSTITUTIONAL:  No Fever or chills  HEENT:   No diplopia or blurred vision.  No earache, sore throat or runny nose.  CARDIOVASCULAR:  No chest pain or SOB  RESPIRATORY:  No cough, shortness of breath, PND or orthopnea.  GASTROINTESTINAL:  No nausea, vomiting or diarrhea.  GENITOURINARY:  No dysuria, frequency or urgency. No Blood in urine  MUSCULOSKELETAL:  no joint aches, no muscle pain  SKIN:  No change in skin, hair or nails.     Physical Exam:  ICU Vital Signs Last 24 Hrs  T(C): 36.8 (12 Jan 2024 04:35), Max: 36.9 (11 Jan 2024 21:23)  T(F): 98.3 (12 Jan 2024 04:35), Max: 98.5 (11 Jan 2024 21:23)  HR: 73 (12 Jan 2024 04:35) (73 - 79)  BP: 100/80 (12 Jan 2024 12:40) (90/53 - 100/80)  RR: 18 (12 Jan 2024 04:35) (18 - 18)  SpO2: 94% (12 Jan 2024 04:35) (92% - 94%)  O2 Parameters below as of 12 Jan 2024 04:35  Patient On (Oxygen Delivery Method): room air  GEN: NAD  HEENT: normocephalic and atraumatic. EOMI. LAZARO.    NECK: Supple.  No lymphadenopathy   LUNGS: Clear to auscultation.  HEART: Regular rate and rhythm without murmur.  ABDOMEN: Soft, nontender, and nondistended.  Positive bowel sounds.    : No CVA tenderness  EXTREMITIES: Without edema. left foot eschar   MSK: no joint swelling  PSYCHIATRIC: Appropriate affect .  SKIN: No rash     Labs:  01-12    136  |  106  |  39<H>  ----------------------------<  161<H>  3.3<L>   |  25  |  1.70<H>    Ca    8.7      12 Jan 2024 08:43  Mg     1.8     01-12    TPro  6.3  /  Alb  2.0<L>  /  TBili  0.4  /  DBili  x   /  AST  22  /  ALT  26  /  AlkPhos  66  01-12                        10.5   14.27 )-----------( 218      ( 12 Jan 2024 08:43 )             30.5     PT/INR - ( 12 Jan 2024 08:43 )   PT: 39.8 sec;   INR: 3.53 ratio    PTT - ( 12 Jan 2024 08:43 )  PTT:39.1 sec  Urinalysis Basic - ( 12 Jan 2024 08:43 )    Color: x / Appearance: x / SG: x / pH: x  Gluc: 161 mg/dL / Ketone: x  / Bili: x / Urobili: x   Blood: x / Protein: x / Nitrite: x   Leuk Esterase: x / RBC: x / WBC x   Sq Epi: x / Non Sq Epi: x / Bacteria: x    LIVER FUNCTIONS - ( 12 Jan 2024 08:43 )  Alb: 2.0 g/dL / Pro: 6.3 g/dL / ALK PHOS: 66 U/L / ALT: 26 U/L / AST: 22 U/L / GGT: x           RECENT CULTURES:  01-10 @ 19:00 .Blood Blood     No growth at 24 hours    01-10 @ 18:50 .Blood Blood     No growth at 24 hours    01-05 @ 13:10 Catheterized Catheterized     No growth    12-31 @ 13:38 Clean Catch Clean Catch (Midstream)     <10,000 CFU/mL Normal Urogenital Christy    12-31 @ 13:05 .Blood Blood-Peripheral     No growth at 5 days    12-31 @ 13:00 .Blood Blood-Peripheral     No growth at 5 days    NotDetec      All imaging and data are reviewed.   < from: Xray Chest 1 View-PORTABLE IMMEDIATE (Xray Chest 1 View-PORTABLE IMMEDIATE .) (01.10.24 @ 11:51) >  Heart magnified by technique.  There is a small infiltrate developing left the left lower hilum compared   to December 31, 2023. Clips over the aortic arch area again noted.  IMPRESSION: Developing small left lower perihilar infiltrate.    < from: NM Bone Marrow Imaging Limited (01.10.24 @ 14:12) >  TECHNIQUE:  The patient was injected with the above dose of labeled   autologous leukocytes on 1/6/2024. Approximately 24 hours later, on   1/7/2024, static images of the feet were obtained. The patient then was   injected with Tc-99m sulfur colloid and the images were repeated in the   same projections using dual isotope acquisition mode after approximately   45 minutes. Due to decreased target to background ratio, the dual isotope   was then reacquired at 90 minutes  COMPARISON: No prior scintigraphy of the feet available.  FINDINGS: The proximal LEFT midfoot shows an area of increased white cell   accumulation. On the second set of delayed sulfur colloid images, there   is an area of increased confluent increased activity of the bone marrow,   with an area of photopenia within the marrow seen in the same region as   the white cell accumulation. Findings are suspicious for infection.  IMPRESSION:  Abnormal combined Indium-111 labeled leukocyte study and   marrow scan. Findings suspicious for infection.    Assessment and Plan:   89 yo M with PMH HTN, HLD, LBBB, paroxismal Atrial Fibrillation, thoracic aortic aneurysm, s/p mechanical AVR, who was transferred from  for angioplasty of his L foot. He has a left foot eschar, on empiric antibiotics to treat for potential soft tissue infection or even underlying osteomyelitis. Bone scan shows evidence of L foot infection. MRSA nasal PCR negative.    Noted to have worsening leukocytosis, unclear etiology. CT chest and abdomen not concerning for any source of infection, UA with high WBC>50 but negative Nitrate, has a castañeda, but on room air and no respiratory complaints.   NM positive in left midfoot but no active cellulitis or draining wound, so I would watch it for now.   Would hold on OM treatment, since no active infection will not benefit from long term ABx, possibly in his age will have side effects.    #Left foot eschar and osteomyelitis  #Leukocytosis  # UTI vs developing pneumonia?     - Blood cultures negative   - Continue cefepime to complete 5days total for possible UTI/pneumonia?   - leukocytosis responding will follow trend   - Monitor BM if diarrhea inform MD   - Aspiration precautions  - Podiatry follow up    Will follow.  Dr. Arriaga is covering this weekend.     Aliya Orosco MD  Division of Infectious Diseases   Please call ID service at [phone] with any question.      50 minutes spent on total encounter assessing patient, examination, chart review, counseling and coordinating care by the attending physician/nurse/care manager.

## 2024-01-12 NOTE — DIETITIAN INITIAL EVALUATION ADULT - PERTINENT LABORATORY DATA
01-11    137  |  105  |  30<H>  ----------------------------<  146<H>  3.6   |  27  |  1.50<H>    Ca    8.7      11 Jan 2024 05:57    TPro  6.4  /  Alb  2.3<L>  /  TBili  0.6  /  DBili  x   /  AST  27  /  ALT  29  /  AlkPhos  69  01-11  POCT Blood Glucose.: 163 mg/dL (01-12-24 @ 08:15)  A1C with Estimated Average Glucose Result: 7.1 % (01-01-24 @ 07:10)

## 2024-01-12 NOTE — DIETITIAN INITIAL EVALUATION ADULT - OTHER INFO
89 yo M with PMH HTN,  DM II  ( A1c 6.7) HLD, LBBB, paroxysmal Atrial Fibrillation, thoracic aortic aneurysm, s/p mechanical AVR presented transferred from  for angioplasty of his L foot - found to have palpable pulses and procedure cancelled. To continue management of BETINA and cellulitis/suspected osteomy.    At time of RD visit to pts bedside, pt sleeping . Per nsg documentation po intake 26-50% served, had s large soft BM yesterday 1/11/24. Unable to do a complete nutrition focused physical exam but it is evident that pts is malnourished and findings from 1/3/24 nutr assessment remain ( see below) .    Per SLP, ok for soft bite sized but no mixed texture therefore will receive pureed soups

## 2024-01-12 NOTE — PROGRESS NOTE ADULT - SUBJECTIVE AND OBJECTIVE BOX
Patient is a 89y old  Male who presents with a chief complaint of left foot wound (12 Jan 2024 11:55)        INTERVAL HPI/OVERNIGHT EVENTS: Overnight, pt had 2 loose BMs. Pt seen and examined at the bedside this AM. Pt denies any pain -- has no complaints. Continue to have castañeda.         MEDICATIONS  (STANDING):  atorvastatin 10 milliGRAM(s) Oral at bedtime  cefepime   IVPB 1000 milliGRAM(s) IV Intermittent every 12 hours  dextrose 5%. 1000 milliLiter(s) (100 mL/Hr) IV Continuous <Continuous>  dextrose 5%. 1000 milliLiter(s) (50 mL/Hr) IV Continuous <Continuous>  dextrose 50% Injectable 25 Gram(s) IV Push once  dextrose 50% Injectable 12.5 Gram(s) IV Push once  dextrose 50% Injectable 25 Gram(s) IV Push once  glucagon  Injectable 1 milliGRAM(s) IntraMuscular once  insulin lispro (ADMELOG) corrective regimen sliding scale   SubCutaneous at bedtime  insulin lispro (ADMELOG) corrective regimen sliding scale   SubCutaneous three times a day before meals  potassium chloride    Tablet ER 40 milliEquivalent(s) Oral every 4 hours  tamsulosin 0.4 milliGRAM(s) Oral at bedtime    MEDICATIONS  (PRN):  dextrose Oral Gel 15 Gram(s) Oral once PRN Blood Glucose LESS THAN 70 milliGRAM(s)/deciliter      Allergies    No Known Allergies    Intolerances        REVIEW OF SYSTEMS:  CONSTITUTIONAL: No fever or chills  HEENT:  No headache, no sore throat  RESPIRATORY: No cough, wheezing, or shortness of breath  CARDIOVASCULAR: No chest pain, palpitations  GASTROINTESTINAL: No abd pain, nausea, vomiting, or diarrhea  GENITOURINARY: No dysuria, frequency, or hematuria  NEUROLOGICAL: no focal weakness or dizziness  MUSCULOSKELETAL: no myalgias     Vital Signs Last 24 Hrs  T(C): 36.8 (12 Jan 2024 04:35), Max: 36.9 (11 Jan 2024 21:23)  T(F): 98.3 (12 Jan 2024 04:35), Max: 98.5 (11 Jan 2024 21:23)  HR: 73 (12 Jan 2024 04:35) (73 - 79)  BP: 100/80 (12 Jan 2024 12:40) (90/53 - 100/80)  BP(mean): --  RR: 18 (12 Jan 2024 04:35) (18 - 18)  SpO2: 94% (12 Jan 2024 04:35) (92% - 94%)    Parameters below as of 12 Jan 2024 04:35  Patient On (Oxygen Delivery Method): room air        PHYSICAL EXAM:  GENERAL: NAD, sitting upright in bed, eating breakfast  HEENT:  anicteric, moist mucous membranes  CHEST/LUNG:  CTA b/l, no rales, wheezes, or rhonchi  HEART:  RRR, S1, S2, +Murmur  ABDOMEN:  BS+, soft, nontender, nondistended  EXTREMITIES: +L foot eschar on dorsum of foot, no edema, cyanosis, or calf tenderness  NERVOUS SYSTEM: answers questions and follows commands appropriately    LABS:                        10.5   14.27 )-----------( 218      ( 12 Jan 2024 08:43 )             30.5     CBC Full  -  ( 12 Jan 2024 08:43 )  WBC Count : 14.27 K/uL  Hemoglobin : 10.5 g/dL  Hematocrit : 30.5 %  Platelet Count - Automated : 218 K/uL  Mean Cell Volume : 87.6 fl  Mean Cell Hemoglobin : 30.2 pg  Mean Cell Hemoglobin Concentration : 34.4 gm/dL  Auto Neutrophil # : 11.70 K/uL  Auto Lymphocyte # : 1.39 K/uL  Auto Monocyte # : 0.80 K/uL  Auto Eosinophil # : 0.19 K/uL  Auto Basophil # : 0.04 K/uL  Auto Neutrophil % : 82.0 %  Auto Lymphocyte % : 9.7 %  Auto Monocyte % : 5.6 %  Auto Eosinophil % : 1.3 %  Auto Basophil % : 0.3 %    12 Jan 2024 08:43    136    |  106    |  39     ----------------------------<  161    3.3     |  25     |  1.70     Ca    8.7        12 Jan 2024 08:43  Mg     1.8       12 Jan 2024 08:43    TPro  6.3    /  Alb  2.0    /  TBili  0.4    /  DBili  x      /  AST  22     /  ALT  26     /  AlkPhos  66     12 Jan 2024 08:43    PT/INR - ( 12 Jan 2024 08:43 )   PT: 39.8 sec;   INR: 3.53 ratio         PTT - ( 12 Jan 2024 08:43 )  PTT:39.1 sec  Urinalysis Basic - ( 12 Jan 2024 08:43 )    Color: x / Appearance: x / SG: x / pH: x  Gluc: 161 mg/dL / Ketone: x  / Bili: x / Urobili: x   Blood: x / Protein: x / Nitrite: x   Leuk Esterase: x / RBC: x / WBC x   Sq Epi: x / Non Sq Epi: x / Bacteria: x      CAPILLARY BLOOD GLUCOSE      POCT Blood Glucose.: 173 mg/dL (12 Jan 2024 12:35)  POCT Blood Glucose.: 163 mg/dL (12 Jan 2024 08:15)  POCT Blood Glucose.: 188 mg/dL (11 Jan 2024 21:56)  POCT Blood Glucose.: 133 mg/dL (11 Jan 2024 16:59)        Culture - Blood (collected 01-10-24 @ 19:00)  Source: .Blood Blood  Preliminary Report (01-12-24 @ 01:03):    No growth at 24 hours    Culture - Blood (collected 01-10-24 @ 18:50)  Source: .Blood Blood  Preliminary Report (01-12-24 @ 01:02):    No growth at 24 hours        RADIOLOGY & ADDITIONAL TESTS:  Personally reviewed.     Consultant(s) Notes Reviewed:  [x] YES  [ ] NO

## 2024-01-12 NOTE — SWALLOW BEDSIDE ASSESSMENT ADULT - SLP GENERAL OBSERVATIONS
The pt was received at bedside at which time he was awake, alert, and in NAD. Pt was oriented to person. He was noted to be [place] He was seated upright for purposes of the evaluation. He denied any past or present difficulty swallowing. He stated he has upper dentures which he does not have with him. He stated softer food is easier for him to chew without his dentures but that he was maintaining a regular diet with thin liquids at home with dentures in place. He reported no known hx of PNA. He was noted to be impulsive throughout the evaluation. The pt was in NAD during or after the evaluation. He remained upright following the evaluation. The pt was received at bedside at which time he was awake, alert, and in NAD. Pt was oriented to person. He was noted to be [place]. He was seated upright for purposes of the evaluation. He denied any past or present difficulty swallowing. He stated he has upper dentures which he does not have with him. He stated softer food is easier for him to chew without his dentures but that he was maintaining a regular diet with thin liquids at home with dentures in place. He reported no known hx of PNA. He was noted to be impulsive throughout the evaluation. The pt was in NAD during or after the evaluation. He remained upright following the evaluation.

## 2024-01-12 NOTE — SWALLOW BEDSIDE ASSESSMENT ADULT - ASR SWALLOW DENTITION
Pt edentulous at time of evaluation. He stated he has upper dentures which he does not have with him.

## 2024-01-12 NOTE — PROGRESS NOTE ADULT - ASSESSMENT
89 yo M with PMH HTN, HLD, LBBB, paroxysmal Atrial Fibrillation, thoracic aortic aneurysm, s/p mechanical AVR presented transferred from  for angioplasty of his L foot - found to have palpable pulses and procedure cancelled. To continue management of BETINA and cellulitis/suspected osteomyelitis. 89 yo M with PMH HTN, HLD, LBBB, paroxysmal Atrial Fibrillation, thoracic aortic aneurysm, s/p mechanical AVR presented transferred from  for angioplasty of his L foot - found to have palpable pulses and procedure cancelled. To continue management of BETINA and cellulitis/suspected osteomyelitis. Course complicated by suspected colitis.

## 2024-01-12 NOTE — PROGRESS NOTE ADULT - PROBLEM SELECTOR PLAN 7
Pt with hx BPH  - takes silodosin at home  - started on flomax by uro Pt with hx HLD  - cont home statin

## 2024-01-12 NOTE — SWALLOW BEDSIDE ASSESSMENT ADULT - NS ASR SWALLOW FINDINGS DISCUS
Education regarding evaluation results and recommendations were communicated to the pt, the pt's MD (Dr. Metcalf), pt's nurse, and the pt's dietician at which time all parties provided verbal understanding./Physician/Nursing/Patient/Dietitian

## 2024-01-12 NOTE — PROGRESS NOTE ADULT - PROBLEM SELECTOR PLAN 3
Pt with history of afib on warfarin at home, presented to [facility] originally with supratherapeutic INR  - pt NSR on exam  - INR supratherapeutic at 3.66 today; will hold Coumadin tonight  - f/u TTE ordered per cardiology recs - routine and just as baseline - would not impede any potential procedures (although son has stated that he would not really want a procedure unless absolutely needed) Pt with BETINA, unknown baseline (outpt note with Cr 1.16 10/2023) - stable  - BUN/Cr 46/1.6 on [facility] admission  - likely 2/2 abx vs urinary retention - vanco discontinued and castañeda placed with >1L output  - hold lisinopril in setting of BETINA  - hold silodosin in setting of dec Cr clearance  - now s/p another 24h of IV fluids given orthostasis - with improved Cr - monitor off fluids  - replace electrolytes as needed  - nephro consulted, recs appreciated  - pt failed TOV  - continue with castañeda, pt to f/u w/ outpatient urologist

## 2024-01-12 NOTE — PROGRESS NOTE ADULT - PROBLEM SELECTOR PLAN 5
Pt with hx HTN  - takes ramipril, nadolol at home  - hold ACE in setting of BETINA and stable BP with orthostasis  - will hold nadolol 20mg as has soft BPs and orthostasis -- also this is a nonselective BB and not an ideal choice for rate control for his afib anyway - f/u cardiology recs Pt with A1c 6.7%, not on home medications and does not follow with PCP  - continue CASS with FS  - hypoglycemia protocol

## 2024-01-12 NOTE — SOCIAL WORK PROGRESS NOTE - NSSWPROGRESSNOTE_GEN_ALL_CORE
Pt remains acute as per rounds, awaiting speech/swallow and CT of abd/pelvis, plan is [facility] at [facility] when medically stable. SW to continue to follow to ensure safe transitional planning.

## 2024-01-12 NOTE — DIETITIAN INITIAL EVALUATION ADULT - ENERGY INTAKE
89 yo M with PMH HTN, HLD, LBBB, paroxysmal Atrial Fibrillation, thoracic aortic aneurysm, s/p mechanical AVR presented transferred from  for angioplasty of his L foot - found to have palpable pulses and procedure cancelled. To continue management of BETINA and cellulitis/suspected osteomyelitis.      Problem/Plan - 1   ·  Problem: Cellulitis of left foot.   ·  Plan: Pt with original presentation to [facility] with L foot wound and cellulitis, transferred to [facility] for L angioplasty  - doppler with occlusion of the popliteal artery on the left - however patient has strong DP pulse, US likely not accurate per vascular - angioplasty cancelled  - L foot NM bone scan ordered to eval for OM, unable to obtain MRI due to metal in arm as per son  - bone scan suggesting suspicion for infection  - s/p vanc/zosyn -> vanc/cefepime -> cefepime q12  - WBC now uptrending  - afebrile, VS stable  - CXR ordered to r/o aspiration -- showing small left lower infiltrate   - speech and swallow reval, changed diet to soft and bite sized for aspiration precaution  - cont cefepime 1g q12 renal dosing  - will check blood cultures given worsening leukocytosis - stable exam so does not warrant CT imaging - discussed with ID Dr. Loredo  - will resume heparin gtt as hematuria cleared up as per cardiology NP Vijaya   - INR 2.41 --> 3.66 today -- will hold Coumadin tonight  - cont minced and moist diet per  chart review  - ID consulted Dr. Orosco, recs appreciated  - Vascular consulted recs appreciated  - Cardio consulted for cardiac clearance recs appreciated.    Problem/Plan - 2   ·  Problem: BETINA (acute kidney injury).   ·  Plan: Pt with BETINA, unknown baseline (outpt note with Cr 1.16 10/2023) - stable  - BUN/Cr 46/1.6 on  admission  - likely 2/2 abx vs urinary retention - vanco discontinued and castañeda placed with >1L output  - hold lisinopril in setting of BETINA  - hold silodosin in setting of dec Cr clearance  - now s/p another 24h of IV fluids given orthostasis - with improved Cr - monitor off fluids  - replace electrolytes as needed  - nephro consulted, recs appreciated  - pt failed TOV today, castañeda replaced as per uro.    Problem/Plan - 3   ·  Problem: Atrial fibrillation.   ·  Plan: Pt with history of afib on warfarin at home, presented to  originally with supratherapeutic INR  - pt NSR on exam  - INR supratherapeutic at 3.66 today; will hold Coumadin tonight  - f/u TTE ordered per cardiology recs - routine and just as baseline - would not impede any potential procedures (although son has stated that he would not really want a procedure unless absolutely needed).    Problem/Plan - 4   ·  Problem: DMII (diabetes mellitus, type 2).   ·  Plan: Pt with A1c 6.7%, not on home medications and does not follow with PCP  - continue CASS with FS  - consistent cho diet minced and moist  - hypoglycemia protocol.    Problem/Plan - 5   ·  Problem: HTN (hypertension).   ·  Plan: Pt with hx HTN  - takes ramipril, nadolol at home  - hold ACE in setting of BETINA and stable BP with orthostasis  - will hold nadolol 20mg as has soft BPs and orthostasis -- also this is a nonselective BB and not an ideal choice for rate control for his afib anyway - f/u cardiology recs.    Problem/Plan - 6   ·  Problem: HLD (hyperlipidemia).   ·  Plan: Pt with hx HLD  - cont home statin.    Problem/Plan - 7   ·  Problem: BPH (benign prostatic hyperplasia).   ·  Plan: Pt with hx BPH  - takes silodosin at home  - started on flomax by uro.    Problem/Plan - 8   ·  Problem: Need for prophylactic measure.   ·  Plan: Heparin gtt restarted.  Coumadin; INR 3.66, will hold dose tonight    Per GC chart, GOC discussion started with son Bogdan Jefferson who appears hard to reach by phone  - palliative consulted -- pt now DNR/DNI    Spoke to son 1/8 and updated him on plan - wants eventual MEGHNA - concerned about surgery due to anesthesia - states ~5PM is the best time to call and this is the only number to call.   89 yo M with PMH HTN, HLD, LBBB, paroxysmal Atrial Fibrillation, thoracic aortic aneurysm, s/p mechanical AVR presented transferred from  for angioplasty of his L foot - found to have palpable pulses and procedure cancelled. To continue management of BETINA and cellulitis/suspected osteomyelitis.      Problem/Plan - 1   ·  Problem: Cellulitis of left foot.   ·  Plan: Pt with original presentation to [facility] with L foot wound and cellulitis, transferred to [facility] for L angioplasty  - doppler with occlusion of the popliteal artery on the left - however patient has strong DP pulse, US likely not accurate per vascular - angioplasty cancelled  - L foot NM bone scan ordered to eval for OM, unable to obtain MRI due to metal in arm as per son  - bone scan suggesting suspicion for infection  - s/p vanc/zosyn -> vanc/cefepime -> cefepime q12  - WBC now uptrending  - afebrile, VS stable  - CXR ordered to r/o aspiration -- showing small left lower infiltrate   - speech and swallow reval, changed diet to soft and bite sized for aspiration precaution  - cont cefepime 1g q12 renal dosing  - will check blood cultures given worsening leukocytosis - stable exam so does not warrant CT imaging - discussed with ID Dr. Loredo  - will resume heparin gtt as hematuria cleared up as per cardiology NP Vijaya   - INR 2.41 --> 3.66 today -- will hold Coumadin tonight  - cont minced and moist diet per  chart review  - ID consulted Dr. Orosco, recs appreciated  - Vascular consulted recs appreciated  - Cardio consulted for cardiac clearance recs appreciated.    Problem/Plan - 2   ·  Problem: BETINA (acute kidney injury).   ·  Plan: Pt with BETINA, unknown baseline (outpt note with Cr 1.16 10/2023) - stable  - BUN/Cr 46/1.6 on  admission  - likely 2/2 abx vs urinary retention - vanco discontinued and castañeda placed with >1L output  - hold lisinopril in setting of BETINA  - hold silodosin in setting of dec Cr clearance  - now s/p another 24h of IV fluids given orthostasis - with improved Cr - monitor off fluids  - replace electrolytes as needed  - nephro consulted, recs appreciated  - pt failed TOV today, castañeda replaced as per uro.    Problem/Plan - 3   ·  Problem: Atrial fibrillation.   ·  Plan: Pt with history of afib on warfarin at home, presented to  originally with supratherapeutic INR  - pt NSR on exam  - INR supratherapeutic at 3.66 today; will hold Coumadin tonight  - f/u TTE ordered per cardiology recs - routine and just as baseline - would not impede any potential procedures (although son has stated that he would not really want a procedure unless absolutely needed).    Problem/Plan - 4   ·  Problem: DMII (diabetes mellitus, type 2).   ·  Plan: Pt with A1c 6.7%, not on home medications and does not follow with PCP  - continue CASS with FS  - consistent cho diet minced and moist  - hypoglycemia protocol.    Problem/Plan - 5   ·  Problem: HTN (hypertension).   ·  Plan: Pt with hx HTN  - takes ramipril, nadolol at home  - hold ACE in setting of BETINA and stable BP with orthostasis  - will hold nadolol 20mg as has soft BPs and orthostasis -- also this is a nonselective BB and not an ideal choice for rate control for his afib anyway - f/u cardiology recs.    Problem/Plan - 6   ·  Problem: HLD (hyperlipidemia).   ·  Plan: Pt with hx HLD  - cont home statin.    Problem/Plan - 7   ·  Problem: BPH (benign prostatic hyperplasia).   ·  Plan: Pt with hx BPH  - takes silodosin at home  - started on flomax by uro.    Problem/Plan - 8   ·  Problem: Need for prophylactic measure.   ·  Plan: Heparin gtt restarted.  Coumadin; INR 3.66, will hold dose tonight    Per GC chart, GOC discussion started with son Bogdan Jefferson who appears hard to reach by phone  - palliative consulted -- pt now DNR/DNI    Spoke to son 1/8 and updated him on plan - wants eventual MEGHNA - concerned about surgery due to anesthesia - states ~5PM is the best time to call and this is the only number to call.   Poor (<50%)   poor to fair

## 2024-01-12 NOTE — SWALLOW BEDSIDE ASSESSMENT ADULT - ASR SWALLOW RECOMMEND DIAG
MBS is recommended given remarkable chest imaging and pt with inconsistent coughing with mixed consistencies.

## 2024-01-12 NOTE — PROGRESS NOTE ADULT - PROBLEM SELECTOR PLAN 1
Yes Pt with original presentation to [facility] with L foot wound and cellulitis, transferred to [facility] for L angioplasty  - doppler with occlusion of the popliteal artery on the left - however patient has strong DP pulse, US likely not accurate per vascular - angioplasty cancelled  - L foot NM bone scan ordered to eval for OM, unable to obtain MRI due to metal in arm as per son  - bone scan suggesting suspicion for infection  - s/p vanc/zosyn -> vanc/cefepime -> cefepime q12  - WBC now uptrending  - afebrile, VS stable  - CXR ordered to r/o aspiration -- showing small left lower infiltrate   - speech and swallow reval, changed diet to soft and bite sized for aspiration precaution  - cont cefepime 1g q12 renal dosing  - will check blood cultures given worsening leukocytosis - stable exam so does not warrant CT imaging - discussed with ID Dr. Loredo  - will resume heparin gtt as hematuria cleared up as per cardiology NP Hooker   - INR 2.41 --> 3.66 today -- will hold Coumadin tonight  - cont minced and moist diet per  chart review  - ID consulted Dr. Orosco, recs appreciated  - Vascular consulted recs appreciated  - Cardio consulted for cardiac clearance recs appreciated Pt with original presentation to [facility] with L foot wound and cellulitis, transferred to [facility] for L angioplasty  - doppler with occlusion of the popliteal artery on the left - however patient has strong DP pulse, US likely not accurate per vascular - angioplasty cancelled  - L foot NM bone scan ordered to eval for OM, unable to obtain MRI due to metal in arm as per son  - bone scan suggesting suspicion for infection  - s/p vanc/zosyn -> vanc/cefepime -> cefepime q12  - WBC now uptrending  - afebrile, VS stable  - CXR ordered to r/o aspiration -- showing small left lower infiltrate   - speech and swallow reval, changed diet to soft and bite sized for aspiration precaution  - cont cefepime 1g q12 renal dosing  - will check blood cultures given worsening leukocytosis - stable exam so does not warrant CT imaging - discussed with ID Dr. Loredo  - will resume heparin gtt as hematuria cleared up as per cardiology NP Portia   - INR 2.41 --> 3.66 today -- will hold Coumadin tonight  - cont minced and moist diet per  chart review  - ID consulted Dr. Orosco, recs appreciated  - Vascular consulted recs appreciated  - Cardio consulted for cardiac clearance recs appreciated Pt with original presentation to [facility] with L foot wound and cellulitis, transferred to [facility] for L angioplasty  - doppler with occlusion of the popliteal artery on the left - however patient has strong DP pulse, US likely not accurate per vascular - angioplasty cancelled  - L foot NM bone scan ordered to eval for OM, unable to obtain MRI due to metal in arm as per son  - bone scan suggesting suspicion for infection  - s/p vanc/zosyn -> vanc/cefepime -> cefepime q12  - WBC now uptrending  - afebrile, VS stable  - CXR ordered to r/o aspiration -- showing small left lower infiltrate   - speech and swallow reval, ordered MBS f/u results  - cont cefepime 1g q12 renal dosing  - will check blood cultures given worsening leukocytosis - stable exam so does not warrant CT imaging - discussed with ID Dr. Loredo  - will resume heparin gtt as hematuria cleared up as per cardiology NP Vijaya   - INR 3.53 today -- will hold Coumadin tonight, also in setting of diarrhea  - ID consulted Dr. Orosco, recs appreciated  - Vascular consulted recs appreciated  - Cardio consulted for cardiac clearance recs appreciated Pt with original presentation to [facility] with L foot wound and cellulitis, [facility] L [facility]  - doppler with occlusion of the popliteal artery on the left - however patient has strong DP pulse, US likely not accurate per vascular - angioplasty cancelled  - L foot NM bone scan ordered to eval for OM, unable to obtain MRI due to metal in arm as per son  - bone scan suggesting suspicion for infection  - s/p vanc/zosyn -> vanc/cefepime -> cefepime q12  - WBC now uptrending  - afebrile, VS stable  - CXR ordered to r/o aspiration -- showing small left lower infiltrate   - speech and swallow reval, ordered MBS f/u results  - cont cefepime 1g q12 renal dosing  - will check blood cultures given worsening leukocytosis - stable exam so does not warrant CT imaging - discussed with ID Dr. Loredo  - will resume heparin gtt as hematuria cleared up as per cardiology NP Vijaya   - INR 3.53 today -- will hold Coumadin tonight, also in setting of diarrhea  - ID consulted Dr. Orosco, recs appreciated  - Vascular consulted recs appreciated  - Cardio consulted for cardiac clearance recs appreciated

## 2024-01-12 NOTE — PROGRESS NOTE ADULT - PROBLEM SELECTOR PLAN 6
Pt with hx HLD  - cont home statin Pt with hx HTN  - takes ramipril, nadolol at home  - hold ACE in setting of BETINA and stable BP with orthostasis  - will hold nadolol 20mg as has soft BPs and orthostasis -- also this is a nonselective BB and not an ideal choice for rate control for his afib anyway - f/u cardiology recs

## 2024-01-12 NOTE — SWALLOW BEDSIDE ASSESSMENT ADULT - COMMENTS
"89 yo M with PMH HTN, HLD, LBBB, paroxismal Atrial Fibrillation, thoracic aortic aneurysm, s/p mechanical AVR presented transferred from  for angioplasty of his L foot. Pt originally presented to  on 12/31 after experiencing left foot pain which upon removal of his sock revealed a black eschar on the dorsum of his foot. He had an xray done which showed no bone destruction and he was started on IV abx for cellulitis, currently on cefepime only. 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 Pt was unable to have an MRI performed as they were unable to complete the MRI safety forms due to pts cognitive impairment and inability to reach family. Pt reports currently feeling well and denies any complaints. Pt [place]."    CT Chest 1/11/24:  IMPRESSION:  Small loculated fluid right major fissure.  6 mm nodule right middle lobe.  Recommend follow-up CT scan chest in 6 months.  Suggestion of long segments of bowel wall thickening sigmoid colon   through rectosigmoid colon.  Correlate for colitis.  Marked prostatomegaly.  Thick-walled trabeculated urinary bladder.  Other findings as discussed above.    X-ray Chest 1/10/24:  IMPRESSION: Developing small left lower perihilar infiltrate. "89 yo M with PMH HTN, HLD, LBBB, paroxismal Atrial Fibrillation, thoracic aortic aneurysm, s/p mechanical AVR presented transferred from  for angioplasty of his L foot. Pt originally presented to  on 12/31 after experiencing left foot pain which upon removal of his sock revealed a black eschar on the dorsum of his foot. He had an xray done which showed no bone destruction and he was started on IV abx for cellulitis, currently on cefepime only. 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 Pt was unable to have an MRI performed as they were unable to complete the MRI safety forms due to pts cognitive impairment and inability to reach family. Pt reports currently feeling well and denies any complaints. Pt [place]."    CT Chest 1/11/24:  IMPRESSION:  Small loculated fluid right major fissure.  6 mm nodule right middle lobe.  Recommend follow-up CT scan chest in 6 months.  Suggestion of long segments of bowel wall thickening sigmoid colon   through rectosigmoid colon.  Correlate for colitis.  Marked prostatomegaly.  Thick-walled trabeculated urinary bladder.  Other findings as discussed above.    X-ray Chest 1/10/24:  IMPRESSION: Developing small left lower perihilar infiltrate.

## 2024-01-12 NOTE — DIETITIAN INITIAL EVALUATION ADULT - PROBLEM SELECTOR PLAN 1
Pt with original presentation to [facility] with L foot wound and cellulitis, transferred to [facility] for L angioplasty  - doppler with occlusion of the popliteal artery on the left  - s/p vanc/zosyn -> vanc/cefepime -> cefepime (vanco d/c today per ID)  - WBC on admission to  11.71 -> 13,4 this AM  - afebrile, VS stable  - cont cefepime 1g q24 renal dosing  - f/u AM cbc  - will hold off on ordering Bcx as pt has completed several days of abx tx; consider Bcx if pt has new fever  - cont heparin gtt  - cont minced and moist diet per  chart review  - ID consulted Dr. Orosco, f/u recs  - Vascular consulted  - Cardio consulted for cardiac clearance Pt with original presentation to [facility] with L foot wound and cellulitis, transferred to [facility] for L angioplasty  - doppler with occlusion of the popliteal artery on the left  - s/p vanc/zosyn -> vanc/cefepime -> cefepime (vanco d/c today per ID)  - WBC on admission to  11.71 -> 13,4 this AM  - afebrile, VS stable  - cont cefepime 1g q24 renal dosing  - f/u AM cbc  - will hold off on ordering Bcx as pt has completed several days of abx tx; consider Bcx if pt has new fever  - cont heparin gtt  - cont minced and moist diet per  chart review  - ID consulted Dr. Orosco, f/u recs  - Vascular consulted  - Cardio consulted for cardiac clearance

## 2024-01-12 NOTE — PROGRESS NOTE ADULT - PROBLEM SELECTOR PLAN 2
Pt with BETINA, unknown baseline (outpt note with Cr 1.16 10/2023) - stable  - BUN/Cr 46/1.6 on GC admission  - likely 2/2 abx vs urinary retention - vanco discontinued and castañeda placed with >1L output  - hold lisinopril in setting of BETINA  - hold silodosin in setting of dec Cr clearance  - now s/p another 24h of IV fluids given orthostasis - with improved Cr - monitor off fluids  - replace electrolytes as needed  - nephro consulted, recs appreciated  - pt failed TOV today, castañeda replaced as per uro -overnight, pt with two loose BMs  -CT abdomen and pelvis non contrast: Suggestion of long segments of bowel wall thickening sigmoid colon   through rectosigmoid colon. Correlate for colitis.  -GI PCR and c diff pending  -ID following

## 2024-01-12 NOTE — PROGRESS NOTE ADULT - SUBJECTIVE AND OBJECTIVE BOX
Date/Time Patient Seen:  		  Referring MD:   Data Reviewed	       Patient is a 89y old  Male who presents with a chief complaint of left foot wound (11 Jan 2024 16:22)      Subjective/HPI     PAST MEDICAL & SURGICAL HISTORY:  HTN (hypertension)    HLD (hyperlipidemia)    Paroxysmal atrial fibrillation    H/O aortic valve repair    H/O thoracic aortic aneurysm repair          Medication list         MEDICATIONS  (STANDING):  atorvastatin 10 milliGRAM(s) Oral at bedtime  cefepime   IVPB 1000 milliGRAM(s) IV Intermittent every 12 hours  dextrose 5%. 1000 milliLiter(s) (100 mL/Hr) IV Continuous <Continuous>  dextrose 5%. 1000 milliLiter(s) (50 mL/Hr) IV Continuous <Continuous>  dextrose 50% Injectable 25 Gram(s) IV Push once  dextrose 50% Injectable 12.5 Gram(s) IV Push once  dextrose 50% Injectable 25 Gram(s) IV Push once  glucagon  Injectable 1 milliGRAM(s) IntraMuscular once  insulin lispro (ADMELOG) corrective regimen sliding scale   SubCutaneous three times a day before meals  insulin lispro (ADMELOG) corrective regimen sliding scale   SubCutaneous at bedtime  tamsulosin 0.4 milliGRAM(s) Oral at bedtime    MEDICATIONS  (PRN):  dextrose Oral Gel 15 Gram(s) Oral once PRN Blood Glucose LESS THAN 70 milliGRAM(s)/deciliter         Vitals log        ICU Vital Signs Last 24 Hrs  T(C): 36.8 (12 Jan 2024 04:35), Max: 36.9 (11 Jan 2024 21:23)  T(F): 98.3 (12 Jan 2024 04:35), Max: 98.5 (11 Jan 2024 21:23)  HR: 73 (12 Jan 2024 04:35) (68 - 79)  BP: 90/53 (12 Jan 2024 04:35) (90/53 - 96/51)  BP(mean): --  ABP: --  ABP(mean): --  RR: 18 (12 Jan 2024 04:35) (17 - 18)  SpO2: 94% (12 Jan 2024 04:35) (92% - 94%)    O2 Parameters below as of 12 Jan 2024 04:35  Patient On (Oxygen Delivery Method): room air                 Input and Output:  I&O's Detail    10 Wilman 2024 07:01  -  11 Jan 2024 07:00  --------------------------------------------------------  IN:    Heparin Infusion: 128 mL    IV PiggyBack: 50 mL    Oral Fluid: 320 mL  Total IN: 498 mL    OUT:    Indwelling Catheter - Urethral (mL): 1100 mL  Total OUT: 1100 mL    Total NET: -602 mL      11 Jan 2024 07:01  -  12 Jan 2024 05:32  --------------------------------------------------------  IN:  Total IN: 0 mL    OUT:    Indwelling Catheter - Urethral (mL): 500 mL    Intermittent Catheterization - Urethral (mL): 2 mL    Voided (mL): 550 mL  Total OUT: 1052 mL    Total NET: -1052 mL          Lab Data                        11.0   22.37 )-----------( 231      ( 11 Jan 2024 05:57 )             31.8     01-11    137  |  105  |  30<H>  ----------------------------<  146<H>  3.6   |  27  |  1.50<H>    Ca    8.7      11 Jan 2024 05:57    TPro  6.4  /  Alb  2.3<L>  /  TBili  0.6  /  DBili  x   /  AST  27  /  ALT  29  /  AlkPhos  69  01-11            Review of Systems	      Objective     Physical Examination    heart s1s2  lung dc bS  head nc      Pertinent Lab findings & Imaging      Karolina:  NO   Adequate UO     I&O's Detail    10 Wilman 2024 07:01  -  11 Jan 2024 07:00  --------------------------------------------------------  IN:    Heparin Infusion: 128 mL    IV PiggyBack: 50 mL    Oral Fluid: 320 mL  Total IN: 498 mL    OUT:    Indwelling Catheter - Urethral (mL): 1100 mL  Total OUT: 1100 mL    Total NET: -602 mL      11 Jan 2024 07:01  -  12 Jan 2024 05:32  --------------------------------------------------------  IN:  Total IN: 0 mL    OUT:    Indwelling Catheter - Urethral (mL): 500 mL    Intermittent Catheterization - Urethral (mL): 2 mL    Voided (mL): 550 mL  Total OUT: 1052 mL    Total NET: -1052 mL               Discussed with:     Cultures:	        Radiology

## 2024-01-12 NOTE — SWALLOW BEDSIDE ASSESSMENT ADULT - ADDITIONAL RECOMMENDATIONS
1. No mixed consistencies i.e. soup or cold cereal etc.   2. Thin liquids via single cup sip only  3. No straws   4. ST to f/u as schedule permits while the pt is in-house

## 2024-01-12 NOTE — PROGRESS NOTE ADULT - PROBLEM SELECTOR PLAN 8
Heparin gtt restarted.  Coumadin; INR 3.66, will hold dose tonight    Per GC chart, GOC discussion started with son Bogdan Jefferson who appears hard to reach by phone  - palliative consulted -- pt now DNR/DNI    Spoke to son 1/8 and updated him on plan - wants eventual MEGHNA - concerned about surgery due to anesthesia - states ~5PM is the best time to call and this is the only number to call. Pt with hx BPH  - takes silodosin at home  - started on flomax by uro

## 2024-01-12 NOTE — DIETITIAN INITIAL EVALUATION ADULT - NSICDXPASTSURGICALHX_GEN_ALL_CORE_FT
[FreeTextEntry1] : This is a 44 year old female followed for high risk. She is s/p left stereotactic biopsy on 10/20/2016 done for increasing microcalcifications of the left breast lower outer quadrant. Pathology showed adenosis, sclerosing adenosis and involuting adenosis with scattered luminal calcifications. Path was found to be concordant and a f/u 6 month mammogram was recommended.  She is s/p left excisional biopsy in 11/2015. 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 She has a history of bilateral excisional biopsies for fibroadenomas and a additional two core biopsies  of the left breast in 2002 and 2013.  Both areas were benign. She stated she first felt this lump on 9/2/15 and had negative imaging. She then had a breast MRI which showed an area at 10:00 of "suspicious enhancement" for which targeted ultrasound was recommended. This was done at [facility] and a complicated cyst was found to correlate with the palpable finding and she was told to follow up in 6 months for ultrasound. On further workup, at left breast 10:00 2cm FN a circumscribed hypoechoic lesion correlates with the enhancing lesion seen on MRI at 10:00. IN addition at 7-8:00 in left breast 3cm FN adjacent to where the patient felt a lump a hypoechoic lesion with indistinct margins was seen and biopsy was recommended. The patient is s/p left 10:00 N2 USGBx 10/22/15, pathology shows nodular adenosis, with no atypia nor malignancy. At left -8:00 N3 pathology shows cystic change with associated papillary apocrine hyperplasia, no atypia nor malignancy. Patient is s/p left EBBx 11/12/15 for a palpable mass that was not seen on ultrasound. 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 \par \par Patient is s/p hip labrum tear repair on 09/29/2017. She is status post hysterectomy. On 4/9/19 she underwent left breast ultrasound-guided biopsy pathology showed benign intraductal papilloma with you D. age. Transected on one side. No atypia no malignancy.She then had screening breast MRI. On 4/25/19 she had left breast 10:00 ultrasound-guided biopsy which revealed benign results. And this was on an area as seen on her MRI with enhancement.On 5/6/19 she underwent right breast 7:00 MRI guided biopsy which had benign findings. And PAS H. and MRI biopsy right breast in upper inner quadrant which showed benign breast tissue and PASH.\par \par Status post left breast 3:00 excisional biopsy 6/6/19 pathology shows benign breast tissue with usual ductal hyperplasia, biopsy change present, no residual papilloma identified.She has no  breast complaints today.\par \par C/o right breast shape change and loss of breast volume. Her niece is being worked up for a possible bca she is <31yo.\par \par She does not SBE. \par She has noticed a change in her right breast or a breast lump on the right. Painful bruise on right breast.\par She has not noticed a change in her nipple or nipple area.\par She has noticed a change in the skin painful bruising of the right breast.\par She is not experiencing nipple discharge.\par She is experiencing right breast pain.\par She has not noticed a lump or lymph node under the armpit. \par \par BREAST CANCER RISK FACTORS\par Menarche: 10\par Date of LMP: 2/15/2019\par Menopause: post\par Grav: 2  Para:  2\par Age at first live birth: 33\par Nursed: no\par Hysterectomy: yes 3/5/2019\par Oophorectomy: no\par OCP: yes\par HRT: no\par Last pap/pelvic exam: 10/2019 WNL.\par Related family history: no\par Ashkenazi: no\par Mastery: BRCAPRO 10.7%, TCv6 10.8%, TCv7 26.3%, Sarah 28.8%\par BRCA testing: yes negative\par Bra size:  36B\par \par Last mammogram:  10/4/2019       Location: [place]\par Report reviewed.                                 Images reviewed on PACS.\par Results: Birads 2\par The breasts are extremely dense, which lowers the sensitivity of\par mammography. Right craniocaudad and bilateral exaggerated lateral craniocaudad\par digital mammogram views were obtained. Interval postoperative architectural\par distortion is present at 3:00 on the left. Metallic markers are present\par bilaterally with associated postbiopsy change. Waxing and waning circumscribed\par masses are again present bilaterally with the largest on the right located in\par the central right breast approximately 2.6 cm measuring 1.7 x 1.3 cm and the\par largest on the left located at the posterior upper outer quadrant 4.5 cm deep\par to the nipple measuring approximately 2.7 x 2 cm. There is no suspicious mass\par or suspicious cluster of microcalcifications present. No skin or nipple\par abnormalities are present. \par \par Last ultrasound:  10/4/2019        Location: [place]\par Report reviewed.                                 Images reviewed on PACS.\par Results: Birads 2\par homogeneous background echotexture-fibroglandular is\par present. Waxing and waning cysts, complicated cysts and cyst clusters are\par again present bilaterally with the largest located at 12:00 in the right\par periareolar region measuring 1.8 x 1.6 x 1.1 cm (correlating with the largest\par mammographic mass), at 1:00 in the right retroareolar region measuring 1.5 x\par 1.2 x 0.7 cm, at 9:00 in the right breast 4 cm from the nipple measuring 11 x\par 9 x 5 mm, at 9:00 in the right breast 5 cm from the nipple measuring 9 x 9 x 4\par mm, at 9:00 in the right breast 3 cm from the nipple measuring 14 x 14 x 11\par mm, at 10:00 in the right breast 4 cm from the nipple measuring 9 x 11 x 5 mm,\par at 2:00 in the left breast 2 cm from the nipple measuring 2.6 x 3.6 x 1.4 cm\par (correlating with the largest mammographic mass on the left) and at 5:00 in\par the left breast 2 cm from the nipple measuring 8 x 8 x 5 mm. Hypoechoic\par shadowing scar with adjacent but no internal color flow is present at 3:00 on\par the left. The previously biopsied lesion in this region is no longer present.\par The previously biopsied hypoechoic lesion at 10:00 in the left breast 2 cm\par from the nipple is stable measuring 7 x 7 x 4 mm with an associated echogenic\par marker. Hypoechoic scar is present adjacent to this. There are no enlarged or\par abnormal appearing axillary lymph nodes present.\par \par Last MRI:  11/6/2019                           Location: [facility] \par Report reviewed.                                     Images reviewed on PACS.\par Results: Birads 2\par The biopsied enhancing lesion at 7:00 in the right breast is no\par longer present and the biopsied enhancing lesion at 10:00 in the left breast\par is decreased in size. Interval postoperative changes are present on the left.\par There is no suspicious MRI abnormality present. A follow-up breast MRI is\par recommended in one year. The patient is due for her annual bilateral\par mammography in October 2020.
PAST SURGICAL HISTORY:  H/O aortic valve repair     H/O thoracic aortic aneurysm repair

## 2024-01-12 NOTE — SWALLOW BEDSIDE ASSESSMENT ADULT - ORAL PREPARATORY PHASE
Mildly prolonged mastication Within functional limits Mildly prolonged mastication though functional

## 2024-01-12 NOTE — PROGRESS NOTE ADULT - SUBJECTIVE AND OBJECTIVE BOX
[facility] -- Jenn Goldstein,  An, Gildardo Marrero Savella, Goodger, Cohen  Office # [phone]    Follow Up:  Mechanical AVR, Cardiac Optimization    Subjective/Observations: Mechanical AVR, Cardiac Optimization    REVIEW OF SYSTEMS: All other review of systems is negative unless indicated above  PAST MEDICAL & SURGICAL HISTORY:  HTN (hypertension)      HLD (hyperlipidemia)      Paroxysmal atrial fibrillation      H/O aortic valve repair      H/O thoracic aortic aneurysm repair        MEDICATIONS  (STANDING):  atorvastatin 10 milliGRAM(s) Oral at bedtime  cefepime   IVPB 1000 milliGRAM(s) IV Intermittent every 12 hours  dextrose 5%. 1000 milliLiter(s) (50 mL/Hr) IV Continuous <Continuous>  dextrose 5%. 1000 milliLiter(s) (100 mL/Hr) IV Continuous <Continuous>  dextrose 50% Injectable 25 Gram(s) IV Push once  dextrose 50% Injectable 12.5 Gram(s) IV Push once  dextrose 50% Injectable 25 Gram(s) IV Push once  glucagon  Injectable 1 milliGRAM(s) IntraMuscular once  insulin lispro (ADMELOG) corrective regimen sliding scale   SubCutaneous three times a day before meals  insulin lispro (ADMELOG) corrective regimen sliding scale   SubCutaneous at bedtime  potassium chloride    Tablet ER 40 milliEquivalent(s) Oral every 4 hours  tamsulosin 0.4 milliGRAM(s) Oral at bedtime    MEDICATIONS  (PRN):  dextrose Oral Gel 15 Gram(s) Oral once PRN Blood Glucose LESS THAN 70 milliGRAM(s)/deciliter    Allergies    No Known Allergies    Intolerances      Vital Signs Last 24 Hrs  T(C): 36.8 (12 Jan 2024 04:35), Max: 36.9 (11 Jan 2024 21:23)  T(F): 98.3 (12 Jan 2024 04:35), Max: 98.5 (11 Jan 2024 21:23)  HR: 73 (12 Jan 2024 04:35) (68 - 79)  BP: 95/50 (12 Jan 2024 05:52) (90/53 - 96/51)  BP(mean): --  RR: 18 (12 Jan 2024 04:35) (17 - 18)  SpO2: 94% (12 Jan 2024 04:35) (92% - 94%)    Parameters below as of 12 Jan 2024 04:35  Patient On (Oxygen Delivery Method): room air      I&O's Summary    11 Jan 2024 07:01  -  12 Jan 2024 07:00  --------------------------------------------------------  IN: 0 mL / OUT: 1302 mL / NET: -1302 mL    12 Jan 2024 07:01  -  12 Jan 2024 11:56  --------------------------------------------------------  IN: 0 mL / OUT: 200 mL / NET: -200 mL           TELE:off tele  PHYSICAL EXAM:  Constitutional: NAD, asleep  HEENT: Moist Mucous Membranes, Anicteric  Pulmonary: Non-labored, breath sounds are clear bilaterally, No wheezing, rales or rhonchi  Cardiovascular: Regular, S1 and S2, +murmurs, no rubs, gallops +clicks  Gastrointestinal: Bowel Sounds present, soft, nontender.   Lymph: No peripheral edema. No lymphadenopathy.  Skin: No visible rashes.  Left heel ulcers with dressing dry and intact  Psych:  Mood & affect: Unable to assess    LABS: All Labs Reviewed:                        10.5   14.27 )-----------( 218      ( 12 Jan 2024 08:43 )             30.5                         11.0   22.37 )-----------( 231      ( 11 Jan 2024 05:57 )             31.8                         10.4   21.43 )-----------( 240      ( 10 Wilman 2024 21:10 )             30.2     12 Jan 2024 08:43    136    |  106    |  39     ----------------------------<  161    3.3     |  25     |  1.70   11 Jan 2024 05:57    137    |  105    |  30     ----------------------------<  146    3.6     |  27     |  1.50   10 Wilman 2024 07:30    135    |  103    |  32     ----------------------------<  152    4.3     |  29     |  1.50     Ca    8.7        12 Jan 2024 08:43  Ca    8.7        11 Jan 2024 05:57  Ca    8.7        10 Wilman 2024 07:30  Mg     1.8       12 Jan 2024 08:43    TPro  6.3    /  Alb  2.0    /  TBili  0.4    /  DBili  x      /  AST  22     /  ALT  26     /  AlkPhos  66     12 Jan 2024 08:43  TPro  6.4    /  Alb  2.3    /  TBili  0.6    /  DBili  x      /  AST  27     /  ALT  29     /  AlkPhos  69     11 Jan 2024 05:57    PT/INR - ( 12 Jan 2024 08:43 )   PT: 39.8 sec;   INR: 3.53 ratio         PTT - ( 12 Jan 2024 08:43 )  PTT:39.1 sec      12 Lead ECG:   Ventricular Rate 80 BPM    Atrial Rate 80 BPM    P-R Interval 200 ms    QRS Duration 140 ms    Q-T Interval 408 ms    QTC Calculation(Bazett) 470 ms    P Axis 81 degrees    R Axis -51 degrees    T Axis 88 degrees    Diagnosis Line Normal sinus rhythm  Left axis deviation  Left bundle branch block  Left anterior fascicular block  Abnormal ECG  When compared with ECG of 07-SEP-2021 17:07,  T wave inversion less evident in Lateral leads    Confirmed by JOHNATHAN GOMEZ MD ([id number]) on 1/1/2024 8:42:58 AM (12-31-23 @ 12:32)        [facility] -- Jenn Goldstein,  An, Gildardo Marrero Savella, Goodger, Cohen  Office # [phone]    Follow Up:  Mechanical AVR, Cardiac Optimization    Subjective/Observations: Mechanical AVR, Cardiac Optimization    REVIEW OF SYSTEMS: All other review of systems is negative unless indicated above  PAST MEDICAL & SURGICAL HISTORY:  HTN (hypertension)      HLD (hyperlipidemia)      Paroxysmal atrial fibrillation      H/O aortic valve repair      H/O thoracic aortic aneurysm repair        MEDICATIONS  (STANDING):  atorvastatin 10 milliGRAM(s) Oral at bedtime  cefepime   IVPB 1000 milliGRAM(s) IV Intermittent every 12 hours  dextrose 5%. 1000 milliLiter(s) (50 mL/Hr) IV Continuous <Continuous>  dextrose 5%. 1000 milliLiter(s) (100 mL/Hr) IV Continuous <Continuous>  dextrose 50% Injectable 25 Gram(s) IV Push once  dextrose 50% Injectable 12.5 Gram(s) IV Push once  dextrose 50% Injectable 25 Gram(s) IV Push once  glucagon  Injectable 1 milliGRAM(s) IntraMuscular once  insulin lispro (ADMELOG) corrective regimen sliding scale   SubCutaneous three times a day before meals  insulin lispro (ADMELOG) corrective regimen sliding scale   SubCutaneous at bedtime  potassium chloride    Tablet ER 40 milliEquivalent(s) Oral every 4 hours  tamsulosin 0.4 milliGRAM(s) Oral at bedtime    MEDICATIONS  (PRN):  dextrose Oral Gel 15 Gram(s) Oral once PRN Blood Glucose LESS THAN 70 milliGRAM(s)/deciliter    Allergies    No Known Allergies    Intolerances      Vital Signs Last 24 Hrs  T(C): 36.8 (12 Jan 2024 04:35), Max: 36.9 (11 Jan 2024 21:23)  T(F): 98.3 (12 Jan 2024 04:35), Max: 98.5 (11 Jan 2024 21:23)  HR: 73 (12 Jan 2024 04:35) (68 - 79)  BP: 95/50 (12 Jan 2024 05:52) (90/53 - 96/51)  BP(mean): --  RR: 18 (12 Jan 2024 04:35) (17 - 18)  SpO2: 94% (12 Jan 2024 04:35) (92% - 94%)    Parameters below as of 12 Jan 2024 04:35  Patient On (Oxygen Delivery Method): room air      I&O's Summary    11 Jan 2024 07:01  -  12 Jan 2024 07:00  --------------------------------------------------------  IN: 0 mL / OUT: 1302 mL / NET: -1302 mL    12 Jan 2024 07:01  -  12 Jan 2024 11:56  --------------------------------------------------------  IN: 0 mL / OUT: 200 mL / NET: -200 mL           TELE:off tele  PHYSICAL EXAM:  Constitutional: NAD, asleep  HEENT: Moist Mucous Membranes, Anicteric  Pulmonary: Non-labored, breath sounds are clear bilaterally, No wheezing, rales or rhonchi  Cardiovascular: Regular, S1 and S2, +murmurs, no rubs, gallops +clicks  Gastrointestinal: Bowel Sounds present, soft, nontender.   Lymph: No peripheral edema. No lymphadenopathy.  Skin: No visible rashes.  Left heel ulcers with dressing dry and intact  Psych:  Mood & affect: Unable to assess    LABS: All Labs Reviewed:                        10.5   14.27 )-----------( 218      ( 12 Jan 2024 08:43 )             30.5                         11.0   22.37 )-----------( 231      ( 11 Jan 2024 05:57 )             31.8                         10.4   21.43 )-----------( 240      ( 10 Wilman 2024 21:10 )             30.2     12 Jan 2024 08:43    136    |  106    |  39     ----------------------------<  161    3.3     |  25     |  1.70   11 Jan 2024 05:57    137    |  105    |  30     ----------------------------<  146    3.6     |  27     |  1.50   10 Wilman 2024 07:30    135    |  103    |  32     ----------------------------<  152    4.3     |  29     |  1.50     Ca    8.7        12 Jan 2024 08:43  Ca    8.7        11 Jan 2024 05:57  Ca    8.7        10 Wilman 2024 07:30  Mg     1.8       12 Jan 2024 08:43    TPro  6.3    /  Alb  2.0    /  TBili  0.4    /  DBili  x      /  AST  22     /  ALT  26     /  AlkPhos  66     12 Jan 2024 08:43  TPro  6.4    /  Alb  2.3    /  TBili  0.6    /  DBili  x      /  AST  27     /  ALT  29     /  AlkPhos  69     11 Jan 2024 05:57    PT/INR - ( 12 Jan 2024 08:43 )   PT: 39.8 sec;   INR: 3.53 ratio         PTT - ( 12 Jan 2024 08:43 )  PTT:39.1 sec      12 Lead ECG:   Ventricular Rate 80 BPM    Atrial Rate 80 BPM    P-R Interval 200 ms    QRS Duration 140 ms    Q-T Interval 408 ms    QTC Calculation(Bazett) 470 ms    P Axis 81 degrees    R Axis -51 degrees    T Axis 88 degrees    Diagnosis Line Normal sinus rhythm  Left axis deviation  Left bundle branch block  Left anterior fascicular block  Abnormal ECG  When compared with ECG of 07-SEP-2021 17:07,  T wave inversion less evident in Lateral leads    Confirmed by JOHNATHAN GOMEZ MD ([id number]) on 1/1/2024 8:42:58 AM (12-31-23 @ 12:32)

## 2024-01-12 NOTE — PROGRESS NOTE ADULT - ASSESSMENT
89 year old male with PMH HTN, HLD, LBBB, paroxysmal Atrial Fibrillation, thoracic aortic aneurysm, s/p mechanical AVR presented transferred from  for angioplasty of his L foot.  Cardiology consultation now being obtained.     Cardiac Optimization/LBBB/PaFib  - Vascular recs noted.  No plans for LE angiogram at this point.     - EKG SR with old LBBB   - Has known history of PAfib and mechanical AVR  - hematuria resolved  - Dose coumadin daily to keep INR~3   - Continue home statin     - No sign volume overload, non-orthopneic on RA  - Can obtain routine TTE    - BP remains stable and controlled  - Continue to hold home ACEI for BETINA, though, stable at 1.5  - Continue home Nadolol with parameters     - Monitor and replete lytes, keep K>4, Mg>2.  - Will continue to follow.    Swati Moise NP  Nurse Practitioner- Cardiology   Call TEAMS 89 year old male with PMH HTN, HLD, LBBB, paroxysmal Atrial Fibrillation, thoracic aortic aneurysm, s/p mechanical AVR presented transferred from  for angioplasty of his L foot.  Cardiology consultation now being obtained.     Cardiac Optimization/LBBB/PaFib  - Vascular recs noted.  No plans for LE angiogram at this point.     - EKG SR with old LBBB   - Has known history of PAfib and mechanical AVR  - hematuria resolved  - Dose coumadin daily to keep INR~3   - Continue home statin     - No sign volume overload, non-orthopneic on RA, despite elevated proBNP  - TTE with septal motion is abnormal consistent with previous cardiac surgery. Left ventricular systolic function is normal with an ejection fraction of 67 %.     - BP remains stable and controlled  - Continue to hold home ACEI for BETINA, though, stable at 1.7  - Continue home Nadolol with parameters     - Monitor and replete lytes, keep K>4, Mg>2.  - Will continue to follow.    Swati Moise NP  Nurse Practitioner- Cardiology   Call TEAMS

## 2024-01-12 NOTE — SWALLOW BEDSIDE ASSESSMENT ADULT - ORAL PHASE
suspected premature spillage 2/2 poor coordination of solid/liquid combined piecemeal deglutition Within functional limits

## 2024-01-12 NOTE — PROGRESS NOTE ADULT - PROBLEM SELECTOR PLAN 4
Pt with A1c 6.7%, not on home medications and does not follow with PCP  - continue CASS with FS  - consistent cho diet minced and moist  - hypoglycemia protocol Pt with history of afib on warfarin at home, presented to [facility] originally with supratherapeutic INR  - pt NSR on exam  - INR at 3.53 today; will hold Coumadin tonight  - TTE no significant findings Pt with history of afib on warfarin at home, presented to [facility] originally with supratherapeutic INR  - pt NSR on exam  - INR at 3.53 today; will hold Coumadin tonight  - TTE with septal motion is abnormal consistent with previous cardiac surgery. Left ventricular systolic function is normal with an ejection fraction of 67 %.

## 2024-01-13 NOTE — PROGRESS NOTE ADULT - PROBLEM SELECTOR PLAN 8
Problem: Adult Inpatient Plan of Care  Goal: Plan of Care Review  Outcome: Ongoing, Progressing      Pt with hx BPH  - takes silodosin at home  - started on flomax by uro

## 2024-01-13 NOTE — SOCIAL WORK PROGRESS NOTE - NSSWPROGRESSNOTE_GEN_ALL_CORE
Per medical team, pt cleared for dc. NAIF spoke with Amy from [facility] who confirmed pt can be transferred today. Pt to be transferred to [facility] today at 3PM via [facility] ambulance. SW made pt and pt son Bogdan aware of dc/transport for today; they were both in agreement. No further SW needs indicated at this time and will remain available as needed. Per medical team, pt cleared for dc. NAIF spoke with Amy from [facility] who confirmed pt can be transferred today. Pt to be transferred to [facility] today at 3PM via [facility] ambulance. SW made pt and pt son Bogdan aware of dc/transport for today; they were both in agreement. No further SW needs indicated at this time and will remain available as needed.

## 2024-01-13 NOTE — PROGRESS NOTE ADULT - SUBJECTIVE AND OBJECTIVE BOX
[facility] -- An Barajas, Gildardo Marrero [place], , Royce Whatley  Office # [phone]    Follow Up:   Mechanical AVR, Cardiac Optimization, LBBB, Pafib    Subjective/Observations: Sleeping soundly, on RA.  Not in any form of discomfort.  No overnight events    REVIEW OF SYSTEMS: All other review of systems is negative unless indicated above  PAST MEDICAL & SURGICAL HISTORY:  HTN (hypertension)      HLD (hyperlipidemia)      Paroxysmal atrial fibrillation      H/O aortic valve repair      H/O thoracic aortic aneurysm repair    MEDICATIONS  (STANDING):  atorvastatin 10 milliGRAM(s) Oral at bedtime  cefepime   IVPB 1000 milliGRAM(s) IV Intermittent every 12 hours  dextrose 5%. 1000 milliLiter(s) (100 mL/Hr) IV Continuous <Continuous>  dextrose 5%. 1000 milliLiter(s) (50 mL/Hr) IV Continuous <Continuous>  dextrose 50% Injectable 25 Gram(s) IV Push once  dextrose 50% Injectable 12.5 Gram(s) IV Push once  dextrose 50% Injectable 25 Gram(s) IV Push once  glucagon  Injectable 1 milliGRAM(s) IntraMuscular once  insulin lispro (ADMELOG) corrective regimen sliding scale   SubCutaneous three times a day before meals  insulin lispro (ADMELOG) corrective regimen sliding scale   SubCutaneous at bedtime  tamsulosin 0.4 milliGRAM(s) Oral at bedtime    MEDICATIONS  (PRN):  dextrose Oral Gel 15 Gram(s) Oral once PRN Blood Glucose LESS THAN 70 milliGRAM(s)/deciliter    Allergies    No Known Allergies    Intolerances      Vital Signs Last 24 Hrs  T(C): 37 (13 Jan 2024 09:52), Max: 37 (13 Jan 2024 09:52)  T(F): 98.6 (13 Jan 2024 09:52), Max: 98.6 (13 Jan 2024 09:52)  HR: 78 (13 Jan 2024 09:52) (73 - 78)  BP: 113/87 (13 Jan 2024 09:52) (94/54 - 123/63)  BP(mean): --  RR: 18 (13 Jan 2024 09:52) (17 - 20)  SpO2: 95% (13 Jan 2024 09:52) (93% - 96%)    Parameters below as of 13 Jan 2024 09:52  Patient On (Oxygen Delivery Method): room air      I&O's Summary    12 Jan 2024 07:01  -  13 Jan 2024 07:00  --------------------------------------------------------  IN: 0 mL / OUT: 1200 mL / NET: -1200 mL    PHYSICAL EXAM:  TELE: Not on tele  Constitutional: NAD, awsleep, frail  HEENT: Moist Mucous Membranes, Anicteric  Pulmonary: Non-labored, breath sounds are clear but diminished bilaterally, No wheezing, rales or rhonchi  Cardiovascular: Regular, S1 and S2, +murmurs, no rubs, gallops +clicks  Gastrointestinal: Bowel Sounds present, soft, nontender.   Lymph: No peripheral edema. No lymphadenopathy.  Skin: No visible rashes.  +left heel ulcer with dressing  Psych:  Mood & affect: Unable to assess  LABS: All Labs Reviewed:                        10.2   9.52  )-----------( 214      ( 13 Jan 2024 08:06 )             29.7                         10.5   14.27 )-----------( 218      ( 12 Jan 2024 08:43 )             30.5                         11.0   22.37 )-----------( 231      ( 11 Jan 2024 05:57 )             31.8     13 Jan 2024 08:06    135    |  112    |  42     ----------------------------<  128    4.2     |  21     |  1.70   12 Jan 2024 08:43    136    |  106    |  39     ----------------------------<  161    3.3     |  25     |  1.70   11 Jan 2024 05:57    137    |  105    |  30     ----------------------------<  146    3.6     |  27     |  1.50     Ca    9.2        13 Jan 2024 08:06  Ca    8.7        12 Jan 2024 08:43  Ca    8.7        11 Jan 2024 05:57  Mg     1.8       12 Jan 2024 08:43    TPro  6.2    /  Alb  2.1    /  TBili  0.4    /  DBili  x      /  AST  18     /  ALT  24     /  AlkPhos  90     13 Jan 2024 08:06  TPro  6.3    /  Alb  2.0    /  TBili  0.4    /  DBili  x      /  AST  22     /  ALT  26     /  AlkPhos  66     12 Jan 2024 08:43  TPro  6.4    /  Alb  2.3    /  TBili  0.6    /  DBili  x      /  AST  27     /  ALT  29     /  AlkPhos  69     11 Jan 2024 05:57    PT/INR - ( 13 Jan 2024 08:06 )   PT: 32.3 sec;   INR: 2.85 ratio         PTT - ( 13 Jan 2024 08:06 )  PTT:37.3 sec          TRANSTHORACIC ECHOCARDIOGRAM REPORT  ________________________________________________________________________________                                      _______       Pt. Name:       POPPY MERCADO Study Date:    1/11/2024  MRN:      [id number]                   YOB: 1932  Accession #:    [id number]                   Age:           89 years  Account#:       [id number]                  Gender:        M  Heart Rate:                                 Height:68.00 in (172.72 cm)  Rhythm:                                     Weight:        135.00 lb (61.24 kg)  Blood Pressure: 90/56 mmHg                  BSA/BMI:       1.73 m² / 20.53 kg/m²  ________________________________________________________________________________________  Referring Physician:    [id number] Hemant Tran  Interpreting Physician: Yvette Crane MD  Primary Sonographer:    Archana Orona [facility]    CPT:               ECHO TTE WO CON COMP W DOPP - 03037.m  Indication(s):     Abnormal electrocardiogram ECG/EKG - R94.31  Procedure:         Transthoracic echocardiogram with 2-D, M-mode and complete                     spectral and color flow Doppler.  Ordering Location: [facility]  Admission Status:  Inpatient  Study Information: Image quality for this study is technically difficult.    _______________________________________________________________________________________     CONCLUSIONS:      1. Technically difficult image quality.   2. Septal motion is abnormal consistent with previous cardiac surgery. Left ventricular systolic function is normal with an ejection fraction of 67 % by Shine's method of disks.   3. Normal right ventricular cavity size, wall thickness, and normal systolic function.   4. The left atrium is normal.   5. The right atrium is normal in size.   6. A mechanical valve replacement is present in the aortic position. is well seated with normal function.   7. There is calcification of the mitral valve annulus.   8. Mild mitral valve stenosis.   9. Trace mitral regurgitation.  10. Mild tricuspid regurgitation.  11. The inferior vena cava is normal in size measuring 1.77 cm in diameter, (normal <2.1cm) with normal inspiratory collapse (normal >50%) consistent with normal right atrial pressure (~3, range 0-5mmHg).  12. Estimated pulmonary artery systolic pressure is 32 mmHg.    ________________________________________________________________________________________  FINDINGS:     Left Ventricle:  Septal motion is abnormal consistent with previous cardiac surgery. Left ventricular systolic function is normal with a calculated ejection fraction of 67 % by the Shine's biplane method of disks. The left ventricular diastolic function is indeterminate. Mild left ventricular hypertrophy.     Right Ventricle:  The right ventricular cavity is normal in size, normal wall thickness and normal systolic function.     Left Atrium:  The left atrium is normal.     Right Atrium:  The right atrium is normal in size.     Aortic Valve:  A mechanicalvalve replacement is present in the aortic position. The prosthetic valve is well seated with normal function. The peak transaortic velocity is 2.81 m/s, peak transaortic gradient is 31.6 mmHg and mean transaortic gradient is 17.0 mmHg with an LVOT/aortic valve VTI ratio of 0.34.     Mitral Valve:  There is mitral valve thickening of the anterior and posterior leaflets. There is calcification of the mitral valve annulus. There is mild mitral valve stenosis. There is trace mitral regurgitation.     Tricuspid Valve:  Structurally normal tricuspid valve with normal leaflet excursion. There is mild tricuspid regurgitation. Estimated pulmonary artery systolic pressure is 32 mmHg.     Pulmonic Valve:  The pulmonic valve was not well visualized.     Aorta:  The aortic root at the sinuses of Valsalva is normal in size, measuring 3.20 cm (indexed 1.85 cm/m²).     Pericardium:  No pericardial effusion seen.     Systemic Veins:  The inferior vena cava is normal in size measuring 1.77 cm in diameter, (normal <2.1cm) with normal inspiratory collapse (normal >50%) consistent with normal right atrial pressure (~3, range 0-5mmHg).  ____________________________________________________________________  QUANTITATIVE DATA:  Left Ventricle Measurements: (Indexed to BSA)     IVSd (2D):   1.2 cm  LVPWd (2D):  1.0 cm  LVIDd (2D):  4.5 cm  LVIDs (2D):  2.8 cm  LV Mass:     174 g  100.5 g/m²  LV Vol d, MOD A2C: 85.7 ml 49.55 ml/m²  LV Vol d, MOD A4C: 99.0 ml 57.24 ml/m²  LV Vol d, MOD BP:  96.3 ml 55.66 ml/m²  LV Vol s, MOD A2C: 26.7 ml 15.44 ml/m²  LV Vol s, MOD A4C: 34.2 ml 19.77 ml/m²  LV Vol s, MOD BP:  31.6 ml 18.27 ml/m²  LVOT SV MOD BP:    64.7 ml  LV EF% MOD BP:     67 %     MV E Vmax:    0.87 m/s  MV A Vmax:    1.36 m/s  MV E/A:       0.64  e' lateral:  7.51 cm/s  e' medial:    4.24 cm/s  E/e' lateral: 11.58  E/e' medial:  20.52  E/e' Average: 14.81  MV DT:        222 msec    Aorta Measurements: (normal range) (Indexed to BSA)     Sinuses of Valsalva: 3.20 cm (3.1 - 3.7 cm)  Left Atrium Measurements: (Indexed to BSA)  LA Diam 2D:        3.20 cm  LA Vol s, MOD A4C: 70.10 ml.    LVOT / RVOT/ Qp/Qs Data: (Indexed to BSA)  LVOT Vmax: 0.98 m/s  LVOT VTI:  18.70 cm    Aortic Valve Measurements:  AV Vmax:                2.8 m/s  AV Peak Gradient:       31.6 mmHg  AV Mean Gradient:       17.0 mmHg  AV VTI:                 55.7 cm  AV VTI Ratio:           0.34  AoV Dimensionless Index 0.34    Mitral Valve Measurements:     MV Vmax:      1.39 m/s  MV VTI, gerard   0.369 m  MV Mean Grad: 3.00 mmHg  MV Peak Grad: 7.7 mmHg  MV E Vmax:    0.9 m/s  MV A Vmax:    1.4 m/s  MV E/A:       0.6    Tricuspid Valve Measurements:     TR Vmax:          2.7 m/s  TR Peak Gradient: 28.7 mmHg  RA Pressure:      3 mmHg  PASP:             32 mmHg    ________________________________________________________________________________________  Electronically signed on 1/12/2024 at 7:31:59 AM by Yvette Crane MD         *** Final ***      [facility] -- An Barajas, Gildardo Marrero Savella, , Royce Whatley  Office # [phone]    Follow Up:   Mechanical AVR, Cardiac Optimization, LBBB, Pafib    Subjective/Observations: Sleeping soundly, on RA.  Not in any form of discomfort.  No overnight events    REVIEW OF SYSTEMS: All other review of systems is negative unless indicated above  PAST MEDICAL & SURGICAL HISTORY:  HTN (hypertension)      HLD (hyperlipidemia)      Paroxysmal atrial fibrillation      H/O aortic valve repair      H/O thoracic aortic aneurysm repair    MEDICATIONS  (STANDING):  atorvastatin 10 milliGRAM(s) Oral at bedtime  cefepime   IVPB 1000 milliGRAM(s) IV Intermittent every 12 hours  dextrose 5%. 1000 milliLiter(s) (100 mL/Hr) IV Continuous <Continuous>  dextrose 5%. 1000 milliLiter(s) (50 mL/Hr) IV Continuous <Continuous>  dextrose 50% Injectable 25 Gram(s) IV Push once  dextrose 50% Injectable 12.5 Gram(s) IV Push once  dextrose 50% Injectable 25 Gram(s) IV Push once  glucagon  Injectable 1 milliGRAM(s) IntraMuscular once  insulin lispro (ADMELOG) corrective regimen sliding scale   SubCutaneous three times a day before meals  insulin lispro (ADMELOG) corrective regimen sliding scale   SubCutaneous at bedtime  tamsulosin 0.4 milliGRAM(s) Oral at bedtime    MEDICATIONS  (PRN):  dextrose Oral Gel 15 Gram(s) Oral once PRN Blood Glucose LESS THAN 70 milliGRAM(s)/deciliter    Allergies    No Known Allergies    Intolerances      Vital Signs Last 24 Hrs  T(C): 37 (13 Jan 2024 09:52), Max: 37 (13 Jan 2024 09:52)  T(F): 98.6 (13 Jan 2024 09:52), Max: 98.6 (13 Jan 2024 09:52)  HR: 78 (13 Jan 2024 09:52) (73 - 78)  BP: 113/87 (13 Jan 2024 09:52) (94/54 - 123/63)  BP(mean): --  RR: 18 (13 Jan 2024 09:52) (17 - 20)  SpO2: 95% (13 Jan 2024 09:52) (93% - 96%)    Parameters below as of 13 Jan 2024 09:52  Patient On (Oxygen Delivery Method): room air      I&O's Summary    12 Jan 2024 07:01  -  13 Jan 2024 07:00  --------------------------------------------------------  IN: 0 mL / OUT: 1200 mL / NET: -1200 mL    PHYSICAL EXAM:  TELE: Not on tele  Constitutional: NAD, awsleep, frail  HEENT: Moist Mucous Membranes, Anicteric  Pulmonary: Non-labored, breath sounds are clear but diminished bilaterally, No wheezing, rales or rhonchi  Cardiovascular: Regular, S1 and S2, +murmurs, no rubs, gallops +clicks  Gastrointestinal: Bowel Sounds present, soft, nontender.   Lymph: No peripheral edema. No lymphadenopathy.  Skin: No visible rashes.  +left heel ulcer with dressing  Psych:  Mood & affect: Unable to assess  LABS: All Labs Reviewed:                        10.2   9.52  )-----------( 214      ( 13 Jan 2024 08:06 )             29.7                         10.5   14.27 )-----------( 218      ( 12 Jan 2024 08:43 )             30.5                         11.0   22.37 )-----------( 231      ( 11 Jan 2024 05:57 )             31.8     13 Jan 2024 08:06    135    |  112    |  42     ----------------------------<  128    4.2     |  21     |  1.70   12 Jan 2024 08:43    136    |  106    |  39     ----------------------------<  161    3.3     |  25     |  1.70   11 Jan 2024 05:57    137    |  105    |  30     ----------------------------<  146    3.6     |  27     |  1.50     Ca    9.2        13 Jan 2024 08:06  Ca    8.7        12 Jan 2024 08:43  Ca    8.7        11 Jan 2024 05:57  Mg     1.8       12 Jan 2024 08:43    TPro  6.2    /  Alb  2.1    /  TBili  0.4    /  DBili  x      /  AST  18     /  ALT  24     /  AlkPhos  90     13 Jan 2024 08:06  TPro  6.3    /  Alb  2.0    /  TBili  0.4    /  DBili  x      /  AST  22     /  ALT  26     /  AlkPhos  66     12 Jan 2024 08:43  TPro  6.4    /  Alb  2.3    /  TBili  0.6    /  DBili  x      /  AST  27     /  ALT  29     /  AlkPhos  69     11 Jan 2024 05:57    PT/INR - ( 13 Jan 2024 08:06 )   PT: 32.3 sec;   INR: 2.85 ratio         PTT - ( 13 Jan 2024 08:06 )  PTT:37.3 sec          TRANSTHORACIC ECHOCARDIOGRAM REPORT  ________________________________________________________________________________                                      _______       Pt. Name:       POPPY MERCADO Study Date:    1/11/2024  MRN:      [id number]                   YOB: 1932  Accession #:    [id number]                   Age:           89 years  Account#:       [id number]                  Gender:        M  Heart Rate:                                 Height:68.00 in (172.72 cm)  Rhythm:                                     Weight:        135.00 lb (61.24 kg)  Blood Pressure: 90/56 mmHg                  BSA/BMI:       1.73 m² / 20.53 kg/m²  ________________________________________________________________________________________  Referring Physician:    [id number] Hemant Tran  Interpreting Physician: Yvette Crane MD  Primary Sonographer:    Archana Orona [facility]    CPT:               ECHO TTE WO CON COMP W DOPP - 78076.m  Indication(s):     Abnormal electrocardiogram ECG/EKG - R94.31  Procedure:         Transthoracic echocardiogram with 2-D, M-mode and complete                     spectral and color flow Doppler.  Ordering Location: [facility]  Admission Status:  Inpatient  Study Information: Image quality for this study is technically difficult.    _______________________________________________________________________________________     CONCLUSIONS:      1. Technically difficult image quality.   2. Septal motion is abnormal consistent with previous cardiac surgery. Left ventricular systolic function is normal with an ejection fraction of 67 % by Shine's method of disks.   3. Normal right ventricular cavity size, wall thickness, and normal systolic function.   4. The left atrium is normal.   5. The right atrium is normal in size.   6. A mechanical valve replacement is present in the aortic position. is well seated with normal function.   7. There is calcification of the mitral valve annulus.   8. Mild mitral valve stenosis.   9. Trace mitral regurgitation.  10. Mild tricuspid regurgitation.  11. The inferior vena cava is normal in size measuring 1.77 cm in diameter, (normal <2.1cm) with normal inspiratory collapse (normal >50%) consistent with normal right atrial pressure (~3, range 0-5mmHg).  12. Estimated pulmonary artery systolic pressure is 32 mmHg.    ________________________________________________________________________________________  FINDINGS:     Left Ventricle:  Septal motion is abnormal consistent with previous cardiac surgery. Left ventricular systolic function is normal with a calculated ejection fraction of 67 % by the Shine's biplane method of disks. The left ventricular diastolic function is indeterminate. Mild left ventricular hypertrophy.     Right Ventricle:  The right ventricular cavity is normal in size, normal wall thickness and normal systolic function.     Left Atrium:  The left atrium is normal.     Right Atrium:  The right atrium is normal in size.     Aortic Valve:  A mechanicalvalve replacement is present in the aortic position. The prosthetic valve is well seated with normal function. The peak transaortic velocity is 2.81 m/s, peak transaortic gradient is 31.6 mmHg and mean transaortic gradient is 17.0 mmHg with an LVOT/aortic valve VTI ratio of 0.34.     Mitral Valve:  There is mitral valve thickening of the anterior and posterior leaflets. There is calcification of the mitral valve annulus. There is mild mitral valve stenosis. There is trace mitral regurgitation.     Tricuspid Valve:  Structurally normal tricuspid valve with normal leaflet excursion. There is mild tricuspid regurgitation. Estimated pulmonary artery systolic pressure is 32 mmHg.     Pulmonic Valve:  The pulmonic valve was not well visualized.     Aorta:  The aortic root at the sinuses of Valsalva is normal in size, measuring 3.20 cm (indexed 1.85 cm/m²).     Pericardium:  No pericardial effusion seen.     Systemic Veins:  The inferior vena cava is normal in size measuring 1.77 cm in diameter, (normal <2.1cm) with normal inspiratory collapse (normal >50%) consistent with normal right atrial pressure (~3, range 0-5mmHg).  ____________________________________________________________________  QUANTITATIVE DATA:  Left Ventricle Measurements: (Indexed to BSA)     IVSd (2D):   1.2 cm  LVPWd (2D):  1.0 cm  LVIDd (2D):  4.5 cm  LVIDs (2D):  2.8 cm  LV Mass:     174 g  100.5 g/m²  LV Vol d, MOD A2C: 85.7 ml 49.55 ml/m²  LV Vol d, MOD A4C: 99.0 ml 57.24 ml/m²  LV Vol d, MOD BP:  96.3 ml 55.66 ml/m²  LV Vol s, MOD A2C: 26.7 ml 15.44 ml/m²  LV Vol s, MOD A4C: 34.2 ml 19.77 ml/m²  LV Vol s, MOD BP:  31.6 ml 18.27 ml/m²  LVOT SV MOD BP:    64.7 ml  LV EF% MOD BP:     67 %     MV E Vmax:    0.87 m/s  MV A Vmax:    1.36 m/s  MV E/A:       0.64  e' lateral:  7.51 cm/s  e' medial:    4.24 cm/s  E/e' lateral: 11.58  E/e' medial:  20.52  E/e' Average: 14.81  MV DT:        222 msec    Aorta Measurements: (normal range) (Indexed to BSA)     Sinuses of Valsalva: 3.20 cm (3.1 - 3.7 cm)  Left Atrium Measurements: (Indexed to BSA)  LA Diam 2D:        3.20 cm  LA Vol s, MOD A4C: 70.10 ml.    LVOT / RVOT/ Qp/Qs Data: (Indexed to BSA)  LVOT Vmax: 0.98 m/s  LVOT VTI:  18.70 cm    Aortic Valve Measurements:  AV Vmax:                2.8 m/s  AV Peak Gradient:       31.6 mmHg  AV Mean Gradient:       17.0 mmHg  AV VTI:                 55.7 cm  AV VTI Ratio:           0.34  AoV Dimensionless Index 0.34    Mitral Valve Measurements:     MV Vmax:      1.39 m/s  MV VTI, gerard   0.369 m  MV Mean Grad: 3.00 mmHg  MV Peak Grad: 7.7 mmHg  MV E Vmax:    0.9 m/s  MV A Vmax:    1.4 m/s  MV E/A:       0.6    Tricuspid Valve Measurements:     TR Vmax:          2.7 m/s  TR Peak Gradient: 28.7 mmHg  RA Pressure:      3 mmHg  PASP:             32 mmHg    ________________________________________________________________________________________  Electronically signed on 1/12/2024 at 7:31:59 AM by Yvette Crane MD         *** Final ***

## 2024-01-13 NOTE — PROGRESS NOTE ADULT - PROBLEM SELECTOR PLAN 3
Pt with BETINA, unknown baseline (outpt note with Cr 1.16 10/2023) - stable  - BUN/Cr 46/1.6 on GC admission  - likely 2/2 abx vs urinary retention - vanco discontinued and castañeda placed with >1L output  - hold lisinopril in setting of BETINA  - hold silodosin in setting of dec Cr clearance  - now s/p another 24h of IV fluids given orthostasis - with improved Cr - monitor off fluids  - replace electrolytes as needed  - nephro consulted, recs appreciated  - pt failed TOV  - continue with castañeda, pt to f/u w/ outpatient urologist

## 2024-01-13 NOTE — PROGRESS NOTE ADULT - ASSESSMENT
89 yo M with PMH HTN, HLD, LBBB, paroxysmal Atrial Fibrillation, thoracic aortic aneurysm, s/p mechanical AVR presented transferred from  for angioplasty of his L foot - found to have palpable pulses and procedure cancelled. To continue management of BETINA and cellulitis/suspected osteomyelitis. Course complicated by suspected colitis. 89 yo M with PMH HTN, HLD, LBBB, paroxysmal Atrial Fibrillation, thoracic aortic aneurysm, s/p mechanical AVR presented transferred from  for angioplasty of his L foot - found to have palpable pulses and procedure cancelled. To continue management of BETINA and cellulitis/suspected osteomyelitis. Course complicated by suspected colitis.

## 2024-01-13 NOTE — PROGRESS NOTE ADULT - NUTRITIONAL ASSESSMENT
This patient has been assessed with a concern for Malnutrition and has been determined to have a diagnosis/diagnoses of Severe protein-calorie malnutrition.    This patient is being managed with:   Diet Soft and Bite Sized-  Consistent Carbohydrate {Evening Snack}  DASH/TLC {Sodium & Cholesterol Restricted}  Free Water Flush Instructions:  OTHER ; nutrition assistants : MIGUEL SHAW ONLY !!!!!!!!!!!  Supplement Feeding Modality:  Oral  Glucerna Shake Cans or Servings Per Day:  1       Frequency:  Two Times a day  Entered: Jan 12 2024  1:42PM    Diet Soft and Bite Sized-  Consistent Carbohydrate {Evening Snack}  DASH/TLC {Sodium & Cholesterol Restricted}  Entered: Jan 11 2024 11:00AM    The following pending diet order is being considered for treatment of Severe protein-calorie malnutrition:null
This patient has been assessed with a concern for Malnutrition and has been determined to have a diagnosis/diagnoses of Severe protein-calorie malnutrition.    This patient is being managed with:   Diet Soft and Bite Sized-  Consistent Carbohydrate {Evening Snack}  DASH/TLC {Sodium & Cholesterol Restricted}  Free Water Flush Instructions:  OTHER ; nutrition assistants : MIGUEL SHAW ONLY !!!!!!!!!!!  Supplement Feeding Modality:  Oral  Glucerna Shake Cans or Servings Per Day:  1       Frequency:  Two Times a day  Entered: Jan 12 2024  1:42PM    Diet Soft and Bite Sized-  Consistent Carbohydrate {Evening Snack}  DASH/TLC {Sodium & Cholesterol Restricted}  Entered: Jan 11 2024 11:00AM    The following pending diet order is being considered for treatment of Severe protein-calorie malnutrition:null

## 2024-01-13 NOTE — PROGRESS NOTE ADULT - PROBLEM SELECTOR PLAN 9
off hep gtt, c/w coumadin     Per GC chart, GOC discussion started with son Bogdan Jefferson who appears hard to reach by phone  - palliative consulted -- pt now DNR/DNI    Spoke to son 1/8 and updated him on plan - wants eventual MEGHNA - concerned about surgery due to anesthesia - states ~5PM is the best time to call and this is the only number to call.
Heparin gtt restarted.  Coumadin; INR 3.53, will hold dose tonight    Per GC chart, GOC discussion started with son Bogdan Jefferson who appears hard to reach by phone  - palliative consulted -- pt now DNR/DNI    Spoke to son 1/8 and updated him on plan - wants eventual MEGHNA - concerned about surgery due to anesthesia - states ~5PM is the best time to call and this is the only number to call.

## 2024-01-13 NOTE — PROGRESS NOTE ADULT - SUBJECTIVE AND OBJECTIVE BOX
[facility]   INFECTIOUS DISEASES   [address] [place] [address]  Tel: [phone]     Fax: [phone]  =========================================================  MD Herlinda Soto Kaushal, MD Cho, Michelle, MD Sunjit, Jaspal, MD  =========================================================    POPPY MERCADO [id number]    Follow up: Lying in bed comfortable, no fever, no abdominal or flank pain.   Left foot pain is the same.     Allergies:  No Known Allergies    atorvastatin 10 milliGRAM(s) Oral at bedtime  cefepime   IVPB 1000 milliGRAM(s) IV Intermittent every 12 hours  dextrose 5%. 1000 milliLiter(s) IV Continuous <Continuous>  dextrose 5%. 1000 milliLiter(s) IV Continuous <Continuous>  dextrose 50% Injectable 25 Gram(s) IV Push once  dextrose 50% Injectable 12.5 Gram(s) IV Push once  dextrose 50% Injectable 25 Gram(s) IV Push once  dextrose Oral Gel 15 Gram(s) Oral once PRN  glucagon  Injectable 1 milliGRAM(s) IntraMuscular once  insulin lispro (ADMELOG) corrective regimen sliding scale   SubCutaneous at bedtime  insulin lispro (ADMELOG) corrective regimen sliding scale   SubCutaneous three times a day before meals  potassium chloride    Tablet ER 40 milliEquivalent(s) Oral every 4 hours  tamsulosin 0.4 milliGRAM(s) Oral at bedtime     REVIEW OF SYSTEMS:  CONSTITUTIONAL:  No Fever or chills  HEENT:   No diplopia or blurred vision.  No earache, sore throat or runny nose.  CARDIOVASCULAR:  No chest pain or SOB  RESPIRATORY:  No cough, shortness of breath, PND or orthopnea.  GASTROINTESTINAL:  No nausea, vomiting or diarrhea.  GENITOURINARY:  No dysuria, frequency or urgency. No Blood in urine  MUSCULOSKELETAL:  no joint aches, no muscle pain  SKIN:  No change in skin, hair or nails.     Physical Exam:  Vital Signs Last 24 Hrs  T(C): 37 (13 Jan 2024 09:52), Max: 37 (13 Jan 2024 09:52)  T(F): 98.6 (13 Jan 2024 09:52), Max: 98.6 (13 Jan 2024 09:52)  HR: 78 (13 Jan 2024 09:52) (73 - 78)  BP: 113/87 (13 Jan 2024 09:52) (94/54 - 123/63)  BP(mean): --  RR: 18 (13 Jan 2024 09:52) (17 - 20)  SpO2: 95% (13 Jan 2024 09:52) (93% - 96%)  Parameters below as of 13 Jan 2024 09:52  Patient On (Oxygen Delivery Method): room air  GEN: NAD  HEENT: normocephalic and atraumatic. EOMI. LAZARO.    NECK: Supple.  No lymphadenopathy   LUNGS: Clear to auscultation.  HEART: Regular rate and rhythm without murmur.  ABDOMEN: Soft, nontender, and nondistended.  Positive bowel sounds.    : No CVA tenderness  EXTREMITIES: Without edema. left foot eschar   MSK: no joint swelling  PSYCHIATRIC: Appropriate affect .  SKIN: No rash     Labs:                        10.2   9.52  )-----------( 214      ( 13 Jan 2024 08:06 )             29.7     01-13    135  |  112<H>  |  42<H>  ----------------------------<  128<H>  4.2   |  21<L>  |  1.70<H>    Ca    9.2      13 Jan 2024 08:06  Mg     1.8     01-12    TPro  6.2  /  Alb  2.1<L>  /  TBili  0.4  /  DBili  x   /  AST  18  /  ALT  24  /  AlkPhos  90  01-13    Culture - Blood (collected 01-10-24 @ 19:00)  Source: .Blood Blood  Preliminary Report (01-13-24 @ 01:02):    No growth at 48 Hours    Culture - Blood (collected 01-10-24 @ 18:50)  Source: .Blood Blood  Preliminary Report (01-13-24 @ 01:02):    No growth at 48 Hours    Culture - Urine (collected 01-05-24 @ 13:10)  Source: Catheterized Catheterized  Final Report (01-06-24 @ 14:20):    No growth    Culture - Urine (collected 12-31-23 @ 13:38)  Source: Clean Catch Clean Catch (Midstream)  Final Report (01-01-24 @ 19:45):    <10,000 CFU/mL Normal Urogenital Christy    Culture - Blood (collected 12-31-23 @ 13:05)  Source: .Blood Blood-Peripheral  Final Report (01-05-24 @ 22:02):    No growth at 5 days    Culture - Blood (collected 12-31-23 @ 13:00)  Source: .Blood Blood-Peripheral  Final Report (01-05-24 @ 22:02):    No growth at 5 days    WBC Count: 9.52 K/uL (01-13-24 @ 08:06)  WBC Count: 14.27 K/uL (01-12-24 @ 08:43)  WBC Count: 22.37 K/uL (01-11-24 @ 05:57)  WBC Count: 21.43 K/uL (01-10-24 @ 21:10)  WBC Count: 19.13 K/uL (01-10-24 @ 09:08)  WBC Count: 15.76 K/uL (01-10-24 @ 07:30)  WBC Count: 8.32 K/uL (01-09-24 @ 05:40)  WBC Count: 9.18 K/uL (01-09-24 @ 02:00)  WBC Count: 9.06 K/uL (01-08-24 @ 20:20)    Creatinine: 1.70 mg/dL (01-13-24 @ 08:06)  Creatinine: 1.70 mg/dL (01-12-24 @ 08:43)  Creatinine: 1.50 mg/dL (01-11-24 @ 05:57)  Creatinine: 1.50 mg/dL (01-10-24 @ 07:30)  Creatinine: 1.40 mg/dL (01-09-24 @ 05:40)    C-Reactive Protein, Serum: 53 mg/L (01-12-24 @ 08:43)  C-Reactive Protein, Serum: 57 mg/L (12-31-23 @ 18:55)    Sedimentation Rate, Erythrocyte: 77 mm/hr (01-12-24 @ 08:43)  Sedimentation Rate, Erythrocyte: 76 mm/hr (12-31-23 @ 18:55)    Procalcitonin, Serum: 0.12 ng/mL (01-12-24 @ 08:43)     SARS-CoV-2: NotDetec (12-31-23 @ 13:00)    All imaging and data are reviewed.   < from: Xray Chest 1 View-PORTABLE IMMEDIATE (Xray Chest 1 View-PORTABLE IMMEDIATE .) (01.10.24 @ 11:51) >  Heart magnified by technique.  There is a small infiltrate developing left the left lower hilum compared   to December 31, 2023. Clips over the aortic arch area again noted.  IMPRESSION: Developing small left lower perihilar infiltrate.    < from: NM Bone Marrow Imaging Limited (01.10.24 @ 14:12) >  TECHNIQUE:  The patient was injected with the above dose of labeled   autologous leukocytes on 1/6/2024. Approximately 24 hours later, on   1/7/2024, static images of the feet were obtained. The patient then was   injected with Tc-99m sulfur colloid and the images were repeated in the   same projections using dual isotope acquisition mode after approximately   45 minutes. Due to decreased target to background ratio, the dual isotope   was then reacquired at 90 minutes  COMPARISON: No prior scintigraphy of the feet available.  FINDINGS: The proximal LEFT midfoot shows an area of increased white cell   accumulation. On the second set of delayed sulfur colloid images, there   is an area of increased confluent increased activity of the bone marrow,   with an area of photopenia within the marrow seen in the same region as   the white cell accumulation. Findings are suspicious for infection.  IMPRESSION:  Abnormal combined Indium-111 labeled leukocyte study and   marrow scan. Findings suspicious for infection.    Assessment and Plan:   89 yo M with PMH HTN, HLD, LBBB, paroxismal Atrial Fibrillation, thoracic aortic aneurysm, s/p mechanical AVR, who was transferred from  for angioplasty of his L foot. He has a left foot eschar, on empiric antibiotics to treat for potential soft tissue infection or even underlying osteomyelitis. Bone scan shows evidence of L foot infection. MRSA nasal PCR negative.    Noted to have worsening leukocytosis, unclear etiology. CT chest and abdomen not concerning for any source of infection, UA with high WBC>50 but negative Nitrate, has a castañeda, but on room air and no respiratory complaints.   NM positive in left midfoot but no active cellulitis or draining wound, so I would watch it for now.   Would hold on OM treatment, since no active infection will not benefit from long term ABx, possibly in his age will have side effects.    #Left foot eschar and osteomyelitis  #Leukocytosis  # UTI vs developing pneumonia?     - Blood cultures negative   - Urine cultures 12/31 and 1/5 both negative   - On cefepime, completed 7days can stop it.   - leukocytosis responding today normal 9.52  - Aspiration precautions  - Podiatry follow up, I don't recommend long term treatment for osteomyelitis, since wound is ischemic and dry    Will follow PRN. Please call with any question.     Aliya Orosco MD  Division of Infectious Diseases   Please call ID service at [phone] with any question.      50 minutes spent on total encounter assessing patient, examination, chart review, counseling and coordinating care by the attending physician/nurse/care manager.   [facility]   INFECTIOUS DISEASES   [address] [place] [address]  Tel: [phone]     Fax: [phone]  =========================================================  MD Herlinda Soto Kaushal, MD Cho, Michelle, MD Sunjit, Jaspal, MD  =========================================================    POPPY MERCADO [id number]    Follow up: Lying in bed comfortable, no fever, no abdominal or flank pain.   Left foot pain is the same.     Allergies:  No Known Allergies    atorvastatin 10 milliGRAM(s) Oral at bedtime  cefepime   IVPB 1000 milliGRAM(s) IV Intermittent every 12 hours  dextrose 5%. 1000 milliLiter(s) IV Continuous <Continuous>  dextrose 5%. 1000 milliLiter(s) IV Continuous <Continuous>  dextrose 50% Injectable 25 Gram(s) IV Push once  dextrose 50% Injectable 12.5 Gram(s) IV Push once  dextrose 50% Injectable 25 Gram(s) IV Push once  dextrose Oral Gel 15 Gram(s) Oral once PRN  glucagon  Injectable 1 milliGRAM(s) IntraMuscular once  insulin lispro (ADMELOG) corrective regimen sliding scale   SubCutaneous at bedtime  insulin lispro (ADMELOG) corrective regimen sliding scale   SubCutaneous three times a day before meals  potassium chloride    Tablet ER 40 milliEquivalent(s) Oral every 4 hours  tamsulosin 0.4 milliGRAM(s) Oral at bedtime     REVIEW OF SYSTEMS:  CONSTITUTIONAL:  No Fever or chills  HEENT:   No diplopia or blurred vision.  No earache, sore throat or runny nose.  CARDIOVASCULAR:  No chest pain or SOB  RESPIRATORY:  No cough, shortness of breath, PND or orthopnea.  GASTROINTESTINAL:  No nausea, vomiting or diarrhea.  GENITOURINARY:  No dysuria, frequency or urgency. No Blood in urine  MUSCULOSKELETAL:  no joint aches, no muscle pain  SKIN:  No change in skin, hair or nails.     Physical Exam:  Vital Signs Last 24 Hrs  T(C): 37 (13 Jan 2024 09:52), Max: 37 (13 Jan 2024 09:52)  T(F): 98.6 (13 Jan 2024 09:52), Max: 98.6 (13 Jan 2024 09:52)  HR: 78 (13 Jan 2024 09:52) (73 - 78)  BP: 113/87 (13 Jan 2024 09:52) (94/54 - 123/63)  BP(mean): --  RR: 18 (13 Jan 2024 09:52) (17 - 20)  SpO2: 95% (13 Jan 2024 09:52) (93% - 96%)  Parameters below as of 13 Jan 2024 09:52  Patient On (Oxygen Delivery Method): room air  GEN: NAD  HEENT: normocephalic and atraumatic. EOMI. LAZARO.    NECK: Supple.  No lymphadenopathy   LUNGS: Clear to auscultation.  HEART: Regular rate and rhythm without murmur.  ABDOMEN: Soft, nontender, and nondistended.  Positive bowel sounds.    : No CVA tenderness  EXTREMITIES: Without edema. left foot eschar   MSK: no joint swelling  PSYCHIATRIC: Appropriate affect .  SKIN: No rash     Labs:                        10.2   9.52  )-----------( 214      ( 13 Jan 2024 08:06 )             29.7     01-13    135  |  112<H>  |  42<H>  ----------------------------<  128<H>  4.2   |  21<L>  |  1.70<H>    Ca    9.2      13 Jan 2024 08:06  Mg     1.8     01-12    TPro  6.2  /  Alb  2.1<L>  /  TBili  0.4  /  DBili  x   /  AST  18  /  ALT  24  /  AlkPhos  90  01-13    Culture - Blood (collected 01-10-24 @ 19:00)  Source: .Blood Blood  Preliminary Report (01-13-24 @ 01:02):    No growth at 48 Hours    Culture - Blood (collected 01-10-24 @ 18:50)  Source: .Blood Blood  Preliminary Report (01-13-24 @ 01:02):    No growth at 48 Hours    Culture - Urine (collected 01-05-24 @ 13:10)  Source: Catheterized Catheterized  Final Report (01-06-24 @ 14:20):    No growth    Culture - Urine (collected 12-31-23 @ 13:38)  Source: Clean Catch Clean Catch (Midstream)  Final Report (01-01-24 @ 19:45):    <10,000 CFU/mL Normal Urogenital Christy    Culture - Blood (collected 12-31-23 @ 13:05)  Source: .Blood Blood-Peripheral  Final Report (01-05-24 @ 22:02):    No growth at 5 days    Culture - Blood (collected 12-31-23 @ 13:00)  Source: .Blood Blood-Peripheral  Final Report (01-05-24 @ 22:02):    No growth at 5 days    WBC Count: 9.52 K/uL (01-13-24 @ 08:06)  WBC Count: 14.27 K/uL (01-12-24 @ 08:43)  WBC Count: 22.37 K/uL (01-11-24 @ 05:57)  WBC Count: 21.43 K/uL (01-10-24 @ 21:10)  WBC Count: 19.13 K/uL (01-10-24 @ 09:08)  WBC Count: 15.76 K/uL (01-10-24 @ 07:30)  WBC Count: 8.32 K/uL (01-09-24 @ 05:40)  WBC Count: 9.18 K/uL (01-09-24 @ 02:00)  WBC Count: 9.06 K/uL (01-08-24 @ 20:20)    Creatinine: 1.70 mg/dL (01-13-24 @ 08:06)  Creatinine: 1.70 mg/dL (01-12-24 @ 08:43)  Creatinine: 1.50 mg/dL (01-11-24 @ 05:57)  Creatinine: 1.50 mg/dL (01-10-24 @ 07:30)  Creatinine: 1.40 mg/dL (01-09-24 @ 05:40)    C-Reactive Protein, Serum: 53 mg/L (01-12-24 @ 08:43)  C-Reactive Protein, Serum: 57 mg/L (12-31-23 @ 18:55)    Sedimentation Rate, Erythrocyte: 77 mm/hr (01-12-24 @ 08:43)  Sedimentation Rate, Erythrocyte: 76 mm/hr (12-31-23 @ 18:55)    Procalcitonin, Serum: 0.12 ng/mL (01-12-24 @ 08:43)     SARS-CoV-2: NotDetec (12-31-23 @ 13:00)    All imaging and data are reviewed.   < from: Xray Chest 1 View-PORTABLE IMMEDIATE (Xray Chest 1 View-PORTABLE IMMEDIATE .) (01.10.24 @ 11:51) >  Heart magnified by technique.  There is a small infiltrate developing left the left lower hilum compared   to December 31, 2023. Clips over the aortic arch area again noted.  IMPRESSION: Developing small left lower perihilar infiltrate.    < from: NM Bone Marrow Imaging Limited (01.10.24 @ 14:12) >  TECHNIQUE:  The patient was injected with the above dose of labeled   autologous leukocytes on 1/6/2024. Approximately 24 hours later, on   1/7/2024, static images of the feet were obtained. The patient then was   injected with Tc-99m sulfur colloid and the images were repeated in the   same projections using dual isotope acquisition mode after approximately   45 minutes. Due to decreased target to background ratio, the dual isotope   was then reacquired at 90 minutes  COMPARISON: No prior scintigraphy of the feet available.  FINDINGS: The proximal LEFT midfoot shows an area of increased white cell   accumulation. On the second set of delayed sulfur colloid images, there   is an area of increased confluent increased activity of the bone marrow,   with an area of photopenia within the marrow seen in the same region as   the white cell accumulation. Findings are suspicious for infection.  IMPRESSION:  Abnormal combined Indium-111 labeled leukocyte study and   marrow scan. Findings suspicious for infection.    Assessment and Plan:   89 yo M with PMH HTN, HLD, LBBB, paroxismal Atrial Fibrillation, thoracic aortic aneurysm, s/p mechanical AVR, who was transferred from  for angioplasty of his L foot. He has a left foot eschar, on empiric antibiotics to treat for potential soft tissue infection or even underlying osteomyelitis. Bone scan shows evidence of L foot infection. MRSA nasal PCR negative.    Noted to have worsening leukocytosis, unclear etiology. CT chest and abdomen not concerning for any source of infection, UA with high WBC>50 but negative Nitrate, has a castañeda, but on room air and no respiratory complaints.   NM positive in left midfoot but no active cellulitis or draining wound, so I would watch it for now.   Would hold on OM treatment, since no active infection will not benefit from long term ABx, possibly in his age will have side effects.    #Left foot eschar and osteomyelitis  #Leukocytosis  # UTI vs developing pneumonia?     - Blood cultures negative   - Urine cultures 12/31 and 1/5 both negative   - On cefepime, completed 7days can stop it.   - leukocytosis responding today normal 9.52  - Aspiration precautions  - Podiatry follow up, I don't recommend long term treatment for osteomyelitis, since wound is ischemic and dry    Will follow PRN. Please call with any question.     Aliya Orosco MD  Division of Infectious Diseases   Please call ID service at [phone] with any question.      50 minutes spent on total encounter assessing patient, examination, chart review, counseling and coordinating care by the attending physician/nurse/care manager.

## 2024-01-13 NOTE — PROGRESS NOTE ADULT - ASSESSMENT
89 year old male with PMH HTN, HLD, LBBB, paroxysmal Atrial Fibrillation, thoracic aortic aneurysm, s/p mechanical AVR presented transferred from  for angioplasty of his L foot.  Cardiology consultation now being obtained.     Cardiac Optimization/LBBB/PaFib/Mechanical AVR  - Vascular recs noted.  No plans for LE angiogram at this point.     - EKG SR with old LBBB   - Has known history of PAfib and mechanical AVR  - Hematuria resolved.  s/p Heparin gtt  - Dose Coumadin daily to keep INR~3   - Continue home statin     - No sign volume overload, non-orthopneic on RA, despite elevated proBNP  - TTE with septal motion is abnormal consistent with previous cardiac surgery.  Left ventricular systolic function is normal with an ejection fraction of 67 %.     - BP soft at systolic 90's-120  - Continue to hold home ACEI for BETINA, though, stable at 1.7  - Continue home Nadolol with parameters     - Monitor and replete lytes, keep K>4, Mg>2.  - Will continue to follow.  Otherwise, stable from cardiac standpoint    Vijaya Randhawa DNP, NP-C, AGACNP-C  Cardiology   Call TEAMS

## 2024-01-13 NOTE — PROGRESS NOTE ADULT - PROBLEM SELECTOR PROBLEM 1
DM foot ulcer
Cellulitis of left foot

## 2024-01-13 NOTE — PROGRESS NOTE ADULT - PROBLEM SELECTOR PLAN 4
Pt with history of afib on warfarin at home, presented to [facility] originally with supratherapeutic INR  - pt NSR on exam  - INR at 3.53 today; will hold Coumadin tonight  - TTE with septal motion is abnormal consistent with previous cardiac surgery. Left ventricular systolic function is normal with an ejection fraction of 67 %.

## 2024-01-13 NOTE — PROGRESS NOTE ADULT - TIME BILLING
Note written by attending, see above.  Time spent: 50min. More than 50% of the visit was spent counseling the patient on medical condition and coordination of care
activities including direct patient care, counseling and/or coordinating care, reviewing notes/lab data/imaging, and discussion with multidisciplinary team (excluding time spent on resident teaching)

## 2024-01-13 NOTE — PROGRESS NOTE ADULT - SUBJECTIVE AND OBJECTIVE BOX
Date/Time Patient Seen:  		  Referring MD:   Data Reviewed	       Patient is a 89y old  Male who presents with a chief complaint of left foot wound (12 Jan 2024 16:56)      Subjective/HPI     PAST MEDICAL & SURGICAL HISTORY:  HTN (hypertension)    HLD (hyperlipidemia)    Paroxysmal atrial fibrillation    H/O aortic valve repair    H/O thoracic aortic aneurysm repair          Medication list         MEDICATIONS  (STANDING):  atorvastatin 10 milliGRAM(s) Oral at bedtime  cefepime   IVPB 1000 milliGRAM(s) IV Intermittent every 12 hours  dextrose 5%. 1000 milliLiter(s) (50 mL/Hr) IV Continuous <Continuous>  dextrose 5%. 1000 milliLiter(s) (100 mL/Hr) IV Continuous <Continuous>  dextrose 50% Injectable 25 Gram(s) IV Push once  dextrose 50% Injectable 12.5 Gram(s) IV Push once  dextrose 50% Injectable 25 Gram(s) IV Push once  glucagon  Injectable 1 milliGRAM(s) IntraMuscular once  insulin lispro (ADMELOG) corrective regimen sliding scale   SubCutaneous at bedtime  insulin lispro (ADMELOG) corrective regimen sliding scale   SubCutaneous three times a day before meals  tamsulosin 0.4 milliGRAM(s) Oral at bedtime    MEDICATIONS  (PRN):  dextrose Oral Gel 15 Gram(s) Oral once PRN Blood Glucose LESS THAN 70 milliGRAM(s)/deciliter         Vitals log        ICU Vital Signs Last 24 Hrs  T(C): 36.4 (13 Jan 2024 04:24), Max: 36.4 (12 Jan 2024 19:24)  T(F): 97.6 (13 Jan 2024 04:24), Max: 97.6 (13 Jan 2024 04:24)  HR: 77 (13 Jan 2024 04:24) (73 - 77)  BP: 123/63 (13 Jan 2024 04:24) (94/54 - 123/63)  BP(mean): --  ABP: --  ABP(mean): --  RR: 17 (13 Jan 2024 04:24) (17 - 20)  SpO2: 96% (13 Jan 2024 04:24) (93% - 96%)    O2 Parameters below as of 13 Jan 2024 04:24  Patient On (Oxygen Delivery Method): room air                 Input and Output:  I&O's Detail    12 Jan 2024 07:01  -  13 Jan 2024 07:00  --------------------------------------------------------  IN:  Total IN: 0 mL    OUT:    Indwelling Catheter - Urethral (mL): 1000 mL    Voided (mL): 200 mL  Total OUT: 1200 mL    Total NET: -1200 mL          Lab Data                        10.5   14.27 )-----------( 218      ( 12 Jan 2024 08:43 )             30.5     01-12    136  |  106  |  39<H>  ----------------------------<  161<H>  3.3<L>   |  25  |  1.70<H>    Ca    8.7      12 Jan 2024 08:43  Mg     1.8     01-12    TPro  6.3  /  Alb  2.0<L>  /  TBili  0.4  /  DBili  x   /  AST  22  /  ALT  26  /  AlkPhos  66  01-12            Review of Systems	      Objective     Physical Examination    heart s1s2  lung dc BS  head nc      Pertinent Lab findings & Imaging      Karolina:  NO   Adequate UO     I&O's Detail    12 Jan 2024 07:01  -  13 Jan 2024 07:00  --------------------------------------------------------  IN:  Total IN: 0 mL    OUT:    Indwelling Catheter - Urethral (mL): 1000 mL    Voided (mL): 200 mL  Total OUT: 1200 mL    Total NET: -1200 mL               Discussed with:     Cultures:	        Radiology

## 2024-01-13 NOTE — DISCHARGE NOTE NURSING/CASE MANAGEMENT/SOCIAL WORK - NSDCPEFALRISK_GEN_ALL_CORE
For information on Fall & Injury Prevention, visit: [email or web address] OR  [email or web address] OR  https://www.cdc.gov/steadi/patient.html For information on Fall & Injury Prevention, visit: [email or web address] OR  [email or web address] OR  https://www.cdc.gov/steadi/patient.html

## 2024-01-13 NOTE — PROGRESS NOTE ADULT - PROBLEM SELECTOR PLAN 5
Pt with A1c 6.7%, not on home medications and does not follow with PCP  - continue CASS with FS  - hypoglycemia protocol

## 2024-01-13 NOTE — PROGRESS NOTE ADULT - NS ATTEND AMEND GEN_ALL_CORE FT
As per my prior note: strong palpable pulse above wound. No angiogram needed.
89 year old male with PMH HTN, HLD, LBBB, paroxysmal Atrial Fibrillation, thoracic aortic aneurysm, s/p mechanical AVR presented transferred from  for angioplasty of his L foot.      - Vascular recs noted.  No plans for LE angiogram at this point  - Can continue transition to Coumadin.  Dose daily to keep INR~3   - Has known history of PAfib and mechanical AVR  - Continue to hold home ACEI for cristhian, soft bp   - Continue home Nadolol with parameters   - Continue home statin   - No sign volume overload, non-orthopneic on RA  - Can obtain routine TTE
89 year old male with PMH HTN, HLD, LBBB, paroxysmal Atrial Fibrillation   thoracic aortic aneurysm, s/p mechanical AVR presented transferred from  for angioplasty of his L foot.       Presenting with cellulitis left foot, for angioplasty called for cardiac optimization   EKG SR with old LBBB   has known history of p afib, mechanical valve on home coumadin - now on IV heparin preoperatively   no signs of ischemia or HF  holding home ace for BETINA / soft bp   continue home nadolol with parameters   continue home statin      would check baseline echo as routine but should not delay any necessary surgical procedures.   plan per vascular notes for LLE angio - no objection from cv standpoint to proceed with low risk procedure
89 year old male with PMH HTN, HLD, LBBB, paroxysmal Atrial Fibrillation, thoracic aortic aneurysm, s/p mechanical AVR presented transferred from  for angioplasty of his L foot.  Cardiology consultation now being obtained.     Cardiac Optimization/LBBB/PaFib/Mechanical AVR  - Vascular recs noted.  No plans for LE angiogram at this point.     - EKG SR with old LBBB   - Has known history of PAfib and mechanical AVR  - Hematuria resolved.  s/p Heparin gtt  - Dose Coumadin daily to keep INR~3   - Continue home statin     - No sign volume overload, non-orthopneic on RA, despite elevated proBNP  - TTE with septal motion is abnormal consistent with previous cardiac surgery.  Left ventricular systolic function is normal with an ejection fraction of 67 %.     - BP soft at systolic 90's-120  - Continue to hold home ACEI for BETINA, though, stable at 1.7  - Continue home Nadolol with parameters
89 year old male with PMH HTN, HLD, LBBB, paroxysmal Atrial Fibrillation, thoracic aortic aneurysm, s/p mechanical AVR presented transferred from  for angioplasty of his L foot.    Heparin to Coumadin bridge, INR goal of 3 given mechanical valve + pAF (additional risk factor). INR now good and can stop heparin gtt.  Hematuria has since resolved, H+H stable  ACEI on hold given BETINA  TTE pending  restart Nadolol as tolerated by his BP
89 year old male with PMH HTN, HLD, LBBB, paroxysmal Atrial Fibrillation, thoracic aortic aneurysm, s/p mechanical AVR presented transferred from  for angioplasty of his L foot.    Heparin to Coumadin bridge, INR goal of 3 given mechanical valve + pAF (additional risk factor). Resume heparin this AM until INR close to 3.0  Hematuria has since resolved, H+H stable  ACEI on hold given BETINA  TTE pending  On home Nadolol
89 year old male with PMH HTN, HLD, LBBB, paroxysmal Atrial Fibrillation, thoracic aortic aneurysm, s/p mechanical AVR presented transferred from  for angioplasty of his L foot.  Cardiology consultation now being obtained.     Cardiac Optimization/LBBB/PaFib  - Vascular recs noted.  No plans for LE angiogram at this point.     - EKG SR with old LBBB   - Has known history of PAfib and mechanical AVR  - hematuria resolved  - Dose coumadin daily to keep INR~3   - Continue home statin     - No sign volume overload, non-orthopneic on RA, despite elevated proBNP  - TTE with septal motion is abnormal consistent with previous cardiac surgery. Left ventricular systolic function is normal with an ejection fraction of 67 %.     - BP remains stable and controlled  - Continue to hold home ACEI for BETINA, though, stable at 1.7  - Continue home Nadolol with parameters     - Monitor and replete lytes, keep K>4, Mg>2.  - Will continue to follow.
89 year old male with PMH HTN, HLD, LBBB, paroxysmal Atrial Fibrillation   thoracic aortic aneurysm, s/p mechanical AVR presented transferred from  for angioplasty of his L foot.       Presenting with cellulitis left foot, for angioplasty called for cardiac optimization   EKG SR with old LBBB   has known history of p afib, mechanical valve on home coumadin - now on IV heparin  redose coumadin  no signs of ischemia or HF  holding home ace for BETINA / soft bp   continue home nadolol with parameters   continue home statin      would check baseline echo as routine but should not delay any necessary surgical procedures.     vascular  states no need to leg angio at this time

## 2024-01-13 NOTE — PROGRESS NOTE ADULT - SUBJECTIVE AND OBJECTIVE BOX
Patient is a 89y old  Male who presents with a chief complaint of left foot wound (13 Jan 2024 08:38)      Subjective:  INTERVAL HPI/OVERNIGHT EVENTS: Patient seen and examined at bedside. Patient has no complaints at this time.  one loose bm overnight.    MEDICATIONS  (STANDING):  atorvastatin 10 milliGRAM(s) Oral at bedtime  cefepime   IVPB 1000 milliGRAM(s) IV Intermittent every 12 hours  dextrose 5%. 1000 milliLiter(s) (100 mL/Hr) IV Continuous <Continuous>  dextrose 5%. 1000 milliLiter(s) (50 mL/Hr) IV Continuous <Continuous>  dextrose 50% Injectable 25 Gram(s) IV Push once  dextrose 50% Injectable 12.5 Gram(s) IV Push once  dextrose 50% Injectable 25 Gram(s) IV Push once  glucagon  Injectable 1 milliGRAM(s) IntraMuscular once  insulin lispro (ADMELOG) corrective regimen sliding scale   SubCutaneous at bedtime  insulin lispro (ADMELOG) corrective regimen sliding scale   SubCutaneous three times a day before meals  tamsulosin 0.4 milliGRAM(s) Oral at bedtime    MEDICATIONS  (PRN):  dextrose Oral Gel 15 Gram(s) Oral once PRN Blood Glucose LESS THAN 70 milliGRAM(s)/deciliter      Allergies    No Known Allergies    Intolerances        REVIEW OF SYSTEMS:  CONSTITUTIONAL: No fever or chills  HEENT:  No headache, no sore throat  RESPIRATORY: No cough, wheezing, or shortness of breath  CARDIOVASCULAR: No chest pain, palpitations  GASTROINTESTINAL: No abd pain, nausea, vomiting, or diarrhea  GENITOURINARY: No dysuria, frequency, or hematuria  NEUROLOGICAL: no focal weakness or dizziness  MUSCULOSKELETAL: no myalgias     Objective:  Vital Signs Last 24 Hrs  T(C): 37 (13 Jan 2024 09:52), Max: 37 (13 Jan 2024 09:52)  T(F): 98.6 (13 Jan 2024 09:52), Max: 98.6 (13 Jan 2024 09:52)  HR: 78 (13 Jan 2024 09:52) (73 - 78)  BP: 113/87 (13 Jan 2024 09:52) (94/54 - 123/63)  BP(mean): --  RR: 18 (13 Jan 2024 09:52) (17 - 20)  SpO2: 95% (13 Jan 2024 09:52) (93% - 96%)    Parameters below as of 13 Jan 2024 09:52  Patient On (Oxygen Delivery Method): room air      PHYSICAL EXAM:  GENERAL: NAD, sitting upright in bed  HEENT:  anicteric, moist mucous membranes  CHEST/LUNG:  CTA b/l, no rales, wheezes, or rhonchi  HEART:  RRR, S1, S2, +Murmur  ABDOMEN:  BS+, soft, nontender, nondistended  EXTREMITIES: +L foot eschar on dorsum of foot, no edema, cyanosis, or calf tenderness  NERVOUS SYSTEM: answers questions and follows commands appropriately      LABS:                        10.2   9.52  )-----------( 214      ( 13 Jan 2024 08:06 )             29.7     CBC Full  -  ( 13 Jan 2024 08:06 )  WBC Count : 9.52 K/uL  Hemoglobin : 10.2 g/dL  Hematocrit : 29.7 %  Platelet Count - Automated : 214 K/uL  Mean Cell Volume : 87.1 fl  Mean Cell Hemoglobin : 29.9 pg  Mean Cell Hemoglobin Concentration : 34.3 gm/dL  Auto Neutrophil # : x  Auto Lymphocyte # : x  Auto Monocyte # : x  Auto Eosinophil # : x  Auto Basophil # : x  Auto Neutrophil % : x  Auto Lymphocyte % : x  Auto Monocyte % : x  Auto Eosinophil % : x  Auto Basophil % : x    13 Jan 2024 08:06    135    |  112    |  42     ----------------------------<  128    4.2     |  21     |  1.70     Ca    9.2        13 Jan 2024 08:06    TPro  6.2    /  Alb  2.1    /  TBili  0.4    /  DBili  x      /  AST  18     /  ALT  24     /  AlkPhos  90     13 Jan 2024 08:06    PT/INR - ( 13 Jan 2024 08:06 )   PT: 32.3 sec;   INR: 2.85 ratio         PTT - ( 13 Jan 2024 08:06 )  PTT:37.3 sec  Urinalysis Basic - ( 13 Jan 2024 08:06 )    Color: x / Appearance: x / SG: x / pH: x  Gluc: 128 mg/dL / Ketone: x  / Bili: x / Urobili: x   Blood: x / Protein: x / Nitrite: x   Leuk Esterase: x / RBC: x / WBC x   Sq Epi: x / Non Sq Epi: x / Bacteria: x      CAPILLARY BLOOD GLUCOSE      POCT Blood Glucose.: 133 mg/dL (13 Jan 2024 07:35)  POCT Blood Glucose.: 152 mg/dL (12 Jan 2024 20:54)  POCT Blood Glucose.: 121 mg/dL (12 Jan 2024 16:56)  POCT Blood Glucose.: 173 mg/dL (12 Jan 2024 12:35)        Culture - Blood (collected 01-10-24 @ 19:00)  Source: .Blood Blood  Preliminary Report (01-13-24 @ 01:02):    No growth at 48 Hours    Culture - Blood (collected 01-10-24 @ 18:50)  Source: .Blood Blood  Preliminary Report (01-13-24 @ 01:02):    No growth at 48 Hours        RADIOLOGY & ADDITIONAL TESTS:    Personally reviewed.     Consultant(s) Notes Reviewed:  [x] YES  [ ] NO

## 2024-01-13 NOTE — PROGRESS NOTE ADULT - PROBLEM SELECTOR PLAN 1
Pt with original presentation to [facility] with L foot wound and cellulitis, transferred to [facility] for L angioplasty  - doppler with occlusion of the popliteal artery on the left - however patient has strong DP pulse, US likely not accurate per vascular - angioplasty cancelled  - L foot NM bone scan ordered to eval for OM, unable to obtain MRI due to metal in arm as per son  - bone scan suggesting suspicion for infection  - s/p vanc/zosyn -> vanc/cefepime -> cefepime q12 (5 days)  - afebrile, VS stable  - CXR ordered to r/o aspiration -- showing small left lower infiltrate   - speech and swallow reval, ordered MBS f/u results  - will check blood cultures given worsening leukocytosis - stable exam so does not warrant CT imaging - discussed with ID Dr. Loredo  - completed hep gtt bridge to coumadin  - coumadin held x 2 days, restart 2.5mg 1/13  - ID consulted Dr. Orosco, recs appreciated  - Vascular consulted recs appreciated  - Cardio consulted for cardiac clearance recs appreciated    Dispo d/c planning to [facility] when antibiotic course completed. Pt with original presentation to [facility] with L foot wound and cellulitis, transferred to [facility] for L angioplasty  - doppler with occlusion of the popliteal artery on the left - however patient has strong DP pulse, US likely not accurate per vascular - angioplasty cancelled  - L foot NM bone scan ordered to eval for OM, unable to obtain MRI due to metal in arm as per son  - bone scan suggesting suspicion for infection  - s/p vanc/zosyn -> vanc/cefepime -> cefepime q12 (5 days)  - afebrile, VS stable  - CXR ordered to r/o aspiration -- showing small left lower infiltrate   - speech and swallow reval, ordered MBS f/u results  - will check blood cultures given worsening leukocytosis - stable exam so does not warrant CT imaging - discussed with ID Dr. Loredo  - completed hep gtt bridge to coumadin  - coumadin held x 2 days, restart 2.5mg 1/13  - ID consulted Dr. Orosco, recs appreciated  - Vascular consulted recs appreciated  - Cardio consulted for cardiac clearance recs appreciated    Dispo d/c planning to [facility] when antibiotic course completed. Pt with original presentation to [facility] with L foot wound and cellulitis, transferred to hospitals for L angioplasty  - doppler with occlusion of the popliteal artery on the left - however patient has strong DP pulse, US likely not accurate per vascular - angioplasty cancelled  - L foot NM bone scan ordered to eval for OM, unable to obtain MRI due to metal in arm as per son  - bone scan suggesting suspicion for infection  - s/p vanc/zosyn -> vanc/cefepime -> cefepime q12  -will d/c cefepime today as per ID, pt completed 7 days of cefepime, no further abx needed  - afebrile, VS stable  - CXR ordered to r/o aspiration -- showing small left lower infiltrate   - speech and swallow reval, ordered MBS f/u results  - will check blood cultures given worsening leukocytosis - stable exam so does not warrant CT imaging - discussed with ID Dr. Loredo  - completed hep gtt bridge to coumadin  - coumadin held x 2 days, restart 2.5mg 1/13  - ID consulted Dr. Orosco, recs appreciated  - Vascular consulted recs appreciated -- follow up outpatient wound care clinic  - Cardio consulted for cardiac clearance recs appreciated    Dispo d/c planning to [facility]. Pt with original presentation to [facility] with L foot wound and cellulitis, transferred to [facility] for L angioplasty  - doppler with occlusion of the popliteal artery on the left - however patient has strong DP pulse, US likely not accurate per vascular - angioplasty cancelled  - L foot NM bone scan ordered to eval for OM, unable to obtain MRI due to metal in arm as per son  - bone scan suggesting suspicion for infection  - s/p vanc/zosyn -> vanc/cefepime -> cefepime q12  -will d/c cefepime today as per ID, pt completed 7 days of cefepime, no further abx needed  - afebrile, VS stable  - CXR ordered to r/o aspiration -- showing small left lower infiltrate   - speech and swallow reval, ordered MBS f/u results  - will check blood cultures given worsening leukocytosis - stable exam so does not warrant CT imaging - discussed with ID Dr. Loredo  - completed hep gtt bridge to coumadin  - coumadin held x 2 days, restart 2.5mg 1/13  - ID consulted Dr. Orosco, recs appreciated  - Vascular consulted recs appreciated -- follow up outpatient wound care clinic  - Cardio consulted for cardiac clearance recs appreciated    Dispo d/c planning to [facility].

## 2024-01-13 NOTE — PROGRESS NOTE ADULT - REASON FOR ADMISSION
left foot wound

## 2024-01-13 NOTE — PROGRESS NOTE ADULT - PROBLEM SELECTOR PLAN 2
-CT abdomen and pelvis non contrast: Suggestion of long segments of bowel wall thickening sigmoid colon through rectosigmoid colon. Correlate for colitis.  -ID following

## 2024-01-13 NOTE — PROGRESS NOTE ADULT - PROVIDER SPECIALTY LIST ADULT
Cardiology
Infectious Disease
Infectious Disease
Urology
Urology
Cardiology
Urology
Cardiology
Cardiology
Nephrology
Nephrology
Pulmonology
Vascular Surgery
Infectious Disease
Cardiology
Infectious Disease
Infectious Disease
Urology
Vascular Surgery
Pulmonology
Cardiology
Infectious Disease
Urology
Podiatry
Hospitalist

## 2024-01-13 NOTE — DISCHARGE NOTE NURSING/CASE MANAGEMENT/SOCIAL WORK - PATIENT PORTAL LINK FT
You can access the FollowMyHealth Patient Portal offered by [facility] by registering at the following website: [email or web address]. By joining [employer]’s FollowMyHealth portal, you will also be able to view your health information using other applications (apps) compatible with our system. You can access the FollowMyHealth Patient Portal offered by [facility] by registering at the following website: [email or web address]. By joining [employer]’s FollowMyHealth portal, you will also be able to view your health information using other applications (apps) compatible with our system.

## 2024-03-05 NOTE — ED PROVIDER NOTE - CLINICAL SUMMARY MEDICAL DECISION MAKING FREE TEXT BOX
89-year-old male with history of A-fib on Coumadin, diabetes, BPH, CKD currently having Coumadin held for penile bleeding, brought in by EMS from nursing home for Turcios placement, urinary retention.  Patient reportedly had Turcios removed earlier for void trial but failed.  EMS reports Turcios placement was attempted at facility but unsuccessful.  Patient denies any complaints.  Denies bladder distention or pain.  Denies dysuria.  Denies dizziness chest pain or shortness of breath.  Denies fever chills nausea vomiting.    Patient well-appearing in no distress.  Vitals unremarkable except for slight tachycardia.  Abdomen soft nontender, mildly distended bladder.  Small amount of blood at penile meatus.  Likely urinary obstruction.  Will check bladder scan.  If retaining urine will place Turcios.  Laboratory ready done earlier today outpatient, copy of labs provided and reviewed.  BUN/creatinine 68/2.0.  Baseline creatinine about 1.7, reviewed from labs January 13.  Rest of electrolytes unremarkable.    Update: Bladder scan shows over 400 cc.  Turcios placed by RN without difficulty.  About 400 cc output with some blood and few clots.  Otherwise Turcios draining well.  Will discharge back to nursing home.  Follow-up with PMD/urology

## 2024-03-05 NOTE — ED PROVIDER NOTE - CARE PROVIDERS DIRECT ADDRESSES
[email or web address]

## 2024-03-05 NOTE — ED PROVIDER NOTE - PATIENT PORTAL LINK FT
You can access the FollowMyHealth Patient Portal offered by [facility] by registering at the following website: [email or web address]. By joining [employer]’s FollowMyHealth portal, you will also be able to view your health information using other applications (apps) compatible with our system.

## 2024-03-05 NOTE — ED ADULT NURSE NOTE - NSFALLUNIVINTERV_ED_ALL_ED
Bed/Stretcher in lowest position, wheels locked, appropriate side rails in place/Call bell, personal items and telephone in reach/Instruct patient to call for assistance before getting out of bed/chair/stretcher/Non-slip footwear applied when patient is off stretcher/[place] to call system/Physically safe environment - no spills, clutter or unnecessary equipment/Purposeful proactive rounding/Room/bathroom lighting operational, light cord in reach

## 2024-03-05 NOTE — ED PROVIDER NOTE - OBJECTIVE STATEMENT
89-year-old male with history of A-fib on Coumadin, diabetes, BPH, CKD currently having Coumadin held for penile bleeding, brought in by EMS from nursing home for Turcios placement, urinary retention.  Patient reportedly had Turcios removed earlier for void trial but failed.  EMS reports Turcios placement was attempted at facility but unsuccessful.  Patient denies any complaints.  Denies bladder distention or pain.  Denies dysuria.  Denies dizziness chest pain or shortness of breath.  Denies fever chills nausea vomiting.

## 2024-03-05 NOTE — ED PROVIDER NOTE - CARE PROVIDER_API CALL
Maria Del Carmen aHmmer  Urology  [address] [place] [id number]  Phone: [phone]  Fax: [phone]  Follow Up Time: 1-3 Days

## 2024-03-05 NOTE — ED PROVIDER NOTE - PHYSICAL EXAMINATION
exam:   General: well appearing, NAD.   HEENT: eyes perrl, nose normal, OP no erythema/exudate/swelling.   cor: RRR, s1s2, 2+rad pulses.   lungs: ctabl, no resp distress.   abd: soft, ntnd.   : Mildly distended bladder.  Nontender.  No CVAT.  Small amount of blood at penile meatus, blood in diaper.  neuro: a&ox3, cn2-12 intact, PEREA, 5/5 strength c nl sensation all extremities, nl coordination.   MSK: no extremity swelling.  Skin: normal, no rash

## 2024-03-08 NOTE — PHYSICAL EXAM
[General Appearance - Well Nourished] : well nourished [Normal Appearance] : normal appearance [General Appearance - Well Developed] : well developed [Well Groomed] : well groomed [Edema] : no peripheral edema [General Appearance - In No Acute Distress] : no acute distress [Exaggerated Use Of Accessory Muscles For Inspiration] : no accessory muscle use [Respiration, Rhythm And Depth] : normal respiratory rhythm and effort [Abdomen Soft] : soft [Costovertebral Angle Tenderness] : no ~M costovertebral angle tenderness [Urethral Meatus] : meatus normal [Abdomen Tenderness] : non-tender [Urinary Bladder Findings] : the bladder was normal on palpation [Testes Tenderness] : no tenderness of the testes [Skin Color & Pigmentation] : normal skin color and pigmentation [No Focal Deficits] : no focal deficits [] : no rash [Oriented To Time, Place, And Person] : oriented to person, place, and time [Affect] : the affect was normal [No Palpable Adenopathy] : no palpable adenopathy [Mood] : the mood was normal [de-identified] : in wheelchair [de-identified] : castañeda in place w/ clear urine

## 2024-03-08 NOTE — ASSESSMENT
[FreeTextEntry1] : 89M w/ urinary retention.  Failed TOV 3/5/24.   - HIE labs and notes reviewed - Continue Turcios - BMP to at rehab to assess SCr - Continue Flomax - Treat constipation - TOV in 2 week - If fails TOV, will consider UDS in the future       Lucila Littlejohn MD Chief of Urology, [address], [address] [place] [address] Phone: [phone] Fax: [phone]

## 2024-03-08 NOTE — HISTORY OF PRESENT ILLNESS
[FreeTextEntry1] : Referring Provider: ER Chief Complaint: 03/07/2q4 Date of first visit: 03/07/2024   POPPY MERCADO is a 89 year old  man with a PMHx of mechanical AVR, thoracic aortic aneurysm, afib, HTN, HLD who presents for evaluation of urinary retention. He had failed outpatient TOV and had unsuccessful attempt to replace catheter at rehab.  He then went to the ED and catheter was placed.  Pt denies any pain, sig. gross heme, or fever. He denies hx of retention or prior UTIs.  He is on Flomax    PMHx: mechanical AVR, thoracic aortic aneurysm, afib, HTN, HLD SxHx: FHx: SocHx: non-smoker Allergies: NKDA   The patient denies fevers, chills, nausea and or vomiting and no unexplained weight loss. All pertinent laboratory, films and physician notes were reviewed.

## 2024-03-25 PROBLEM — R53.83 LETHARGY: Status: ACTIVE | Noted: 2024-01-01

## 2024-03-25 PROBLEM — N30.00 ACUTE CYSTITIS WITHOUT HEMATURIA: Status: ACTIVE | Noted: 2024-01-01

## 2024-03-25 PROBLEM — K59.00 CONSTIPATION: Status: ACTIVE | Noted: 2024-01-01

## 2024-03-25 NOTE — H&P ADULT - HISTORY OF PRESENT ILLNESS
89-year-old male past medical history osteomyelitis, hypertension, UTIs with chronic Turcios, CKD, anemia.  Patient is presenting after being seen at the urology clinic today.  According to the transfer information the patient's had a significant decline in his mental status over the last couple weeks.  He normally is alert and conversant and now he is lethargic and does not respond to questions.  They did a Turcios change in the clinic where they were concerned that his urine was grossly infected.  Patient also has an inguinal hernia that is reported to be erythematous and tender. pt is A&OX1. poor historian. of note, pt recently admitted to  and transferred to [facility] (12/31-1/13) for vascular consult as pt was found to have a left foot wound with no pulses on dopplers; at [facility], pt was found to have DP pulses present and deemed no evidence OM and discharged to [facility] afer 1 week for cefepime.     In the ED, vitals T99.1F, , /72, RR20, satting 91% on RA

## 2024-03-25 NOTE — ED PROVIDER NOTE - CARE PLAN
1 Principal Discharge DX:	Severe sepsis  Secondary Diagnosis:	Atrial fibrillation with RVR  Secondary Diagnosis:	Acute UTI

## 2024-03-25 NOTE — ED ADULT TRIAGE NOTE - CHIEF COMPLAINT QUOTE
Pt from outpatient urology, c/o AMS. Per urology RN pt has a urinary catheter that was changed today which he has for urinary retention. Dr. Littlejohn noticed that he is not at his baseline mental status, however unknown what his baseline is. Unknown when LKW is. Pt not answering questions appropriately. Noted to by hypotensive in triage and brought into pt room. Pt normally resides at [facility].

## 2024-03-25 NOTE — H&P ADULT - ASSESSMENT
89-year-old male past medical history osteomyelitis, hypertension, UTIs with chronic Turcios, CKD, anemia here for    #Sepsis  #UTI  -Admit to Telemetry  -, WBC 21.22 in ED  -UA showing large leuks, large blood  -Turcios changed in urology clinic 3/25  -s/p rocephin in ED  -s/p 30cc/kg NS in ED  -will start Zosyn  -will cont maintenance fluids   -F/U UCx and BCx   -CT Abd/pel pending to r/o kidney stone and evaluate hernia     #Left Foot Wound  -copious yellow discharge from wound  -concern for OM  -Zosyn  -ID consult   -podiatry consult    #A-fib  #Supratherapeutic INR   -HR 140s in ER  -will give 1 dose lopressor   -likely exacerbated by infxn   -hold Coumadin as INR 5.04; daily INRs    #Anemia  -Hgb 9.8  -monitor and trend   -XRAY left foot pending     #Acute on CKD  -Cr 1.8 in ED; baseline 1.7  -fluids     #DM2  -last a1c 6.7   -BGM 260s in ED  -ISS for now     #HLD  -cont lipitor    #DVT PPX  -hold coumadin for now; daily INRs    #DNR/DNI  -was only able to get in touch with daughter in law who states son is more familiar with pt's care and HCP but he is at work and unable to get in contact with him until he gets home around 4:30-5PM; will call during those times to touch base with son    Case discussed with Dr Meyers

## 2024-03-25 NOTE — PATIENT PROFILE ADULT - FALL HARM RISK - HARM RISK INTERVENTIONS

## 2024-03-25 NOTE — ED ADULT NURSE NOTE - NSICDXPASTMEDICALHX_GEN_ALL_CORE_FT
PAST MEDICAL HISTORY:  Chronic UTI     History of osteomyelitis     HLD (hyperlipidemia)     HTN (hypertension)     Paroxysmal atrial fibrillation

## 2024-03-25 NOTE — ED ADULT TRIAGE NOTE - BP NONINVASIVE DIASTOLIC (MM HG)
Detail Level: Zone Text: The above diagnosis and findings were noted on the exam but not discussed at this time with the patient. 48

## 2024-03-25 NOTE — HISTORY OF PRESENT ILLNESS
[FreeTextEntry1] : Referring Provider: ER Chief Complaint: Urinary retention Date of first visit: 03/07/2024  POPPY MERCADO is a 89 year old  man with a PMHx of mechanical AVR, thoracic aortic aneurysm, afib, HTN, HLD who presents for evaluation of urinary retention. He had failed outpatient TOV and had unsuccessful attempt to replace catheter at rehab. He then went to the ED and catheter was placed. Pt denies any pain, sig. gross heme, or fever. He denies hx of retention or prior UTIs. He was on Flomax.    Patient returned to the office 3/25/2024 for attempted trial of void.  Patient significantly altered and lethargic compared to initial meeting 2 weeks ago.  Urine appears grossly infected and external portion of the catheter appeared dirty.    CT Hx:  -c/a/p w/o (01/10/24): ADRENALS: Within normal limits; KIDNEYS/URETERS: Within normal limits; BLADDER: Turcios catheter with thick-walled, trabeculated bladder; REPRODUCTIVE ORGANS: Markedly enlarged prostate gland; BOWEL: High attenuation intraluminal contents within the stomach; Redundant sigmoid colon, with suggestion of long segment of bowel wall thickening, extending to the rectosigmoid colon. Colonic fecal retention rectosigmoid colon, without bowel obstruction; Evaluation of the bowel wall is limited without intravenous contrast; Appendix is within normal limits.  PMHx: mechanical AVR, thoracic aortic aneurysm, afib, HTN, HLD SocHx: non-smoker Allergies: NKDA  The patient denies fevers, chills, nausea and or vomiting and no unexplained weight loss. All pertinent laboratory, films and physician notes were reviewed.

## 2024-03-25 NOTE — H&P ADULT - NSHPPHYSICALEXAM_GEN_ALL_CORE
Vitals  T(F): 99.1 (03-25-24 @ 11:20), Max: 99.1 (03-25-24 @ 11:20)  HR: 141 (03-25-24 @ 13:00) (95 - 141)  BP: 110/72 (03-25-24 @ 13:00) (80/48 - 110/72)  RR: 20 (03-25-24 @ 13:00) (18 - 22)  SpO2: 95% (03-25-24 @ 13:00) (85% - 97%)    PHYSICAL EXAM   Gen: NAD, comfortable, AA&Ox1  HEENT: head atrumatic and normocephalic, PEERLA, EOMI,  no gross abnormalities of ears, mucous membranes moist, no oral lesions, neck supple without masses/goiter/lymphadenopathy, no JVD  CVS: +s1, s2, tachycardic, no murmurs, r  Pulmonary: normal respiratory effort, clear to auscultation b/l, no wheezes/crackles/rhonchi  Abdomen: soft, slight TTP throughout abdomen, non-distended, +bowel sounds in all 4 quadrants, no masses noted, no guarding or rigidity. +castañeda. +left inguinia hernia though no evidence of erythema or TTP.   Back: no scoliosis, lordosis, or kyphosis, no point tenderness, no CVA tenderness   Extremities: no pedal edema, cold extremities. unable to palpate DP pulses   Skin: warm and dry.+wound with yellow copious discharge on left foot.  Neuro: 5/5 strength in upper and lower extremities bilaterally, sensation intact in upper and lower extremities bilaterally.

## 2024-03-25 NOTE — H&P ADULT - ATTENDING COMMENTS
Pt seen and examined at bedside.  No acute events overnight    Pt w/ hx of Dementia, chronic castañeda catheter, presented to the ED from Urology office, due to AMS, noted to have severe sepsis secondary to likely Urinary source, however, also noted to have left foot wound concerning for OM.    Vitals reviewed; Hypotensive, tachycardic  Physical exam  NAD ill confused appearing elderly male  Irregularly irregular HR, uncontrolled rate  Decreased breath sounds b/l no wheezing nonlabored breathing  Left foot wound w/ yellow drainage, unable to differentiate from purulent vs fibrinous exudate   Labs/imaging reviewed    Pt w/ severe sepsis secondary to multiple potential sources UTI, Infected wound w/ underlying OM, as well as possible Colitis  Continue Zosyn  Fluid resuscitation. If BP does not improve or respond to IVF, will need to consult ICU for possible Pressor support    Monitor

## 2024-03-25 NOTE — ED PROVIDER NOTE - PROGRESS NOTE DETAILS
Repeat evaluation showed that the patient's heart rate did improve with IV fluids as it is blood pressure.  Patient was found to be septic secondary to a UTI.  He was given antibiotics.  All cultures were not able to be obtained to see what he is susceptible to.  There is signs of endorgan damage with the rapid A-fib so this will qualify as severe sepsis.  His fluids did improve his blood pressure so not septic shock.  Will repeat lactate.  Admitted to the hospitalist.

## 2024-03-25 NOTE — ED PROVIDER NOTE - OBJECTIVE STATEMENT
89-year-old male past medical history osteomyelitis, hypertension, UTIs with chronic Turcios, CKD, anemia.  Patient is presenting after being seen at the urology clinic today.  Patient is at [facility] rehab where he came for a routine Turcios check.  According to the transfer information the patient's had a significant decline in his mental status over the last couple weeks.  He normally is alert and conversant and now he is lethargic and does not respond to questions.  They did a Turcios change in the clinic where they were concerned that his urine was grossly infected.  Patient also has an inguinal hernia that is reported to be erythematous and tender.  Patient was sent down to the emergency department for evaluation.  The patient is unable to provide any history secondary to his mental status.

## 2024-03-25 NOTE — ED PROVIDER NOTE - NSTOBACCOUNKNOWNSMK_GEN_A_CORE_RD
Subjective:       Patient ID:  Boone Hilario is a 75 y.o. male who presents for   Chief Complaint   Patient presents with    Lesion     follow up, still some rough spots     Pt is here today for a follow up on skin lesions on his scalp. Pt used EFUDEX 5% cream BID for 2 weeks and 2 days. Pt states that he still feels some rough spots on his scalp.   He also complains of some acne-like spots under his arms that come and go. No drainage, no pain.      Lesion  - Follow-up  Symptom course: improving  Affected locations: scalp  Signs / symptoms: redness and rough  Severity: mild to moderate    Spot  - Initial  Affected locations: left axilla and right axilla  Duration: 1 week  Signs / symptoms: asymptomatic and redness  Severity: mild  Timing: intermittent  Aggravated by: nothing  Relieving factors/Treatments tried: nothing      Review of Systems   Constitutional: Negative for fever and chills.   Skin: Positive for activity-related sunscreen use and wears hat. Negative for recent sunburn.        Objective:    Physical Exam   Constitutional: He appears well-developed and well-nourished. No distress.   HENT:   Mouth/Throat: Lips normal.    Eyes: Lids are normal.  No conjunctival no injection.   Neurological: He is alert and oriented to person, place, and time. He is not disoriented.   Psychiatric: He has a normal mood and affect.   Skin:   Areas Examined (abnormalities noted in diagram):   Scalp / Hair Palpated and Inspected  Head / Face Inspection Performed  Neck Inspection Performed  Chest / Axilla Inspection Performed  RUE Inspected  LUE Inspection Performed                   Diagram Legend     Erythematous scaling macule/papule c/w actinic keratosis       Vascular papule c/w angioma      Pigmented verrucoid papule/plaque c/w seborrheic keratosis      Yellow umbilicated papule c/w sebaceous hyperplasia      Irregularly shaped tan macule c/w lentigo     1-2 mm smooth white papules consistent with Milia      Movable  subcutaneous cyst with punctum c/w epidermal inclusion cyst      Subcutaneous movable cyst c/w pilar cyst      Firm pink to brown papule c/w dermatofibroma      Pedunculated fleshy papule(s) c/w skin tag(s)      Evenly pigmented macule c/w junctional nevus     Mildly variegated pigmented, slightly irregular-bordered macule c/w mildly atypical nevus      Flesh colored to evenly pigmented papule c/w intradermal nevus       Pink pearly papule/plaque c/w basal cell carcinoma      Erythematous hyperkeratotic cursted plaque c/w SCC      Surgical scar with no sign of skin cancer recurrence      Open and closed comedones      Inflammatory papules and pustules      Verrucoid papule consistent consistent with wart     Erythematous eczematous patches and plaques     Dystrophic onycholytic nail with subungual debris c/w onychomycosis     Umbilicated papule    Erythematous-base heme-crusted tan verrucoid plaque consistent with inflamed seborrheic keratosis     Erythematous Silvery Scaling Plaque c/w Psoriasis     See annotation      Assessment / Plan:        Actinic keratosis  Cryosurgery procedure note:  Risk, benefits, and alternatives of cryosurgery are discussed with the patient, including risk of hypo- or hyperpigmentation, scar, infection, recurrence, and need for additional treatment of site. Verbal consent obtained from patient. Liquid nitrogen cryosurgery applied to 4 lesion(s) to produce a freeze injury. Counseled patient that blisters may form and instructed patient on wound care with gentle cleansing and use of Vaseline ointment to keep moist until healed. Handout was provided, and patient was instructed to return to clinic in 1-2 months if lesions do not completely resolve.    Bacterial folliculitis + Suspected carrier of methicillin susceptible Staphylococcus aureus (MSSA)  Use mupirocin 2% ointment to affected areas TID x 1-2 weeks.  Recommended washing body with Hibiclens solution for 5-10 minutes daily until  clear. Do not allow contact with eyes or ears.    Postinflammatory pigmentary changes  This is a normal discoloration of the skin after an inflammatory process has resolved. It may take months to years to fade.  Patient instructed on importance of daily sun protection with a broad-spectrum sunscreen of SPF 30 or higher. Sun avoidance and topical protection discussed.   Patient encouraged to wear hat for all outdoor exposure.    Follow-up in about 6 months (around 4/22/2019) for or sooner if symptoms worsening or not improving.   Cognitively Impaired

## 2024-03-25 NOTE — H&P ADULT - NSHPPOADEEPVENOUSTHROMB_GEN_A_CORE
Render Risk Assessment In Note?: no
no
Detail Level: Simple
Additional Notes: Pt will schedule excision.\\nPt is already on a short course Cefadroxil  for another issue which should also treat inflammation of cyst

## 2024-03-25 NOTE — CONSULT NOTE ADULT - SUBJECTIVE AND OBJECTIVE BOX
S : 89y year old Male seen at bedside for Left foot ulceration.  Patient is a poor historian.    Chief Complaint : Patient is a 89y old  Male who presents with a chief complaint of lethargy. AMS (25 Mar 2024 20:29)    HPI : HPI:  89-year-old male past medical history osteomyelitis, hypertension, UTIs with chronic Turcios, CKD, anemia.  Patient is presenting after being seen at the urology clinic today.  According to the transfer information the patient's had a significant decline in his mental status over the last couple weeks.  He normally is alert and conversant and now he is lethargic and does not respond to questions.  They did a Turcios change in the clinic where they were concerned that his urine was grossly infected.  Patient also has an inguinal hernia that is reported to be erythematous and tender. pt is A&OX1. poor historian. of note, pt recently admitted to  and transferred to [facility] (12/31-1/13) for vascular consult as pt was found to have a left foot wound with no pulses on dopplers; at [facility], pt was found to have DP pulses present and deemed no evidence OM and discharged to [facility] afer 1 week for cefepime.     In the ED, vitals T99.1F, , /72, RR20, satting 91% on RA (25 Mar 2024 13:00)      Patient admits to  (-) Fevers, (-) Chills, (-) Nausea, (-) Vomiting, (-) Shortness of Breath      PMH: HTN (hypertension)    HLD (hyperlipidemia)    Paroxysmal atrial fibrillation    Chronic UTI    History of osteomyelitis      PSH:H/O aortic valve repair    H/O thoracic aortic aneurysm repair        Allergies:No Known Allergies      Labs:                          9.8    21.22 )-----------( 127      ( 25 Mar 2024 11:30 )             30.3     WBC Trend  21.22<H> Date (03-25 @ 11:30)      Chem  03-25    130<L>  |  99  |  56<H>  ----------------------------<  267<H>  5.0   |  23  |  1.86<H>    Ca    8.5      25 Mar 2024 11:30    TPro  6.5  /  Alb  1.8<L>  /  TBili  0.5  /  DBili  x   /  AST  29  /  ALT  26  /  AlkPhos  86  03-25          T(F): 97.9 (03-25-24 @ 15:50), Max: 99.1 (03-25-24 @ 11:20)  HR: 79 (03-25-24 @ 15:50) (79 - 141)  BP: 98/48 (03-25-24 @ 15:50) (80/48 - 115/95)  RR: 20 (03-25-24 @ 15:50) (18 - 22)  SpO2: 97% (03-25-24 @ 15:50) (85% - 100%)  Wt(kg): --    O:   General: Pleasant  male NAD & AOX3.    Integument:  Skin warm, dry and supple bilateral.    Ulceration dorusm of Left footL: negative hyperkeratotic border, wound base fibrotic , wound size (4.1 cm X 4.0 cm X 0.2cm) negative  edema, negative  anjel-wound erythema, negative  purulence, negative  fluctuance, negative  tracking/tunneling, negative  probe to bone. No clinical signs of infection.  Vascular: Dorsalis Pedis and Posterior Tibial pulses 1/4.  Capillary re-fill time less then 3 seconds digits 1-5 bilateral.      A: Left foot ulceration      P:   Chart reviewed and Patient evaluated  Discussed diagnosis and treatment with patient  Applied betadine with dry sterile dressing  X-rays pending  Offloading to bilateral Heels in bed  Recommend wound care consult   No surgical intervention needed

## 2024-03-25 NOTE — ED PROVIDER NOTE - PHYSICAL EXAMINATION
Vitals: I have reviewed the patients vital signs  General: Ill-appearing  HEENT: Atraumatic, normocephalic, airway patent  Eyes: EOMI, tracking appropriately  Neck: no tracheal deviation  Chest/Lungs: no trauma, symmetric chest rise, speaking in complete sentences,  no resp distress  Heart: skin and extremities well perfused, tachycardic irregular rate and rhythm  Neuro: Groaning unable to participate in exam  MSK: no deformities  Skin: no cyanosis, no jaundice   Abdomen: Soft tender palpation throughout no rebound no guarding  : Turcios catheter in place.  There is a left inguinal hernia extending into the scrotum.  The skin is discolored however not erythematous and not warm.

## 2024-03-25 NOTE — ASSESSMENT
[FreeTextEntry1] : 89M w/ urinary retention. Failed TOV 3/5/24.  Patient acutely altered with concerns for UTI versus other source of infection.  Also with tender left inguinal hernia.  - HIE labs and notes reviewed - Turcios catheter exchanged 3/25/2024 - Ucx drawn off of new catheter; bladder was rinsed with dilute solution of Betadine, so Ucx may result is negative but would empirically treat for complicated UTI in light of how urine appeared in patient's clinical status - BMP showed serum creatinine at baseline - Continue Flomax - Treat constipation - Discussed his status with [facility] RN, Dr. John Reyes who has seen the patient in [facility], and his daughter-in-law Latoya - Patient being sent to ER for further evaluation and care; ER attending aware   Lucila Littlejohn MD Chief of Urology, [address], [address] [place] [address] Phone: [phone] Fax: [phone]

## 2024-03-25 NOTE — ED PROVIDER NOTE - CRITICAL CARE ATTENDING CONTRIBUTION TO CARE
Upon my evaluation, this patient had a high probability of imminent or life-threatening deterioration due to sepsis, afib with RVR, hypotension ,AMS, which required my direct attention, intervention, and personal management.  The patient has a  medical condition that impairs one or more vital organ systems.  Frequent personal assessment and adjustment of medical interventions was performed.      I have personally provided 75 minutes of critical care time exclusive of time spent on separately billable procedures. Time includes review of laboratory data, radiology results, discussion with consultants, patient and family; monitoring for potential decompensation, as well as time spent retrieving data and reviewing the chart and documenting the visit. Interventions were performed as documented above.

## 2024-03-25 NOTE — ED PROVIDER NOTE - CONVERSATION DETAILS
Unable to review at this time secondary to patient's mental status.  Reviewed his chart and he is a DO NOT RESUSCITATE

## 2024-03-25 NOTE — ED PROVIDER NOTE - CLINICAL SUMMARY MEDICAL DECISION MAKING FREE TEXT BOX
89-year-old male past medical history is rather extensive including a indwelling Turcios presenting with altered mental status.  He is ready had his Turcios changed this afternoon.  The differential includes but is not limited to sepsis secondary to UTI, uremia, electrolyte abnormalities, metabolic encephalopathy, ischemic bowel.  Patient will require a broad workup.  Will require cross-sectional imaging as well once his creatinine is back and we can determine whether he needs contrasted a noncontrasted scan.  Will give broad-spectrum antibiotics on arrival for presumed infection and patient's hypotension.  He is also in A-fib with RVR which needs to be treated first with IV fluids to see if that is the etiology.  If after proper IV hydration he remains in A-fib with RVR will give rate control medications.

## 2024-03-25 NOTE — ED ADULT NURSE NOTE - OBJECTIVE STATEMENT
Pt came from outpt urology for AMS. Resides at [facility]. Brought in for AMS with unknown LKW. Hx HTN, afib, chronic castañeda, CKD, anemia and osteomyelitis. Per urology team, pts baseline is alert and talkative. On arrival, pt a& o x 0 and lethargic. BP= 80/48 with rectal temp= 99.1 F. Pt with 18 Tunisian coude that was changed by urology team PTA.

## 2024-03-25 NOTE — ED ADULT NURSE NOTE - NSFALLHARMRISKINTERV_ED_ALL_ED

## 2024-03-25 NOTE — PHYSICAL EXAM
[General Appearance - Well Developed] : well developed [General Appearance - Well Nourished] : well nourished [Normal Appearance] : normal appearance [Well Groomed] : well groomed [General Appearance - In No Acute Distress] : no acute distress [Edema] : no peripheral edema [] : no respiratory distress [Exaggerated Use Of Accessory Muscles For Inspiration] : no accessory muscle use [Respiration, Rhythm And Depth] : normal respiratory rhythm and effort [Abdomen Soft] : soft [Costovertebral Angle Tenderness] : no ~M costovertebral angle tenderness [Abdomen Tenderness] : non-tender [Urethral Meatus] : meatus normal [Penis Abnormality] : normal uncircumcised penis [Normal Station and Gait] : the gait and station were normal for the patient's age [Urinary Bladder Findings] : the bladder was normal on palpation [Skin Color & Pigmentation] : normal skin color and pigmentation [No Focal Deficits] : no focal deficits [No Palpable Adenopathy] : no palpable adenopathy [de-identified] : Phimosis, left inguinal hernia, left groin swollen with erythema tracking down to left hemiscrotum, there is no firm mass and no crepitus palpated [de-identified] : ANO x 0, unable to track voice
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
## 2024-03-26 NOTE — DIETITIAN NUTRITION RISK NOTIFICATION - TREATMENT: THE FOLLOWING DIET HAS BEEN RECOMMENDED
Diet, Soft and Bite Sized:   Supplement Feeding Modality:  Oral  Ensure Plus High Protein Cans or Servings Per Day:  1       Frequency:  Two Times a day (03-26-24 @ 09:22) [Pending Verification By Attending]  Diet, Soft and Bite Sized (03-25-24 @ 18:00) [Active]

## 2024-03-26 NOTE — DIETITIAN INITIAL EVALUATION ADULT - ADD RECOMMEND
1. Add Ensure Plus High Protein 8oz PO BID (Provides 700kcal & 40grams of Protein)   2. Provide ongoing assistance with meals  3. Recommend MVI, ascorbic acid (500 mg), & zinc sulfate (220 mg) to promote wound healing   4. Monitor daily PO intakes, GI tolerance, labs, weights, skin integrity, & BM regularity

## 2024-03-26 NOTE — DIETITIAN INITIAL EVALUATION ADULT - NSFNSPHYEXAMSKINFT_GEN_A_CORE
Pressure Injury 1: Left:, upper, foot, Unstageable  Pressure Injury 2: Left:, shoulder, Unstageable  Pressure Injury 3: Bilateral:, heel, Stage I  Pressure Injury 4: none, none  Pressure Injury 5: none, none  Pressure Injury 6: none, none  Pressure Injury 7: none, none  Pressure Injury 8: none, none  Pressure Injury 9: none, none  Pressure Injury 10: none, none  Pressure Injury 11: none, none

## 2024-03-26 NOTE — DISCHARGE NOTE FOR THE EXPIRED PATIENT - HOSPITAL COURSE
HPI:  89-year-old male past medical history osteomyelitis, hypertension, UTIs with chronic Turcios, CKD, anemia.  Patient is presenting after being seen at the urology clinic today.  According to the transfer information the patient's had a significant decline in his mental status over the last couple weeks.  He normally is alert and conversant and now he is lethargic and does not respond to questions.  They did a Turcios change in the clinic where they were concerned that his urine was grossly infected.  Patient also has an inguinal hernia that is reported to be erythematous and tender. pt is A&OX1. poor historian. of note, pt recently admitted to  and transferred to \[facility]\"" (12/31-1/13) for vascular consult as pt was found to have a left foot wound with no pulses on dopplers; at \[facility]\"", pt was found to have DP pulses present and deemed no evidence OM and discharged to [facility] afer 1 week for cefepime.     In the ED, vitals T99.1F, , /72, RR20, satting 91% on RA (25 Mar 2024 13:00)    Patient was started on IV abx, zosyn was started and ID consulted. Noted to have bactermia. RRT called for agonal breathing. Patient is DNR/DNI. Pronounced at 1:14pm HPI:  89-year-old male past medical history osteomyelitis, hypertension, UTIs with chronic Turcios, CKD, anemia.  Patient is presenting after being seen at the urology clinic today.  According to the transfer information the patient's had a significant decline in his mental status over the last couple weeks.  He normally is alert and conversant and now he is lethargic and does not respond to questions.  They did a Turcios change in the clinic where they were concerned that his urine was grossly infected.  Patient also has an inguinal hernia that is reported to be erythematous and tender. pt is A&OX1. poor historian. of note, pt recently admitted to  and transferred to [facility] (12/31-1/13) for vascular consult as pt was found to have a left foot wound with no pulses on dopplers; at [facility], pt was found to have DP pulses present and deemed no evidence OM and discharged to [facility] afer 1 week for cefepime.     In the ED, vitals T99.1F, , /72, RR20, satting 91% on RA (25 Mar 2024 13:00)    Patient was started on IV abx, zosyn was started and ID consulted. Podiatry was consulted and saw the ulcer on his left foot. Patient noted to have bacteremic with gram positive cocci. RRT called for agonal breathing. Patient is DNR/DNI and oxygen given for comfort. Patient was seen and examined at bedside. Patient noticed to be asystole on monitor. He is not breathing and Blood Pressure unrecordable.  There is no visible respiratory movements, no palpable pulse, no spontaneous movements.  PMD's contacted at 1:30pm  Informed the family, no autopsy requested, they will come to the bedside  Patient septic and +ve blood cultures.  Final cause of death:  Cardiopulmonary arrest due to bacteremia. Source unknown

## 2024-03-26 NOTE — DIETITIAN INITIAL EVALUATION ADULT - OTHER INFO
Patient with fair appetite, consuming 51-75% of meals at this time per PCA. T2DM noted. A1C: 7.1% (1/1/24). Addressed with [facility]. Stage I pressure injury noted at b/l heel, unstagable @ left shoulder and left top foot. 3+ edema noted at b/l foot.

## 2024-03-26 NOTE — PROGRESS NOTE ADULT - SUBJECTIVE AND OBJECTIVE BOX
CC: Patient is a 89y old  Male who presents with a chief complaint of lethargy. AMS (25 Mar 2024 20:29)      Interval History:  Patient seen and examined at bedside.  No overnight events  Arousable but no complaints of pain    ALLERGIES:  No Known Allergies    MEDICATIONS  (STANDING):  atorvastatin 10 milliGRAM(s) Oral at bedtime  dextrose 5%. 1000 milliLiter(s) (100 mL/Hr) IV Continuous <Continuous>  dextrose 5%. 1000 milliLiter(s) (50 mL/Hr) IV Continuous <Continuous>  dextrose 50% Injectable 25 Gram(s) IV Push once  dextrose 50% Injectable 12.5 Gram(s) IV Push once  dextrose 50% Injectable 25 Gram(s) IV Push once  glucagon  Injectable 1 milliGRAM(s) IntraMuscular once  insulin lispro (ADMELOG) corrective regimen sliding scale   SubCutaneous three times a day before meals  piperacillin/tazobactam IVPB.. 3.375 Gram(s) IV Intermittent every 8 hours  tamsulosin 0.4 milliGRAM(s) Oral at bedtime    MEDICATIONS  (PRN):  acetaminophen     Tablet .. 650 milliGRAM(s) Oral every 6 hours PRN Temp greater or equal to 38C (100.4F), Mild Pain (1 - 3)  aluminum hydroxide/magnesium hydroxide/simethicone Suspension 30 milliLiter(s) Oral every 4 hours PRN Dyspepsia  dextrose Oral Gel 15 Gram(s) Oral once PRN Blood Glucose LESS THAN 70 milliGRAM(s)/deciliter  melatonin 3 milliGRAM(s) Oral at bedtime PRN Insomnia  ondansetron Injectable 4 milliGRAM(s) IV Push every 8 hours PRN Nausea and/or Vomiting    Vital Signs Last 24 Hrs  T(F): 97.8 (26 Mar 2024 05:00), Max: 99.1 (25 Mar 2024 11:20)  HR: 99 (26 Mar 2024 05:00) (79 - 141)  BP: 108/65 (26 Mar 2024 05:00) (80/48 - 115/95)  RR: 19 (26 Mar 2024 05:00) (18 - 22)  SpO2: 100% (26 Mar 2024 05:00) (85% - 100%)  I&O's Summary    25 Mar 2024 07:01  -  26 Mar 2024 07:00  --------------------------------------------------------  IN: 375 mL / OUT: 600 mL / NET: -225 mL    26 Mar 2024 07:01  -  26 Mar 2024 10:53  --------------------------------------------------------  IN: 420 mL / OUT: 0 mL / NET: 420 mL      BMI (kg/m2): 25.1 (03-25-24 @ 11:10)    PHYSICAL EXAM:  GENERAL: NAD  NERVOUS SYSTEM:  CN II - XII intact; Sensation intact; follows commands  CHEST/LUNG: Clear to percussion bilaterally; No rales, rhonchi, wheezing, or rubs; normal respiratory effort, no intercostal retractions  HEART: Regular rate and rhythm; No murmurs, rubs, or gallops; No pitting edema  ABDOMEN: Soft, Nontender, Nondistended; Bowel sounds present; No HSM or masses  MUSCULOSKELETAL/EXTREMITIES:  2+ Peripheral Pulses, No clubbing or digital cyanosis; FROM of extremeties (pain, crepitation or contracture)  PSYCH: Appropriate affect, Alert & Oriented x 3, Good Memory; Good insight    LABS:                        9.3    12.28 )-----------( 178      ( 26 Mar 2024 07:02 )             29.3       03-26    137  |  106  |  51  ----------------------------<  150  4.5   |  19  |  1.76    Ca    8.1      26 Mar 2024 07:02    TPro  6.3  /  Alb  1.6  /  TBili  0.5  /  DBili  x   /  AST  28  /  ALT  25  /  AlkPhos  81  03-26       PT/INR - ( 26 Mar 2024 07:02 )   PT: 54.2 sec;   INR: 4.85 ratio         PTT - ( 25 Mar 2024 11:30 )  PTT:52.1 sec   Lactate, Blood: 2.0 mmol/L (03-25 @ 13:40)  Lactate, Blood: 2.8 mmol/L (03-25 @ 11:30)    01-01 Chol 129 mg/dL LDL -- HDL 26 mg/dL Trig 127 mg/dL    POCT Blood Glucose.: 148 mg/dL (26 Mar 2024 08:00)  POCT Blood Glucose.: 153 mg/dL (25 Mar 2024 21:59)  POCT Blood Glucose.: 170 mg/dL (25 Mar 2024 17:28)  POCT Blood Glucose.: 210 mg/dL (25 Mar 2024 14:13)  POCT Blood Glucose.: 298 mg/dL (25 Mar 2024 11:02)    Urinalysis Basic - ( 26 Mar 2024 07:02 )  Color: x / Appearance: x / SG: x / pH: x  Gluc: 150 mg/dL / Ketone: x  / Bili: x / Urobili: x   Blood: x / Protein: x / Nitrite: x   Leuk Esterase: x / RBC: x / WBC x   Sq Epi: x / Non Sq Epi: x / Bacteria: x    Culture - Blood (collected 25 Mar 2024 11:30)  Source: .Blood Blood-Peripheral  Gram Stain (26 Mar 2024 06:27):    Growth in aerobic bottle: Gram positive cocci in pairs  Preliminary Report (26 Mar 2024 06:28):    Growth in aerobic bottle: Gram positive cocci in pairs    Culture - Blood (collected 25 Mar 2024 11:25)  Source: .Blood Blood-Peripheral  Gram Stain (26 Mar 2024 07:53):    Growth in aerobic bottle: Gram positive cocci in pairs    Growth in anaerobic bottle: Gram positive cocci in pairs  Preliminary Report (26 Mar 2024 07:53):    Growth in aerobic bottle: Gram positive cocci in pairs    Growth in anaerobic bottle: Gram positive cocci in pairs    Direct identification is available within approximately 3-5    hours either by Blood Panel Multiplexed PCR or Direct    MALDI-TOF. Details: [email or web address]  Organism: Blood Culture PCR (26 Mar 2024 07:32)  Organism: Blood Culture PCR (26 Mar 2024 07:32)      Method Type: PCR      -  Enterococcus faecalis: Detec    Care Discussed with Consultants/Other Providers: Yes

## 2024-03-26 NOTE — PROGRESS NOTE ADULT - ASSESSMENT
89-year-old male past medical history osteomyelitis, hypertension, UTIs with chronic Turcios, CKD, anemia here for    #Sepsis due to UTI and Bacteremia  #Acute Metabolic Encephalopathy likely due to sepsis  -, WBC 21.22 in ED  -UA showing large leuks, large blood  -Turcios changed in urology clinic 3/25  -s/p rocephin in ED, s/p 30cc/kg NS in ED  -Continue Zosyn  -Stop maintenance fluids as he is tachypneic   -F/U UCx  -BCx with GPC in pairs  -CT Abd/pel pending to r/o kidney stone and evaluate hernia     #Left Foot Wound  -copious yellow discharge from wound  -Continue Zosyn  -ID consult on board   -Podiatry note appreciated  -Wound Care consult in place    #Chronic A-fib  #Supratherapeutic INR   -HR 140s in ER  -will give 1 dose lopressor   -likely exacerbated by infxn   -hold Coumadin as INR still elevated; daily INRs    #Anemia  -Hgb 9.8  -monitor and trend     #CKD III  -baseline 1.7  -Encourage oral intake  -Avoid nephrotoxic agents  -Monitor BMP as per routine     #DM2  -last a1c 7.1 (1/2024)  -ISS for now     #HLD  -continue lipitor    #DVT PPX  -hold coumadin for now; daily INRs  -coumadin 2.5mg    #DNR/DNI  -left VM for son franny Mcfadden is available around 4:30-5PM  [phone]        89-year-old male past medical history osteomyelitis, hypertension, UTIs with chronic Turcios, CKD, anemia here for    #Sepsis due to UTI and Bacteremia and Gram Negative Pneumonia  #Acute Metabolic Encephalopathy likely due to sepsis  -, WBC 21.22 in ED  -UA showing large leuks, large blood  -Turcios changed in urology clinic 3/25  -s/p rocephin in ED, s/p 30cc/kg NS in ED  -Continue Zosyn  -Stop maintenance fluids as he is tachypneic   -F/U UCx  -BCx with GPC in pairs  -CXR: Increasing bilateral infiltrates left greater than right.  -Duonebs PRN  -CT Abd/pel pending to r/o kidney stone and evaluate hernia     #Left Foot Wound  -copious yellow discharge from wound  -Continue Zosyn  -ID consult on board   -Podiatry note appreciated  -Wound Care consult in place    #Chronic A-fib  #Supratherapeutic INR   -HR 140s in ER  -will give 1 dose lopressor   -likely exacerbated by infxn   -hold Coumadin as INR still elevated; daily INRs    #Anemia  -Hgb 9.8  -monitor and trend     #CKD III  -baseline 1.7  -Encourage oral intake  -Avoid nephrotoxic agents  -Monitor BMP as per routine     #DM2  -last a1c 7.1 (1/2024)  -ISS for now     #HLD  -continue lipitor    #DVT PPX  -hold coumadin for now; daily INRs  -coumadin 2.5mg at home    #DNR/DNI  -left VM for son but Bogdan is available around 4:30-5PM  [phone]        89-year-old male past medical history osteomyelitis, hypertension, UTIs with chronic Turcios, CKD, anemia here for    #Sepsis due to UTI and Gram Positive Bacteremia and possible Gram Negative Pneumonia  #Acute Metabolic Encephalopathy likely due to sepsis  -, WBC 21.22 in ED  -UA showing large leuks, large blood  -Turcios changed in urology clinic 3/25  -s/p rocephin in ED, s/p 30cc/kg NS in ED  -Continue Zosyn  -Stop maintenance fluids as he is tachypneic   -F/U UCx  -BCx with GPC in pairs  -CXR: Increasing bilateral infiltrates left greater than right.  -Duonebs PRN  -CT Abd/pel pending to r/o kidney stone and evaluate hernia     #Left Foot Wound  -copious yellow discharge from wound  -Continue Zosyn  -ID consult on board   -Podiatry note appreciated  -Wound Care consult in place    #Chronic A-fib  #Supratherapeutic INR   -HR 140s in ER  -will give 1 dose lopressor   -likely exacerbated by infxn   -hold Coumadin as INR still elevated; daily INRs    #Anemia  -Hgb 9.8  -monitor and trend     #CKD III  -baseline 1.7  -Encourage oral intake  -Avoid nephrotoxic agents  -Monitor BMP as per routine     #DM2  -last a1c 7.1 (1/2024)  -ISS for now     #HLD  -continue lipitor    #DVT PPX  -hold coumadin for now; daily INRs  -coumadin 2.5mg at home    #DNR/DNI  -left VM for son but Bogdan is available around 4:30-5PM  [phone]      Updated son around noon and informed of bacteremia and still lethargic but responsive. He reports any information can be given to his wife Latoya since he is at work.

## 2024-03-26 NOTE — DIETITIAN INITIAL EVALUATION ADULT - PERTINENT LABORATORY DATA
03-26    137  |  106  |  51<H>  ----------------------------<  150<H>  4.5   |  19<L>  |  1.76<H>    Ca    8.1<L>      26 Mar 2024 07:02    TPro  6.3  /  Alb  1.6<L>  /  TBili  0.5  /  DBili  x   /  AST  28  /  ALT  25  /  AlkPhos  81  03-26  POCT Blood Glucose.: 148 mg/dL (03-26-24 @ 08:00)  A1C with Estimated Average Glucose Result: 7.1 % (01-01-24 @ 07:10)

## 2024-03-26 NOTE — DIETITIAN INITIAL EVALUATION ADULT - PERTINENT MEDS FT
MEDICATIONS  (STANDING):  atorvastatin 10 milliGRAM(s) Oral at bedtime  dextrose 5%. 1000 milliLiter(s) (100 mL/Hr) IV Continuous <Continuous>  dextrose 5%. 1000 milliLiter(s) (50 mL/Hr) IV Continuous <Continuous>  dextrose 50% Injectable 25 Gram(s) IV Push once  dextrose 50% Injectable 12.5 Gram(s) IV Push once  dextrose 50% Injectable 25 Gram(s) IV Push once  glucagon  Injectable 1 milliGRAM(s) IntraMuscular once  insulin lispro (ADMELOG) corrective regimen sliding scale   SubCutaneous three times a day before meals  piperacillin/tazobactam IVPB.. 3.375 Gram(s) IV Intermittent every 8 hours  tamsulosin 0.4 milliGRAM(s) Oral at bedtime    MEDICATIONS  (PRN):  acetaminophen     Tablet .. 650 milliGRAM(s) Oral every 6 hours PRN Temp greater or equal to 38C (100.4F), Mild Pain (1 - 3)  aluminum hydroxide/magnesium hydroxide/simethicone Suspension 30 milliLiter(s) Oral every 4 hours PRN Dyspepsia  dextrose Oral Gel 15 Gram(s) Oral once PRN Blood Glucose LESS THAN 70 milliGRAM(s)/deciliter  melatonin 3 milliGRAM(s) Oral at bedtime PRN Insomnia  ondansetron Injectable 4 milliGRAM(s) IV Push every 8 hours PRN Nausea and/or Vomiting

## 2024-03-27 LAB
-  AMPICILLIN: SIGNIFICANT CHANGE UP
-  GENTAMICIN SYNERGY: SIGNIFICANT CHANGE UP
-  STREPTOMYCIN SYNERGY: SIGNIFICANT CHANGE UP
-  VANCOMYCIN: SIGNIFICANT CHANGE UP
CULTURE RESULTS: ABNORMAL
CULTURE RESULTS: ABNORMAL
METHOD TYPE: SIGNIFICANT CHANGE UP
ORGANISM # SPEC MICROSCOPIC CNT: ABNORMAL
ORGANISM # SPEC MICROSCOPIC CNT: ABNORMAL
ORGANISM # SPEC MICROSCOPIC CNT: SIGNIFICANT CHANGE UP
SPECIMEN SOURCE: SIGNIFICANT CHANGE UP
SPECIMEN SOURCE: SIGNIFICANT CHANGE UP

## 2024-03-29 LAB — BACTERIA UR CULT: ABNORMAL

## 2024-04-04 ENCOUNTER — APPOINTMENT (OUTPATIENT)
Dept: CARDIOLOGY | Facility: CLINIC | Age: 89
End: 2024-04-04

## 2025-03-24 NOTE — ED PROVIDER NOTE - IV ALTEPLASE DOOR HIDDEN
- last Hb 8.9, ferritin 23. Hb not improving with PO Fe.  - Scheduled IV Fe during visit today for tomorrow 3/25 at 0830   show